# Patient Record
Sex: MALE | Race: WHITE | Employment: OTHER | URBAN - METROPOLITAN AREA
[De-identification: names, ages, dates, MRNs, and addresses within clinical notes are randomized per-mention and may not be internally consistent; named-entity substitution may affect disease eponyms.]

---

## 2017-05-19 ENCOUNTER — HOSPITAL ENCOUNTER (OUTPATIENT)
Dept: GENERAL RADIOLOGY | Age: 67
Discharge: HOME | End: 2017-05-19
Payer: COMMERCIAL

## 2017-05-19 DIAGNOSIS — M41.50 DEGENERATIVE SCOLIOSIS: ICD-10-CM

## 2017-05-19 PROCEDURE — 72040 X-RAY EXAM NECK SPINE 2-3 VW: CPT

## 2017-05-19 PROCEDURE — 72100 X-RAY EXAM L-S SPINE 2/3 VWS: CPT

## 2017-06-30 PROBLEM — R79.89 LOW TSH LEVEL: Status: ACTIVE | Noted: 2017-06-30

## 2017-06-30 PROBLEM — E87.1 HYPONATREMIA: Status: ACTIVE | Noted: 2017-06-30

## 2017-06-30 PROBLEM — B35.1 ONYCHOMYCOSIS OF TOENAIL: Chronic | Status: ACTIVE | Noted: 2017-06-30

## 2018-02-08 PROBLEM — H04.123 INSUFFICIENCY OF TEAR FILM OF BOTH EYES: Chronic | Status: ACTIVE | Noted: 2018-02-08

## 2018-02-14 PROBLEM — K40.90 RIGHT INGUINAL HERNIA: Status: ACTIVE | Noted: 2018-02-14

## 2018-05-31 RX ORDER — CEFAZOLIN SODIUM IN 0.9 % NACL 2 G/50 ML
2 INTRAVENOUS SOLUTION, PIGGYBACK (ML) INTRAVENOUS ONCE
Status: CANCELLED | OUTPATIENT
Start: 2018-06-29 | End: 2018-06-29

## 2018-06-28 ENCOUNTER — ANESTHESIA EVENT (OUTPATIENT)
Dept: SURGERY | Age: 68
End: 2018-06-28
Payer: COMMERCIAL

## 2018-06-28 RX ORDER — SODIUM CHLORIDE 0.9 % (FLUSH) 0.9 %
5-10 SYRINGE (ML) INJECTION EVERY 8 HOURS
Status: CANCELLED | OUTPATIENT
Start: 2018-06-28

## 2018-06-28 RX ORDER — SODIUM CHLORIDE 0.9 % (FLUSH) 0.9 %
5-10 SYRINGE (ML) INJECTION AS NEEDED
Status: CANCELLED | OUTPATIENT
Start: 2018-06-28

## 2018-06-29 ENCOUNTER — HOSPITAL ENCOUNTER (OUTPATIENT)
Age: 68
Setting detail: OUTPATIENT SURGERY
Discharge: HOME | End: 2018-06-29
Attending: SURGERY | Admitting: SURGERY
Payer: COMMERCIAL

## 2018-06-29 ENCOUNTER — ANESTHESIA (OUTPATIENT)
Dept: SURGERY | Age: 68
End: 2018-06-29
Payer: COMMERCIAL

## 2018-06-29 VITALS
HEIGHT: 70 IN | BODY MASS INDEX: 23.94 KG/M2 | TEMPERATURE: 97.6 F | WEIGHT: 167.25 LBS | SYSTOLIC BLOOD PRESSURE: 150 MMHG | DIASTOLIC BLOOD PRESSURE: 81 MMHG | HEART RATE: 75 BPM | RESPIRATION RATE: 16 BRPM | OXYGEN SATURATION: 91 %

## 2018-06-29 PROCEDURE — 77030002986 HC SUT PROL J&J -A: Performed by: SURGERY

## 2018-06-29 PROCEDURE — 77030012406 HC DRN WND PENRS BARD -A: Performed by: SURGERY

## 2018-06-29 PROCEDURE — 74011250636 HC RX REV CODE- 250/636

## 2018-06-29 PROCEDURE — 74011250636 HC RX REV CODE- 250/636: Performed by: SURGERY

## 2018-06-29 PROCEDURE — 77030011640 HC PAD GRND REM COVD -A: Performed by: SURGERY

## 2018-06-29 PROCEDURE — 77030003029 HC SUT VCRL J&J -B: Performed by: SURGERY

## 2018-06-29 PROCEDURE — 74011250636 HC RX REV CODE- 250/636: Performed by: ANESTHESIOLOGY

## 2018-06-29 PROCEDURE — 74011000250 HC RX REV CODE- 250

## 2018-06-29 PROCEDURE — 77030020782 HC GWN BAIR PAWS FLX 3M -B: Performed by: ANESTHESIOLOGY

## 2018-06-29 PROCEDURE — 77030002996 HC SUT SLK J&J -A: Performed by: SURGERY

## 2018-06-29 PROCEDURE — 77030018836 HC SOL IRR NACL ICUM -A: Performed by: SURGERY

## 2018-06-29 PROCEDURE — 76010000161 HC OR TIME 1 TO 1.5 HR INTENSV-TIER 1: Performed by: SURGERY

## 2018-06-29 PROCEDURE — 77030008703 HC TU ET UNCUF COVD -A: Performed by: ANESTHESIOLOGY

## 2018-06-29 PROCEDURE — 74011000250 HC RX REV CODE- 250: Performed by: SURGERY

## 2018-06-29 PROCEDURE — 77030021678 HC GLIDESCP STAT DISP VERT -B: Performed by: ANESTHESIOLOGY

## 2018-06-29 PROCEDURE — 77030032490 HC SLV COMPR SCD KNE COVD -B: Performed by: SURGERY

## 2018-06-29 PROCEDURE — 77030031139 HC SUT VCRL2 J&J -A: Performed by: SURGERY

## 2018-06-29 PROCEDURE — C1781 MESH (IMPLANTABLE): HCPCS | Performed by: SURGERY

## 2018-06-29 PROCEDURE — 77030008467 HC STPLR SKN COVD -B: Performed by: SURGERY

## 2018-06-29 PROCEDURE — 74011250637 HC RX REV CODE- 250/637: Performed by: ANESTHESIOLOGY

## 2018-06-29 PROCEDURE — 76060000034 HC ANESTHESIA 1.5 TO 2 HR: Performed by: SURGERY

## 2018-06-29 PROCEDURE — 76210000020 HC REC RM PH II FIRST 0.5 HR: Performed by: SURGERY

## 2018-06-29 PROCEDURE — 76210000000 HC OR PH I REC 2 TO 2.5 HR: Performed by: SURGERY

## 2018-06-29 PROCEDURE — 77030018673: Performed by: SURGERY

## 2018-06-29 DEVICE — BARD MESH, 3" X 6" (7.6 CM X 15 CM)
Type: IMPLANTABLE DEVICE | Site: GROIN | Status: FUNCTIONAL
Brand: BARD

## 2018-06-29 RX ORDER — PROPOFOL 10 MG/ML
INJECTION, EMULSION INTRAVENOUS AS NEEDED
Status: DISCONTINUED | OUTPATIENT
Start: 2018-06-29 | End: 2018-06-29 | Stop reason: HOSPADM

## 2018-06-29 RX ORDER — FAMOTIDINE 20 MG/1
20 TABLET, FILM COATED ORAL ONCE
Status: COMPLETED | OUTPATIENT
Start: 2018-06-29 | End: 2018-06-29

## 2018-06-29 RX ORDER — ONDANSETRON 2 MG/ML
INJECTION INTRAMUSCULAR; INTRAVENOUS AS NEEDED
Status: DISCONTINUED | OUTPATIENT
Start: 2018-06-29 | End: 2018-06-29 | Stop reason: HOSPADM

## 2018-06-29 RX ORDER — FENTANYL CITRATE 50 UG/ML
INJECTION, SOLUTION INTRAMUSCULAR; INTRAVENOUS AS NEEDED
Status: DISCONTINUED | OUTPATIENT
Start: 2018-06-29 | End: 2018-06-29 | Stop reason: HOSPADM

## 2018-06-29 RX ORDER — NEOSTIGMINE METHYLSULFATE 1 MG/ML
INJECTION INTRAVENOUS AS NEEDED
Status: DISCONTINUED | OUTPATIENT
Start: 2018-06-29 | End: 2018-06-29 | Stop reason: HOSPADM

## 2018-06-29 RX ORDER — SODIUM CHLORIDE 0.9 % (FLUSH) 0.9 %
5-10 SYRINGE (ML) INJECTION AS NEEDED
Status: DISCONTINUED | OUTPATIENT
Start: 2018-06-29 | End: 2018-06-29 | Stop reason: HOSPADM

## 2018-06-29 RX ORDER — CEFAZOLIN SODIUM IN 0.9 % NACL 2 G/50 ML
2 INTRAVENOUS SOLUTION, PIGGYBACK (ML) INTRAVENOUS ONCE
Status: DISCONTINUED | OUTPATIENT
Start: 2018-06-29 | End: 2018-06-29 | Stop reason: SDUPTHER

## 2018-06-29 RX ORDER — GLYCOPYRROLATE 0.2 MG/ML
INJECTION INTRAMUSCULAR; INTRAVENOUS AS NEEDED
Status: DISCONTINUED | OUTPATIENT
Start: 2018-06-29 | End: 2018-06-29 | Stop reason: HOSPADM

## 2018-06-29 RX ORDER — BUPIVACAINE HYDROCHLORIDE 2.5 MG/ML
INJECTION, SOLUTION EPIDURAL; INFILTRATION; INTRACAUDAL AS NEEDED
Status: DISCONTINUED | OUTPATIENT
Start: 2018-06-29 | End: 2018-06-29 | Stop reason: HOSPADM

## 2018-06-29 RX ORDER — LIDOCAINE HYDROCHLORIDE 20 MG/ML
INJECTION, SOLUTION EPIDURAL; INFILTRATION; INTRACAUDAL; PERINEURAL AS NEEDED
Status: DISCONTINUED | OUTPATIENT
Start: 2018-06-29 | End: 2018-06-29 | Stop reason: HOSPADM

## 2018-06-29 RX ORDER — ROCURONIUM BROMIDE 10 MG/ML
INJECTION, SOLUTION INTRAVENOUS AS NEEDED
Status: DISCONTINUED | OUTPATIENT
Start: 2018-06-29 | End: 2018-06-29 | Stop reason: HOSPADM

## 2018-06-29 RX ORDER — HYDROMORPHONE HYDROCHLORIDE 2 MG/ML
0.5 INJECTION, SOLUTION INTRAMUSCULAR; INTRAVENOUS; SUBCUTANEOUS
Status: DISCONTINUED | OUTPATIENT
Start: 2018-06-29 | End: 2018-06-29 | Stop reason: HOSPADM

## 2018-06-29 RX ORDER — LIDOCAINE HYDROCHLORIDE 10 MG/ML
0.1 INJECTION INFILTRATION; PERINEURAL AS NEEDED
Status: DISCONTINUED | OUTPATIENT
Start: 2018-06-29 | End: 2018-06-29 | Stop reason: HOSPADM

## 2018-06-29 RX ORDER — OXYCODONE HYDROCHLORIDE 5 MG/1
5 TABLET ORAL
Status: COMPLETED | OUTPATIENT
Start: 2018-06-29 | End: 2018-06-29

## 2018-06-29 RX ORDER — ACETAMINOPHEN 10 MG/ML
1000 INJECTION, SOLUTION INTRAVENOUS
Status: COMPLETED | OUTPATIENT
Start: 2018-06-29 | End: 2018-06-29

## 2018-06-29 RX ORDER — MIDAZOLAM HYDROCHLORIDE 1 MG/ML
2 INJECTION, SOLUTION INTRAMUSCULAR; INTRAVENOUS
Status: DISCONTINUED | OUTPATIENT
Start: 2018-06-29 | End: 2018-06-29 | Stop reason: HOSPADM

## 2018-06-29 RX ORDER — FENTANYL CITRATE 50 UG/ML
25 INJECTION, SOLUTION INTRAMUSCULAR; INTRAVENOUS ONCE
Status: DISCONTINUED | OUTPATIENT
Start: 2018-06-29 | End: 2018-06-29 | Stop reason: HOSPADM

## 2018-06-29 RX ORDER — OXYCODONE AND ACETAMINOPHEN 5; 325 MG/1; MG/1
1 TABLET ORAL AS NEEDED
Status: DISCONTINUED | OUTPATIENT
Start: 2018-06-29 | End: 2018-06-29 | Stop reason: HOSPADM

## 2018-06-29 RX ORDER — SODIUM CHLORIDE, SODIUM LACTATE, POTASSIUM CHLORIDE, CALCIUM CHLORIDE 600; 310; 30; 20 MG/100ML; MG/100ML; MG/100ML; MG/100ML
100 INJECTION, SOLUTION INTRAVENOUS CONTINUOUS
Status: DISCONTINUED | OUTPATIENT
Start: 2018-06-29 | End: 2018-06-29 | Stop reason: HOSPADM

## 2018-06-29 RX ORDER — DIPHENHYDRAMINE HYDROCHLORIDE 50 MG/ML
12.5 INJECTION, SOLUTION INTRAMUSCULAR; INTRAVENOUS ONCE
Status: DISCONTINUED | OUTPATIENT
Start: 2018-06-29 | End: 2018-06-29 | Stop reason: HOSPADM

## 2018-06-29 RX ORDER — MIDAZOLAM HYDROCHLORIDE 1 MG/ML
2 INJECTION, SOLUTION INTRAMUSCULAR; INTRAVENOUS ONCE
Status: COMPLETED | OUTPATIENT
Start: 2018-06-29 | End: 2018-06-29

## 2018-06-29 RX ORDER — SODIUM CHLORIDE 9 MG/ML
50 INJECTION, SOLUTION INTRAVENOUS CONTINUOUS
Status: DISCONTINUED | OUTPATIENT
Start: 2018-06-29 | End: 2018-06-29 | Stop reason: HOSPADM

## 2018-06-29 RX ORDER — CEFAZOLIN SODIUM/WATER 2 G/20 ML
2 SYRINGE (ML) INTRAVENOUS
Status: COMPLETED | OUTPATIENT
Start: 2018-06-29 | End: 2018-06-29

## 2018-06-29 RX ADMIN — PROPOFOL 200 MG: 10 INJECTION, EMULSION INTRAVENOUS at 07:15

## 2018-06-29 RX ADMIN — OXYCODONE HYDROCHLORIDE 5 MG: 5 TABLET ORAL at 10:26

## 2018-06-29 RX ADMIN — ONDANSETRON 4 MG: 2 INJECTION INTRAMUSCULAR; INTRAVENOUS at 07:25

## 2018-06-29 RX ADMIN — HYDROMORPHONE HYDROCHLORIDE 0.5 MG: 2 INJECTION, SOLUTION INTRAMUSCULAR; INTRAVENOUS; SUBCUTANEOUS at 10:26

## 2018-06-29 RX ADMIN — FAMOTIDINE 20 MG: 20 TABLET ORAL at 06:08

## 2018-06-29 RX ADMIN — SODIUM CHLORIDE, SODIUM LACTATE, POTASSIUM CHLORIDE, AND CALCIUM CHLORIDE 1000 ML: 600; 310; 30; 20 INJECTION, SOLUTION INTRAVENOUS at 06:07

## 2018-06-29 RX ADMIN — Medication 2 G: at 07:25

## 2018-06-29 RX ADMIN — PROMETHAZINE HYDROCHLORIDE 6.25 MG: 25 INJECTION INTRAMUSCULAR; INTRAVENOUS at 09:24

## 2018-06-29 RX ADMIN — FENTANYL CITRATE 25 MCG: 50 INJECTION, SOLUTION INTRAMUSCULAR; INTRAVENOUS at 07:30

## 2018-06-29 RX ADMIN — MIDAZOLAM HYDROCHLORIDE 2 MG: 1 INJECTION, SOLUTION INTRAMUSCULAR; INTRAVENOUS at 06:53

## 2018-06-29 RX ADMIN — ROCURONIUM BROMIDE 40 MG: 10 INJECTION, SOLUTION INTRAVENOUS at 07:15

## 2018-06-29 RX ADMIN — ROCURONIUM BROMIDE 10 MG: 10 INJECTION, SOLUTION INTRAVENOUS at 07:41

## 2018-06-29 RX ADMIN — SODIUM CHLORIDE, SODIUM LACTATE, POTASSIUM CHLORIDE, AND CALCIUM CHLORIDE: 600; 310; 30; 20 INJECTION, SOLUTION INTRAVENOUS at 07:06

## 2018-06-29 RX ADMIN — LIDOCAINE HYDROCHLORIDE 60 MG: 20 INJECTION, SOLUTION EPIDURAL; INFILTRATION; INTRACAUDAL; PERINEURAL at 07:15

## 2018-06-29 RX ADMIN — NEOSTIGMINE METHYLSULFATE 3 MG: 1 INJECTION INTRAVENOUS at 08:21

## 2018-06-29 RX ADMIN — GLYCOPYRROLATE 0.4 MG: 0.2 INJECTION INTRAMUSCULAR; INTRAVENOUS at 08:21

## 2018-06-29 RX ADMIN — HYDROMORPHONE HYDROCHLORIDE 0.5 MG: 2 INJECTION, SOLUTION INTRAMUSCULAR; INTRAVENOUS; SUBCUTANEOUS at 10:32

## 2018-06-29 RX ADMIN — ACETAMINOPHEN 1000 MG: 10 INJECTION, SOLUTION INTRAVENOUS at 09:34

## 2018-06-29 RX ADMIN — FENTANYL CITRATE 50 MCG: 50 INJECTION, SOLUTION INTRAMUSCULAR; INTRAVENOUS at 07:15

## 2018-06-29 NOTE — ANESTHESIA PREPROCEDURE EVALUATION
Anesthetic History   No history of anesthetic complications            Review of Systems / Medical History  Patient summary reviewed and pertinent labs reviewed    Pulmonary  Within defined limits                 Neuro/Psych   Within defined limits           Cardiovascular                  Exercise tolerance: >4 METS  Comments: MVP  RBBB   GI/Hepatic/Renal     GERD           Endo/Other        Arthritis     Other Findings   Comments: Scoliosis  Insomnia  DDD  Back pain           Physical Exam    Airway  Mallampati: II  TM Distance: 4 - 6 cm  Neck ROM: decreased range of motion   Mouth opening: Normal     Cardiovascular    Rhythm: regular  Rate: normal         Dental  No notable dental hx       Pulmonary  Breath sounds clear to auscultation               Abdominal  GI exam deferred       Other Findings            Anesthetic Plan    ASA: 2  Anesthesia type: general          Induction: Intravenous  Anesthetic plan and risks discussed with: Patient

## 2018-06-29 NOTE — H&P
H&P/Consult Note/Progress Note/Office Note:   Bienvenido Vega  MRN: [id number]  BKO:[birth year]  Age:67 y.o.    HPI: Bienvenido Vega is a 78 y.o. male who developed a left groin bulge and mild-moderate discomfort in the left groin   which is worsened with exertion. He has had no prior inguinal hernia repairs in the past.      He previously had low midline incision by Dr Mitchell/Dr Deonte Strickland for a back procedure with wound dehisc.       Past Medical History:   Diagnosis Date    Arthritis 07/06/2015    mostly lumbar area    Back pain     DDD (degenerative disc disease), lumbar     GERD (gastroesophageal reflux disease)     occassionally- managed with PRN OTC meds    History of basal cell cancer     removed from scalp    History of squamous cell carcinoma     removed from RLE    Insomnia 07/06/2015    managed with PRN meds    Left inguinal hernia     MVP (mitral valve prolapse)     Onychomycosis 7/6/2015    toenail     Oral lichen planus 8/8/6421    RBBB 7/6/2015    Right wrist pain 7/6/2015    Rising PSA level 7/6/2015    Scoliosis     mild    Spondylosis of lumbar region without myelopathy or radiculopathy     chronic LBP     Past Surgical History:   Procedure Laterality Date    HX COLONOSCOPY      HX ORTHOPAEDIC      RIGHT knee scoped, Dr. Jefferson Moser ARTHROSCOPY Right     NEUROLOGICAL PROCEDURE UNLISTED  2007    L5 - S1 fused, Dr. Deonte Strickland     Current Facility-Administered Medications   Medication Dose Route Frequency    ceFAZolin (ANCEF) 2 g/20 mL in sterile water IV syringe  2 g IntraVENous ON CALL TO OR    lidocaine (XYLOCAINE) 10 mg/mL (1 %) injection 0.1 mL  0.1 mL SubCUTAneous PRN    lactated Ringers infusion  100 mL/hr IntraVENous CONTINUOUS    lactated ringers bolus infusion 1,000 mL  1,000 mL IntraVENous ONCE    0.9% sodium chloride infusion  50 mL/hr IntraVENous CONTINUOUS    fentaNYL citrate (PF) injection 25 mcg  25 mcg IntraVENous ONCE    midazolam (VERSED) injection 2 mg  2 mg IntraVENous ONCE PRN    midazolam (VERSED) injection 2 mg  2 mg IntraVENous ONCE    ceFAZolin in 0.9% NS (ANCEF) IVPB soln 2 g  2 g IntraVENous ONCE     Review of patient's allergies indicates no known allergies. Social History     Social History    Marital status:      Spouse name: N/A    Number of children: N/A    Years of education: N/A     Social History Main Topics    Smoking status: Never Smoker    Smokeless tobacco: Never Used    Alcohol use 3.0 oz/week     5 Cans of beer per week    Drug use: No    Sexual activity: Yes     Partners: Female     Other Topics Concern    Not on file     Social History Narrative     History   Smoking Status    Never Smoker   Smokeless Tobacco    Never Used     Family History   Problem Relation Age of Onset    Cancer Mother      breast cancer    Cancer Father      colon cancer    Malignant Hyperthermia Neg Hx     Pseudocholinesterase Deficiency Neg Hx     Delayed Awakening Neg Hx     Post-op Nausea/Vomiting Neg Hx     Emergence Delirium Neg Hx     Post-op Cognitive Dysfunction Neg Hx     Other Neg Hx      ROS: The patient has no difficulty with chest pain or shortness of breath. No fever or chills. Comprehensive review of systems was otherwise unremarkable except as noted above. Physical Exam:   Visit Vitals    /83 (BP 1 Location: Right arm, BP Patient Position: At rest;Supine)    Pulse 89    Temp 97.9 °F (36.6 °C)    Resp 18    Ht 5' 10\" (1.778 m)    Wt 167 lb 4 oz (75.9 kg)    SpO2 99%    BMI 24 kg/m2     Constitutional: Alert, oriented, cooperative patient in no acute distress; appears stated age    Eyes:Sclera are clear. EOMs intact  ENMT: no external lesions gross hearing normal; no obvious neck masses, no ear or lip lesions, nares normal  CV: RRR. Normal perfusion  Resp: No JVD. Breathing is  non-labored; no audible wheezing.     GI: soft and non-distended; healed scars; small ventral hernia;   +LIH; no testicular masses    Musculoskeletal: unremarkable with normal function. No embolic signs or cyanosis. Neuro:  Oriented; moves all 4; no focal deficits  Psychiatric: normal affect and mood, no memory impairment    Recent vitals (if inpt):  @IPVITALS(24:)@    Labs:  Recent Labs      06/27/18   0927   WBC  6.4   HGB  13.8   PLT  286   NA  132*   K  4.8   CL  93*   CO2  26   BUN  18   CREA  1.02   GLU  85   TBILI  0.8   SGOT  29   ALT  17   AP  46       Lab Results   Component Value Date/Time    WBC 6.4 06/27/2018 09:27 AM    HGB 13.8 06/27/2018 09:27 AM    PLATELET 161 22/71/1297 09:27 AM    Sodium 132 (L) 06/27/2018 09:27 AM    Potassium 4.8 06/27/2018 09:27 AM    Chloride 93 (L) 06/27/2018 09:27 AM    CO2 26 06/27/2018 09:27 AM    BUN 18 06/27/2018 09:27 AM    Creatinine 1.02 06/27/2018 09:27 AM    Glucose 85 06/27/2018 09:27 AM    Bilirubin, total 0.8 06/27/2018 09:27 AM    Bilirubin, direct 0.1 07/21/2010 07:41 AM    AST (SGOT) 29 06/27/2018 09:27 AM    ALT (SGPT) 17 06/27/2018 09:27 AM    Alk. phosphatase 46 06/27/2018 09:27 AM       I reviewed recent labs and recent radiologic studies. I independently reviewed radiology images for studies I described above or studies I have ordered.    Admission date (for inpatients): 6/29/2018   [unfilled]  [unfilled]    ASSESSMENT/PLAN:  Problem List  Date Reviewed: 2/14/2018          Codes Class Noted    Left inguinal hernia ICD-10-CM: K40.90  ICD-9-CM: 550.90  2/14/2018        Insufficiency of tear film of both eyes (Chronic) ICD-10-CM: R98.688  ICD-9-CM: 375.15  2/8/2018        Low TSH level ICD-10-CM: R94.6  ICD-9-CM: 794.5  6/30/2017        Onychomycosis of toenail (Chronic) ICD-10-CM: B35.1  ICD-9-CM: 110.1  6/30/2017        Hyponatremia ICD-10-CM: E87.1  ICD-9-CM: 276.1  6/30/2017    Overview Signed 6/30/2017 11:16 PM by Maddie Emanuel MD     Recurrent, probably due to meloxicam.             Lumbar disc disease (Chronic) ICD-10-CM: M51.9  ICD-9-CM: 722.93 7/6/2015        IBS (irritable bowel syndrome) (Chronic) ICD-10-CM: K58.9  ICD-9-CM: 564.1  7/6/2015        Insomnia (Chronic) ICD-10-CM: G47.00  ICD-9-CM: 780.52  7/6/2015        Arthritis (Chronic) ICD-10-CM: M19.90  ICD-9-CM: 716.90  7/6/2015    Overview Signed 7/6/2015 10:29 PM by Ashlee Su MD     primarily the right wrist             Right wrist pain (Chronic) ICD-10-CM: M25.531  ICD-9-CM: 719.43  7/6/2015        Onychomycosis (Chronic) ICD-10-CM: B35.1  ICD-9-CM: 110.1  7/6/2015    Overview Signed 7/6/2015 10:29 PM by Ashlee Su MD     toenail             Rising PSA level (Chronic) ICD-10-CM: R97.20  ICD-9-CM: 790.93  5/6/5190        Oral lichen planus (Chronic) ICD-10-CM: L43.8  ICD-9-CM: 697.0  7/6/2015        BPH with obstruction/lower urinary tract symptoms (Chronic) ICD-10-CM: N40.1, N13.8  ICD-9-CM: 600.01, 599.69  7/6/2015    Overview Signed 7/6/2015 10:34 PM by Ashlee Su MD     minor             RBBB (Chronic) ICD-10-CM: I45.10  ICD-9-CM: 426.4  7/6/2015        Chronic low back pain (Chronic) ICD-10-CM: M54.5, G89.29  ICD-9-CM: 724.2, 338.29  7/5/2015        MVP (mitral valve prolapse) (Chronic) ICD-10-CM: I34.1  ICD-9-CM: 424.0  7/5/2015        Special screening for malignant neoplasm of prostate ICD-10-CM: Z12.5  ICD-9-CM: V76.44  7/2/2015            Active Problems:    * No active hospital problems. *       Informed consent obtained for [facility] with mesh.           Signed:  Corky Ireland MD,  FACS

## 2018-06-29 NOTE — OP NOTES
[facility] [address], [address] [facility]  [phone]    [address] REPORT    Name: Guicho Romo  Date of Surgery: 6/29/2018  Med Record Number: [id number]   Age: 78 y.o. Sex: male   Pre-operative Diagnosis: Hernia, inguinal, left [K40.90]  Post-operative Diagnosis: Hernia, inguinal, left [K40.90]  - Direct  Procedure: Direct Inguinal Hernia Repair with mesh  Surgeon: Eder Urena MD  Assistants: none  Anesthesia:  General  Complications: none  Specimens:  none  Estimated Blood Loss:  <30cc      Procedure Description:   The risks, benefits, potential complications, treatment options, and expected outcomes were discussed with the patient pre-operatively. The possibilities of reaction to medication, chronic pain, bleeding, infection, injury to internal organs, recurrence, testicular damage, blood clots, the need for further surgery, heart attack, stroke, and death were discussed with the patient. The patient voiced understanding and gave informed consent preoperatively. The surgical site was marked preoperatively. The patient was taken to the Operating Room, and the [facility] time-out protocol checklist was followed. After the induction of adequate anesthesia, the abdomen was prepped and draped in the usual sterile fashion. An Nyoka Parcel was used to prevent any contact with the skin. Preoperative antibiotics were given. An oblique inguinal incision was made and carried to the external oblique fascia and external ring. The external oblique was opened in the direction of its fibers down to the ring and the spermatic cord was encircled with a penrose drain. The direct hernia defect was identified and imbricated with suture to keep it out of the field. There was no indirect hernia component. The wound was irrigated with Bacitracin irrigation. A piece of prolene mesh was soaked in bacitracin, cut to size and anchored at the pubic tubercle with 2-0 prolene suture.   The mesh was secured medially to the conjoint tendon and laterally to the iliopubic tract and then the defect cut in the mesh to allow accommodation the cord was secured with 2-0 prolene suture superolaterally. The wound was once again irrigated with bacitracin and hemostasis confirmed. It was then anesthetized with marcaine and the external oblique was re approximated with 2-0 vicryl suture. Gena's fascia was closed with 3-O vicryl suture. The skin was closed with clips, and a sterile dressing of Telfa and tegaderm was placed. At the end of the operation, all sponge, instruments, and needle counts were correct.      Eder Urena MD, FACS

## 2018-06-29 NOTE — DISCHARGE INSTRUCTIONS
Leave dressings 6-7 days. Then remove, but keep incision covered daily until follow-up. Try to keep incision as dry as possible to lower risk of infection. No heavy lifting (>5lbs) for 6 weeks to reduce risk of developing a hernia in the incisions. No driving until you are off pain meds for 24hrs and have no pain with movements associated with driving. Pain prescription (Percocet) on chart for patient to use as needed for pain  Follow-up with Dr Shelby Duran in 1 week (Friday July 6 at 11:45am-appt already made) in the office at:  [address], [address]  [phone]-->option 1)    After general anesthesia or intravenous sedation, for 24 hours or while taking prescription Narcotics:  · Limit your activities  · Do not drive and operate hazardous machinery  · Do not make important personal or business decisions  · Do  not drink alcoholic beverages  · If you have not urinated within 8 hours after discharge, please contact your surgeon on call. *  Please give a list of your current medications to your Primary Care Provider. *  Please update this list whenever your medications are discontinued, doses are      changed, or new medications (including over-the-counter products) are added. *  Please carry medication information at all times in case of emergency situations. These are general instructions for a healthy lifestyle:  No smoking/ No tobacco products/ Avoid exposure to second hand smoke  Surgeon General's Warning:  Quitting smoking now greatly reduces serious risk to your health.   Obesity, smoking, and sedentary lifestyle greatly increases your risk for illness  A healthy diet, regular physical exercise & weight monitoring are important for maintaining a healthy lifestyle    You may be retaining fluid if you have a history of heart failure or if you experience any of the following symptoms:  Weight gain of 3 pounds or more overnight or 5 pounds in a week, increased swelling in our hands or feet or shortness of breath while lying flat in bed. Please call your doctor as soon as you notice any of these symptoms; do not wait until your next office visit. Recognize signs and symptoms of STROKE:  F-face looks uneven  A-arms unable to move or move unevenly  S-speech slurred or non-existent  T-time-call 911 as soon as signs and symptoms begin-DO NOT go       Back to bed or wait to see if you get better-TIME IS BRAIN.

## 2018-06-29 NOTE — IP AVS SNAPSHOT
Cynthia Shelby 
 
 
 [address] [facility] [address] 
[phone] Patient: Benjy Bolaños MRN: [id number] About your hospitalization You were admitted on:  June 29, 2018 You last received care in the:  [facility] PACU You were discharged on:  June 29, 2018 Why you were hospitalized Your primary diagnosis was:  Not on File Follow-up Information Follow up With Details Comments Contact Info Ro Johnston MD   [address] [facility] [address] 
[phone] Your Scheduled Appointments Thursday July 05, 2018  3:00 PM EDT Complete Physical with Ro Johnston MD  
Internal Medicine [facility] Diagnostics ([address]) [address] [facility] 
[address] [place] [id number]  
[phone] Friday July 06, 2018 11:45 AM EDT Global Post Op with Shea Arellano MD  
[facility] ([facility]) [facility] [address]  
[phone] Discharge Orders None A check louis indicates which time of day the medication should be taken. My Medications CONTINUE taking these medications Instructions Each Dose to Equal  
 Morning Noon Evening Bedtime BENTYL 10 mg capsule Generic drug:  dicyclomine Your last dose was: Your next dose is: Take 10 mg by mouth as needed. 10 mg  
    
   
   
   
  
 COLACE 100 mg capsule Generic drug:  docusate sodium Your last dose was: Your next dose is: Take 100 mg by mouth daily. 100 mg  
    
   
   
   
  
 eszopiclone 3 mg tablet Commonly known as:  Flower Normangee Your last dose was: Your next dose is: Take 1 Tab by mouth nightly as needed for Sleep. Max Daily Amount: 3 mg.  
 3 mg HYDROcodone-acetaminophen 5-325 mg per tablet Commonly known as:  [address] Your last dose was: Your next dose is: Take 1 Tab by mouth every six (6) hours as needed for Pain. Max Daily Amount: 4 Tabs. 1 Tab LINZESS 145 mcg Cap capsule Generic drug:  linaclotide Your last dose was: Your next dose is: Take 145 mcg by mouth as needed. 145 mcg  
    
   
   
   
  
 loratadine 10 mg tablet Commonly known as:  Chapo Briggs Your last dose was: Your next dose is: Take 10 mg by mouth daily. 10 mg  
    
   
   
   
  
 meloxicam 15 mg tablet Commonly known as:  MOBIC Your last dose was: Your next dose is: Take 1 Tab by mouth daily. 15 mg  
    
   
   
   
  
 omeprazole 20 mg capsule Commonly known as:  PRILOSEC Your last dose was: Your next dose is: Take 20 mg by mouth daily. 20 mg  
    
   
   
   
  
 RESTASIS 0.05 % ophthalmic emulsion Generic drug:  cycloSPORINE Your last dose was: Your next dose is:    
   
   
 INSTILL 1 DROP IN BOTH EYES TWICE DAILY  
     
   
   
   
  
 SINGULAIR 10 mg tablet Generic drug:  montelukast  
   
Your last dose was: Your next dose is: Take 10 mg by mouth as needed. 10 mg  
    
   
   
   
  
 traMADol 50 mg tablet Commonly known as:  ULTRAM  
   
Your last dose was: Your next dose is: Take 1 Tab by mouth every six (6) hours as needed for Pain. Max Daily Amount: 200 mg.  
 50 mg  
    
   
   
   
  
 tretinoin 0.05 % topical cream  
Commonly known as:  RETIN-A Your last dose was: Your next dose is:    
   
   
 APPLY TO AFFECTED AREA NIGHTLY ATBEDTIME  
     
   
   
   
  
 triamcinolone acetonide 0.1 % dental paste Commonly known as:  KENALOG Your last dose was:     
   
Your next dose is:    
   
   
 APPLY AS DIRECTED  
 Opioid Education Prescription Opioids: What You Need to Know: 
 
Prescription opioids can be used to help relieve moderate-to-severe pain and are often prescribed following a surgery or injury, or for certain health conditions. These medications can be an important part of treatment but also come with serious risks. Opioids are strong pain medicines. Examples include hydrocodone, oxycodone, fentanyl, and morphine. Heroin is an example of an illegal opioid. It is important to work with your health care provider to make sure you are getting the safest, most effective care. WHAT ARE THE RISKS AND SIDE EFFECTS OF OPIOID USE? Prescription opioids carry serious risks of addiction and overdose, especially with prolonged use. An opioid overdose, often marked by slow breathing, can cause sudden death. The use of prescription opioids can have a number of side effects as well, even when taken as directed. · Tolerance-meaning you might need to take more of a medication for the same pain relief · Physical dependence-meaning you have symptoms of withdrawal when the medication is stopped. Withdrawal symptoms can include nausea, sweating, chills, diarrhea, stomach cramps, and muscle aches. Withdrawal can last up to several weeks, depending on which drug you took and how long you took it. · Increased sensitivity to pain · Constipation · Nausea, vomiting, and dry mouth · Sleepiness and dizziness · Confusion · Depression · Low levels of testosterone that can result in lower sex drive, energy, and strength · Itching and sweating RISKS ARE GREATER WITH:      
· History of drug misuse, substance use disorder, or overdose · Mental health conditions (such as depression or anxiety) · Sleep apnea · Older age (72 years or older) · Pregnancy Avoid alcohol while taking prescription opioids.   Also, unless specifically advised by your health care provider, medications to avoid include: · Benzodiazepines (such as Xanax or Valium) · Muscle relaxants (such as Soma or Flexeril) · Hypnotics (such as Ambien or Lunesta) · Other prescription opioids KNOW YOUR OPTIONS Talk to your health care provider about ways to manage your pain that don't involve prescription opioids. Some of these options may actually work better and have fewer risks and side effects. Options may include: 
· Pain relievers such as acetaminophen, ibuprofen, and naproxen · Some medications that are also used for depression or seizures · Physical therapy and exercise · Counseling to help patients learn how to cope better with triggers of pain and stress. · Application of heat or cold compress · Massage therapy · Relaxation techniques Be Informed Make sure you know the name of your medication, how much and how often to take it, and its potential risks & side effects. IF YOU ARE PRESCRIBED OPIOIDS FOR PAIN: 
· Never take opioids in greater amounts or more often than prescribed. Remember the goal is not to be pain-free but to manage your pain at a tolerable level. · Follow up with your primary care provider to: · Work together to create a plan on how to manage your pain. · Talk about ways to help manage your pain that don't involve prescription opioids. · Talk about any and all concerns and side effects. · Help prevent misuse and abuse. · Never sell or share prescription opioids · Help prevent misuse and abuse. · Store prescription opioids in a secure place and out of reach of others (this may include visitors, children, friends, and family). · Safely dispose of unused/unwanted prescription opioids: Find your community drug take-back program or your pharmacy mail-back program, or flush them down the toilet, following guidance from the Food and Drug Administration (www.fda.gov/Drugs/ResourcesForYou). · Visit www.cdc.gov/drugoverdose to learn about the risks of opioid abuse and overdose. · If you believe you may be struggling with addiction, tell your health care provider and ask for guidance or call Garry Saleh at 4-960-156-MGBI. Discharge Instructions Leave dressings 6-7 days. Then remove, but keep incision covered daily until follow-up. Try to keep incision as dry as possible to lower risk of infection. No heavy lifting (>5lbs) for 6 weeks to reduce risk of developing a hernia in the incisions. No driving until you are off pain meds for 24hrs and have no pain with movements associated with driving. Pain prescription (Percocet) on chart for patient to use as needed for pain Follow-up with Dr Tal Gamino in 1 week (Friday July 6 at 11:45am-appt already made) in the office at: 
[place] [address], [address] 
([phone]-->option 1) After general anesthesia or intravenous sedation, for 24 hours or while taking prescription Narcotics: · Limit your activities · Do not drive and operate hazardous machinery · Do not make important personal or business decisions · Do  not drink alcoholic beverages · If you have not urinated within 8 hours after discharge, please contact your surgeon on call. *  Please give a list of your current medications to your Primary Care Provider. *  Please update this list whenever your medications are discontinued, doses are 
    changed, or new medications (including over-the-counter products) are added. *  Please carry medication information at all times in case of emergency situations. These are general instructions for a healthy lifestyle: No smoking/ No tobacco products/ Avoid exposure to second hand smoke Surgeon General's Warning:  Quitting smoking now greatly reduces serious risk to your health. Obesity, smoking, and sedentary lifestyle greatly increases your risk for illness A healthy diet, regular physical exercise & weight monitoring are important for maintaining a healthy lifestyle You may be retaining fluid if you have a history of heart failure or if you experience any of the following symptoms:  Weight gain of 3 pounds or more overnight or 5 pounds in a week, increased swelling in our hands or feet or shortness of breath while lying flat in bed. Please call your doctor as soon as you notice any of these symptoms; do not wait until your next office visit. Recognize signs and symptoms of STROKE: 
F-face looks uneven A-arms unable to move or move unevenly S-speech slurred or non-existent T-time-call 911 as soon as signs and symptoms begin-DO NOT go Back to bed or wait to see if you get better-TIME IS BRAIN. Introducing [facility]! [employer] introduces eKonnekt patient portal. Now you can access parts of your medical record, email your doctor's office, and request medication refills online. 1. In your internet browser, go to [email or web address] 2. Click on the First Time User? Click Here link in the Sign In box. You will see the New Member Sign Up page. 3. Enter your eKonnekt Access Code exactly as it appears below. You will not need to use this code after youve completed the sign-up process. If you do not sign up before the expiration date, you must request a new code. · eKonnekt Access Code: [email or web address] Expires: 8/15/2018 11:38 AM 
 
4. Enter the last four digits of your Social Security Number (xxxx) and Date of Birth (mm/dd/yyyy) as indicated and click Submit. You will be taken to the next sign-up page. 5. Create a eKonnekt ID. This will be your eKonnekt login ID and cannot be changed, so think of one that is secure and easy to remember. 6. Create a eKonnekt password. You can change your password at any time. 7. Enter your Password Reset Question and Answer.  This can be used at a later time if you forget your password. 8. Enter your e-mail address. You will receive e-mail notification when new information is available in [address] 9. Click Sign Up. You can now view and download portions of your medical record. 10. Click the Download Summary menu link to download a portable copy of your medical information. If you have questions, please visit the Frequently Asked Questions section of the Maventt website. Remember, Averail is NOT to be used for urgent needs. For medical emergencies, dial 911. Now available from your iPhone and Android! Introducing Segundo Perez As a [employer] patient, I wanted to make you aware of our electronic visit tool called Segundo Perez. [employer] 24/7 allows you to connect within minutes with a medical provider 24 hours a day, seven days a week via a mobile device or tablet or logging into a secure website from your computer. You can access Segundo Perez from anywhere in the [place]. A virtual visit might be right for you when you have a simple condition and feel like you just dont want to get out of bed, or cant get away from work for an appointment, when your regular [employer] provider is not available (evenings, weekends or holidays), or when youre out of town and need minor care. Electronic visits cost only $49 and if the [employer] 24/7 provider determines a prescription is needed to treat your condition, one can be electronically transmitted to a nearby pharmacy*. Please take a moment to enroll today if you have not already done so. The enrollment process is free and takes just a few minutes. To enroll, please download the [employer] 24/7 john to your tablet or phone, or visit [email or web address] to enroll on your computer.    
And, as an [address] [place] [address] patient with a [employer] account, the results of your visits will be scanned into your electronic medical record and your primary care provider will be able to view the scanned results. We urge you to continue to see your regular [employer] provider for your ongoing medical care. And while your primary care provider may not be the one available when you seek a Segundo Cohenfin virtual visit, the peace of mind you get from getting a real diagnosis real time can be priceless. For more information on Biscottivivianfin, view our Frequently Asked Questions (FAQs) at [email or web address]. Sincerely, 
 
Tucker Rodriguez MD 
Chief Medical Officer MundoMartha Cole Tenzin *:  certain medications cannot be prescribed via [employer] Providers Seen During Your Hospitalization Provider Specialty Primary office phone Eder Urena MD General Surgery [phone] Your Primary Care Physician (PCP) Primary Care Physician Office Phone Office Fax [address] [phone] You are allergic to the following No active allergies Recent Documentation Height Weight BMI Smoking Status 1.778 m 75.9 kg 24 kg/m2 Never Smoker Emergency Contacts Name Discharge Info Relation Home Work Mobile Rehana Gomez  Child [2] [phone] Caterina Romeo  Spouse [3] [phone] Patient Belongings The following personal items are in your possession at time of discharge: 
  Dental Appliances: None  Visual Aid: Glasses      Home Medications: None   Jewelry: None  Clothing: Pants, Shirt, Footwear, Undergarments, Belt, Socks    Other Valuables: None  Personal Items Sent to Safe: none Please provide this summary of care documentation to your next provider. Signatures-by signing, you are acknowledging that this After Visit Summary has been reviewed with you and you have received a copy. Patient Signature:  ____________________________________________________________ Date:  ____________________________________________________________  
  
Alba Landsman Provider Signature:  ____________________________________________________________ Date:  ____________________________________________________________

## 2018-06-29 NOTE — ANESTHESIA POSTPROCEDURE EVALUATION
Post-Anesthesia Evaluation and Assessment    Patient: Brock Yu MRN: [id number]  SSN: [id number]    YOB: 1950  Age: 78 y.o. Sex: male       Cardiovascular Function/Vital Signs  Visit Vitals    /89    Pulse 70    Temp 36.7 °C (98 °F)    Resp 16    Ht 5' 10\" (1.778 m)    Wt 75.9 kg (167 lb 4 oz)    SpO2 100%    BMI 24 kg/m2       Patient is status post general anesthesia for Procedure(s):  LEFT HERNIA INGUINAL REPAIR WITH  MESH. Nausea/Vomiting: None    Postoperative hydration reviewed and adequate. Pain:  Pain Scale 1: Numeric (0 - 10) (06/29/18 0841)  Pain Intensity 1: 2 (06/29/18 0841)   Managed    Neurological Status:   Neuro (WDL): Exceptions to WDL (06/29/18 0841)  Neuro  Neurologic State: Sleeping (06/29/18 0841)   At baseline    Mental Status and Level of Consciousness: Arousable    Pulmonary Status:   O2 Device: Room air (06/29/18 0915)   Adequate oxygenation and airway patent    Complications related to anesthesia: None    Post-anesthesia assessment completed.  No concerns    Signed By: Avelino Larson MD     June 29, 2018

## 2018-07-03 ENCOUNTER — PATIENT OUTREACH (OUTPATIENT)
Dept: OTHER | Age: 68
End: 2018-07-03

## 2018-07-03 NOTE — PROGRESS NOTES
Patient on [address] [facility] discharge report dated 6/30/18. Left message on voicemail. Will attempt to contact again. Need to complete post-discharge assessment.

## 2018-07-04 ENCOUNTER — PATIENT OUTREACH (OUTPATIENT)
Dept: OTHER | Age: 68
End: 2018-07-04

## 2018-07-04 NOTE — LETTER
Mr. Jose J Tamayo [address] [facility] [address] Dear Mr. Randall Lew, My name is Yulia Sharif RN, Employee Care Manager for [employer], and I have been trying to reach you. The Employee Care  is a free-of-charge, confidential service provided to our employees and their family members covered by the [employer]. Part of my job is to follow up with members who have recently been in the hospital or emergency room, to help them coordinate their care and answer questions they may have about their visit. I am able to provide assistance with medication questions, scheduling needed follow-up appointments, and arranging services like home health or home medical equipment. I can also provide education regarding your hospital or ER visit as well as your medical conditions. As healthcare providers, we know that patients do better when they have close follow up with a primary care provider (PCP), especially after a hospital or emergency department visit. If you do not have a PCP, I can help you find one that is convenient to you and covered by your insurance. I can also help you understand any after visit instructions, such as what symptoms to watch out for, or any new or changed medications. Employee Care Management now partners with [employer]. If you are a qualifying employee, you may receive an additional 10 wellness incentive points for every month of active participation with an Employee Care Manager. Remember that you can access your After Visit Summary by logging into your [email or web address] account. If you do not have a [email or web address] account, I can help you request access. Our program is designed to provide you with the opportunity to have a [employer] care manager partner with you for your healthcare needs. Please contact me at the below number if I can provide you with assistance for any of the above services.  
 
 
Sincerely, 
 
 
 Kalee Zelaya, RN, BSN  Employee Care Manager [address] [email or web address]

## 2018-07-04 NOTE — PROGRESS NOTES
Second attempt to reach patient for [facility] Program, and discharge assessment. Discreet VM left. Will send UTR letter.

## 2018-07-06 PROBLEM — J31.0 RHINITIS: Chronic | Status: ACTIVE | Noted: 2018-07-06

## 2018-07-06 PROBLEM — C44.90 NONMELANOMA SKIN CANCER: Chronic | Status: ACTIVE | Noted: 2018-07-06

## 2018-07-06 PROBLEM — N42.9 ABNORMAL PROSTATE ON PHYSICAL EXAMINATION: Status: ACTIVE | Noted: 2018-07-06

## 2018-07-12 ENCOUNTER — PATIENT OUTREACH (OUTPATIENT)
Dept: OTHER | Age: 68
End: 2018-07-12

## 2018-07-12 NOTE — PROGRESS NOTES
Third attempt to reach patient for [facility] Program, and discharge assessment. Discreet VM left. Sent UTR letter last outreach attampt.

## 2018-08-08 ENCOUNTER — PATIENT OUTREACH (OUTPATIENT)
Dept: OTHER | Age: 68
End: 2018-08-08

## 2018-08-08 NOTE — LETTER
8/8/2018 4:23 PM 
 
Mr. Destini Varnernvangen [address] [facility] [address] Dear Destini Efren My name is Zain Olivares,  Employee Care Manager for [employer], and I have been trying to reach you. The Employee Care Management [facility]) program is a free-of-charge, confidential service provided to our employees and their family members covered by the [facility]. I can help you with care transitions such as when you come home from the hospital, when help is needed to manage your disease, or when you need assistance coordinating services or appointments. [employer] now partners with [facility]. If you are a qualifying employee, you may receive an additional 10 wellness incentive points for every month of active participation with an Employee Care Manager. As healthcare providers, we know that patients do better when they have close follow up with a primary care provider (PCP). I can help you find one that is convenient to you and covered by your insurance. I can also help you understand any after visit instructions, such as what symptoms to watch out for, or any new or changed medications. We can work together using your preferred communication -- telephone, email, sifonrhart. If you do not have a [employer] account, I can help you request access. Our program is designed to provide you with the opportunity to have a [employer] care manager partner with you for your healthcare needs. Due to not being able to reach you, I am closing out the current program, but will remain available to you should you have any questions. Please contact me at the below number if I can provide you with assistance for any of the above services. Sincerely, Zain Olivares RN, BSN  Employee Care Manager [address] [place] [address], [phone] [facility] Alexander Quintero [phone]  F [phone]  [email or web address]

## 2018-08-08 NOTE — PROGRESS NOTES
NICKOLAS follow up. * Covering for CM Pina Peoples RN. Final call made today to attempt to contact patient. Discreet message left on voicemail with contact information. Resolving current episode for case management due to patient unable to reach. Patient has not been reached after repeated calls and letters. Letter sent to patient notifying completion of services due to unable to reach. This writer's contact information and information regarding program services included in materials sent.

## 2019-06-11 ENCOUNTER — TELEPHONE (OUTPATIENT)
Dept: PHARMACY | Age: 69
End: 2019-06-11

## 2019-06-11 NOTE — TELEPHONE ENCOUNTER
CLINICAL PHARMACY NOTE - Medication Conversion Initiative    Sam Pardo is a 80 y.o. male referred to clinical pharmacy specialist - identified with current prescription(s) for Flector. Starting July 2019, the prescribed medication(s) is moving from tier 2 to tier 3 per patient's pharmacy benefit. For ministry and patient cost savings, a conversion has been identified to generic diclofenac patch. Plan:  - Medications:   Consider changing from brand Flector to generic diclofenac patch   Copay cards available: Yes  - Follow up: Per provider discretion     First attempt made to reach patient by telephone to discuss formulary changes. Left voice message for patient to return clinician's phone call to [phone] and/or [phone] option 7. Will continue to attempt to contact patient by telephone as appropriate.     Priti Tate, PharmD, Daria Melchor, [facility]  Clinical Pharmacy Specialist  [phone], Option 7

## 2019-06-12 NOTE — TELEPHONE ENCOUNTER
CLINICAL PHARMACY NOTE - Medication Conversion Initiative     Second attempt made to reach patient by telephone to discuss formulary changes. Left detailed voice message for patient regarding formulary changes and alternatives. Also sent patient an email with the information and shared the team's contact information if there are any questions.  No further outreach at this time.      Ibrahima Leary, PharmD, Margy Liu, [address] [facility] Specialist  [phone], Option 7

## 2019-07-03 ENCOUNTER — HOSPITAL ENCOUNTER (OUTPATIENT)
Dept: GENERAL RADIOLOGY | Age: 69
Discharge: HOME | End: 2019-07-03

## 2019-07-03 DIAGNOSIS — Z00.00 ROUTINE GENERAL MEDICAL EXAMINATION AT A HEALTH CARE FACILITY: ICD-10-CM

## 2019-07-22 ENCOUNTER — HOSPITAL ENCOUNTER (OUTPATIENT)
Dept: ULTRASOUND IMAGING | Age: 69
Discharge: HOME | End: 2019-07-22
Attending: INTERNAL MEDICINE

## 2019-07-22 DIAGNOSIS — R06.02 SHORTNESS OF BREATH: ICD-10-CM

## 2019-07-22 DIAGNOSIS — R22.43 LOCALIZED SWELLING, MASS, OR LUMP OF BOTH LOWER EXTREMITIES: ICD-10-CM

## 2020-03-04 ENCOUNTER — HOSPITAL ENCOUNTER (OUTPATIENT)
Dept: MAMMOGRAPHY | Age: 70
Discharge: HOME | End: 2020-03-04
Attending: INTERNAL MEDICINE
Payer: COMMERCIAL

## 2020-03-04 DIAGNOSIS — M81.0 AGE-RELATED OSTEOPOROSIS WITHOUT CURRENT PATHOLOGICAL FRACTURE: ICD-10-CM

## 2020-03-04 DIAGNOSIS — E55.9 HYPOVITAMINOSIS D: ICD-10-CM

## 2020-03-04 PROCEDURE — 77080 DXA BONE DENSITY AXIAL: CPT

## 2020-03-06 ENCOUNTER — HOSPITAL ENCOUNTER (OUTPATIENT)
Dept: PHYSICAL THERAPY | Age: 70
Discharge: HOME | End: 2020-03-06
Payer: COMMERCIAL

## 2020-03-06 PROCEDURE — 97140 MANUAL THERAPY 1/> REGIONS: CPT

## 2020-03-06 PROCEDURE — 97110 THERAPEUTIC EXERCISES: CPT

## 2020-03-06 PROCEDURE — 97161 PT EVAL LOW COMPLEX 20 MIN: CPT

## 2020-03-06 NOTE — PROGRESS NOTES
Erna Stringer  : 1950  Payor: [facility] / Plan: 2900 [facility] 30 [facility] / Product Type: Commerical /  [address]  at [facility] [facility], [place] [address] [address] [facility]  [phone]   [phone]      OUTPATIENT PHYSICAL THERAPY: Daily Treatment Note 3/6/2020  Visit Count:  1    ICD-10: Treatment Diagnosis:   M54.5 Low Back Pain   R26.2 Difficulty in Walking, Not Elsewhere Classified    Precautions/Allergies:   Patient has no known allergies. TREATMENT PLAN:  Effective Dates: 3/6/2020 TO 2020 (60 days). Frequency/Duration: 3 times a week for 60 Days        PRE-TREATMENT SYMPTOMS/COMPLAINTS:  Central Low back pain    MEDICATIONS REVIEWED:  3/6/2020   TREATMENT:   (In addition to Assessment/Re-Assessment sessions the following treatments were rendered)    THERAPEUTIC EXERCISE: (8 minutes):  Exercises per grid below to improve mobility, strength and balance. Required minimal visual and verbal cues to promote proper body alignment and promote proper body posture. Progressed resistance, range and complexity of movement as indicated. Date:  3/6/2020 Date:   Date:     Activity/Exercise Parameters Parameters Parameters   Piriformis stretch HEP     Glute stretch HEP     LTR HEP     bridges      PPT with TA activation      Side plank, forward plank      Hip abduction      clamshell      birddog                          MANUAL THERAPY: (15 minutes): Joint mobilization, Soft tissue mobilization and Manipulation was utilized and necessary because of the patient's restricted joint motion, painful spasm, loss of articular motion and restricted motion of soft tissue.    +PA L1-L5 Grade III    (Used abbreviations: MET - muscle energy technique; PNF - proprioceptive neuromuscular facilitation; NMR - neuromuscular re-education; AP - anterior to posterior; PA - posterior to anterior)    MODALITIES: (0 minutes):      none TREATMENT/SESSION ASSESSMENT:  Zana Coffey verbalized understanding of role of PT and POC. Education: Provided pt with HEP. RECOMMENDATIONS/INTENT FOR NEXT TREATMENT SESSION: \"Next visit will focus on advancements to more challenging activities\".     PAIN: Initial: 6/10 Post Session: 6 /10     MedBridge Portal    Total Treatment Billable Duration: 44 minutes  PT Patient Time In/Time Out  Time In: 1518  Time Out: 1602  Gaudencio Dorman, PT, DPT    Future Appointments   Date Time Provider Elisabeth Chauhan   3/9/2020  8:00 AM Rei Senate A [facility]   3/11/2020  8:45 AM Rei Senate A [facility]PT [facility]   3/13/2020 10:15 AM Rei Senate A S[facility]ORPT SFD   3/16/2020  9:30 AM Rei Senate A SFDORPT SFD   3/17/2020 11:00 AM Rei Senate A S[facility]ORPT SFD   3/20/2020  1:45 PM Rei Senate A S[facility]ORPT SFD   3/23/2020  9:30 AM Rei Senate A S[facility]ORPT SFD   3/24/2020 11:00 AM Rei Senate A S[facility]PT SFD   3/27/2020 10:15 AM Rei Senate A [facility]ORPT SFD   3/30/2020 10:15 AM Rei Senate A [facility]PT S[facility]   4/1/2020 10:15 AM Rei Senate A [facility]PT S[facility]   4/3/2020 10:15 AM Rei Senate A SFDORPT SFD   4/30/2020 11:00 AM Lam Garcia MD [facility] BSNE   9/9/2020  3:00 PM Lam Garcia MD [facility]

## 2020-03-06 NOTE — THERAPY EVALUATION
Cleveland Myers : 1950 Payor: [address] / Plan: 2900 [facility] 30 [facility] / Product Type: Commlailal /  [address]  at [facility] [address], [address] [facility], [place] [address] [address] [facility] [phone]   [phone] OUTPATIENT PHYSICAL THERAPY:Initial Assessment and PM 3/6/2020 ICD-10: Treatment Diagnosis:  
M54.5 Low Back Pain R26.2 Difficulty in Walking, Not Elsewhere Classified Precautions/Allergies:  
Patient has no known allergies. Fall Risk Score: 2 (? 5 = High Risk) MD Orders: Evaluate and Treat MEDICAL/REFERRING DIAGNOSIS: 
Other forms of scoliosis, lumbar region [M41.86] DATE OF ONSET: 6-8 weeks ago REFERRING PHYSICIAN: Jorge Luis Fuller MD 
RETURN PHYSICIAN APPOINTMENT:   
 
ASSESSMENT:  Mr. Shaheen Denson has attended 1 physical therapy session including the initial evaluation. Pt presents with significant hypomobility of lumbar spinal segments with decreased ROM and core strength affecting pt ability to tolerate functional mobility, ADLs, and work tasks. Recommending skilled PT: manual therapeutic techniques (as appropriate), therapeutic exercises and activities, balance interventions, and a comprehensive home exercise program to address the current impairments, as listed above. Cleveland Myers will benefit from skilled PT (medically necessary) to address above deficits affecting participation in basic ADLs and overall functional tolerance. PROBLEM LIST (Impacting functional limitations): 1. Decreased Strength 2. Decreased ADL/Functional Activities 3. Decreased Transfer Abilities 4. Decreased Ambulation Ability/Technique 5. Decreased Balance 6. Increased Pain 7. Decreased Flexibility/Joint Mobility 8. Decreased [place] with Home Exercise Program INTERVENTIONS PLANNED: 
1. Balance Exercise 2. Cold 3. Cryotherapy 4. Electrical Stimulation 5. Family Education 6. Gait Training 7.  Heat 8. Home Exercise Program (HEP) 9. Manual Therapy 10. Neuromuscular Re-education/Strengthening 11. Range of Motion (ROM) 12. Therapeutic Activites 13. Therapeutic Exercise/Strengthening 14. Transcutaneous Electrical Nerve Stimulation (TENS) 15. Transfer Training TREATMENT PLAN: 
TREATMENT PLAN: 
Effective Dates: 3/6/2020 TO 5/5/2020 (60 days). Frequency/Duration: 3 times a week for 60 Days Short-Term Functional Goals: Time Frame: 4 weeks 1. Pt will be compliant with HEP. 2. Pt will decreased worst pain to 5/10 or less in order to improved standing and sitting tolerance for work and ADLs 3. Pt will decreased KAI to 13/50 or less in order to return to daily functional activities and work tasks 4. Pt will report sitting tolerance > 30 minutes with <5/10 pain for return to functional mobility and work tasks Discharge Goals: Time Frame: 8 weeks 1. Pt will be independent with HEP. 2. Pt will decreased worst pain to 3/10 or less in order to improve standing and sitting tolerance for work and ADLs 3. Pt will decreased KAI to 8/50 or less in order to return to daily functional activities and work tasks 4. Pt will report sitting tolerance > 60 minutes with < 3/10 pain for return to functional mobility and work tasks Rehabilitation Potential For Stated Goals: Good Regarding [facility] therapy, I certify that the treatment plan above will be carried out by a therapist or under their direction. Thank you for this referral, Joao Hicks, PT, DPT Referring Physician Signature: Padma Hull MD              Date The information in this section was collected on 3/6/20 (except where otherwise noted). HISTORY:  
History of Present Injury/Illness (Reason for Referral): Mechanical low back pain CC/Primary Concern: 
    Pt reports a history of chronic lower back pain that began in his 35s.  Pt reports having a lumbar fusion L5-S1 a few years ago but now with recent exaccerbation approximately 6-8 weeks ago. 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 Treatment Side: Bilateral central LBP Past Medical History/Comorbidities:  
Mr. Markie Hernández  has a past medical history of Arthritis (07/06/2015), Back pain, DDD (degenerative disc disease), lumbar, GERD (gastroesophageal reflux disease), History of basal cell cancer, History of squamous cell carcinoma, Insomnia (07/06/2015), Left inguinal hernia, MVP (mitral valve prolapse), Onychomycosis (5/7/9897), Oral lichen planus (7/1/4252), RBBB (7/6/2015), Right wrist pain (7/6/2015), Rising PSA level (7/6/2015), Scoliosis, and Spondylosis of lumbar region without myelopathy or radiculopathy. Mr. Markie Hernández  has a past surgical history that includes hx orthopaedic; neurological procedure unlisted (2007); hx colonoscopy; and hx shoulder arthroscopy (Right). Social History/Living Environment:  
 Lives with wife, enjoys being active (fly fishing, going to [address] [facility], 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 
Prior to 6-8 weeks ago pain was manageable Patient Goals: Return to work tasks with manageable pain, be able to sit for > 1 hour without significant back pain, be able to stand for >1 hour without significant back pain Current Medications: Current Outpatient Medications:  
  linaCLOtide (LINZESS) 145 mcg cap capsule, Take 1 Cap by mouth Daily (before breakfast). , Disp: 90 Cap, Rfl: 3 
  traMADol (ULTRAM) 50 mg tablet, Take 1 Tab by mouth every six (6) hours as needed for Pain for up to 30 days. Max Daily Amount: 200 mg., Disp: 120 Tab, Rfl: 5 
  Cetirizine (ZYRTEC) 10 mg cap, Take  by mouth., Disp: , Rfl:  
  terbinafine HCl (LAMISIL) 250 mg tablet, Take 250 mg by mouth daily. , Disp: , Rfl:  
  lidocaine (LIDODERM) 5 %, by TransDERmal route every twenty-four (24) hours. Apply patch to the affected area for 12 hours a day and remove for 12 hours a day., Disp: , Rfl:  
  meloxicam (MOBIC) 15 mg tablet, Take 1 Tab by mouth daily. , Disp: 90 Tab, Rfl: 3 
  eszopiclone (LUNESTA) 3 mg tablet, Take 1 Tab by mouth nightly as needed for Sleep. Max Daily Amount: 3 mg., Disp: 90 Tab, Rfl: 1 
  montelukast (SINGULAIR) 10 mg tablet, Take 1 Tab by mouth daily. , Disp: 90 Tab, Rfl: 4 
  tadalafil (CIALIS) 5 mg tablet, Take 1 Tab by mouth daily. , Disp: 90 Tab, Rfl: 3 
  predniSONE (DELTASONE) 5 mg tablet, 8 tablets daily for 5 days then taper as directed, Disp: 100 Tab, Rfl: 1 
  dicyclomine (BENTYL) 20 mg tablet, Take 1 Tab by mouth every six (6) hours. , Disp: 120 Tab, Rfl: 5 
  tamsulosin (FLOMAX) 0.4 mg capsule, Take 1 Cap by mouth daily. , Disp: 90 Cap, Rfl: 3 
  ipratropium (ATROVENT) 0.03 % nasal spray, 2 Sprays by Both Nostrils route every twelve (12) hours. , Disp: 30 mL, Rfl: 11 
  docusate sodium (COLACE) 100 mg capsule, Take 100 mg by mouth daily. , Disp: , Rfl:  
  RESTASIS 0.05 % ophthalmic emulsion, INSTILL 1 DROP IN BOTH EYES TWICE DAILY, Disp: , Rfl: 12 
  tretinoin (RETIN-A) 0.05 % topical cream, APPLY TO AFFECTED AREA NIGHTLY ATBEDTIME, Disp: , Rfl: 1 
  triamcinolone acetonide (KENALOG) 0.1 % dental paste, APPLY AS DIRECTED, Disp: , Rfl: 1 
  omeprazole (PRILOSEC) 20 mg capsule, Take 20 mg by mouth daily. , Disp: , Rfl:   
Date Last Reviewed:  3/6/2020 Ambulatory/Rehab Services H2 Model Falls Risk Assessment Risk Factors: 
     (1)  Gender [Male] Ability to Rise from Chair: 
     (1)  Pushes up, successful in one attempt Falls Prevention Plan: No modifications necessary Total: (5 or greater = High Risk): 2  
 ©2010 [facility] of [place] States Patent #[id number]. Federal Law prohibits the replication, distribution or use without written permission from [facility] of [place] Number of Personal Factors/Comorbidities that affect the Plan of Care: 1-2: MODERATE COMPLEXITY EXAMINATION:  
Inspection Pelvic alignment: NT Additional Comments:  
________________________________________________________________________________________________ Observation Posture: Decreased lumbar lordosis Gait: Decreased Gait Speed, Decreased Stride Length and Decreased Step Length Assistive Device: 
           
 
________________________________________________________________________________________________ Range of Motion Lumbar Joint:    
 Right (Degrees/Percent) Left (Degrees/Percent) Flexion  65 degrees pain Extension  10 degrees pain Side Bending  Knee joint [facility] Rotation  Knee joint [facility] Lower Joint: Passive Passive Active Active Right (Degrees) Left (Degrees) Right (Degrees) Left (Degrees) Hip Flexion   [facility] [place] Hip Extension   [facility] [place] Hip Abduction   [facility] [place] Hip Internal Rotation    NT NT Hip External Rotation   NT NT Additional Comments: + decreased tissue extensibility HS, piriformis, glutes, R hip flexor > L , quads 
________________________________________________________________________________________________ Strength Lower Extremity Joint:    
 RIGHT LEFT Hip Flexion 4/5 4/5 Hip Extension 4+/5 4+/5 Hip Internal Rotation NT/5 NT/5 Hip External Rotation NT/5 NT/% Hip Abduction 4+/5 4+/5 Knee flexion 5/5 5/5 Knee extension 5/5 5/5  
 
________________________________________________________________________________________________ Neruo-Vascular C/O Radicular Symptoms: No 
 
    Additional Comments:  
________________________________________________________________________________________________ 
 
       
________________________________________________________________________________________________ Palpation: lumbar paraspinals, increased tone noted Joint mobilization: L1-L5 hypomobility with PA Body Structures Involved: 1. Nerves 2. Bones 3. Joints 4. Muscles 5. Ligaments Body Functions Affected: 1. Sensory/Pain 2. Neuromusculoskeletal 
3. Movement Related Activities and Participation Affected: 1. General Tasks and Demands 2. Mobility 3. Self Care 4. Domestic Life 5. Interpersonal Interactions and Relationships 6. Community, Social and [place] Number of elements (examined above) that affect the Plan of Care: 4+: HIGH COMPLEXITY CLINICAL PRESENTATION:  
Presentation: Evolving clinical presentation with changing clinical characteristics: MODERATE COMPLEXITY CLINICAL DECISION MAKING:  
Outcome Measure: Tool Used: Modified Oswestry Low Back Pain Questionnaire Score:  Initial:17 /50  Most Recent: X/50 (Date: -- ) Interpretation of Score: Each section is scored on a 0-5 scale, 5 representing the greatest disability. The scores of each section are added together for a total score of 50. Medical Necessity:  
· Skilled intervention continues to be required due to decreased strength, balance, ROM, with increased pain affecting pt participation in daily functional mobility Reason for Services/Other Comments: 
· Patient continues to require skilled intervention due to decreased strength balance and ROM with increased pain affecting pt functional mobility and ADLS. Use of outcome tool(s) and clinical judgement create a POC that gives a: Clear prediction of patient's progress: LOW COMPLEXITY

## 2020-03-09 ENCOUNTER — HOSPITAL ENCOUNTER (OUTPATIENT)
Dept: PHYSICAL THERAPY | Age: 70
Discharge: HOME | End: 2020-03-09
Payer: COMMERCIAL

## 2020-03-09 PROCEDURE — 97140 MANUAL THERAPY 1/> REGIONS: CPT

## 2020-03-09 PROCEDURE — 97014 ELECTRIC STIMULATION THERAPY: CPT

## 2020-03-09 NOTE — PROGRESS NOTES
Jose J Aquino  : 1950  Payor: [facility] / Plan: 2900 [facility] 30 [facility] / Product Type: Commerical /  [address]  at [facility]  [address] [facility], [place] [address] [address] [facility]  [phone]   [phone]      OUTPATIENT PHYSICAL THERAPY: Daily Treatment Note 3/9/2020  Visit Count:  2    ICD-10: Treatment Diagnosis:   M54.5 Low Back Pain   R26.2 Difficulty in Walking, Not Elsewhere Classified    Precautions/Allergies:   Patient has no known allergies. TREATMENT PLAN:  Effective Dates: 3/6/2020 TO 2020 (60 days). Frequency/Duration: 3 times a week for 60 Days        PRE-TREATMENT SYMPTOMS/COMPLAINTS:  Pt reports pain is tolerable this morning. Has questions about stretches. MEDICATIONS REVIEWED:  3/9/2020   TREATMENT:   (In addition to Assessment/Re-Assessment sessions the following treatments were rendered)    THERAPEUTIC EXERCISE: ( minutes):  Exercises per grid below to improve mobility, strength and balance. Required minimal visual and verbal cues to promote proper body alignment and promote proper body posture. Progressed resistance, range and complexity of movement as indicated. Date:  3/6/2020 Date:   Date:     Activity/Exercise Parameters Parameters Parameters   Piriformis stretch HEP     Glute stretch HEP     LTR HEP     bridges      PPT with TA activation      Side plank, forward plank      Hip abduction      clamshell      birddog                          MANUAL THERAPY: (45 minutes): Joint mobilization, Soft tissue mobilization and Manipulation was utilized and necessary because of the patient's restricted joint motion, painful spasm, loss of articular motion and restricted motion of soft tissue.    +PA L1-L5 Grade III  + stretch B HS, glutes, piriformis, quads, hip flexors  + STM B lumbar paraspinals    (Used abbreviations: MET - muscle energy technique; PNF - proprioceptive neuromuscular facilitation; NMR - neuromuscular re-education; AP - anterior to posterior; PA - posterior to anterior)    MODALITIES: (15 minutes): *  Electrical Stimulation Therapy (level 15) was provided with intensity adjusted throughout treatment to patient tolerance. with MHP      TREATMENT/SESSION ASSESSMENT:  Viviana Andre verbalized understanding of role of PT and POC. Reviewed stretches and provided VC/TC for technique. Focused on manual techniques today. Education: Provided pt with HEP. RECOMMENDATIONS/INTENT FOR NEXT TREATMENT SESSION: \"Next visit will focus on advancements to more challenging activities\".     PAIN: Initial: 2 /10 Post Session: 2 /10     MedBridge Portal    Total Treatment Billable Duration: 60 minutes  PT Patient Time In/Time Out  Time In: 0800  Time Out: 0904  Sparkle Olivarez PT, DPT    Future Appointments   Date Time Provider Elisabeth Chauhan   3/11/2020  8:45 AM [place] Si [facility]ORPT [facility]   3/13/2020 10:15 AM Hollice Yuly A S[facility]PT [facility]   3/16/2020  9:30 AM Hollice Yuly A [facility]PT [facility]   3/17/2020 11:00 AM Hollice Yuly A S[facility]PT [facility]   3/20/2020  1:45 PM Hollice Yuly A SFDORPT [facility]   3/23/2020  9:30 AM Hollice Yuly A S[facility]ORPT [facility]   3/24/2020 11:00 AM Hollice Yuly A SFDORPT [facility]   3/27/2020 10:15 AM Hollice Yuly A SFDORPT [facility]   3/30/2020 10:15 AM Hollice Yuly A S[facility]ORPT [facility]   4/1/2020 10:15 AM Hollice Yuly A SFDORPT [facility]   4/3/2020 10:15 AM Hollice Yuly A S[facility]PT [facility]   4/30/2020 11:00 AM Vira Shook MD [facility] BS[facility]   9/9/2020  3:00 PM Vira Shook MD [facility] BS[facility]

## 2020-03-11 ENCOUNTER — HOSPITAL ENCOUNTER (OUTPATIENT)
Dept: PHYSICAL THERAPY | Age: 70
Discharge: HOME | End: 2020-03-11
Payer: COMMERCIAL

## 2020-03-11 PROCEDURE — 97140 MANUAL THERAPY 1/> REGIONS: CPT

## 2020-03-11 PROCEDURE — 97014 ELECTRIC STIMULATION THERAPY: CPT

## 2020-03-11 PROCEDURE — 97110 THERAPEUTIC EXERCISES: CPT

## 2020-03-11 NOTE — PROGRESS NOTES
Fortino Ron  : 1950  Payor: [facility] / Plan: 2900 [facility] 30 [facility] / Product Type: Commerical /  [address]  at [facility]  [address], [address] [facility], [place] [address] [address] [facility]  [phone]   [phone]      OUTPATIENT PHYSICAL THERAPY: Daily Treatment Note 3/11/2020  Visit Count:  3    ICD-10: Treatment Diagnosis:   M54.5 Low Back Pain   R26.2 Difficulty in Walking, Not Elsewhere Classified    Precautions/Allergies:   Patient has no known allergies. TREATMENT PLAN:  Effective Dates: 3/6/2020 TO 2020 (60 days). Frequency/Duration: 3 times a week for 60 Days        PRE-TREATMENT SYMPTOMS/COMPLAINTS:  Pt reports feeling better after last session and feels that heat helped. MEDICATIONS REVIEWED:  3/11/2020   TREATMENT:   (In addition to Assessment/Re-Assessment sessions the following treatments were rendered)    THERAPEUTIC EXERCISE: (10 minutes):  Exercises per grid below to improve mobility, strength and balance. Required minimal visual and verbal cues to promote proper body alignment and promote proper body posture. Progressed resistance, range and complexity of movement as indicated. Date:  3/6/2020 Date:  3/11/20 Date:     Activity/Exercise Parameters Parameters Parameters   Piriformis stretch HEP --    Glute stretch HEP --    LTR HEP 15 X    bridges  20 X     PPT with TA activation  10 X marches    Side plank  --    Hip abduction  15 X     clamshell  20 X     birddog  5X each 5 sec holds                        MANUAL THERAPY: (45 minutes): Joint mobilization, Soft tissue mobilization and Manipulation was utilized and necessary because of the patient's restricted joint motion, painful spasm, loss of articular motion and restricted motion of soft tissue.    +PA L1-L5 Grade III  + stretch B HS, glutes, piriformis, quads, hip flexors  + STM B lumbar paraspinals    (Used abbreviations: MET - muscle energy technique; PNF - proprioceptive neuromuscular facilitation; NMR - neuromuscular re-education; AP - anterior to posterior; PA - posterior to anterior)    MODALITIES: ( 15 minutes): *  Electrical Stimulation Therapy (level 15) was provided with intensity adjusted throughout treatment to patient tolerance. with P      TREATMENT/SESSION ASSESSMENT:  Shavon Heard verbalized understanding of role of PT and POC. Added core and hip strengthening exercises today. Education: Provided pt with HEP. RECOMMENDATIONS/INTENT FOR NEXT TREATMENT SESSION: \"Next visit will focus on advancements to more challenging activities\".     PAIN: Initial: 3 /10 Post Session: 2 /10     [facility] Portal    Total Treatment Billable Duration: 75 minutes  PT Patient Time In/Time Out  Time In: 0769  Time Out: [address], PT, DPT    Future Appointments   Date Time Provider Elisabeth Chauhan   3/13/2020 10:15 AM Jonita Creamer A SFD[facility]PT [facility]   3/16/2020  9:30 AM Jonita Creamer A [facility]PT [facility]   3/17/2020 11:00 AM Jonita Creamer A [facility]PT [facility]   3/20/2020  1:45 PM Jonita Creamer A S[facility]PT [facility]   3/23/2020  9:30 AM Jonita Creamer A S[facility]PT [facility]   3/24/2020 11:00 AM Jonita Creamer A SFDORPT [facility]   3/27/2020 10:15 AM Jonita Creamer A S[facility]ORPT [facility]   3/30/2020 10:15 AM Jonita Creamer A SFDORPT [facility]   4/1/2020 10:15 AM Jonita Creamer A SFDORPT S[facility]   4/3/2020 10:15 AM Jonita Creamer A SFDORPT [facility]   4/30/2020 11:00 AM Jd Yo MD [facility] BS[facility]   9/9/2020  3:00 PM Jd Yo MD [facility]

## 2020-03-13 ENCOUNTER — HOSPITAL ENCOUNTER (OUTPATIENT)
Dept: PHYSICAL THERAPY | Age: 70
Discharge: HOME | End: 2020-03-13
Payer: COMMERCIAL

## 2020-03-13 PROCEDURE — 97140 MANUAL THERAPY 1/> REGIONS: CPT

## 2020-03-13 PROCEDURE — 97014 ELECTRIC STIMULATION THERAPY: CPT

## 2020-03-13 PROCEDURE — 97110 THERAPEUTIC EXERCISES: CPT

## 2020-03-13 NOTE — PROGRESS NOTES
Toy English  : 1950  Payor: [facility] / Plan: [address] [facility] 30 [facility] / Product Type: Commerical /  [address]  at [address] [facility] 68, 101 [facility], [place], [facility] W [facility]  [phone]   [phone]      OUTPATIENT PHYSICAL THERAPY: Daily Treatment Note 3/13/2020  Visit Count:  4    ICD-10: Treatment Diagnosis:   M54.5 Low Back Pain   R26.2 Difficulty in Walking, Not Elsewhere Classified    Precautions/Allergies:   Patient has no known allergies. TREATMENT PLAN:  Effective Dates: 3/6/2020 TO 2020 (60 days). Frequency/Duration: 3 times a week for 60 Days        PRE-TREATMENT SYMPTOMS/COMPLAINTS:  Pt reports back is feeling okay this morning. Mornings are always better for him. MEDICATIONS REVIEWED:  3/13/2020   TREATMENT:   (In addition to Assessment/Re-Assessment sessions the following treatments were rendered)    THERAPEUTIC EXERCISE: (15 minutes):  Exercises per grid below to improve mobility, strength and balance. Required minimal visual and verbal cues to promote proper body alignment and promote proper body posture. Progressed resistance, range and complexity of movement as indicated. Date:  3/6/2020 Date:  3/11/20 Date:  3/13/20   Activity/Exercise Parameters Parameters Parameters   Piriformis stretch HEP -- --   Glute stretch HEP -- --   LTR HEP 15 X --   bridges  20 X  20 X   PPT with TA activation  10 X marches 10 X marches 2 set   Side plank  -- --   Hip abduction  15 X  15 X    clamshell  20 X  15 X    birddog  5X each 5 sec holds                        MANUAL THERAPY: (30 minutes): Joint mobilization, Soft tissue mobilization and Manipulation was utilized and necessary because of the patient's restricted joint motion, painful spasm, loss of articular motion and restricted motion of soft tissue.    +PA L1-L5 Grade III  + stretch B HS, glutes, piriformis, quads, hip flexors  + STM B lumbar paraspinals    (Used abbreviations: MET - muscle energy technique; PNF - proprioceptive neuromuscular facilitation; NMR - neuromuscular re-education; AP - anterior to posterior; PA - posterior to anterior)    MODALITIES: ( 15 minutes): *  Electrical Stimulation Therapy (level 17) was provided with intensity adjusted throughout treatment to patient tolerance. with P      TREATMENT/SESSION ASSESSMENT:  Jose Luis Bello verbalized understanding of role of PT and POC. Required cues for technique with s/l hip abduction to prevent compensations  Education: Provided pt with HEP. RECOMMENDATIONS/INTENT FOR NEXT TREATMENT SESSION: \"Next visit will focus on advancements to more challenging activities\".     PAIN: Initial: 3 /10 Post Session: 2 /10     [employer] Portal    Total Treatment Billable Duration: 60 minutes  PT Patient Time In/Time Out  Time In: 1015  Time Out: 200  Sahra Leo PT, DPT    Future Appointments   Date Time Provider Elisabeth Chauhan   3/16/2020  9:30 AM Harleen Giron [facility]   3/17/2020 11:00 AM Boneta Lua A [facility]ORPT [facility]   3/20/2020  1:45 PM Boneta Lua A [facility]PT [facility]   3/23/2020  9:30 AM Boneta Lua A [facility]PT [facility]   3/24/2020 11:00 AM Boneta Lua A [facility]PT [facility]   3/27/2020 10:15 AM Boneta Lua A [facility]   3/30/2020 10:15 AM Boneta Lua A [facility]PT [facility]   4/1/2020 10:15 AM Boneta Lua A [facility]PT [facility]   4/3/2020 10:15 AM Boneta Lua A [facility]PT [facility]   4/30/2020 11:00 AM Damien Blanton MD [facility]   9/9/2020  3:00 PM Damien Blanton MD [facility]

## 2020-03-16 ENCOUNTER — HOSPITAL ENCOUNTER (OUTPATIENT)
Dept: PHYSICAL THERAPY | Age: 70
Discharge: HOME | End: 2020-03-16
Payer: COMMERCIAL

## 2020-03-16 PROCEDURE — 97110 THERAPEUTIC EXERCISES: CPT

## 2020-03-16 PROCEDURE — 97140 MANUAL THERAPY 1/> REGIONS: CPT

## 2020-03-16 PROCEDURE — 97014 ELECTRIC STIMULATION THERAPY: CPT

## 2020-03-16 NOTE — PROGRESS NOTES
Fortino Ron  : 1950  Payor: [facility] / Plan: 2900 [facility] 30 [facility] / Product Type: [employer] /  [place]  at 614 [facility] 68, [address], [place], [facility] W [facility]  [phone]   [phone]      OUTPATIENT PHYSICAL THERAPY: Daily Treatment Note 3/16/2020  Visit Count:  5    ICD-10: Treatment Diagnosis:   M54.5 Low Back Pain   R26.2 Difficulty in Walking, Not Elsewhere Classified    Precautions/Allergies:   Patient has no known allergies. TREATMENT PLAN:  Effective Dates: 3/6/2020 TO 2020 (60 days). Frequency/Duration: 3 times a week for 60 Days        PRE-TREATMENT SYMPTOMS/COMPLAINTS:  Pt reports back is feeling fair this morning. Pt reports wife was able to help with driving. MEDICATIONS REVIEWED:  3/16/2020   TREATMENT:   (In addition to Assessment/Re-Assessment sessions the following treatments were rendered)    THERAPEUTIC EXERCISE: (10 minutes):  Exercises per grid below to improve mobility, strength and balance. Required minimal visual and verbal cues to promote proper body alignment and promote proper body posture. Progressed resistance, range and complexity of movement as indicated. Reviewed HEP and performed to provided cues for technique and prevent compensations.      Date:  3/6/2020 Date:  3/11/20 Date:  3/13/20 Date:   3/16/20       Activity/Exercise Parameters Parameters Parameters        Piriformis stretch HEP -- --        Glute stretch HEP -- --        LTR HEP 15 X --        bridges  20 X  20 X        PPT with TA activation  10 X marches 10 X marches 2 set reviewed       Side plank  -- -- reviewed       Hip abduction  15 X  15 X         clamshell  20 X  15 X         birddog  5X each 5 sec holds  reviewed       NEEL    Added + reviewed                               MANUAL THERAPY: (35 minutes): Joint mobilization, Soft tissue mobilization and Manipulation was utilized and necessary because of the patient's restricted joint motion, painful spasm, loss of articular motion and restricted motion of soft tissue. +PA L1-L5 Grade III  + stretch B HS, glutes, piriformis, quads, hip flexors  + STM B lumbar paraspinals    (Used abbreviations: MET - muscle energy technique; PNF - proprioceptive neuromuscular facilitation; NMR - neuromuscular re-education; AP - anterior to posterior; PA - posterior to anterior)    MODALITIES: (15  minutes): *  Electrical Stimulation Therapy (level 19) was provided with intensity adjusted throughout treatment to patient tolerance. with P      TREATMENT/SESSION ASSESSMENT:  Maurizio Lei verbalized understanding of role of PT and POC. Required cues for technique with s/l hip abduction to prevent compensations  Education: Provided pt with HEP. RECOMMENDATIONS/INTENT FOR NEXT TREATMENT SESSION: \"Next visit will focus on advancements to more challenging activities\".     PAIN: Initial: 3 /10 Post Session: 2 /10     [facility] Portal    Total Treatment Billable Duration: 60 minutes  PT Patient Time In/Time Out  Time In: 0930  Time Out: 56  Pito Chandra PT, DPT    Future Appointments   Date Time Provider Elisabeth Chauhan   3/17/2020 11:00 AM Wilber Lloyd [facility]PT [facility]   3/20/2020  1:45 PM Iva Loaizal A [facility]PT [facility]   3/23/2020  9:30 AM Iva Loaizal A [facility]PT [facility]   3/24/2020 11:00 AM Iva Loaizal A [facility]PT [facility]   3/27/2020 10:15 AM Iva Loaizal A [facility]   3/30/2020 10:15 AM Iva Loaizal A [facility]PT [facility]   4/1/2020 10:15 AM Iva Loaizal A [facility]   4/3/2020 10:15 AM Iva Loaizal A [facility]PT [facility]   4/30/2020 11:00 AM Caryle Coon, MD [facility]   9/9/2020  3:00 PM Caryle Coon, MD [facility]

## 2020-03-17 ENCOUNTER — HOSPITAL ENCOUNTER (OUTPATIENT)
Dept: PHYSICAL THERAPY | Age: 70
Discharge: HOME | End: 2020-03-17
Payer: COMMERCIAL

## 2020-03-17 PROCEDURE — 97014 ELECTRIC STIMULATION THERAPY: CPT

## 2020-03-17 PROCEDURE — 97110 THERAPEUTIC EXERCISES: CPT

## 2020-03-17 PROCEDURE — 97140 MANUAL THERAPY 1/> REGIONS: CPT

## 2020-03-17 NOTE — PROGRESS NOTES
Mamadou Fam  : 1950  Payor: [facility] / Plan: [address] [facility] 30 [facility] / Product Type: Commerical /  [address]  at [facility]  [address], [address] [facility], [place] [address] [address] [facility]  [phone]   [phone]      OUTPATIENT PHYSICAL THERAPY: Daily Treatment Note 3/17/2020  Visit Count:  6    ICD-10: Treatment Diagnosis:   M54.5 Low Back Pain   R26.2 Difficulty in Walking, Not Elsewhere Classified    Precautions/Allergies:   Patient has no known allergies. TREATMENT PLAN:  Effective Dates: 3/6/2020 TO 2020 (60 days). Frequency/Duration: 3 times a week for 60 Days        PRE-TREATMENT SYMPTOMS/COMPLAINTS:  Pt reports some increased pain yesterday after walking 1.5 miles with dog but seems to have subsided some today. MEDICATIONS REVIEWED:  3/17/2020   TREATMENT:   (In addition to Assessment/Re-Assessment sessions the following treatments were rendered)    THERAPEUTIC EXERCISE: (8 minutes):  Exercises per grid below to improve mobility, strength and balance. Required minimal visual and verbal cues to promote proper body alignment and promote proper body posture. Progressed resistance, range and complexity of movement as indicated. Reviewed HEP and performed to provided cues for technique and prevent compensations.      Date:  3/6/2020 Date:  3/11/20 Date:  3/13/20 Date:   3/16/20 Date:  3/17/20      Activity/Exercise Parameters Parameters Parameters        Piriformis stretch HEP -- --        Glute stretch HEP -- --        LTR HEP 15 X --        bridges  20 X  20 X        PPT with TA activation  10 X marches 10 X marches 2 set reviewed Reviewed   10 X 2 set      Side plank  -- -- reviewed       Hip abduction  15 X  15 X         clamshell  20 X  15 X         birddog  5X each 5 sec holds  reviewed Reviewed  5 X 5 sec holds, 2 set      NEEL    Added + reviewed                               MANUAL THERAPY: (45 minutes): Joint mobilization, Soft tissue mobilization and Manipulation was utilized and necessary because of the patient's restricted joint motion, painful spasm, loss of articular motion and restricted motion of soft tissue. +PA L1-L5 Grade III  + stretch B HS, glutes, piriformis, quads, hip flexors  + STM B lumbar paraspinals    (Used abbreviations: MET - muscle energy technique; PNF - proprioceptive neuromuscular facilitation; NMR - neuromuscular re-education; AP - anterior to posterior; PA - posterior to anterior)    MODALITIES: (15  minutes): *  Electrical Stimulation Therapy (level 19) was provided with intensity adjusted throughout treatment to patient tolerance. with P      TREATMENT/SESSION ASSESSMENT:  Tavo Riley verbalized understanding of role of PT and POC. Required cues for technique with s/l hip abduction to prevent compensations  Education: Provided pt with HEP. RECOMMENDATIONS/INTENT FOR NEXT TREATMENT SESSION: \"Next visit will focus on advancements to more challenging activities\".     PAIN: Initial: 2 /10 Post Session: 1 /10     [employer] Portal    Total Treatment Billable Duration: 68 minutes  PT Patient Time In/Time Out  Time In: 1100  Time Out: 1208  Roseline Hemp, PT, DPT    Future Appointments   Date Time Provider Elisabeth Chauhan   3/20/2020  1:45 PM [facility]   3/23/2020  9:30 AM Kaleen Peed A [facility]PT [facility]   3/24/2020 11:00 AM Kaleen Peed A [facility]PT [facility]   3/27/2020 10:15 AM Kaleen Peed A [facility]PT [facility]   3/30/2020 10:15 AM Kaleen Peed A [facility]PT [facility]   4/1/2020 10:15 AM Kaleen Peed A [facility]PT [facility]   4/3/2020 10:15 AM Kaleen Peed A [facility]PT [facility]   4/30/2020 11:00 AM David Hobbs MD [facility]   9/9/2020  3:00 PM David Hobbs MD [facility]

## 2020-03-19 PROBLEM — R29.3 POSTURAL IMBALANCE: Status: ACTIVE | Noted: 2020-03-19

## 2020-03-19 PROBLEM — R20.2 PARESTHESIA: Status: ACTIVE | Noted: 2020-03-19

## 2020-03-19 PROBLEM — G62.9 NEUROPATHY: Status: ACTIVE | Noted: 2020-03-19

## 2020-03-19 PROBLEM — R53.1 WEAKNESS: Status: ACTIVE | Noted: 2020-03-19

## 2020-04-27 ENCOUNTER — APPOINTMENT (OUTPATIENT)
Dept: PHYSICAL THERAPY | Age: 70
End: 2020-04-27

## 2020-05-11 ENCOUNTER — HOSPITAL ENCOUNTER (OUTPATIENT)
Dept: PHYSICAL THERAPY | Age: 70
Discharge: HOME | End: 2020-05-11
Payer: COMMERCIAL

## 2020-05-11 PROCEDURE — 97164 PT RE-EVAL EST PLAN CARE: CPT

## 2020-05-11 PROCEDURE — 97140 MANUAL THERAPY 1/> REGIONS: CPT

## 2020-05-11 PROCEDURE — 97014 ELECTRIC STIMULATION THERAPY: CPT

## 2020-05-11 NOTE — PROGRESS NOTES
Kassie Miranda  : 1950  Payor: [facility] / Plan: [address] [facility] 30 [facility] / Product Type: Commerical /  [address]  at [address] [facility] 68, 101 [facility], [place], [facility] W [facility]  [phone]   [phone]      OUTPATIENT PHYSICAL THERAPY: Daily Treatment Note 2020  Visit Count:  7    ICD-10: Treatment Diagnosis:   M54.5 Low Back Pain   R26.2 Difficulty in Walking, Not Elsewhere Classified    Precautions/Allergies:   Patient has no known allergies. TREATMENT PLAN:  Effective Dates: 2020 TO 7/10/2020 (60 days). Frequency/Duration: 3 times a week for 60 Days        PRE-TREATMENT SYMPTOMS/COMPLAINTS:  Pt reports some increased lower back pain after fly fishing this weekend but reports pain overall seems to be improved since first beginning PT. Pt hoping to attempt returning to work in another 4 weeks    MEDICATIONS REVIEWED:  2020   TREATMENT:   (In addition to Assessment/Re-Assessment sessions the following treatments were rendered)    THERAPEUTIC EXERCISE: (0 minutes):  Exercises per grid below to improve mobility, strength and balance. Required minimal visual and verbal cues to promote proper body alignment and promote proper body posture. Progressed resistance, range and complexity of movement as indicated. Reviewed HEP and performed to provided cues for technique and prevent compensations.      Date:  3/6/2020 Date:  3/11/20 Date:  3/13/20 Date:   3/16/20 Date:  3/17/20      Activity/Exercise Parameters Parameters Parameters        Piriformis stretch HEP -- --        Glute stretch HEP -- --        LTR HEP 15 X --        bridges  20 X  20 X        PPT with TA activation  10 X marches 10 X march 2 set reviewed Reviewed   10 X 2 set      Side plank  -- -- reviewed       Hip abduction  15 X  15 X         clamshell  20 X  15 X         birddog  5X each 5 sec holds  reviewed Reviewed  5 X 5 sec holds, 2 set NEEL    Added + reviewed                               MANUAL THERAPY: (30 minutes): Joint mobilization, Soft tissue mobilization and Manipulation was utilized and necessary because of the patient's restricted joint motion, painful spasm, loss of articular motion and restricted motion of soft tissue. +PA L1-L5 Grade III  + stretch B HS, glutes, piriformis, quads, hip flexors  + STM B lumbar paraspinals    (Used abbreviations: MET - muscle energy technique; PNF - proprioceptive neuromuscular facilitation; NMR - neuromuscular re-education; AP - anterior to posterior; PA - posterior to anterior)    MODALITIES: (15  minutes): *  Electrical Stimulation Therapy (level 12) was provided with intensity adjusted throughout treatment to patient tolerance. with [facility]      TREATMENT/SESSION ASSESSMENT:  Thea Kumar verbalized understanding of role of PT and POC. Tolerated manual techniques well with improvement in symptoms    Education: Provided pt with HEP. RECOMMENDATIONS/INTENT FOR NEXT TREATMENT SESSION: \"Next visit will focus on advancements to more challenging activities\".     PAIN: Initial: 5 /10 Post Session: 3 /10     [facility] Portal    Total Treatment Billable Duration: 55 minutes  PT Patient Time In/Time Out  Time In: 1005  Time Out: 80  Jimmy Ax, PT, DPT    Future Appointments   Date Time Provider Elisabeth Chauhan   7/24/2020  8:00 AM [facility] NURSE ONLY LAKISHA LUNA   7/31/2020  1:30 PM MD LAKISHA Mata JACQUELINE LUNA

## 2020-05-11 NOTE — THERAPY EVALUATION
Evgeny Amaya : 1950 Payor: [address] / Plan: 2900 [facility] 30 [facility] / Product Type: Commerical /  [address]  at [facility] [address], [address] [address] [address] [facility] [phone]   [phone] OUTPATIENT PHYSICAL THERAPY:Re-evaluation and AM 2020 ICD-10: Treatment Diagnosis:  
M54.5 Low Back Pain R26.2 Difficulty in Walking, Not Elsewhere Classified Precautions/Allergies:  
Patient has no known allergies. Fall Risk Score: 2 (? 5 = High Risk) MD Orders: Evaluate and Treat MEDICAL/REFERRING DIAGNOSIS: 
Other forms of scoliosis, lumbar region [M41.86] DATE OF ONSET: 6-8 weeks ago REFERRING PHYSICIAN: Gloria Hicks MD 
RETURN PHYSICIAN APPOINTMENT:   
 
ASSESSMENT:  Mr. Flower Irwin has attended 6 physical therapy session including the initial evaluation with last session being on 3/17/20 before closure of clinic due to COVID-19 crisis. Pt is making progress in terms of strength and pain overall but continues to present with decreased lumbar ROM and core strength affecting quality of life and daily functional activities such as sitting and standing tolerance. Pt continues to present with significant hypomobility of lumbar spinal segments with decreased ROM and core strength affecting pt ability to tolerate functional mobility, ADLs, and work tasks. Recommending continued skilled PT: manual therapeutic techniques (as appropriate), therapeutic exercises and activities, balance interventions, and a comprehensive home exercise program to address the current impairments, as listed above. Evgeny Amaya will benefit from skilled PT (medically necessary) to address above deficits affecting participation in basic ADLs and overall functional tolerance. PROBLEM LIST (Impacting functional limitations): 1. Decreased Strength 2. Decreased ADL/Functional Activities 3.  Decreased Transfer Abilities 4. Decreased Ambulation Ability/Technique 5. Decreased Balance 6. Increased Pain 7. Decreased Flexibility/Joint Mobility 8. Decreased [place] with Home Exercise Program INTERVENTIONS PLANNED: 
1. Balance Exercise 2. Cold 3. Cryotherapy 4. Electrical Stimulation 5. Family Education 6. Gait Training 7. Heat 8. Home Exercise Program (HEP) 9. Manual Therapy 10. Neuromuscular Re-education/Strengthening 11. Range of Motion (ROM) 12. Therapeutic Activites 13. Therapeutic Exercise/Strengthening 14. Transcutaneous Electrical Nerve Stimulation (TENS) 15. Transfer Training TREATMENT PLAN: 
TREATMENT PLAN: 
Effective Dates: 5/11/2020 TO 7/10/2020 (60 days). Frequency/Duration: 3 times a week for 60 Days Short-Term Functional Goals: Time Frame: 4 weeks 1. Pt will be compliant with HEP. Goal met 5/11/20 2. Pt will decreased worst pain to 5/10 or less in order to improved standing and sitting tolerance for work and ADLs, Goal met 5/11/20 with exception of heavier tasks for example fly fishing 3. Pt will decreased KAI to 13/50 or less in order to return to daily functional activities and work tasks 4. Pt will report sitting tolerance > 30 minutes with <5/10 pain for return to functional mobility and work tasks Discharge Goals: Time Frame: 8 weeks 1. Pt will be independent with HEP. 2. Pt will decreased worst pain to 3/10 or less in order to improve standing and sitting tolerance for work and ADLs 3. Pt will decreased KAI to 8/50 or less in order to return to daily functional activities and work tasks 4. Pt will report sitting tolerance > 60 minutes with < 3/10 pain for return to functional mobility and work tasks Rehabilitation Potential For Stated Goals: Good Regarding [facility] therapy, I certify that the treatment plan above will be carried out by a therapist or under their direction. Thank you for this referral, Kam Osei, PT, DPT Referring Physician Signature: Sandra Wallis MD              Date The information in this section was collected on 5/11/20 (except where otherwise noted). HISTORY:  
History of Present Injury/Illness (Reason for Referral): Mechanical low back pain CC/Primary Concern: 
    Pt reports a history of chronic lower back pain that began in his 35s. Pt reports having a lumbar fusion L5-S1 a few years ago but now with recent exaccerbation approximately 6-8 weeks ago. 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 Treatment Side: Bilateral central LBP Past Medical History/Comorbidities:  
Mr. Sheeba Veras  has a past medical history of Arthritis (07/06/2015), Back pain, DDD (degenerative disc disease), lumbar, GERD (gastroesophageal reflux disease), History of basal cell cancer, History of squamous cell carcinoma, Insomnia (07/06/2015), Left inguinal hernia, MVP (mitral valve prolapse), Onychomycosis (3/4/0235), Oral lichen planus (1/4/0055), RBBB (7/6/2015), Right wrist pain (7/6/2015), Rising PSA level (7/6/2015), Scoliosis, and Spondylosis of lumbar region without myelopathy or radiculopathy. Mr. Sheeba Veras  has a past surgical history that includes hx orthopaedic; neurological procedure unlisted (2007); hx colonoscopy; and hx shoulder arthroscopy (Right). Social History/Living Environment:  
 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 
Prior to 6-8 weeks ago pain was manageable Patient Goals: Return to work tasks with manageable pain, be able to sit for > 1 hour without significant back pain, be able to stand for >1 hour without significant back pain Current Medications:   
  
Current Outpatient Medications:  
  tamsulosin (FLOMAX) 0.4 mg capsule, Take 1 Cap by mouth daily. , Disp: 90 Cap, Rfl: 3 
  linaCLOtide (LINZESS) 145 mcg cap capsule, Take 1 Cap by mouth Daily (before breakfast). , Disp: 90 Cap, Rfl: 3 
  Cetirizine (ZYRTEC) 10 mg cap, Take  by mouth., Disp: , Rfl:  
  terbinafine HCl (LAMISIL) 250 mg tablet, Take 250 mg by mouth daily. , Disp: , Rfl:  
  lidocaine (LIDODERM) 5 %, by TransDERmal route every twenty-four (24) hours. Apply patch to the affected area for 12 hours a day and remove for 12 hours a day., Disp: , Rfl:  
  meloxicam (MOBIC) 15 mg tablet, Take 1 Tab by mouth daily. , Disp: 90 Tab, Rfl: 3 
  eszopiclone (LUNESTA) 3 mg tablet, Take 1 Tab by mouth nightly as needed for Sleep. Max Daily Amount: 3 mg., Disp: 90 Tab, Rfl: 1 
  montelukast (SINGULAIR) 10 mg tablet, Take 1 Tab by mouth daily. , Disp: 90 Tab, Rfl: 4 
  tadalafil (CIALIS) 5 mg tablet, Take 1 Tab by mouth daily. , Disp: 90 Tab, Rfl: 3 
  predniSONE (DELTASONE) 5 mg tablet, 8 tablets daily for 5 days then taper as directed, Disp: 100 Tab, Rfl: 1 
  dicyclomine (BENTYL) 20 mg tablet, Take 1 Tab by mouth every six (6) hours. , Disp: 120 Tab, Rfl: 5 
  ipratropium (ATROVENT) 0.03 % nasal spray, 2 Sprays by Both Nostrils route every twelve (12) hours. , Disp: 30 mL, Rfl: 11 
  docusate sodium (COLACE) 100 mg capsule, Take 100 mg by mouth daily. , Disp: , Rfl:  
  RESTASIS 0.05 % ophthalmic emulsion, INSTILL 1 DROP IN BOTH EYES TWICE DAILY, Disp: , Rfl: 12 
  tretinoin (RETIN-A) 0.05 % topical cream, APPLY TO AFFECTED AREA NIGHTLY ATBEDTIME, Disp: , Rfl: 1 
  triamcinolone acetonide (KENALOG) 0.1 % dental paste, APPLY AS DIRECTED, Disp: , Rfl: 1 
  omeprazole (PRILOSEC) 20 mg capsule, Take 20 mg by mouth daily. , Disp: , Rfl:   
Date Last Reviewed:  5/11/2020 Ambulatory/Rehab Services H2 Model Falls Risk Assessment Risk Factors: 
     (1)  Gender [Male] Ability to Rise from Chair: 
     (1)  Pushes up, successful in one attempt Falls Prevention Plan: No modifications necessary Total: (5 or greater = High Risk): 2  
 ©2010 [facility] [address] [place] [address] States Patent #[id number]. Federal Law prohibits the replication, distribution or use without written permission from [employer] [employer] [place] Number of Personal Factors/Comorbidities that affect the Plan of Care: 1-2: MODERATE COMPLEXITY EXAMINATION:  
Inspection Pelvic alignment: NT Additional Comments:  
________________________________________________________________________________________________ Observation Posture: Decreased lumbar lordosis Gait: Decreased Gait Speed, Decreased Stride Length and Decreased Step Length 
 
           
 
________________________________________________________________________________________________ Range of Motion Lumbar Joint:    
 Right (Degrees/Percent) Left (Degrees/Percent) Flexion  40 degrees pain Extension  15 degrees pain Side Bending  Knee joint [facility] Rotation  Knee joint [facility] Lower Joint: Passive Passive Active Active Right (Degrees) Left (Degrees) Right (Degrees) Left (Degrees) Hip Flexion   [facility] Hip Extension   [facility] Hip Abduction   [facility] Hip Internal Rotation    NT NT Hip External Rotation   NT NT     Additional Comments: + decreased tissue extensibility HS, piriformis, glutes, R hip flexor > L , quads 
________________________________________________________________________________________________ Strength Lower Extremity Joint:    
 RIGHT LEFT Hip Flexion 5/5  5/5 Hip Extension 5/5  5/5 Hip Internal Rotation NT/5 NT/5 Hip External Rotation NT/5 NT/% Hip Abduction 5/5 (5/5 pain) 5/5 Knee flexion 5/5 5/5 Knee extension 5/5 5/5  
 
________________________________________________________________________________________________ Neruo-Vascular C/O Radicular Symptoms: No 
 
    Additional Comments:  
________________________________________________________________________________________________ 
 
       
________________________________________________________________________________________________ Palpation: lumbar paraspinals, increased tone noted Joint mobilization: L1-L5 hypomobility with PA Body Structures Involved: 1. Nerves 2. Bones 3. Joints 4. Muscles 5. Ligaments Body Functions Affected: 1. Sensory/Pain 2. Neuromusculoskeletal 
3. Movement Related Activities and Participation Affected: 1. General Tasks and Demands 2. Mobility 3. Self Care 4. Domestic Life 5. Interpersonal Interactions and Relationships 6. Community, Social and [address] Number of elements (examined above) that affect the Plan of Care: 4+: HIGH COMPLEXITY CLINICAL PRESENTATION:  
Presentation: Evolving clinical presentation with changing clinical characteristics: MODERATE COMPLEXITY CLINICAL DECISION MAKING:  
Outcome Measure: Tool Used: Modified Oswestry Low Back Pain Questionnaire Score:  Initial:17 /50  Most Recent: 24/50 (Date: 5/11/20 ) Interpretation of Score: Each section is scored on a 0-5 scale, 5 representing the greatest disability. The scores of each section are added together for a total score of 50.    
 
 
Medical Necessity:  
· Skilled intervention continues to be required due to decreased strength, balance, ROM, with increased pain affecting pt participation in daily functional mobility Reason for Services/Other Comments: 
· Patient continues to require skilled intervention due to decreased strength balance and ROM with increased pain affecting pt functional mobility and ADLS. Use of outcome tool(s) and clinical judgement create a POC that gives a: Clear prediction of patient's progress: LOW COMPLEXITY

## 2020-05-13 ENCOUNTER — HOSPITAL ENCOUNTER (OUTPATIENT)
Dept: PHYSICAL THERAPY | Age: 70
Discharge: HOME | End: 2020-05-13
Payer: COMMERCIAL

## 2020-05-13 PROCEDURE — 97014 ELECTRIC STIMULATION THERAPY: CPT

## 2020-05-13 PROCEDURE — 97140 MANUAL THERAPY 1/> REGIONS: CPT

## 2020-05-13 NOTE — PROGRESS NOTES
Chapo Barba  : 1950  Payor: [facility] / Plan: 29[facility] 30 [facility] / Product Type: Commlailal /  [address]  at [address] [facility]  11 [facility], [address] [place] [address], [place], [facility] W [facility]  [phone]   [phone]      OUTPATIENT PHYSICAL THERAPY: Daily Treatment Note 2020  Visit Count:  8    ICD-10: Treatment Diagnosis:   M54.5 Low Back Pain   R26.2 Difficulty in Walking, Not Elsewhere Classified    Precautions/Allergies:   Patient has no known allergies. TREATMENT PLAN:  Effective Dates: 2020 TO 7/10/2020 (60 days). Frequency/Duration: 3 times a week for 60 Days        PRE-TREATMENT SYMPTOMS/COMPLAINTS:  Pt reports some increased soreness after last session but feeling better today. MEDICATIONS REVIEWED:  2020   TREATMENT:   (In addition to Assessment/Re-Assessment sessions the following treatments were rendered)    THERAPEUTIC EXERCISE: (0 minutes):  Exercises per grid below to improve mobility, strength and balance. Required minimal visual and verbal cues to promote proper body alignment and promote proper body posture. Progressed resistance, range and complexity of movement as indicated. Reviewed HEP and performed to provided cues for technique and prevent compensations.      Date:  3/6/2020 Date:  3/11/20 Date:  3/13/20 Date:   3/16/20 Date:  3/17/20      Activity/Exercise Parameters Parameters Parameters        Piriformis stretch HEP -- --        Glute stretch HEP -- --        LTR HEP 15 X --        bridges  20 X  20 X        PPT with TA activation  10 X  10 X march 2 set reviewed Reviewed   10 X 2 set      Side plank  -- -- reviewed       Hip abduction  15 X  15 X         clamshell  20 X  15 X         birddog  5X each 5 sec holds  reviewed Reviewed  5 X 5 sec holds, 2 set      NEEL    Added + reviewed                               MANUAL THERAPY: (40 minutes): Joint mobilization, Soft tissue mobilization and Manipulation was utilized and necessary because of the patient's restricted joint motion, painful spasm, loss of articular motion and restricted motion of soft tissue. +PA L1-L5 Grade III  + stretch B HS, glutes, piriformis, quads, hip flexors  + STM B lumbar paraspinals    (Used abbreviations: MET - muscle energy technique; PNF - proprioceptive neuromuscular facilitation; NMR - neuromuscular re-education; AP - anterior to posterior; PA - posterior to anterior)    MODALITIES: (15  minutes): *  Electrical Stimulation Therapy (level 20) was provided with intensity adjusted throughout treatment to patient tolerance. with MHP      TREATMENT/SESSION ASSESSMENT:  Rashawn Roland verbalized understanding of role of PT and POC. Tolerated manual techniques well with improvement in symptoms    Education: Provided pt with HEP. RECOMMENDATIONS/INTENT FOR NEXT TREATMENT SESSION: \"Next visit will focus on advancements to more challenging activities\".     PAIN: Initial: 3 /10 Post Session: 1 /10     [employer] Portal    Total Treatment Billable Duration: 55 minutes  PT Patient Time In/Time Out  Time In: 1005  Time Out: 80  Cece Herring PT, DPT    Future Appointments   Date Time Provider Elisabeth Chauhan   5/14/2020  9:00 AM Michelle BAKER [facility]   5/18/2020  9:30 AM Katelyn Levy, PT, DPT [facility]   5/20/2020 10:15 AM Katelyn Levy, PT, DPT [facility]   5/22/2020 10:15 AM Katelyn Levy, PT, DPT [facility]   5/26/2020 10:15 AM Katelyn Levy, PT, DPT [facility]   5/27/2020 10:15 AM Katelyn Levy, PT, DPT [facility]   5/28/2020 11:00 AM Katelyn Levy, PT, DPT [facility]   6/2/2020  9:30 AM Katelyn Levy, PT, DPT [facility]   6/3/2020  9:30 AM Katelyn Levy, PT, DPT [facility]   6/5/2020  9:30 AM Katelyn Levy, PT, DPT [facility]   7/24/2020  8:00 AM IMD NURSE ONLY LAKISHA IMD IMD   7/31/2020  1:30 PM MD LAKISHA Engle IMJACQUELINE IMJACQUELINE

## 2020-05-14 ENCOUNTER — HOSPITAL ENCOUNTER (OUTPATIENT)
Dept: PHYSICAL THERAPY | Age: 70
Discharge: HOME | End: 2020-05-14
Payer: COMMERCIAL

## 2020-05-14 PROCEDURE — 97140 MANUAL THERAPY 1/> REGIONS: CPT

## 2020-05-14 PROCEDURE — 97014 ELECTRIC STIMULATION THERAPY: CPT

## 2020-05-14 NOTE — PROGRESS NOTES
Thea Kumar  : 1950  Payor: [facility] / Plan: 2900 [facility] 30 [facility] / Product Type: Commerical /  [address]  at [facility]  [address], [address], [place], [facility] W [facility]  [phone]   [phone]      OUTPATIENT PHYSICAL THERAPY: Daily Treatment Note 2020  Visit Count:  9    ICD-10: Treatment Diagnosis:   M54.5 Low Back Pain   R26.2 Difficulty in Walking, Not Elsewhere Classified    Precautions/Allergies:   Patient has no known allergies. TREATMENT PLAN:  Effective Dates: 2020 TO 7/10/2020 (60 days). Frequency/Duration: 3 times a week for 60 Days        PRE-TREATMENT SYMPTOMS/COMPLAINTS:  Pt reports some increased soreness but not as bad as last session. Pt reports continued pattern of increased pain at late afternoon. MEDICATIONS REVIEWED:  2020   TREATMENT:   (In addition to Assessment/Re-Assessment sessions the following treatments were rendered)    THERAPEUTIC EXERCISE: (0 minutes):  Exercises per grid below to improve mobility, strength and balance. Required minimal visual and verbal cues to promote proper body alignment and promote proper body posture. Progressed resistance, range and complexity of movement as indicated. Reviewed HEP and performed to provided cues for technique and prevent compensations.      Date:  3/6/2020 Date:  3/11/20 Date:  3/13/20 Date:   3/16/20 Date:  3/17/20      Activity/Exercise Parameters Parameters Parameters        Piriformis stretch HEP -- --        Glute stretch HEP -- --        LTR HEP 15 X --        bridges  20 X  20 X        PPT with TA activation  10 X marches 10 X marches 2 set reviewed Reviewed   10 X 2 set      Side plank  -- -- reviewed       Hip abduction  15 X  15 X         clamshell  20 X  15 X         birddog  5X each 5 sec holds  reviewed Reviewed  5 X 5 sec holds, 2 set      NEEL    Added + reviewed                               MANUAL THERAPY: (45 minutes): Joint mobilization, Soft tissue mobilization and Manipulation was utilized and necessary because of the patient's restricted joint motion, painful spasm, loss of articular motion and restricted motion of soft tissue. +PA L1-L5 Grade III  + stretch B HS, glutes, piriformis, quads, hip flexors  + STM B lumbar paraspinals    (Used abbreviations: MET - muscle energy technique; PNF - proprioceptive neuromuscular facilitation; NMR - neuromuscular re-education; AP - anterior to posterior; PA - posterior to anterior)    MODALITIES: (15  minutes): *  Electrical Stimulation Therapy (level 22) was provided with intensity adjusted throughout treatment to patient tolerance. with P      TREATMENT/SESSION ASSESSMENT:  Pradeep Guallpa verbalized understanding of role of PT and POC. Tolerated manual techniques well with improvement in symptoms    Education: Provided pt with HEP. Discussed addition of lumbar and paraspinal stretching into program.    RECOMMENDATIONS/INTENT FOR NEXT TREATMENT SESSION: \"Next visit will focus on advancements to more challenging activities\".     PAIN: Initial: 3 /10 Post Session: 2 /10     [employer] Portal    Total Treatment Billable Duration: 60  minutes  PT Patient Time In/Time Out  Time In: 0900  Time Out: 1000  Aliza Duran PT, DPT    Future Appointments   Date Time Provider Elisabeth Chauhan   5/18/2020  9:30 AM Arturo [place], PT, DPT [facility]   5/20/2020 10:15 AM [facility] [place]   5/22/2020 10:15 AM Arturo [place], PT, DPT [facility]   5/26/2020 10:15 AM Katcindy [place], PT, DPT [facility]   5/27/2020 10:15 AM [facility]cindy [place], PT, [facility]   5/28/2020 11:00 AM Arturo [place], PT, DPT [facility]   6/2/2020  9:30 AM [facility] [place]   6/3/2020  9:30 AM Arturo [place], PT, DPT [facility]   6/5/2020  9:30 AM Arturo [place], PT, DPT [facility]   7/24/2020  8:00 AM IMD NURSE ONLY [facility] JEREMY LUNA   7/31/2020  1:30 PM Owen Chacon MD [facility] JEREMY LUNA

## 2020-05-18 ENCOUNTER — HOSPITAL ENCOUNTER (OUTPATIENT)
Dept: PHYSICAL THERAPY | Age: 70
Discharge: HOME | End: 2020-05-18
Payer: COMMERCIAL

## 2020-05-18 PROCEDURE — 97140 MANUAL THERAPY 1/> REGIONS: CPT

## 2020-05-18 PROCEDURE — 97014 ELECTRIC STIMULATION THERAPY: CPT

## 2020-05-18 PROCEDURE — 97110 THERAPEUTIC EXERCISES: CPT

## 2020-05-18 NOTE — PROGRESS NOTES
Vicente Garcia  : 1950  Payor: [facility] / Plan: [address] [facility] 30 [facility] / Product Type: Commerical /  [address] [place]  at [facility]  [address], [place] [address] [address] [facility]  [phone]   [phone]      OUTPATIENT PHYSICAL THERAPY: Daily Treatment Note 2020  Visit Count:  10    ICD-10: Treatment Diagnosis:   M54.5 Low Back Pain   R26.2 Difficulty in Walking, Not Elsewhere Classified    Precautions/Allergies:   Patient has no known allergies. TREATMENT PLAN:  Effective Dates: 2020 TO 7/10/2020 (60 days). Frequency/Duration: 3 times a week for 60 Days       PRE-TREATMENT SYMPTOMS/COMPLAINTS:  Pt reports bending over continues to be the biggest issue. Pt notes over the weekend is worse because he is doing     MEDICATIONS REVIEWED:  2020   TREATMENT:   (In addition to Assessment/Re-Assessment sessions the following treatments were rendered)    THERAPEUTIC EXERCISE: (10 minutes):  Exercises per grid below to improve mobility, strength and balance. Required minimal visual and verbal cues to promote proper body alignment and promote proper body posture. Progressed resistance, range and complexity of movement as indicated. Reviewed HEP and performed to provided cues for technique and prevent compensations. Date:  2020     Activity/Exercise      Nustep 10min  Level 3     Piriformis stretch      Glute stretch      LTR      bridges      PPT with TA activation      Side plank      Hip abduction      clamshell      birddog      NEEL                    MANUAL THERAPY: (30 minutes): Joint mobilization, Soft tissue mobilization and Manipulation was utilized and necessary because of the patient's restricted joint motion, painful spasm, loss of articular motion and restricted motion of soft tissue.    +PA L1-L5 Grade III  + stretch B HS, glutes, piriformis, quads, hip flexors  + STM B lumbar paraspinals and QL  + trigger point to R QL   + silicone cups for myofascial release and to increase blood flow to lumbar region    (Used abbreviations: MET - muscle energy technique; PNF - proprioceptive neuromuscular facilitation; NMR - neuromuscular re-education; AP - anterior to posterior; PA - posterior to anterior)    MODALITIES: (10  minutes): *  Electrical Stimulation Therapy (level 22) was provided with intensity adjusted throughout treatment to patient tolerance. with [facility]      TREATMENT/SESSION ASSESSMENT:  Marly Ill with decreased mobility in B SI joints. Increased tone throughout R QL with associated tenderness. Decreased lumbar mobility with B lateral flexion and associated pain with L>R. Pt responded well to manual therapy with improved mobility afterwards. Notes ESTIM is helping decrease his pain. Reports he is going back to work in 2 weeks when his leave is up. Education: Provided pt with HEP. Discussed addition of lumbar and paraspinal stretching into program.    RECOMMENDATIONS/INTENT FOR NEXT TREATMENT SESSION: \"Next visit will focus on advancements to more challenging activities\".     PAIN: Initial: 3 /10 Post Session: 2 /10     [employer] Portal    Total Treatment Billable Duration: 50  minutes  PT Patient Time In/Time Out  Time In: 0930  Time Out: 1015  Abhijit Castro, PT, DPT, PT, DPT    Future Appointments   Date Time Provider Elisabeth Chauhan   5/20/2020 10:15 AM Tomeka Mediate, PT, DPT [facility]   5/22/2020 10:15 AM Tomeka Mediate, PT, DPT [facility]   5/26/2020 10:15 AM [facility]iate, [facility]   5/27/2020 10:15 AM Tomeka Mediate, PT, DPT [facility]   5/28/2020 11:00 AM Tomeka Mediate, PT, DPT [facility]   6/2/2020  9:30 AM Tomeka Mediate, PT, DPT [facility]   6/3/2020  9:30 AM Tomeka Mediate, PT, DPT [facility]   6/5/2020  9:30 AM Tomeka Mediate, PT, DPT [facility]   7/24/2020  8:00 AM IMD NURSE ONLY [facility] IMD IMD   7/31/2020  1:30 PM Rhianna Austin Danisha Sanchez MD [facility] IMD IMD

## 2020-05-20 ENCOUNTER — HOSPITAL ENCOUNTER (OUTPATIENT)
Dept: PHYSICAL THERAPY | Age: 70
Discharge: HOME | End: 2020-05-20
Payer: COMMERCIAL

## 2020-05-20 PROCEDURE — 97110 THERAPEUTIC EXERCISES: CPT

## 2020-05-20 PROCEDURE — 97014 ELECTRIC STIMULATION THERAPY: CPT

## 2020-05-20 NOTE — PROGRESS NOTES
Ant Fry  : 1950  Payor: [facility] / Plan: [address] [facility] 30 [facility] / Product Type: [employer] /  [address]  at [address], [address] [place] [address], [address]  [phone]   [phone]      OUTPATIENT PHYSICAL THERAPY: Daily Treatment Note 2020  Visit Count:  11    ICD-10: Treatment Diagnosis:   M54.5 Low Back Pain   R26.2 Difficulty in Walking, Not Elsewhere Classified    Precautions/Allergies:   Patient has no known allergies. TREATMENT PLAN:  Effective Dates: 2020 TO 7/10/2020 (60 days). Frequency/Duration: 3 times a week for 60 Days       PRE-TREATMENT SYMPTOMS/COMPLAINTS:  Pt notes continued pain with sustained lumbar flexion. MEDICATIONS REVIEWED:  2020   TREATMENT:   (In addition to Assessment/Re-Assessment sessions the following treatments were rendered)    THERAPEUTIC EXERCISE: (40 minutes):  Exercises per grid below to improve mobility, strength and balance. Required minimal visual and verbal cues to promote proper body alignment and promote proper body posture. Progressed resistance, range and complexity of movement as indicated. Reviewed HEP and performed to provided cues for technique and prevent compensations.      Date:  2020 Date:  2020    Activity/Exercise      Treadmill  12min  Increasing incline to 10    Nustep 10min  Level 3     Piriformis stretch      Glute stretch      LTR      bridges      PPT with TA activation      Side plank      Hip abduction      clamshell      birddog  2x10 reps     NEEL      physioball glute bridge  1x10 reps    Physioball hamstring curl  1x10 reps    Curl up  1x10 reps    Paloff press  1x10 reps    Squat review  5min            MANUAL THERAPY: (0 minutes): Joint mobilization, Soft tissue mobilization and Manipulation was utilized and necessary because of the patient's restricted joint motion, painful spasm, loss of articular motion and restricted motion of soft tissue. +PA L1-L5 Grade III  + stretch B HS, glutes, piriformis, quads, hip flexors  + STM B lumbar paraspinals and QL  + trigger point to R QL   + silicone cups for myofascial release and to increase blood flow to lumbar region    (Used abbreviations: MET - muscle energy technique; PNF - proprioceptive neuromuscular facilitation; NMR - neuromuscular re-education; AP - anterior to posterior; PA - posterior to anterior)    MODALITIES: (10  minutes): *  Electrical Stimulation Therapy (level 22) was provided with intensity adjusted throughout treatment to patient tolerance. with P      TREATMENT/SESSION ASSESSMENT:  Nicole Melton with decreased mobility in B SI joints. Increased tone throughout R QL with associated tenderness. Decreased lumbar mobility with B lateral flexion and associated pain with L>R. Pt responded well to manual therapy with improved mobility afterwards. Notes ESTIM is helping decrease his pain. Reports he is going back to work in 2 weeks when his leave is up. Education: Provided pt with HEP. Discussed addition of lumbar and paraspinal stretching into program.    RECOMMENDATIONS/INTENT FOR NEXT TREATMENT SESSION: \"Next visit will focus on advancements to more challenging activities\".     PAIN: Initial: 3 /10 Post Session: 2 /10     [employer] Portal    Total Treatment Billable Duration: 50  minutes  PT Patient Time In/Time Out  Time In: 1015  Time Out: 1115  Aleks Shafer PT, DPT, PT, DPT    Future Appointments   Date Time Provider Elisabeth Chauhan   5/22/2020 10:15 AM Maxine Para, PT, DPT [facility]   5/26/2020 10:15 AM Maxine Para, PT, DPT [facility]   5/27/2020 10:15 AM Maxine Para, PT, DPT [facility]   5/28/2020 11:00 AM Maxine Para, PT, DPT [facility]   6/2/2020  9:30 AM Maxine Para, PT, DPT [facility]   6/3/2020  9:30 AM Maxine Para, PT, DPT [facility]   6/5/2020  9:30 AM Maxine Para, PT, DPLESLY [facility]   7/24/2020  8:00 AM IMD NURSE ONLY [facility] IMD IMD   7/31/2020  1:30 PM Danuta Ya MD [facility] IMD IMD

## 2020-05-22 ENCOUNTER — HOSPITAL ENCOUNTER (OUTPATIENT)
Dept: PHYSICAL THERAPY | Age: 70
Discharge: HOME | End: 2020-05-22
Payer: COMMERCIAL

## 2020-05-22 PROCEDURE — 97110 THERAPEUTIC EXERCISES: CPT

## 2020-05-22 NOTE — PROGRESS NOTES
Gerri Goldman  : 1950  Payor: [place] / Plan: [address] [facility] 30 [facility] / Product Type: Commerical /  [address]  at [facility]  [address] [facility], [place] [address] [address] [facility]  [phone]   [phone]      OUTPATIENT PHYSICAL THERAPY: Daily Treatment Note 2020  Visit Count:  12    ICD-10: Treatment Diagnosis:   M54.5 Low Back Pain   R26.2 Difficulty in Walking, Not Elsewhere Classified    Precautions/Allergies:   Patient has no known allergies. TREATMENT PLAN:  Effective Dates: 2020 TO 7/10/2020 (60 days). Frequency/Duration: 3 times a week for 60 Days       PRE-TREATMENT SYMPTOMS/COMPLAINTS:  Pt notes he has been doing exercises at home and they are doing well. MEDICATIONS REVIEWED:  2020   TREATMENT:   (In addition to Assessment/Re-Assessment sessions the following treatments were rendered)    THERAPEUTIC EXERCISE: (40 minutes):  Exercises per grid below to improve mobility, strength and balance. Required minimal visual and verbal cues to promote proper body alignment and promote proper body posture. Progressed resistance, range and complexity of movement as indicated. Reviewed HEP and performed to provided cues for technique and prevent compensations.      Date:  2020 Date:  2020 Date:  2020   Activity/Exercise      Treadmill  12min  Increasing incline to 10    Airdyne   10min   Nustep 10min  Level 3     Piriformis stretch      Glute stretch      LTR      bridges      PPT with TA activation      Side plank      Hip abduction      clamshell      birddog  2x10 reps     NEEL      physioball glute bridge  1x10 reps    Physioball hamstring curl  1x10 reps    Curl up  1x10 reps    Paloff press  1x10 reps    Squat review  5min    Walking and marching holding weight   1 lap  7# weight   Lateral high step overs holding weight   1 lap  7# weight   TRX squats      Single leg RDL   2x10 reps  7# weight   Paloff press   2x10 reps  Grey TB on doorway   HEP review with physioball   5min   Squat review   5min           MANUAL THERAPY: (0 minutes): Joint mobilization, Soft tissue mobilization and Manipulation was utilized and necessary because of the patient's restricted joint motion, painful spasm, loss of articular motion and restricted motion of soft tissue. +PA L1-L5 Grade III  + stretch B HS, glutes, piriformis, quads, hip flexors  + STM B lumbar paraspinals and QL  + trigger point to R QL   + silicone cups for myofascial release and to increase blood flow to lumbar region    (Used abbreviations: MET - muscle energy technique; PNF - proprioceptive neuromuscular facilitation; NMR - neuromuscular re-education; AP - anterior to posterior; PA - posterior to anterior)    MODALITIES: (0  minutes): *  Electrical Stimulation Therapy (level 22) was provided with intensity adjusted throughout treatment to patient tolerance. with P      TREATMENT/SESSION ASSESSMENT:  Portia Miller with decreased balance during walking marching. Cues for proper hip mechanics during squats. Continue dissociation exercises to promote proper lifting and squatting techniques since pt continues to have pain with flexion based movements. Education: Provided pt with HEP. Discussed addition of lumbar and paraspinal stretching into program.    RECOMMENDATIONS/INTENT FOR NEXT TREATMENT SESSION: \"Next visit will focus on advancements to more challenging activities\".     PAIN: Initial: 3 /10 Post Session: 2 /10     [facility] Portal    Total Treatment Billable Duration: 40  minutes  PT Patient Time In/Time Out  Time In: 1015  Time Out: [address], PT, DPT, PT, DPT    Future Appointments   Date Time Provider Elisabeth Chauhan   5/26/2020 10:15 AM Edson Bo PT, DPT [facility]   5/27/2020 10:15 AM Edson Bo PT, DPT [facility]   5/28/2020 11:00 AM Edson Bo PT, DPT [facility]   6/2/2020 9:30 AM Alcario Efland, PT, DPT [facility]   6/3/2020  9:30 AM Alcario Efland, PT, DPT [facility]   6/5/2020  9:30 AM Alcario Efland, PT, DPT [facility]   7/24/2020  8:00 AM IMD NURSE ONLY [facility] IMD   7/31/2020  1:30 PM Melany Hatchet, MD [facility] IMD

## 2020-05-26 ENCOUNTER — HOSPITAL ENCOUNTER (OUTPATIENT)
Dept: PHYSICAL THERAPY | Age: 70
Discharge: HOME | End: 2020-05-26
Payer: COMMERCIAL

## 2020-05-26 PROCEDURE — 97110 THERAPEUTIC EXERCISES: CPT

## 2020-05-26 PROCEDURE — 97014 ELECTRIC STIMULATION THERAPY: CPT

## 2020-05-26 NOTE — PROGRESS NOTES
Tho Sanders  : 1950  Payor: [facility] / Plan: 2900 [facility] 30 [facility] / Product Type: Commerical /  [address]  at [facility] [facility], [place] [address] [address] [facility]  [phone]   [phone]      OUTPATIENT PHYSICAL THERAPY: Daily Treatment Note 2020  Visit Count:  13    ICD-10: Treatment Diagnosis:   M54.5 Low Back Pain   R26.2 Difficulty in Walking, Not Elsewhere Classified    Precautions/Allergies:   Patient has no known allergies. TREATMENT PLAN:  Effective Dates: 2020 TO 7/10/2020 (60 days). Frequency/Duration: 3 times a week for 60 Days       PRE-TREATMENT SYMPTOMS/COMPLAINTS:  Pt notes he was slightly sore after last treatment session but did well the following day. States he has been doing a lot of bending and crouching over the weekend while at his [address] which caused and increase in symptoms while he was performing the activities. Reports the most pain with flexion and sustained trunk flexion. Pt states he will be going back to work in 2 weeks and working 3 half days a week. MEDICATIONS REVIEWED:  2020   TREATMENT:   (In addition to Assessment/Re-Assessment sessions the following treatments were rendered)    THERAPEUTIC EXERCISE: (40 minutes):  Exercises per grid below to improve mobility, strength and balance. Required minimal visual and verbal cues to promote proper body alignment and promote proper body posture. Progressed resistance, range and complexity of movement as indicated. Reviewed HEP and performed to provided cues for technique and prevent compensations.      Date:  2020 Date:  2020 Date:  2020 DatE:  2020   Activity/Exercise       Treadmill  12min  Increasing incline to 10     Airdyne   10min 10min   Nustep 10min  Level 3      Piriformis stretch       Glute stretch       LTR       bridges       PPT with TA activation       Side plank       Hip abduction       clamshell       birddog  2x10 reps      NEEL       physioball glute bridge  1x10 reps     Physioball hamstring curl  1x10 reps     Curl up  1x10 reps     Paloff press  1x10 reps     Squat review  5min     Walking and marching holding weight   1 lap  7# weight    Lateral high step overs holding weight   1 lap  7# weight    TRX squats       Single leg RDL   2x10 reps  7# weight    Paloff press   2x10 reps  Grey TB on doorway 1x10 reps  Blue TB on doorway   HEP review with physioball   5min    Squat review   5min    Monster walks    1 lap  Green TB around knees  Holding 7# DB   Physioball knee flexion    2x10 reps  Green physioball  Holding 7# DB   Physioball hip flexion    2x10 reps  Green physioball  Holding 7# DB   Silverman's carry    1 lap ea hand  10# DB   Hip flexor and quad jason stretch     3x30s B  With PT manual assistance                                               MANUAL THERAPY: (0 minutes): Joint mobilization, Soft tissue mobilization and Manipulation was utilized and necessary because of the patient's restricted joint motion, painful spasm, loss of articular motion and restricted motion of soft tissue. +PA L1-L5 Grade III  + stretch B HS, glutes, piriformis, quads, hip flexors  + STM B lumbar paraspinals and QL  + trigger point to R QL   + silicone cups for myofascial release and to increase blood flow to lumbar region    (Used abbreviations: MET - muscle energy technique; PNF - proprioceptive neuromuscular facilitation; NMR - neuromuscular re-education; AP - anterior to posterior; PA - posterior to anterior)    MODALITIES: (10  minutes): *  Electrical Stimulation Therapy (level 25) was provided with intensity adjusted throughout treatment to patient tolerance. unattended       *  Cold Pack Therapy in order to provide analgesia and reduce inflammation and edema. Applied concurrently at the end of treatment session.        TREATMENT/SESSION ASSESSMENT:  Evgeny Amaya with decreased balance during physioball exercises. Assistance provided to prevent pt from falling sideways. Cues to engage core. Pt struggles with lumbo pelvic dissociation and requires extensive cues to hip hinge before squatting. Emailed detailed HEP after treatment session. Review exercises following treatment session. Pt continues to complain of pain during flexion exercises. Review lifting and bending mechanics. Education: Provided pt with HEP. Discussed addition of lumbar and paraspinal stretching into program.    RECOMMENDATIONS/INTENT FOR NEXT TREATMENT SESSION: \"Next visit will focus on advancements to more challenging activities\".     PAIN: Initial: 3 /10 Post Session: 2 /10     [employer] Portal    Total Treatment Billable Duration: 50  minutes  PT Patient Time In/Time Out  Time In: 1015  Time Out: [address], PT, DPT, PT, DPT    Future Appointments   Date Time Provider Elisabeth Chauhan   5/26/2020 10:15 AM Vazquez Inks, PT, DPT [facility]   5/27/2020 10:15 AM Sydna Inks, PT, DPT [facility]   5/28/2020 11:00 AM Sydna Inks, PT, DPT [facility]   6/2/2020  9:30 AM Sydna Inks, PT, DPT [facility]   6/3/2020  9:30 AM Sydna Inks, PT, DPT [facility]   6/5/2020  9:30 AM Sydna Inks, PT, DPT [facility]   7/24/2020  8:00 AM IMD NURSE ONLY [facility]NA [facility]   7/31/2020  1:30 PM MD LAKISHA Pfeiffer

## 2020-05-27 ENCOUNTER — HOSPITAL ENCOUNTER (OUTPATIENT)
Dept: PHYSICAL THERAPY | Age: 70
Discharge: HOME | End: 2020-05-27
Payer: COMMERCIAL

## 2020-05-27 PROCEDURE — 97110 THERAPEUTIC EXERCISES: CPT

## 2020-05-27 PROCEDURE — 97140 MANUAL THERAPY 1/> REGIONS: CPT

## 2020-05-27 PROCEDURE — 97014 ELECTRIC STIMULATION THERAPY: CPT

## 2020-05-27 NOTE — PROGRESS NOTES
Kristine Morel  : 1950  Payor: [facility] / Plan: [address] [facility] 30 [facility] / Product Type: Commerical /  [address]  at [facility]  [address], [address] [facility], [place] [address] [address] [facility]  [phone]   [phone]      OUTPATIENT PHYSICAL THERAPY: Daily Treatment Note 2020  Visit Count:  14    ICD-10: Treatment Diagnosis:   M54.5 Low Back Pain   R26.2 Difficulty in Walking, Not Elsewhere Classified    Precautions/Allergies:   Patient has no known allergies. TREATMENT PLAN:  Effective Dates: 2020 TO 7/10/2020 (60 days). Frequency/Duration: 3 times a week for 60 Days       PRE-TREATMENT SYMPTOMS/COMPLAINTS:  Pt notes he was sore after yesterday's treatment session. He states he is not sure what he did that aggravated his symptoms. Notes he may be doing the hamstring curls with the physioball too aggressively. Rates pain 10 today and states it is located over R SI joint. MEDICATIONS REVIEWED:  2020   TREATMENT:   (In addition to Assessment/Re-Assessment sessions the following treatments were rendered)    THERAPEUTIC EXERCISE: (20 minutes):  Exercises per grid below to improve mobility, strength and balance. Required minimal visual and verbal cues to promote proper body alignment and promote proper body posture. Progressed resistance, range and complexity of movement as indicated. Reviewed HEP and performed to provided cues for technique and prevent compensations.      Date:  2020 Date:  2020 Date:  2020 DatE:  2020 DatE:  2020   Activity/Exercise        Treadmill  12min  Increasing incline to 10      Airdyne   10min 10min 10min   Nustep 10min  Level 3       Piriformis stretch        Glute stretch        LTR        bridges        PPT with TA activation        Side plank        Hip abduction        clamshell        birddog  2x10 reps       NEEL        physioball glute bridge  1x10 reps   1x10 reps   Physioball hamstring curl  1x10 reps   1x10 reps   Curl up  1x10 reps      Paloff press  1x10 reps      Squat review  5min      Walking and marching holding weight   1 lap  7# weight     Lateral high step overs holding weight   1 lap  7# weight     TRX squats        Single leg RDL   2x10 reps  7# weight     Paloff press   2x10 reps  Grey TB on doorway 1x10 reps  Blue TB on doorway    HEP review with physioball   5min     Squat review   5min     Monster walks    1 lap  Green TB around knees  Holding 7# DB    Physioball knee flexion    2x10 reps  Green physioball  Holding 7# DB    Physioball hip flexion    2x10 reps  Green physioball  Holding 7# DB    Silverman's carry    1 lap ea hand  10# DB    Hip flexor and quad jason stretch     3x30s B  With PT manual assistance    Squats with physioball on wall     1x10 reps  Hold 5s at bottom                                             MANUAL THERAPY: (23 minutes): Joint mobilization, Soft tissue mobilization and Manipulation was utilized and necessary because of the patient's restricted joint motion, painful spasm, loss of articular motion and restricted motion of soft tissue. +PA L1-L5 Grade III  + STM B lumbar paraspinals and QL  + trigger point to R QL  +B long axis distraction   + silicone cups for myofascial release and to increase blood flow to lumbar region-NOT TODAY    (Used abbreviations: MET - muscle energy technique; PNF - proprioceptive neuromuscular facilitation; NMR - neuromuscular re-education; AP - anterior to posterior; PA - posterior to anterior)    MODALITIES: (10  minutes): *  Electrical Stimulation Therapy (level 30) was provided with intensity adjusted throughout treatment to patient tolerance. unattended       *  Hot Pack Therapy in order to provide analgesia and relieve muscle spasm. Applied concurrently at the end of treatment session.        TREATMENT/SESSION ASSESSMENT:  Trang Mensah with improvements in core motor control during physioball bridges and hamstring curls. Educated pt to perform curls with decreased movement to prevent lower back pain. Responded well to manual therapy with decreased pain over R SI joint following long axis distraction. ecreased balance during physioball exercises. Pt with leg length discrepancy in R LE (shorter in supine). Pt struggles with lumbo pelvic dissociation and requires extensive cues to hip hinge before squatting. Review lifting and bending mechanics. Education: Provided pt with HEP. Discussed addition of lumbar and paraspinal stretching into program.    RECOMMENDATIONS/INTENT FOR NEXT TREATMENT SESSION: \"Next visit will focus on advancements to more challenging activities\".     PAIN: Initial: 3 /10 Post Session: 2 /10     [employer] Portal    Total Treatment Billable Duration: 53  minutes  PT Patient Time In/Time Out  Time In: 1015  Time Out: [address], PT, DPT, PT, DPT    Future Appointments   Date Time Provider Elisabeth Chauhan   5/28/2020 11:00 AM Marice Leyden, PT, DPT [facility]   6/2/2020  9:30 AM Marice Leyden, PT, DPT [facility]   6/3/2020  9:30 AM Marice Leyden, PT, DPT [facility]   6/5/2020  9:30 AM Marice Leyden, PT, DPT [facility]   7/24/2020  8:00 AM IMD NURSE ONLY [facility] LUNA [facility]   7/31/2020  1:30 PM MD LAKISHA Nunez IMJACQUELINE

## 2020-05-28 ENCOUNTER — HOSPITAL ENCOUNTER (OUTPATIENT)
Dept: PHYSICAL THERAPY | Age: 70
Discharge: HOME | End: 2020-05-28
Payer: COMMERCIAL

## 2020-05-28 PROCEDURE — 97110 THERAPEUTIC EXERCISES: CPT

## 2020-05-28 PROCEDURE — 97014 ELECTRIC STIMULATION THERAPY: CPT

## 2020-05-28 NOTE — PROGRESS NOTES
Kervin Madison  : 1950  Payor: [facility] / Plan: [address] [facility] 30 [facility] / Product Type: Commerical /  [address]  at [facility]  [address] [facility], [place] [address] [address] [facility]  [phone]   [phone]      OUTPATIENT PHYSICAL THERAPY: Daily Treatment Note 2020  Visit Count:  15    ICD-10: Treatment Diagnosis:   M54.5 Low Back Pain   R26.2 Difficulty in Walking, Not Elsewhere Classified    Precautions/Allergies:   Patient has no known allergies. TREATMENT PLAN:  Effective Dates: 2020 TO 7/10/2020 (60 days). Frequency/Duration: 3 times a week for 60 Days       PRE-TREATMENT SYMPTOMS/COMPLAINTS:  Pt notes continued soreness in lower back from increase in lifting and doing activities. He states he is having difficulty with long car rides. MEDICATIONS REVIEWED:  2020   TREATMENT:   (In addition to Assessment/Re-Assessment sessions the following treatments were rendered)    THERAPEUTIC EXERCISE: (38 minutes):  Exercises per grid below to improve mobility, strength and balance. Required minimal visual and verbal cues to promote proper body alignment and promote proper body posture. Progressed resistance, range and complexity of movement as indicated. Reviewed HEP and performed to provided cues for technique and prevent compensations.      Date:  2020 Date:  2020 Date:  2020 DatE:  2020 Date:  2020 Date:  2020   Activity/Exercise         Treadmill  12min  Increasing incline to 10       Airdyne   10min 10min 10min 10min   Nustep 10min  Level 3        Piriformis stretch         Glute stretch         LTR         bridges         PPT with TA activation         Side plank         Hip abduction         clamshell         birddog  2x10 reps        NEEL         physioball glute bridge  1x10 reps   1x10 reps    Physioball hamstring curl  1x10 reps   1x10 reps    Curl up  1x10 reps       Sawyer press  1x10 reps       Squat review  5min       Walking and marching holding weight   1 lap  7# weight      Lateral high step overs holding weight   1 lap  7# weight      TRX squats         Single leg RDL   2x10 reps  7# weight      Paloff press   2x10 reps  Grey TB on doorway 1x10 reps  Blue TB on doorway     HEP review with physioball   5min      Squat review   5min      Monster walks    1 lap  Green TB around knees  Holding 7# DB     Physioball knee flexion    2x10 reps  Green physioball  Holding 7# DB     Physioball hip flexion    2x10 reps  Green physioball  Holding 7# DB     Silverman's carry    1 lap ea hand  10# DB     Hip flexor and quad jason stretch     3x30s B  With PT manual assistance     Squats with physioball on wall     1x10 reps  Hold 5s at bottom    Walking tipping birds down hallway      1 lap   Bungee forward      1x10 reps   Bungee sideways      1x10 reps B   HEP review                    MANUAL THERAPY: (0 minutes): Joint mobilization, Soft tissue mobilization and Manipulation was utilized and necessary because of the patient's restricted joint motion, painful spasm, loss of articular motion and restricted motion of soft tissue. +PA L1-L5 Grade III  + STM B lumbar paraspinals and QL  + trigger point to R QL  +B long axis distraction   + silicone cups for myofascial release and to increase blood flow to lumbar region-NOT TODAY    (Used abbreviations: MET - muscle energy technique; PNF - proprioceptive neuromuscular facilitation; NMR - neuromuscular re-education; AP - anterior to posterior; PA - posterior to anterior)    MODALITIES: (10  minutes): *  Electrical Stimulation Therapy (level 30) was provided with intensity adjusted throughout treatment to patient tolerance. unattended       *  Hot Pack Therapy in order to provide analgesia and relieve muscle spasm. Applied concurrently at the end of treatment session.        TREATMENT/SESSION ASSESSMENT:  Gerri Goldman with decreased balance especially on L LE. Difficulty with bungees leading with Lt leg as well. Instructed pt to continue working on balance exercises at home. Education: Provided pt with HEP. Discussed addition of lumbar and paraspinal stretching into program.    RECOMMENDATIONS/INTENT FOR NEXT TREATMENT SESSION: \"Next visit will focus on advancements to more challenging activities\".     PAIN: Initial: 3 /10 Post Session: 2 /10     [employer] Portal    Total Treatment Billable Duration: 48  minutes  PT Patient Time In/Time Out  Time In: 1100  Time Out: [address], PT, DPT, PT, DPT    Future Appointments   Date Time Provider Elisabeth Chauhan   6/2/2020  9:30 AM Aaron Ocampo PT, DPT [facility]   6/3/2020  9:30 AM Aaron Ocampo PT, DPT [facility]   6/5/2020  9:30 AM Aaron Ocampo PT, DPT [facility]   7/24/2020  8:00 AM IMD NURSE ONLY LAKISHA IMJACQUELINE IMD   7/31/2020  1:30 PM MD LAKISHA Lopes IMJACQUELINE IMD

## 2020-05-29 ENCOUNTER — APPOINTMENT (OUTPATIENT)
Dept: PHYSICAL THERAPY | Age: 70
End: 2020-05-29
Payer: COMMERCIAL

## 2020-06-02 ENCOUNTER — HOSPITAL ENCOUNTER (OUTPATIENT)
Dept: PHYSICAL THERAPY | Age: 70
Discharge: HOME | End: 2020-06-02
Payer: COMMERCIAL

## 2020-06-02 PROCEDURE — 97014 ELECTRIC STIMULATION THERAPY: CPT

## 2020-06-02 PROCEDURE — 97110 THERAPEUTIC EXERCISES: CPT

## 2020-06-02 NOTE — PROGRESS NOTES
Kassie Miranda  : 1950  Payor: [facility] / Plan: [address] [facility] 30 [facility] / Product Type: Commerical /  [address]  at [facility]  [address], [address] [facility] [place], [address]  [phone]   [phone]      OUTPATIENT PHYSICAL THERAPY: Daily Treatment Note 2020  Visit Count:  16    ICD-10: Treatment Diagnosis:   M54.5 Low Back Pain   R26.2 Difficulty in Walking, Not Elsewhere Classified    Precautions/Allergies:   Patient has no known allergies. TREATMENT PLAN:  Effective Dates: 2020 TO 7/10/2020 (60 days). Frequency/Duration: 3 times a week for 60 Days       PRE-TREATMENT SYMPTOMS/COMPLAINTS:  [place] soreness in his ower back     MEDICATIONS REVIEWED:  2020   TREATMENT:   (In addition to Assessment/Re-Assessment sessions the following treatments were rendered)    THERAPEUTIC EXERCISE: (40 minutes):  Exercises per grid below to improve mobility, strength and balance. Required minimal visual and verbal cues to promote proper body alignment and promote proper body posture. Progressed resistance, range and complexity of movement as indicated. Reviewed HEP and performed to provided cues for technique and prevent compensations.      Date:  2020 Date:  2020 Date:  2020 DatE:  2020 Date:  2020 Date:  2020 Date:  2020   Activity/Exercise          Treadmill  12min  Increasing incline to 10        Airdyne   10min 10min 10min 10min 10min   Nustep 10min  Level 3         Piriformis stretch          Glute stretch          LTR          bridges          PPT with TA activation          Side plank          Hip abduction          clamshell          birddog  2x10 reps         NEEL          physioball glute bridge  1x10 reps   1x10 reps     Physioball hamstring curl  1x10 reps   1x10 reps     Curl up  1x10 reps        Paloff press  1x10 reps Squat review  5min        Walking and marching holding weight   1 lap  7# weight       Lateral high step overs holding weight   1 lap  7# weight       TRX squats          Single leg RDL   2x10 reps  7# weight       Paloff press   2x10 reps  Grey TB on doorway 1x10 reps  Blue TB on doorway      HEP review with physioball   5min       Squat review   5min       Monster walks    1 lap  Green TB around knees  Holding 7# DB      Physioball knee flexion    2x10 reps  Green physioball  Holding 7# DB      Physioball hip flexion    2x10 reps  Green physioball  Holding 7# DB      Silverman's carry    1 lap ea hand  10# DB      Hip flexor and quad jason stretch     3x30s B  With PT manual assistance      Squats with physioball on wall     1x10 reps  Hold 5s at bottom     Marching down hallway          Walking tipping birds down hallway      1 lap 1 lap   Bungee forward      1x10 reps    Bungee sideways      1x10 reps B    HEP review          Trunk lateral flexion in standing with hand weights       2x10 reps  10# hand weight   Marching       2x10 reps  Blue foam pad  Hold 2s   BOSU balance with vertical and horizontal UE movements and weighted ball       3x30s ea  Yellow weighted ball  Flat part of bosu                          MANUAL THERAPY: (0 minutes): Joint mobilization, Soft tissue mobilization and Manipulation was utilized and necessary because of the patient's restricted joint motion, painful spasm, loss of articular motion and restricted motion of soft tissue. +PA L1-L5 Grade III  + STM B lumbar paraspinals and QL  + trigger point to R QL  +B long axis distraction   + silicone cups for myofascial release and to increase blood flow to lumbar region-NOT TODAY    (Used abbreviations: MET - muscle energy technique; PNF - proprioceptive neuromuscular facilitation; NMR - neuromuscular re-education; AP - anterior to posterior; PA - posterior to anterior)    MODALITIES: (10  minutes):       *  Electrical Stimulation Therapy (level 30) was provided with intensity adjusted throughout treatment to patient tolerance. unattended       *  Hot Pack Therapy in order to provide analgesia and relieve muscle spasm. Applied concurrently at the end of treatment session. TREATMENT/SESSION ASSESSMENT:  Kayleen Hernandez with continued decreased balance. Decreased abdominal activation to correct during LOB moments. Worked on lateral flexion exercises to help with that. Pt with difficulty dissociating hips and lower back. Required constant tactile cues to accomplish. Slight pain in lower back during tipping birds which decreased once pt activated core. Education: Provided pt with HEP. Discussed addition of lumbar and paraspinal stretching into program.    RECOMMENDATIONS/INTENT FOR NEXT TREATMENT SESSION: \"Next visit will focus on advancements to more challenging activities\".     PAIN: Initial: 2/10 Post Session: 1/10     [employer] Portal    Total Treatment Billable Duration: 50  minutes  PT Patient Time In/Time Out  Time In: 0930  Time Out: 1030  Amirah Soto, PT, DPT, PT, DPT    Future Appointments   Date Time Provider Department Center   6/3/2020  9:30 AM Tory Showers, PT, DPT [facility]   6/5/2020  9:30 AM Tory Showers, PT, DPT [facility]   6/8/2020  9:30 AM Tory Showers, PT, DPT [facility]   6/10/2020  9:30 AM Tory Showers, PT, DPT [facility]   6/11/2020  3:15 PM Tory Showers, PT, DPT [facility]   6/22/2020  9:30 AM Tory Showers, PT, DPT [facility]   6/24/2020 11:00 AM [facility]   6/26/2020  9:30 AM Tory Showers, PT, DPT [facility]   7/24/2020  8:00 AM IMD NURSE ONLY LAKISHA IMD IMD   7/31/2020  1:30 PM MD LAKISHA Montalvo IMJACQUELINE IMD

## 2020-06-03 ENCOUNTER — HOSPITAL ENCOUNTER (OUTPATIENT)
Dept: PHYSICAL THERAPY | Age: 70
Discharge: HOME | End: 2020-06-03
Payer: COMMERCIAL

## 2020-06-03 PROCEDURE — 97110 THERAPEUTIC EXERCISES: CPT

## 2020-06-03 PROCEDURE — 97140 MANUAL THERAPY 1/> REGIONS: CPT

## 2020-06-03 PROCEDURE — 97014 ELECTRIC STIMULATION THERAPY: CPT

## 2020-06-03 NOTE — PROGRESS NOTES
Josephinereji Hew  : 1950  Payor: [facility] / Plan: [address] / Product Type: Commlailal /  Adriana [place]  at [address] [facility] [address], [address] [facility], [place] [address] [address] [facility]  [phone]   [phone]      OUTPATIENT PHYSICAL THERAPY: Daily Treatment Note 6/3/2020  Visit Count:  17    ICD-10: Treatment Diagnosis:   M54.5 Low Back Pain   R26.2 Difficulty in Walking, Not Elsewhere Classified    Precautions/Allergies:   Patient has no known allergies. TREATMENT PLAN:  Effective Dates: 2020 TO 7/10/2020 (60 days). Frequency/Duration: 3 times a week for 60 Days       PRE-TREATMENT SYMPTOMS/COMPLAINTS:  Pt notes soreness in lower back from yesterday's treatment session. MEDICATIONS REVIEWED:  6/3/2020   TREATMENT:   (In addition to Assessment/Re-Assessment sessions the following treatments were rendered)    THERAPEUTIC EXERCISE: (12 minutes):  Exercises per grid below to improve mobility, strength and balance. Required minimal visual and verbal cues to promote proper body alignment and promote proper body posture. Progressed resistance, range and complexity of movement as indicated. Reviewed HEP and performed to provided cues for technique and prevent compensations.      Date:  2020 Date:  2020 Date:  2020 DatE:  2020 Date:  2020 Date:  2020 Date:  2020 Date:  6/3/2020   Activity/Exercise           Treadmill  12min  Increasing incline to 10         Airdyne   10min 10min 10min 10min 10min 12min   Nustep 10min  Level 3          Piriformis stretch           Glute stretch           LTR           bridges           PPT with TA activation           Side plank           Hip abduction           clamshell           birddog  2x10 reps          NEEL           physioball glute bridge  1x10 reps   1x10 reps      Physioball hamstring curl  1x10 reps   1x10 reps      Curl up  1x10 reps         Paloff press  1x10 reps         Squat review  5min         Walking and marching holding weight   1 lap  7# weight        Lateral high step overs holding weight   1 lap  7# weight        TRX squats           Single leg RDL   2x10 reps  7# weight        Paloff press   2x10 reps  Grey TB on doorway 1x10 reps  Blue TB on doorway       HEP review with physioball   5min        Squat review   5min        Monster walks    1 lap  Green TB around knees  Holding 7# DB       Physioball knee flexion    2x10 reps  Green physioball  Holding 7# DB       Physioball hip flexion    2x10 reps  Green physioball  Holding 7# DB       Silverman's carry    1 lap ea hand  10# DB       Hip flexor and quad jason stretch     3x30s B  With PT manual assistance       Squats with physioball on wall     1x10 reps  Hold 5s at bottom      Marching down hallway           Walking tipping birds down hallway      1 lap 1 lap    Bungee forward      1x10 reps     Bungee sideways      1x10 reps B     HEP review           Trunk lateral flexion in standing with hand weights       2x10 reps  10# hand weight    Marching       2x10 reps  Blue foam pad  Hold 2s    BOSU balance with vertical and horizontal UE movements and weighted ball       3x30s ea  Yellow weighted ball  Flat part of bosu                             MANUAL THERAPY: (30 minutes): Joint mobilization, Soft tissue mobilization and Manipulation was utilized and necessary because of the patient's restricted joint motion, painful spasm, loss of articular motion and restricted motion of soft tissue.    +PA L1-L5 Grade III  + STM B lumbar paraspinals and QL  + trigger point to R QL  +B long axis distraction   + silicone cups for myofascial release and to increase blood flow to lumbar region-NOT TODAY 6/3/2020    (Used abbreviations: MET - muscle energy technique; PNF - proprioceptive neuromuscular facilitation; NMR - neuromuscular re-education; AP - anterior to posterior; PA - posterior to anterior)    MODALITIES: (10  minutes): *  Electrical Stimulation Therapy (level 30) was provided with intensity adjusted throughout treatment to patient tolerance. unattended       *  Hot Pack Therapy in order to provide analgesia and relieve muscle spasm. Applied concurrently at the end of treatment session. TREATMENT/SESSION ASSESSMENT:  Ant Barley with continued tone throughout B QL. He had tenderness throughout B glute med which subsided following thera-roll. Education: Provided pt with HEP. Discussed addition of lumbar and paraspinal stretching into program.    RECOMMENDATIONS/INTENT FOR NEXT TREATMENT SESSION: \"Next visit will focus on advancements to more challenging activities\".     PAIN: Initial: 2/10 Post Session: 1/10     [employer] Portal    Total Treatment Billable Duration: 52  minutes  PT Patient Time In/Time Out  Time In: 0930  Time Out: 23 Miguel White PT, DPT, PT, DPT    Future Appointments   Date Time Provider Department Center   6/5/2020  9:30 AM Obdulia Gaytan PT, DPT [facility]   6/8/2020  9:30 AM Obdulia Gaytan PT, DPT [facility]   6/10/2020  9:30 AM Obdulia Gaytan PT, DPT [facility]   6/11/2020  3:15 PM Obdulia Gaytan PT, DPT [facility]   6/22/2020  9:30 AM Obdulia Gaytan PT, DPT [facility]   6/24/2020 11:00 [facility]   6/26/2020  9:30 AM Obdulia Gaytan PT, DPT [facility]   7/24/2020  8:00 AM IMD NURSE ONLY LAKISHA IMJACQUELINE IMD   7/31/2020  1:30 PM MD LAKISHA Bowman IMJACQUELINE

## 2020-06-05 ENCOUNTER — HOSPITAL ENCOUNTER (OUTPATIENT)
Dept: PHYSICAL THERAPY | Age: 70
Discharge: HOME | End: 2020-06-05
Payer: COMMERCIAL

## 2020-06-05 PROCEDURE — 97014 ELECTRIC STIMULATION THERAPY: CPT

## 2020-06-05 PROCEDURE — 97140 MANUAL THERAPY 1/> REGIONS: CPT

## 2020-06-05 PROCEDURE — 97110 THERAPEUTIC EXERCISES: CPT

## 2020-06-05 NOTE — PROGRESS NOTES
Brock Espinal  : 1950  Payor: MEDICAL [facility] / Plan: [facility] MEDICAL MUTUAL [address] / Product Type: Commerical /  [address]  at [facility]  [address] [facility], [place] [address] [address] [facility]  [phone]   [phone]        OUTPATIENT PHYSICAL THERAPY:Progress Report 2020    ICD-10: Treatment Diagnosis:   M54.5 Low Back Pain   R26.2 Difficulty in Walking, Not Elsewhere Classified  Precautions/Allergies:   Patient has no known allergies. Fall Risk Score: 2 (? 5 = High Risk)  MD Orders: Evaluate and Treat MEDICAL/REFERRING DIAGNOSIS:  Other forms of scoliosis, lumbar region [M41.86]   DATE OF ONSET: 6-8 weeks ago  REFERRING PHYSICIAN: Yuliya Birmingham MD  RETURN PHYSICIAN APPOINTMENT:      ASSESSMENT:  Mr. Elisa Maravilla has attended 11 physical therapy session since his re-evaluation 3/17/20 after opening of clinic. Pt is making progress in terms of strength and pain overall but continues to present with decreased lumbar ROM and core strength affecting quality of life and daily functional activities such as sitting and standing tolerance. Pt continues to present with significant hypomobility of lumbar spinal segments and B hip ER with decreased ROM and core strength affecting pt ability to tolerate functional mobility, ADLs, and work tasks. His balance is slowly improving as well as his hip and lumbar dissociation improving lifting mechanics. Recommending continued skilled PT: manual therapeutic techniques (as appropriate), therapeutic exercises and activities, balance interventions, and a comprehensive home exercise program to address the current impairments, as listed above. Brock Espinal will benefit from skilled PT (medically necessary) to address above deficits affecting participation in basic ADLs and overall functional tolerance. PROBLEM LIST (Impacting functional limitations):  1. Decreased Strength  2.  Decreased ADL/Functional Activities  3. Decreased Transfer Abilities  4. Decreased Ambulation Ability/Technique  5. Decreased Balance  6. Increased Pain  7. Decreased Flexibility/Joint Mobility  8. Decreased [place] with Home Exercise Program INTERVENTIONS PLANNED:  1. Balance Exercise  2. Cold  3. Cryotherapy  4. Electrical Stimulation  5. Family Education  6. Gait Training  7. Heat  8. Home Exercise Program (HEP)  9. Manual Therapy  10. Neuromuscular Re-education/Strengthening  11. Range of Motion (ROM)  12. Therapeutic Activites  13. Therapeutic Exercise/Strengthening  14. Transcutaneous Electrical Nerve Stimulation (TENS)  15. Transfer Training   TREATMENT PLAN:  TREATMENT PLAN:  Effective Dates: 5/11/2020 TO 7/10/2020 (60 days). Frequency/Duration: 3 times a week for 60 Days  Short-Term Functional Goals: Time Frame: 4 weeks  1. Pt will be compliant with HEP. Goal met 5/11/20  2. Pt will decreased worst pain to 5/10 or less in order to improved standing and sitting tolerance for work and ADLs, Goal met 5/11/20 with exception of heavier tasks for example fly fishing  3. Pt will decreased KAI to 13/50 or less in order to return to daily functional activities and work tasks  4. Pt will report sitting tolerance > 30 minutes with <5/10 pain for return to functional mobility and work tasks  Discharge Goals: Time Frame: 8 weeks  1. Pt will be independent with HEP. 2. Pt will decreased worst pain to 3/10 or less in order to improve standing and sitting tolerance for work and ADLs  3. Pt will decreased KAI to 8/50 or less in order to return to daily functional activities and work tasks  4. Pt will report sitting tolerance > 60 minutes with < 3/10 pain for return to functional mobility and work tasks      Rehabilitation Potential For Stated Goals: Good  Regarding [facility] therapy, I certify that the treatment plan above will be carried out by a therapist or under their direction.   Thank you for this referral,  Lashanda Trivedi, PT, DPT, PT, DPT               The information in this section was collected on 5/11/20 (except where otherwise noted). HISTORY:   History of Present Injury/Illness (Reason for Referral):   Mechanical low back pain      CC/Primary Concern:      Pt reports a history of chronic lower back pain that began in his 35s. Pt reports having a lumbar fusion L5-S1 a few years ago but now with recent exaccerbation approximately 6-8 weeks ago. 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 Treatment Side: Bilateral central LBP      Past Medical History/Comorbidities:   Mr. Jose Aviles  has a past medical history of Arthritis (07/06/2015), Back pain, DDD (degenerative disc disease), lumbar, GERD (gastroesophageal reflux disease), History of basal cell cancer, History of squamous cell carcinoma, Insomnia (07/06/2015), Left inguinal hernia, MVP (mitral valve prolapse), Onychomycosis (7/7/8495), Oral lichen planus (9/3/6512), RBBB (7/6/2015), Right wrist pain (7/6/2015), Rising PSA level (7/6/2015), Scoliosis, and Spondylosis of lumbar region without myelopathy or radiculopathy. Mr. Jose Aviles  has a past surgical history that includes hx orthopaedic; neurological procedure unlisted (2007); hx colonoscopy; and hx shoulder arthroscopy (Right).   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 , Disp: 90 Cap, Rfl: 3    linaCLOtide (LINZESS) 145 mcg cap capsule, Take 1 Cap by mouth Daily (before breakfast). , Disp: 90 Cap, Rfl: 3    Cetirizine (ZYRTEC) 10 mg cap, Take  by mouth., Disp: , Rfl:     terbinafine HCl (LAMISIL) 250 mg tablet, Take 250 mg by mouth daily. , Disp: , Rfl:     lidocaine (LIDODERM) 5 %, by TransDERmal route every twenty-four (24) hours. Apply patch to the affected area for 12 hours a day and remove for 12 hours a day., Disp: , Rfl:     meloxicam (MOBIC) 15 mg tablet, Take 1 Tab by mouth daily. , Disp: 90 Tab, Rfl: 3    eszopiclone (LUNESTA) 3 mg tablet, Take 1 Tab by mouth nightly as needed for Sleep. Max Daily Amount: 3 mg., Disp: 90 Tab, Rfl: 1    montelukast (SINGULAIR) 10 mg tablet, Take 1 Tab by mouth daily. , Disp: 90 Tab, Rfl: 4    tadalafil (CIALIS) 5 mg tablet, Take 1 Tab by mouth daily. , Disp: 90 Tab, Rfl: 3    predniSONE (DELTASONE) 5 mg tablet, 8 tablets daily for 5 days then taper as directed, Disp: 100 Tab, Rfl: 1    dicyclomine (BENTYL) 20 mg tablet, Take 1 Tab by mouth every six (6) hours. , Disp: 120 Tab, Rfl: 5    ipratropium (ATROVENT) 0.03 % nasal spray, 2 Sprays by Both Nostrils route every twelve (12) hours. , Disp: 30 mL, Rfl: 11    docusate sodium (COLACE) 100 mg capsule, Take 100 mg by mouth daily. , Disp: , Rfl:     RESTASIS 0.05 % ophthalmic emulsion, INSTILL 1 DROP IN BOTH EYES TWICE DAILY, Disp: , Rfl: 12    tretinoin (RETIN-A) 0.05 % topical cream, APPLY TO AFFECTED AREA NIGHTLY ATBEDTIME, Disp: , Rfl: 1    triamcinolone acetonide (KENALOG) 0.1 % dental paste, APPLY AS DIRECTED, Disp: , Rfl: 1    omeprazole (PRILOSEC) 20 mg capsule, Take 20 mg by mouth daily. , Disp: , Rfl:    Date Last Reviewed:  6/5/2020       Ambulatory/Rehab Services H2 Model Falls Risk Assessment    Risk Factors:       (1)  Gender [Male] Ability to Rise from Chair:       (1)  Pushes up, successful in one attempt    Falls Prevention Plan:       No modifications necessary   Total: (5 or greater = High Risk): 2    ©2010 [facility] of [employer]. All Rights Reserved. [facility] States Patent #[id number].  Federal Law prohibits the replication, distribution or use without written permission from [facility] [employer]        Number of Personal Factors/Comorbidities that affect the Plan of Care: 1-2: MODERATE COMPLEXITY   EXAMINATION:   Inspection        Pelvic alignment: NT        Additional Comments:   ________________________________________________________________________________________________  Observation          Posture: Decreased lumbar lordosis        Gait: Decreased Gait Speed, Decreased Stride Length and Decreased Step Length                  ________________________________________________________________________________________________  Range of Motion        Lumbar  Joint:      Right (Degrees/Percent) Left (Degrees/Percent)   Flexion  40 degrees pain    Extension  15 degrees pain    Side Bending  Knee joint WFL   Rotation  Knee joint WFL         Lower  Joint: Passive Passive Active Active    Right (Degrees) Left (Degrees) Right (Degrees) Left (Degrees)   Hip Flexion   [facility] WFL    Hip Extension   [facility] WFL   Hip Abduction   [facility] WF   Hip Internal Rotation    NT NT   Hip External Rotation   NT NT             Additional Comments: + decreased tissue extensibility HS, piriformis, glutes, R hip flexor > L , quads  ________________________________________________________________________________________________  Strength          Lower Extremity  Joint:      RIGHT LEFT   Hip Flexion 5/5  5/5    Hip Extension 5/5  5/5    Hip Internal Rotation 4/5 4/5   Hip External Rotation 4/5 4/5   Hip Abduction 5/5 (5/5 pain) 5/5    Knee flexion 5/5 5/5   Knee extension 5/5 5/5     ________________________________________________________________________________________________  [facility]        C/O Radicular Symptoms: No        Additional Comments:   ________________________________________________________________________________________________            ________________________________________________________________________________________________  Palpation: lumbar paraspinals, increased tone noted  Joint mobilization: L1-L5 hypomobility with PA       Body Structures Involved:  1. Nerves  2. Bones  3. Joints  4. Muscles  5. Ligaments Body Functions Affected:  1. Sensory/Pain  2. Neuromusculoskeletal  3. Movement Related Activities and Participation Affected:  1. General Tasks and Demands  2. Mobility  3. Self Care  4. Domestic Life  5. Interpersonal Interactions and Relationships  6. Community, Social and [place]   Number of elements (examined above) that affect the Plan of Care: 4+: HIGH COMPLEXITY   CLINICAL PRESENTATION:   Presentation: Evolving clinical presentation with changing clinical characteristics: MODERATE COMPLEXITY   CLINICAL DECISION MAKING:   Outcome Measure: Tool Used: Modified Oswestry Low Back Pain Questionnaire  Score:  Initial:17/50 24/50 (Date: 5/11/20 ) Most Recent: 21/50 (Date: 5/11/20 )   Interpretation of Score: Each section is scored on a 0-5 scale, 5 representing the greatest disability. The scores of each section are added together for a total score of 50.         Medical Necessity:   · Skilled intervention continues to be required due to decreased strength, balance, ROM, with increased pain affecting pt participation in daily functional mobility  Reason for Services/Other Comments:  · Patient continues to require skilled intervention due to decreased strength balance and ROM with increased pain affecting pt functional mobility and ADLS.    Use of outcome tool(s) and clinical judgement create a POC that gives a: Clear prediction of patient's progress: LOW COMPLEXITY

## 2020-06-05 NOTE — PROGRESS NOTES
Brock Signs  : 1950  Payor: [facility] / Plan: [address] / Product Type: Commerical /  [address] [place]  at   [address], [address] [facility], [place] [address] [address] [facility]  [phone]   [phone]      OUTPATIENT PHYSICAL THERAPY: Daily Treatment Note 2020  Visit Count:  18    ICD-10: Treatment Diagnosis:   M54.5 Low Back Pain   R26.2 Difficulty in Walking, Not Elsewhere Classified    Precautions/Allergies:   Patient has no known allergies. TREATMENT PLAN:  Effective Dates: 2020 TO 7/10/2020 (60 days). Frequency/Duration: 3 times a week for 60 Days       PRE-TREATMENT SYMPTOMS/COMPLAINTS:  Pt notes soreness in lower back from yesterday's treatment session. MEDICATIONS REVIEWED:  2020   TREATMENT:   (In addition to Assessment/Re-Assessment sessions the following treatments were rendered)    THERAPEUTIC EXERCISE: (40 minutes):  Exercises per grid below to improve mobility, strength and balance. Required minimal visual and verbal cues to promote proper body alignment and promote proper body posture. Progressed resistance, range and complexity of movement as indicated. Reviewed HEP and performed to provided cues for technique and prevent compensations.      Date:  2020 Date:  2020 Date:  2020 Date:  6/3/2020 Date:  2020   Activity/Exercise        Treadmill        Airdyne 10min 10min 10min 12min 10min   Nustep        Piriformis stretch        Glute stretch        LTR        bridges        PPT with TA activation        Side plank        Hip abduction        clamshell        birddog        NEEL        physioball glute bridge 1x10 reps       Physioball hamstring curl 1x10 reps       Curl up        Paloff press        Squat review        Walking and marching holding weight        Lateral high step overs holding weight        TRX squats        Single leg RDL        Paloff press        HEP review with physioball        Squat review        Monster walks        Physioball knee flexion        Physioball hip flexion        Farmer's carry        Hip flexor and quad jason stretch         Squats with physioball on wall 1x10 reps  Hold 5s at bottom       Marching down hallway     2 laps  1 normal and 1 with ER of B hips   Walking tipping birds down hallway  1 lap 1 lap  2 lap  3# hand weights   Bungee forward  1x10 reps      Bungee sideways  1x10 reps B      HEP review        Trunk lateral flexion in standing with hand weights   2x10 reps  10# hand weight     Marching   2x10 reps  Blue foam pad  Hold 2s     BOSU balance with vertical and horizontal UE movements and weighted ball   3x30s ea  Yellow weighted ball  Flat part of bosu      Wobble board IV/EV     1x20 reps B  Hold 2s in middle   Wobble board DF/PF     1x20 reps B  Hold 2s in middle   Wobble board IV/EV balance with weighted ball     4x30s B  Yellow weighted ball   Gastroc stretch     3x30s                                                     MANUAL THERAPY: (0 minutes): Joint mobilization, Soft tissue mobilization and Manipulation was utilized and necessary because of the patient's restricted joint motion, painful spasm, loss of articular motion and restricted motion of soft tissue. +PA L1-L5 Grade III  + STM B lumbar paraspinals and QL  + trigger point to R QL  +B long axis distraction   + silicone cups for myofascial release and to increase blood flow to lumbar region-NOT TODAY 6/5/2020    (Used abbreviations: MET - muscle energy technique; PNF - proprioceptive neuromuscular facilitation; NMR - neuromuscular re-education; AP - anterior to posterior; PA - posterior to anterior)    MODALITIES: (10  minutes): *  Electrical Stimulation Therapy (level 30) was provided with intensity adjusted throughout treatment to patient tolerance. unattended       *  Hot Pack Therapy in order to provide analgesia and relieve muscle spasm.    Applied concurrently at the end of treatment session. TREATMENT/SESSION ASSESSMENT:  Maria L España with improved balance on L LE today. Difficulty balancing on wobble board. Education: Provided pt with HEP. Discussed addition of lumbar and paraspinal stretching into program.    RECOMMENDATIONS/INTENT FOR NEXT TREATMENT SESSION: \"Next visit will focus on advancements to more challenging activities\".     PAIN: Initial: 2/10 Post Session: 1/10     [facility] Portal    Total Treatment Billable Duration: 52  minutes  PT Patient Time In/Time Out  Time In: 0930  Time Out: 1030  [place] Dancer, PT, DPT, PT, DPT    Future Appointments   Date Time Provider Department Center   6/8/2020  9:30 AM Drew Pore, PT, DPT [facility]   6/10/2020  9:30 AM Drew Pore, PT, DPT [facility]   6/11/2020  3:15 PM Drew Lynn, PT, DPT [facility]   6/22/2020  9:30 AM Drew Pore, PT, DPT [facility]   6/24/2020 11:00 AM Drew Pore, PT, DPT [facility]   6/26/2020  9:30 AM Drew Pore, PT, DPT [facility]   7/24/2020  8:00 AM IMD NURSE ONLY LAKISHA IMJACQUELINE IMJACQUELINE   7/31/2020  1:30 PM MD LAKISHA Rosas IMJACQUELINE

## 2020-06-08 ENCOUNTER — HOSPITAL ENCOUNTER (OUTPATIENT)
Dept: PHYSICAL THERAPY | Age: 70
Discharge: HOME | End: 2020-06-08
Payer: COMMERCIAL

## 2020-06-08 PROCEDURE — 97140 MANUAL THERAPY 1/> REGIONS: CPT

## 2020-06-08 PROCEDURE — 97110 THERAPEUTIC EXERCISES: CPT

## 2020-06-08 NOTE — PROGRESS NOTES
Jordan Matta  : 1950  Payor: MEDICAL [facility] / [facility] [address] / Product Type: Commerical /  [address] [place]  at [facility]  [address], [address], [facility], [address] [facility]  [phone]   [phone]      OUTPATIENT PHYSICAL THERAPY: Daily Treatment Note 2020  Visit Count:  19    ICD-10: Treatment Diagnosis:   M54.5 Low Back Pain   R26.2 Difficulty in Walking, Not Elsewhere Classified    Precautions/Allergies:   Patient has no known allergies. TREATMENT PLAN:  Effective Dates: 2020 TO 7/10/2020 (60 days). Frequency/Duration: 3 times a week for 60 Days       PRE-TREATMENT SYMPTOMS/COMPLAINTS:  Pt notes soreness in lower back after hunting this past weekend. States he was sore that day and then it got better the day after. MEDICATIONS REVIEWED:  2020   TREATMENT:   (In addition to Assessment/Re-Assessment sessions the following treatments were rendered)    THERAPEUTIC EXERCISE: (40 minutes):  Exercises per grid below to improve mobility, strength and balance. Required minimal visual and verbal cues to promote proper body alignment and promote proper body posture. Progressed resistance, range and complexity of movement as indicated. Reviewed HEP and performed to provided cues for technique and prevent compensations.      Date:  2020 Date:  2020 Date:  2020 Date:  6/3/2020 Date:  2020 Date:  2020   Activity/Exercise         Treadmill         Airdyne 10min 10min 10min 12min 10min 10min   Nustep         Piriformis stretch         Glute stretch         LTR         bridges         PPT with TA activation         Side plank         Hip abduction         clamshell         birddog         NEEL         physioball glute bridge 1x10 reps        Physioball hamstring curl 1x10 reps        Curl up         Paloff press         Squat review         Walking and marching holding weight         Lateral high step overs holding weight         TRX squats      2x10 reps  On BOSU   Single leg RDL         Paloff press         HEP review with physioball         Squat review         Monster walks         Physioball knee flexion         Physioball hip flexion         Farmer's carry         Hip flexor and quad jason stretch          Squats with physioball on wall 1x10 reps  Hold 5s at bottom        Marching down hallway     2 laps  1 normal and 1 with ER of B hips    Walking tipping birds down hallway  1 lap 1 lap  2 lap  3# hand weights    Bungee forward  1x10 reps       Bungee sideways  1x10 reps B       HEP review         Trunk lateral flexion in standing with hand weights   2x10 reps  10# hand weight      Marching   2x10 reps  Blue foam pad  Hold 2s      BOSU balance with vertical and horizontal UE movements and weighted ball   3x30s ea  Yellow weighted ball  Flat part of bosu       Wobble board IV/EV     1x20 reps B  Hold 2s in middle    Wobble board DF/PF     1x20 reps B  Hold 2s in middle    Wobble board IV/EV balance with weighted ball     4x30s B  Yellow weighted ball    Gastroc stretch     3x30s    Deadlift with Matrix      2x10 reps  7.5#    Lateral squat with UE extension holding weighted ball      2 laps  Yellow weighted ball   Trampoline ball toss      2x10 reps B on blue airex  2x10 reps B SLS  Tossing red weighted ball   Deadlift HEP review with walking stick      Working on hip hinging                       MANUAL THERAPY: (0 minutes): Joint mobilization, Soft tissue mobilization and Manipulation was utilized and necessary because of the patient's restricted joint motion, painful spasm, loss of articular motion and restricted motion of soft tissue.    +PA L1-L5 Grade III  + STM B lumbar paraspinals and QL  + trigger point to R QL  +B long axis distraction   + silicone cups for myofascial release and to increase blood flow to lumbar region-NOT TODAY 6/8/2020    (Used abbreviations: MET - muscle energy technique; PNF - proprioceptive neuromuscular facilitation; NMR - neuromuscular re-education; AP - anterior to posterior; PA - posterior to anterior)    MODALITIES: (10  minutes): *  Electrical Stimulation Therapy (level 30) was provided with intensity adjusted throughout treatment to patient tolerance. unattended       *  Hot Pack Therapy in order to provide analgesia and relieve muscle spasm. Applied concurrently at the end of treatment session. TREATMENT/SESSION ASSESSMENT:  Hanson Sea with difficulty understanding deadlift mechanics. Continues to require extensive verbal cues to hinge from hips correctly. Pt advised to practice at home. Biggest thing is he does not incorporate his trunk. Education: Provided pt with HEP. Discussed addition of lumbar and paraspinal stretching into program.    RECOMMENDATIONS/INTENT FOR NEXT TREATMENT SESSION: \"Next visit will focus on advancements to more challenging activities\".     PAIN: Initial: 2/10 Post Session: 1/10     [facility] Portal    Total Treatment Billable Duration: 50  minutes  PT Patient Time In/Time Out  Time In: 0930  Time Out: 1015  Ange Masters, PT, DPT, PT, DPT    Future Appointments   Date Time Provider Department Center   6/10/2020  9:30 AM Cleven Bouquet, PT, DPT [facility]   6/11/2020  3:15 PM Cleven Bouquet, PT, DPT [facility]   6/22/2020  9:30 AM Cleven Bouquet, PT, DPT [facility]   6/24/2020 11:00 AM Cleven Bouquet, PT, DPT [facility]   6/26/2020  9:30 AM Cleven Bouquet, PT, DPT [facility]   7/24/2020  8:00 AM IMD NURSE ONLY LAKISHA LUNA   7/31/2020  1:30 PM MD LAKISHA Contreras

## 2020-06-10 ENCOUNTER — HOSPITAL ENCOUNTER (OUTPATIENT)
Dept: PHYSICAL THERAPY | Age: 70
Discharge: HOME | End: 2020-06-10
Payer: COMMERCIAL

## 2020-06-10 PROCEDURE — 97140 MANUAL THERAPY 1/> REGIONS: CPT

## 2020-06-10 PROCEDURE — 97110 THERAPEUTIC EXERCISES: CPT

## 2020-06-10 PROCEDURE — 97014 ELECTRIC STIMULATION THERAPY: CPT

## 2020-06-10 NOTE — PROGRESS NOTES
Kristine Morel  : 1950  Payor: [facility] / Plan: [address] [facility] 30 [facility] / Product Type: Commerical /  [address]  at [facility]  [address] [facility], [place] [address] [address] [facility]  [phone]   [phone]      OUTPATIENT PHYSICAL THERAPY: Daily Treatment Note 6/10/2020  Visit Count:  20    ICD-10: Treatment Diagnosis:   M54.5 Low Back Pain   R26.2 Difficulty in Walking, Not Elsewhere Classified    Precautions/Allergies:   Patient has no known allergies. TREATMENT PLAN:  Effective Dates: 2020 TO 7/10/2020 (60 days). Frequency/Duration: 3 times a week for 60 Days       PRE-TREATMENT SYMPTOMS/COMPLAINTS:  Pt notes soreness in lower back after overdoing it with exercises at home. MEDICATIONS REVIEWED:  6/10/2020   TREATMENT:   (In addition to Assessment/Re-Assessment sessions the following treatments were rendered)    THERAPEUTIC EXERCISE: (30 minutes):  Exercises per grid below to improve mobility, strength and balance. Required minimal visual and verbal cues to promote proper body alignment and promote proper body posture. Progressed resistance, range and complexity of movement as indicated. Reviewed HEP and performed to provided cues for technique and prevent compensations.      Date:  2020 Date:  2020 Date:  2020 Date:  6/3/2020 Date:  2020 Date:  2020 Date:  6/10/2020   Activity/Exercise          Treadmill          Airdyne 10min 10min 10min 12min 10min 10min 10min   Nustep          Piriformis stretch          Glute stretch          LTR          bridges          PPT with TA activation          Side plank          Hip abduction          clamshell          birddog          NEEL          physioball glute bridge 1x10 reps         Physioball hamstring curl 1x10 reps         Curl up          Paloff press          Squat review          Walking and marching holding weight          Lateral high step overs holding weight          TRX squats      2x10 reps  On BOSU    Single leg RDL          Paloff press          HEP review with physioball          Squat review          Monster walks          Physioball knee flexion          Physioball hip flexion          Silverman's carry          Hip flexor and quad jason stretch           Squats with physioball on wall 1x10 reps  Hold 5s at bottom         Marching down hallway     2 laps  1 normal and 1 with ER of B hips     Walking tipping birds down hallway  1 lap 1 lap  2 lap  3# hand weights     Bungee forward  1x10 reps        Bungee sideways  1x10 reps B        HEP review          Trunk lateral flexion in standing with hand weights   2x10 reps  10# hand weight       Marching   2x10 reps  Blue foam pad  Hold 2s       BOSU balance with vertical and horizontal UE movements and weighted ball   3x30s ea  Yellow weighted ball  Flat part of bosu        Wobble board IV/EV     1x20 reps B  Hold 2s in middle     Wobble board DF/PF     1x20 reps B  Hold 2s in middle     Wobble board IV/EV balance with weighted ball     4x30s B  Yellow weighted ball  2x20 reps ea  Yellow weighted ball up and down and sided to side   Gastroc stretch     3x30s     Deadlift with Matrix      2x10 reps  7.5#     Lateral squat with UE extension holding weighted ball      2 laps  Yellow weighted ball    Trampoline ball toss      2x10 reps B on blue airex  2x10 reps B SLS  Tossing red weighted ball 2x10 reps B on blue airex  2x10 reps B SLS  Tossing red weighted ball   Deadlift HEP review with walking stick      Working on hip hinging                          MANUAL THERAPY: (10 minutes): Joint mobilization, Soft tissue mobilization and Manipulation was utilized and necessary because of the patient's restricted joint motion, painful spasm, loss of articular motion and restricted motion of soft tissue.    +PA L1-L5 Grade III  + STM B lumbar paraspinals and QL  + trigger point to R QL  +B long axis distraction   + silicone cups for myofascial release and to increase blood flow to lumbar region-NOT TODAY 6/10/2020    (Used abbreviations: MET - muscle energy technique; PNF - proprioceptive neuromuscular facilitation; NMR - neuromuscular re-education; AP - anterior to posterior; PA - posterior to anterior)    MODALITIES: (10  minutes): *  Electrical Stimulation Therapy (level 30) was provided with intensity adjusted throughout treatment to patient tolerance. unattended       *  Hot Pack Therapy in order to provide analgesia and relieve muscle spasm. Applied concurrently at the end of treatment session. TREATMENT/SESSION ASSESSMENT:  Ant Fry with difficulty understanding deadlift mechanics. Continues to require extensive verbal cues to hinge from hips correctly. Pt advised to practice at home. Biggest thing is he does not incorporate his trunk. Work on incorporating UE resisted exercises with balance. Education: Provided pt with HEP. Discussed addition of lumbar and paraspinal stretching into program.    RECOMMENDATIONS/INTENT FOR NEXT TREATMENT SESSION: \"Next visit will focus on advancements to more challenging activities\".     PAIN: Initial: 2/10 Post Session: 1/10     [employer] Portal    Total Treatment Billable Duration: 50  minutes  PT Patient Time In/Time Out  Time In: 0930  Time Out: 1030  Lashanda Trivedi PT, DPT, PT, DPT    Future Appointments   Date Time Provider Elisabeth Chauhan   6/11/2020  3:15 PM Obdulia Gaytan PT, DPT [facility]   6/26/2020  9:30 AM Obdulia Gaytan PT, DPT [facility]   7/2/2020  9:30 AM Obdulia Gaytan PT, DPT [facility]   7/6/2020  9:30 AM Obdulia Gaytan PT, DPT [facility]   7/8/2020  9:30 AM Obdulia Gaytan PT, DPT [facility]   7/10/2020  9:30 AM Obdulia Gaytan PT, DPT [facility]   7/24/2020  8:00 AM IMD NURSE ONLY LAKISHA LUNA   7/31/2020  1:30 PM MD LAKISHA Nguyen IMJACQUELINE

## 2020-06-11 ENCOUNTER — HOSPITAL ENCOUNTER (OUTPATIENT)
Dept: PHYSICAL THERAPY | Age: 70
Discharge: HOME | End: 2020-06-11
Payer: COMMERCIAL

## 2020-06-11 PROCEDURE — 97014 ELECTRIC STIMULATION THERAPY: CPT

## 2020-06-11 PROCEDURE — 97110 THERAPEUTIC EXERCISES: CPT

## 2020-06-11 NOTE — PROGRESS NOTES
Tho Sanders  : 1950  Payor: [facility] / Plan: [address] [facility] 30 [facility] / Product Type: Commlailal /  [address]  at [facility]  [address], [address], [place] [address] [address] [facility]  [phone]   [phone]      OUTPATIENT PHYSICAL THERAPY: Daily Treatment Note 2020  Visit Count:  21    ICD-10: Treatment Diagnosis:   M54.5 Low Back Pain   R26.2 Difficulty in Walking, Not Elsewhere Classified    Precautions/Allergies:   Patient has no known allergies. TREATMENT PLAN:  Effective Dates: 2020 TO 7/10/2020 (60 days). Frequency/Duration: 3 times a week for 60 Days       PRE-TREATMENT SYMPTOMS/COMPLAINTS:  Pt notes normal stiffness and soreness in lower back. States he has been working on his exercises     MEDICATIONS REVIEWED:  2020   TREATMENT:   (In addition to Assessment/Re-Assessment sessions the following treatments were rendered)    THERAPEUTIC EXERCISE: (40 minutes):  Exercises per grid below to improve mobility, strength and balance. Required minimal visual and verbal cues to promote proper body alignment and promote proper body posture. Progressed resistance, range and complexity of movement as indicated. Reviewed HEP and performed to provided cues for technique and prevent compensations.      Date:  6/3/2020 Date:  2020 Date:  2020 Date:  6/10/2020 Date:  2020   Activity/Exercise        Treadmill        Airdyne 12min 10min 10min 10min 10min   Paloff press     2x10 reps B  7.5#   Paloff press with rotation     2x10 reps B  2.5#   Banded foot taps 3-way for hip ER strength     2x10 reps B  Blue TB   Squat review        Walking and marching holding weight        Lateral high step overs holding weight        TRX squats   2x10 reps  On BOSU     Single leg RDL        Paloff press        HEP review with physioball        Squat review        Monster walks        Physioball knee flexion        Physioball hip flexion        Farmer's carry        Hip flexor and quad jason stretch         Squats with physioball on wall        Marching down hallway  2 laps  1 normal and 1 with ER of B hips      Walking tipping birds down hallway  2 lap  3# hand weights   1x20 reps B  5# hand weights  No walking. Stationary. Bungee forward        Bungee sideways        HEP review        Trunk lateral flexion in standing with hand weights        Marching        BOSU balance with vertical and horizontal UE movements and weighted ball        Wobble board IV/EV  1x20 reps B  Hold 2s in middle      Wobble board DF/PF  1x20 reps B  Hold 2s in middle      Wobble board IV/EV balance with weighted ball  4x30s B  Yellow weighted ball  2x20 reps ea  Yellow weighted ball up and down and sided to side    Gastroc stretch  3x30s   3x30s   Deadlift with Matrix   2x10 reps  7.5#      Lateral squat with UE extension holding weighted ball   2 laps  Yellow weighted ball     Trampoline ball toss   2x10 reps B on blue airex  2x10 reps B SLS  Tossing red weighted ball 2x10 reps B on blue airex  2x10 reps B SLS  Tossing red weighted ball 2x10 reps B on blue airex  2x10 reps B SLS  Tossing red weighted ball   Deadlift HEP review with walking stick   Working on hip hinging                       MANUAL THERAPY: (0 minutes): Joint mobilization, Soft tissue mobilization and Manipulation was utilized and necessary because of the patient's restricted joint motion, painful spasm, loss of articular motion and restricted motion of soft tissue.    +PA L1-L5 Grade III  + STM B lumbar paraspinals and QL  + trigger point to R QL  +B long axis distraction   + silicone cups for myofascial release and to increase blood flow to lumbar region-NOT TODAY 6/11/2020    (Used abbreviations: MET - muscle energy technique; PNF - proprioceptive neuromuscular facilitation; NMR - neuromuscular re-education; AP - anterior to posterior; PA - posterior to anterior)    MODALITIES: (10 minutes): *  Electrical Stimulation Therapy (level 30) was provided with intensity adjusted throughout treatment to patient tolerance. unattended       *  Hot Pack Therapy in order to provide analgesia and relieve muscle spasm. Applied concurrently at the end of treatment session. TREATMENT/SESSION ASSESSMENT:  Maria L España with complains of shoulder discomfort during paloff press. Good mechanics with rotational paloff. No complaints of lower back pain during tipping bird. Education: Provided pt with HEP. Discussed addition of lumbar and paraspinal stretching into program.    RECOMMENDATIONS/INTENT FOR NEXT TREATMENT SESSION: \"Next visit will focus on advancements to more challenging activities\".     PAIN: Initial: 2/10 Post Session: 1/10     [employer] Portal    Total Treatment Billable Duration: 50  minutes  PT Patient Time In/Time Out  Time In: 9655  Time Out: 0327  [facility] Dancer, PT, DPT, PT, DPT    Future Appointments   Date Time Provider Elisabeth Chauhan   6/11/2020  3:15 PM Satira Pore, PT, DPT [facility]   6/26/2020  9:30 AM Satira Pore, PT, DPT [facility] S[facility]   7/2/2020  9:30 AM Satira Pore, PT, DPT [facility]   7/6/2020  9:30 AM Satira Pore, PT, DPT [facility]   7/8/2020  9:30 AM Satira Pore, PT, DPT [facility]   7/10/2020  9:30 AM Satira Pore, PT, DPT [facility]   7/24/2020  8:00 AM IMD NURSE ONLY LAKISHA LUNA   7/31/2020  1:30 PM MD LAKISHA Wade IMJACQUELINE

## 2020-06-22 ENCOUNTER — APPOINTMENT (OUTPATIENT)
Dept: PHYSICAL THERAPY | Age: 70
End: 2020-06-22
Payer: COMMERCIAL

## 2020-06-22 RX ORDER — SODIUM CHLORIDE 0.9 % (FLUSH) 0.9 %
5-40 SYRINGE (ML) INJECTION AS NEEDED
Status: CANCELLED | OUTPATIENT
Start: 2020-06-22

## 2020-06-22 RX ORDER — SODIUM CHLORIDE 0.9 % (FLUSH) 0.9 %
5-40 SYRINGE (ML) INJECTION EVERY 8 HOURS
Status: CANCELLED | OUTPATIENT
Start: 2020-06-22

## 2020-06-24 ENCOUNTER — APPOINTMENT (OUTPATIENT)
Dept: PHYSICAL THERAPY | Age: 70
End: 2020-06-24
Payer: COMMERCIAL

## 2020-06-26 ENCOUNTER — HOSPITAL ENCOUNTER (OUTPATIENT)
Dept: PHYSICAL THERAPY | Age: 70
Discharge: HOME | End: 2020-06-26
Payer: COMMERCIAL

## 2020-06-26 PROCEDURE — 97140 MANUAL THERAPY 1/> REGIONS: CPT

## 2020-06-26 PROCEDURE — 97110 THERAPEUTIC EXERCISES: CPT

## 2020-06-26 PROCEDURE — 97014 ELECTRIC STIMULATION THERAPY: CPT

## 2020-06-26 NOTE — PROGRESS NOTES
Jordan Matta  : 1950  Payor: [facility] / Plan: [address] [facility] [address] [address] / Product Type: Commerical /  [address] [place]  at [facility]  [address], [address], [facility], [address] [facility]  [phone]   [phone]      OUTPATIENT PHYSICAL THERAPY: Daily Treatment Note 2020  Visit Count:  22    ICD-10: Treatment Diagnosis:   M54.5 Low Back Pain   R26.2 Difficulty in Walking, Not Elsewhere Classified    Precautions/Allergies:   Patient has no known allergies. TREATMENT PLAN:  Effective Dates: 2020 TO 7/10/2020 (60 days). Frequency/Duration: 3 times a week for 60 Days       PRE-TREATMENT SYMPTOMS/COMPLAINTS:  Pt notes he went on vacation and had some pain in his lower back from driving many hours. Notes he has gotten better about knowing what activities aggravates his symptoms but sitting continues to be his biggest complaint. Notes he is trying to get a TENs unit however pain management has not returned his multiple emails. MEDICATIONS REVIEWED:  2020   TREATMENT:   (In addition to Assessment/Re-Assessment sessions the following treatments were rendered)    THERAPEUTIC EXERCISE: (12 minutes):  Exercises per grid below to improve mobility, strength and balance. Required minimal visual and verbal cues to promote proper body alignment and promote proper body posture. Progressed resistance, range and complexity of movement as indicated. Reviewed HEP and performed to provided cues for technique and prevent compensations.      Date:  6/3/2020 Date:  2020 Date:  2020 Date:  6/10/2020 Date:  2020 Date:  2020   Activity/Exercise         Treadmill         Airdyne 12min 10min 10min 10min 10min 12min   Paloff press     2x10 reps B  7.5#    Paloff press with rotation     2x10 reps B  2.5#    Banded foot taps 3-way for hip ER strength     2x10 reps B  Blue TB    Squat review         Walking and marching holding weight         Lateral high step overs holding weight         TRX squats   2x10 reps  On BOSU      Single leg RDL         Paloff press         HEP review with physioball         Squat review         Monster walks         Physioball knee flexion         Physioball hip flexion         Farmer's carry         Hip flexor and quad jason stretch          Squats with physioball on wall         Marching down hallway  2 laps  1 normal and 1 with ER of B hips       Walking tipping birds down hallway  2 lap  3# hand weights   1x20 reps B  5# hand weights  No walking. Stationary. Bungee forward         Bungee sideways         HEP review         Trunk lateral flexion in standing with hand weights         Marching         BOSU balance with vertical and horizontal UE movements and weighted ball         Wobble board IV/EV  1x20 reps B  Hold 2s in middle       Wobble board DF/PF  1x20 reps B  Hold 2s in middle       Wobble board IV/EV balance with weighted ball  4x30s B  Yellow weighted ball  2x20 reps ea  Yellow weighted ball up and down and sided to side     Gastroc stretch  3x30s   3x30s    Deadlift with Matrix   2x10 reps  7.5#       Lateral squat with UE extension holding weighted ball   2 laps  Yellow weighted ball      Trampoline ball toss   2x10 reps B on blue airex  2x10 reps B SLS  Tossing red weighted ball 2x10 reps B on blue airex  2x10 reps B SLS  Tossing red weighted ball 2x10 reps B on blue airex  2x10 reps B SLS  Tossing red weighted ball    Deadlift HEP review with walking stick   Working on hip hinging                          MANUAL THERAPY: (26 minutes): Joint mobilization, Soft tissue mobilization and Manipulation was utilized and necessary because of the patient's restricted joint motion, painful spasm, loss of articular motion and restricted motion of soft tissue.    +PA L1-L5, B SI joints, Sacrum Grade III  + STM B lumbar paraspinals and QL  + trigger point to R QL  +B long axis distraction   + silicone cups for myofascial release and to increase blood flow to lumbar region-NOT TODAY 6/26/2020    (Used abbreviations: MET - muscle energy technique; PNF - proprioceptive neuromuscular facilitation; NMR - neuromuscular re-education; AP - anterior to posterior; PA - posterior to anterior)    MODALITIES: (10  minutes): *  Electrical Stimulation Therapy (level 35) was provided with intensity adjusted throughout treatment to patient tolerance. unattended       *  Hot Pack Therapy in order to provide analgesia and relieve muscle spasm. Applied concurrently at the end of treatment session. TREATMENT/SESSION ASSESSMENT:  Juan Christensen with significant tightness to R QL region. More tight than usual. Responded well to manual therapy. Instructed pt to perform doorway stretch every day. Education: Provided pt with HEP. Discussed addition of lumbar and paraspinal stretching into program.    RECOMMENDATIONS/INTENT FOR NEXT TREATMENT SESSION: \"Next visit will focus on advancements to more challenging activities\".     PAIN: Initial: 2/10 Post Session: 1/10     [employer] Portal    Total Treatment Billable Duration: 48  minutes  PT Patient Time In/Time Out  Time In: 0930  Time Out: 1  Issac Dull, PT, DPT, PT, DPT    Future Appointments   Date Time Provider Elisabeth Chauhan   7/2/2020  9:30 AM Gracie Rowlandet, PT, DPT [facility]   7/6/2020  9:30 AM Gracie Rowlandet, PT, DPT [facility]   7/8/2020  9:30 AM Gracie Rowlandet, PT, DPT [facility]   7/10/2020  9:30 AM Gracie Rowlandet, PT, DPT [facility] D   7/24/2020  8:00 AM IMD NURSE ONLY LAKISHA LUNA   7/31/2020  1:30 PM MD LAKISHA Liriano

## 2020-06-29 ENCOUNTER — ANESTHESIA EVENT (OUTPATIENT)
Dept: SURGERY | Age: 70
End: 2020-06-29
Payer: COMMERCIAL

## 2020-06-29 NOTE — H&P
Date of Service: 2020-05-11  Work Status:  ????? Allergies:????? 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  He complains of problems with numbness and tingling in both hands is bothered him quite a bit over the last 5 months. The left hand is worse than the right. The hands waken him at night from sleep. He has found that using splints does help generally. He also gets numbness and tingling during the day with driving as well as gripping and other activities. He denies diabetes rheumatoid arthritis or gout. He has had nerve conduction studies done. He is not having radiating neck pain. PHSH & ROS: This form was been filled out reviewed and included in the chart. Side problems as discussed in the history of present illness the patient has fairly negative review of systems. He drinks only doesnt smoke. He has a history of osteoarthritis. He has had various surgeries in the past including L5-S1 fusion in 2007  A knee arthroscopy number of years ago and right shoulder surgery by Dr. Poppy Tuttle. Current medicines: listed. Drug allergies: None known. PE: The patient is an alert oriented during a healthy-appearing mature white male. He is 5 feet 10 inches tall is 172 pounds. Exam of the upper extremities shows no deformities or atrophy to be present. He has good movement of his elbows wrists and fingers. There is no triggering or locking of the digits. Circulation: Patient hands show good capillary refill bilaterally. There is no peripheral edema noted.   Neurologic exam sensation is intact to light touch and motor function in the radial ulnar median nerve distribution bilaterally. He has no thenar or intrinsic atrophy. Tinel sign is negative over the carpal tunnels. There is no sensitivity at either cubital tunnel. Electrodiagnostic studies: The patient has had MC vase and EMGs done from his uppers to reduce by Dr. Chaka Marsh at [address] neurology on 3/6/20. These showed changes consistent with possible peripheral neuropathy with a superimposed tunnel syndrome bilaterally. Patient was brought back and had Dr. Stokes Root studies done of the lower extremities which were normal.  This caused the assessment to be just of carpal tunnel syndromes rather than a peripheral neuropathy. There was not any evidence of ulnar nerve neuropathy. Diagnosis: Bilateral carpal tunnel syndromes left worse than right    Disposition: The problem was discussed with the patient. He has symptoms consistent with a carpal tunnel syndrome of each upper extremity. Ablated with the help from symptomatic standpoint with carpal tunnel releases. We discussed options including possible carpal tunnel injections as well as the surgical releases. I dont feel that it is urgent that he have the releases done in AP is able to tolerate his symptoms for now he can hold off on surgery until there is a better time to consider an operation. We discussed the postop recovery time. He is going to call back as needed if he decides to go ahead with scheduling of the carpal tunnel releases.           Elect

## 2020-06-30 ENCOUNTER — ANESTHESIA (OUTPATIENT)
Dept: SURGERY | Age: 70
End: 2020-06-30
Payer: COMMERCIAL

## 2020-06-30 ENCOUNTER — HOSPITAL ENCOUNTER (OUTPATIENT)
Age: 70
Setting detail: OUTPATIENT SURGERY
Discharge: HOME | End: 2020-06-30
Attending: ORTHOPAEDIC SURGERY | Admitting: ORTHOPAEDIC SURGERY
Payer: COMMERCIAL

## 2020-06-30 VITALS
HEART RATE: 65 BPM | OXYGEN SATURATION: 96 % | SYSTOLIC BLOOD PRESSURE: 157 MMHG | BODY MASS INDEX: 25.25 KG/M2 | RESPIRATION RATE: 16 BRPM | DIASTOLIC BLOOD PRESSURE: 90 MMHG | TEMPERATURE: 98 F | WEIGHT: 171 LBS

## 2020-06-30 DIAGNOSIS — G89.18 POST-OP PAIN: Primary | ICD-10-CM

## 2020-06-30 PROCEDURE — 74011000250 HC RX REV CODE- 250: Performed by: ORTHOPAEDIC SURGERY

## 2020-06-30 PROCEDURE — 74011250636 HC RX REV CODE- 250/636: Performed by: NURSE ANESTHETIST, CERTIFIED REGISTERED

## 2020-06-30 PROCEDURE — 77030010507 HC ADH SKN DERMBND J&J -B: Performed by: ORTHOPAEDIC SURGERY

## 2020-06-30 PROCEDURE — 77030006605 HC BLD CRPL IN BIOM -C: Performed by: ORTHOPAEDIC SURGERY

## 2020-06-30 PROCEDURE — 74011250637 HC RX REV CODE- 250/637: Performed by: ANESTHESIOLOGY

## 2020-06-30 PROCEDURE — 74011250636 HC RX REV CODE- 250/636: Performed by: ANESTHESIOLOGY

## 2020-06-30 PROCEDURE — 77030000032 HC CUF TRNQT ZIMM -B: Performed by: ORTHOPAEDIC SURGERY

## 2020-06-30 PROCEDURE — 76210000020 HC REC RM PH II FIRST 0.5 HR: Performed by: ORTHOPAEDIC SURGERY

## 2020-06-30 PROCEDURE — 77030031139 HC SUT VCRL2 J&J -A: Performed by: ORTHOPAEDIC SURGERY

## 2020-06-30 PROCEDURE — 76010000138 HC OR TIME 0.5 TO 1 HR: Performed by: ORTHOPAEDIC SURGERY

## 2020-06-30 PROCEDURE — 76060000032 HC ANESTHESIA 0.5 TO 1 HR: Performed by: ORTHOPAEDIC SURGERY

## 2020-06-30 PROCEDURE — 74011000250 HC RX REV CODE- 250: Performed by: NURSE ANESTHETIST, CERTIFIED REGISTERED

## 2020-06-30 PROCEDURE — 77030018836 HC SOL IRR NACL ICUM -A: Performed by: ORTHOPAEDIC SURGERY

## 2020-06-30 PROCEDURE — 76210000063 HC OR PH I REC FIRST 0.5 HR: Performed by: ORTHOPAEDIC SURGERY

## 2020-06-30 RX ORDER — SODIUM CHLORIDE, SODIUM LACTATE, POTASSIUM CHLORIDE, CALCIUM CHLORIDE 600; 310; 30; 20 MG/100ML; MG/100ML; MG/100ML; MG/100ML
75 INJECTION, SOLUTION INTRAVENOUS CONTINUOUS
Status: DISCONTINUED | OUTPATIENT
Start: 2020-06-30 | End: 2020-06-30 | Stop reason: HOSPADM

## 2020-06-30 RX ORDER — LIDOCAINE HYDROCHLORIDE 20 MG/ML
INJECTION, SOLUTION EPIDURAL; INFILTRATION; INTRACAUDAL; PERINEURAL AS NEEDED
Status: DISCONTINUED | OUTPATIENT
Start: 2020-06-30 | End: 2020-06-30 | Stop reason: HOSPADM

## 2020-06-30 RX ORDER — SODIUM CHLORIDE, SODIUM LACTATE, POTASSIUM CHLORIDE, CALCIUM CHLORIDE 600; 310; 30; 20 MG/100ML; MG/100ML; MG/100ML; MG/100ML
1000 INJECTION, SOLUTION INTRAVENOUS CONTINUOUS
Status: DISCONTINUED | OUTPATIENT
Start: 2020-06-30 | End: 2020-06-30 | Stop reason: HOSPADM

## 2020-06-30 RX ORDER — ACETAMINOPHEN 500 MG
1000 TABLET ORAL
COMMUNITY
End: 2020-08-25

## 2020-06-30 RX ORDER — HYDROCODONE BITARTRATE AND ACETAMINOPHEN 7.5; 325 MG/1; MG/1
1 TABLET ORAL
Qty: 20 TAB | Refills: 0 | Status: SHIPPED | OUTPATIENT
Start: 2020-06-30 | End: 2020-07-31 | Stop reason: SDUPTHER

## 2020-06-30 RX ORDER — SODIUM CHLORIDE 0.9 % (FLUSH) 0.9 %
5-40 SYRINGE (ML) INJECTION AS NEEDED
Status: DISCONTINUED | OUTPATIENT
Start: 2020-06-30 | End: 2020-06-30 | Stop reason: HOSPADM

## 2020-06-30 RX ORDER — ONDANSETRON 2 MG/ML
INJECTION INTRAMUSCULAR; INTRAVENOUS AS NEEDED
Status: DISCONTINUED | OUTPATIENT
Start: 2020-06-30 | End: 2020-06-30 | Stop reason: HOSPADM

## 2020-06-30 RX ORDER — LIDOCAINE HYDROCHLORIDE 5 MG/ML
INJECTION, SOLUTION INFILTRATION; INTRAVENOUS
Status: COMPLETED | OUTPATIENT
Start: 2020-06-30 | End: 2020-06-30

## 2020-06-30 RX ORDER — HYDROMORPHONE HYDROCHLORIDE 2 MG/ML
0.5 INJECTION, SOLUTION INTRAMUSCULAR; INTRAVENOUS; SUBCUTANEOUS
Status: DISCONTINUED | OUTPATIENT
Start: 2020-06-30 | End: 2020-06-30 | Stop reason: HOSPADM

## 2020-06-30 RX ORDER — LIDOCAINE HYDROCHLORIDE 10 MG/ML
0.1 INJECTION INFILTRATION; PERINEURAL AS NEEDED
Status: DISCONTINUED | OUTPATIENT
Start: 2020-06-30 | End: 2020-06-30 | Stop reason: HOSPADM

## 2020-06-30 RX ORDER — NALOXONE HYDROCHLORIDE 0.4 MG/ML
0.1 INJECTION, SOLUTION INTRAMUSCULAR; INTRAVENOUS; SUBCUTANEOUS AS NEEDED
Status: DISCONTINUED | OUTPATIENT
Start: 2020-06-30 | End: 2020-06-30 | Stop reason: HOSPADM

## 2020-06-30 RX ORDER — MIDAZOLAM HYDROCHLORIDE 1 MG/ML
2 INJECTION, SOLUTION INTRAMUSCULAR; INTRAVENOUS
Status: DISCONTINUED | OUTPATIENT
Start: 2020-06-30 | End: 2020-06-30 | Stop reason: HOSPADM

## 2020-06-30 RX ORDER — PROPOFOL 10 MG/ML
INJECTION, EMULSION INTRAVENOUS
Status: DISCONTINUED | OUTPATIENT
Start: 2020-06-30 | End: 2020-06-30 | Stop reason: HOSPADM

## 2020-06-30 RX ORDER — LIDOCAINE HYDROCHLORIDE AND EPINEPHRINE 10; 10 MG/ML; UG/ML
INJECTION, SOLUTION INFILTRATION; PERINEURAL AS NEEDED
Status: DISCONTINUED | OUTPATIENT
Start: 2020-06-30 | End: 2020-06-30 | Stop reason: HOSPADM

## 2020-06-30 RX ORDER — ONDANSETRON 2 MG/ML
4 INJECTION INTRAMUSCULAR; INTRAVENOUS ONCE
Status: DISCONTINUED | OUTPATIENT
Start: 2020-06-30 | End: 2020-06-30 | Stop reason: HOSPADM

## 2020-06-30 RX ORDER — DIPHENHYDRAMINE HYDROCHLORIDE 50 MG/ML
12.5 INJECTION, SOLUTION INTRAMUSCULAR; INTRAVENOUS ONCE
Status: DISCONTINUED | OUTPATIENT
Start: 2020-06-30 | End: 2020-06-30 | Stop reason: HOSPADM

## 2020-06-30 RX ORDER — FENTANYL CITRATE 50 UG/ML
100 INJECTION, SOLUTION INTRAMUSCULAR; INTRAVENOUS AS NEEDED
Status: DISCONTINUED | OUTPATIENT
Start: 2020-06-30 | End: 2020-06-30 | Stop reason: HOSPADM

## 2020-06-30 RX ORDER — MIDAZOLAM HYDROCHLORIDE 1 MG/ML
INJECTION, SOLUTION INTRAMUSCULAR; INTRAVENOUS AS NEEDED
Status: DISCONTINUED | OUTPATIENT
Start: 2020-06-30 | End: 2020-06-30 | Stop reason: HOSPADM

## 2020-06-30 RX ORDER — FENTANYL CITRATE 50 UG/ML
INJECTION, SOLUTION INTRAMUSCULAR; INTRAVENOUS AS NEEDED
Status: DISCONTINUED | OUTPATIENT
Start: 2020-06-30 | End: 2020-06-30 | Stop reason: HOSPADM

## 2020-06-30 RX ORDER — SODIUM CHLORIDE 0.9 % (FLUSH) 0.9 %
5-40 SYRINGE (ML) INJECTION EVERY 8 HOURS
Status: DISCONTINUED | OUTPATIENT
Start: 2020-06-30 | End: 2020-06-30 | Stop reason: HOSPADM

## 2020-06-30 RX ORDER — OXYCODONE HYDROCHLORIDE 5 MG/1
10 TABLET ORAL
Status: DISCONTINUED | OUTPATIENT
Start: 2020-06-30 | End: 2020-06-30 | Stop reason: HOSPADM

## 2020-06-30 RX ORDER — OXYCODONE HYDROCHLORIDE 5 MG/1
5 TABLET ORAL
Status: COMPLETED | OUTPATIENT
Start: 2020-06-30 | End: 2020-06-30

## 2020-06-30 RX ADMIN — LIDOCAINE HYDROCHLORIDE 40 MG: 20 INJECTION, SOLUTION EPIDURAL; INFILTRATION; INTRACAUDAL; PERINEURAL at 07:57

## 2020-06-30 RX ADMIN — LIDOCAINE HYDROCHLORIDE 40 ML: 5 INJECTION, SOLUTION INFILTRATION at 07:09

## 2020-06-30 RX ADMIN — FENTANYL CITRATE 25 MCG: 50 INJECTION INTRAMUSCULAR; INTRAVENOUS at 07:51

## 2020-06-30 RX ADMIN — OXYCODONE HYDROCHLORIDE 5 MG: 5 TABLET ORAL at 09:03

## 2020-06-30 RX ADMIN — PROPOFOL 140 MCG/KG/MIN: 10 INJECTION, EMULSION INTRAVENOUS at 07:56

## 2020-06-30 RX ADMIN — ONDANSETRON 4 MG: 2 INJECTION INTRAMUSCULAR; INTRAVENOUS at 08:13

## 2020-06-30 RX ADMIN — MIDAZOLAM 1 MG: 1 INJECTION INTRAMUSCULAR; INTRAVENOUS at 07:51

## 2020-06-30 RX ADMIN — SODIUM CHLORIDE, SODIUM LACTATE, POTASSIUM CHLORIDE, AND CALCIUM CHLORIDE: 600; 310; 30; 20 INJECTION, SOLUTION INTRAVENOUS at 07:51

## 2020-06-30 NOTE — INTERVAL H&P NOTE
Update History & Physical    The Patient's History and Physical of May 11, 2020,   was reviewed with the patient and I examined the patient. There was no change. The surgical site was confirmed by the patient and me. Pt is alert and oriented. Chest: Clear w/o SOB. C/V:  RR&R    Plan:  The risk, benefits, expected outcome, and alternative to the recommended procedure have been discussed with the patient. Patient understands and wants to proceed with a left CTR.     Electronically signed by Gaurang Ng MD on 6/30/2020 at 7:48 AM

## 2020-06-30 NOTE — OP NOTES
Carpal Tunnel Release    Aleida Jones     6/30/2020    Indications: This is a 77 y.o. male who presents with left carpal tunnel syndrome. The patient was admitted for surgery as conservative measures have failed. Pre-operative Diagnosis:  LEFT CARPAL TUNNEL SYNDROME    Post-operative Diagnosis:  LEFT CARPAL TUNNEL SYNDROME    Procedure:   LEFT CARPAL TUNNEL RELEASE    Surgeon: Mikie Beauchamp MD    Anesthesia:   [facility]    Procedure/Operative Note:  Appropriate informed consent was obtained previously in the office. A [place] block was given in the  left upper extremity. THe extremity was prepped and draped as a sterile field. A timeout was completed identifying the patient, procedure site and surgeon. Once confirmed by the operative team we proceeded. A slightly curved longitudinal incision was then made in the  left proximal palm. Dissection was carried down to the subcutaneous tissue. A small self retaining retractor was inserted and the palmar fascia opened with the tip of the scalpel. There was some pain noted when I reached the level of the deep ligament so 2-3 cc of 1% lidocaine with epi was injected around the incision. The transverse ligament was incised over 5-6 mm at the distal margin and the wrist then moderately extended over a folded towel.,  The [employer] Navigation system was used to complete the release. First the single  was passed deep to the deep ligament into the carpal canal.  This was followed by the stripper instrument and then the double . Finally the ligamentatome was used to release the ligament from distal to proximal with a single smooth pass. The completeness of the release was checked by palpating with the single  as well as visually. The median nerve was intact and no masses or other abnormalities noted. Small fascial bands distally were released with the tenotomy scissors.   The margins of the canal were injected with 1% Lidocaine with epinephrine. The incision was closed with 4-0 Vicryl Rapide and surgical adhesive. Sterile dressings were applied. The patient tolerated the procedure well and was sent to the  in good condition.      Tourniquet Time:   Total Tourniquet Time Documented:  Forearm (Left) - 19 minutes  Total: Forearm (Left) - 19 minutes          Signed By: Nancy Cervantes MD

## 2020-06-30 NOTE — DISCHARGE INSTRUCTIONS
POST OP INSTRUCTIONS      1. Patient has appointment for 7/10/20 at 8:45 AM at the [facility]. 2.  For problems call Dr Brittany De Leon, [address] [facility],  [phone]          Appointments only,  [phone]    3. Ice and elevate the surgical site. 4.  May remove dressings and wash in running water or shower. Then cover the incision with Band-aids. Do not submerge in bath or dish water. DIET  · Clear liquids until no nausea or vomiting; then light diet for the first day. · Advance to regular diet on second day, unless your doctor orders otherwise. · If nausea and vomiting continues, call your doctor. PAIN  · Take pain medication as directed by your doctor. · Call your doctor if pain is NOT relieved by medication. · DO NOT take aspirin of blood thinners unless directed by your doctor. CALL YOUR DOCTOR IF   · Excessive bleeding that does not stop after holding pressure over the area  · Temperature of 101 degrees F or above  · Excessive redness, swelling or bruising, and/ or green or yellow, smelly discharge from incision    AFTER ANESTHESIA   · For the first 24 hours: DO NOT Drive, Drink alcoholic beverages, or Make important decisions. · Be aware of dizziness following anesthesia and while taking pain medication. After general anesthesia or intravenous sedation, for 24 hours or while taking prescription Narcotics:  · Limit your activities  · A responsible adult needs to be with you for the next 24 hours  · Do not drive and operate hazardous machinery  · Do not make important personal or business decisions  · Do not drink alcoholic beverages  · If you have not urinated within 8 hours after discharge, please contact your surgeon on call. · If you have sleep apnea and have a CPAP machine, please use it for all naps and sleeping. · Please use caution when taking narcotics and any of your home medications that may cause drowsiness.     *  Please give a list of your current medications to your Primary Care Provider. *  Please update this list whenever your medications are discontinued, doses are      changed, or new medications (including over-the-counter products) are added. *  Please carry medication information at all times in case of emergency situations. These are general instructions for a healthy lifestyle:  No smoking/ No tobacco products/ Avoid exposure to second hand smoke  Surgeon General's Warning:  Quitting smoking now greatly reduces serious risk to your health. Obesity, smoking, and sedentary lifestyle greatly increases your risk for illness  A healthy diet, regular physical exercise & weight monitoring are important for maintaining a healthy lifestyle    You may be retaining fluid if you have a history of heart failure or if you experience any of the following symptoms:  Weight gain of 3 pounds or more overnight or 5 pounds in a week, increased swelling in our hands or feet or shortness of breath while lying flat in bed. Please call your doctor as soon as you notice any of these symptoms; do not wait until your next office visit. Advance Care Planning  People with COVID-19 may have no symptoms, mild symptoms, such as fever, cough, and shortness of breath or they may have more severe illness, developing severe and fatal pneumonia. As a result, Advance Care Planning with attention to naming a health care decision maker (someone you trust to make healthcare decisions for you if you could not speak for yourself) and sharing other health care preferences is important BEFORE a possible health crisis. Please contact your Primary Care Provider to discuss Advance Care Planning.      Preventing the Spread of Coronavirus Disease 2019 in Homes and Residential Communities  For the most recent information go to [employer]    Prevention steps for People with confirmed or suspected COVID-19 (including persons under investigation) who do not need to be hospitalized  and   People with confirmed COVID-19 who were hospitalized and determined to be medically stable to go home    Your healthcare provider and public health staff will evaluate whether you can be cared for at home. If it is determined that you do not need to be hospitalized and can be isolated at home, you will be monitored by staff from your local or state health department. You should follow the prevention steps below until a healthcare provider or local or state health department says you can return to your normal activities. Stay home except to get medical care  People who are mildly ill with COVID-19 are able to isolate at home during their illness. You should restrict activities outside your home, except for getting medical care. Do not go to work, school, or public areas. Avoid using public transportation, ride-sharing, or taxis. Separate yourself from other people and animals in your home  People: As much as possible, you should stay in a specific room and away from other people in your home. Also, you should use a separate bathroom, if available. Animals: You should restrict contact with pets and other animals while you are sick with COVID-19, just like you would around other people. Although there have not been reports of pets or other animals becoming sick with COVID-19, it is still recommended that people sick with COVID-19 limit contact with animals until more information is known about the virus. When possible, have another member of your household care for your animals while you are sick. If you are sick with COVID-19, avoid contact with your pet, including petting, snuggling, being kissed or licked, and sharing food. If you must care for your pet or be around animals while you are sick, wash your hands before and after you interact with pets and wear a facemask.   Call ahead before visiting your doctor  If you have a medical appointment, call the healthcare provider and tell them that you have or may have COVID-19. This will help the healthcare providers office take steps to keep other people from getting infected or exposed. Wear a facemask  You should wear a facemask when you are around other people (e.g., sharing a room or vehicle) or pets and before you enter a healthcare providers office. If you are not able to wear a facemask (for example, because it causes trouble breathing), then people who live with you should not stay in the same room with you, or they should wear a facemask if they enter your room. Cover your coughs and sneezes  Cover your mouth and nose with a tissue when you cough or sneeze. Throw used tissues in a lined trash can. Immediately wash your hands with soap and water for at least 20 seconds or, if soap and water are not available, clean your hands with an alcohol-based hand  that contains at least 60% alcohol. Clean your hands often  Wash your hands often with soap and water for at least 20 seconds, especially after blowing your nose, coughing, or sneezing; going to the bathroom; and before eating or preparing food. If soap and water are not readily available, use an alcohol-based hand  with at least 60% alcohol, covering all surfaces of your hands and rubbing them together until they feel dry. Soap and water are the best option if hands are visibly dirty. Avoid touching your eyes, nose, and mouth with unwashed hands. Avoid sharing personal household items  You should not share dishes, drinking glasses, cups, eating utensils, towels, or bedding with other people or pets in your home. After using these items, they should be washed thoroughly with soap and water. Clean all high-touch surfaces everyday  High touch surfaces include counters, tabletops, doorknobs, bathroom fixtures, toilets, phones, keyboards, tablets, and bedside tables.  Also, clean any surfaces that may have blood, stool, or body fluids on them. Use a household cleaning spray or wipe, according to the label instructions. Labels contain instructions for safe and effective use of the cleaning product including precautions you should take when applying the product, such as wearing gloves and making sure you have good ventilation during use of the product. Monitor your symptoms  Seek prompt medical attention if your illness is worsening (e.g., difficulty breathing). Before seeking care, call your healthcare provider and tell them that you have, or are being evaluated for, COVID-19. Put on a facemask before you enter the facility. These steps will help the healthcare providers office to keep other people in the office or waiting room from getting infected or exposed. Ask your healthcare provider to call the local or state health department. Persons who are placed under active monitoring or facilitated self-monitoring should follow instructions provided by their local health department or occupational health professionals, as appropriate. When working with your local health department check their available hours. If you have a medical emergency and need to call 911, notify the dispatch personnel that you have, or are being evaluated for COVID-19. If possible, put on a facemask before emergency medical services arrive. Discontinuing home isolation  Patients with confirmed COVID-19 should remain under home isolation precautions until the risk of secondary transmission to others is thought to be low. The decision to discontinue home isolation precautions should be made on a case-by-case basis, in consultation with healthcare providers and state and local health departments.

## 2020-06-30 NOTE — ANESTHESIA PROCEDURE NOTES
Peripheral Block    Start time: 6/30/2020 7:57 AM  End time: 6/30/2020 8:18 AM  Performed by: Janice Dubose CRNA  Authorized by: Janice Dubose CRNA       Pre-procedure:    Indications: at surgeon's request and primary anesthetic    Timeout Time: 07:57          Block Type:   Block Type:  [place] block  Laterality:  Left  Patient Position:  Flat  Block Limb IV Checked: Yes    Esmarch exsanguination: Yes    Tourniquet Type:  Single  Tourniquet Location:  Left arm  Tourniquet Inflated:  6/30/2020 8:00 AM  Inflation (mmHg):  250  Limb w/o Radial Pulse: Yes    Infused Agent:  Lidocaine 0.5%  Volume Infused (mL):  40  Tourniquet Deflated:  6/30/2020 8:19 AM    Assessment:    Injection Assessment:   Patient tolerance:  Patient tolerated the procedure well with no immediate complications  Tourniquet cycled down 15 sec, and up for 45 sec times three

## 2020-06-30 NOTE — ANESTHESIA PREPROCEDURE EVALUATION
Anesthetic History   No history of anesthetic complications            Review of Systems / Medical History  Patient summary reviewed and pertinent labs reviewed    Pulmonary  Within defined limits                 Neuro/Psych   Within defined limits           Cardiovascular                  Exercise tolerance: >4 METS  Comments: MVP  RBBB   GI/Hepatic/Renal     GERD           Endo/Other        Arthritis     Other Findings   Comments: Scoliosis  Insomnia  DDD  Back pain           Physical Exam    Airway  Mallampati: II  TM Distance: 4 - 6 cm  Neck ROM: decreased range of motion   Mouth opening: Normal     Cardiovascular    Rhythm: regular  Rate: normal         Dental  No notable dental hx       Pulmonary  Breath sounds clear to auscultation               Abdominal  GI exam deferred       Other Findings            Anesthetic Plan    ASA: 2  Anesthesia type: regional - Abby block          Induction: Intravenous  Anesthetic plan and risks discussed with: Patient

## 2020-06-30 NOTE — ANESTHESIA POSTPROCEDURE EVALUATION
Procedure(s):  LEFT WRIST CARPAL TUNNEL RELEASE.    regional    Anesthesia Post Evaluation      Multimodal analgesia: multimodal analgesia used between 6 hours prior to anesthesia start to PACU discharge  Patient location during evaluation: bedside  Patient participation: complete - patient participated  Level of consciousness: responsive to verbal stimuli  Pain management: adequate  Airway patency: patent  Anesthetic complications: no  Cardiovascular status: hemodynamically stable  Respiratory status: spontaneous ventilation  Hydration status: stable        INITIAL Post-op Vital signs:   Vitals Value Taken Time   /87 6/30/2020  8:46 AM   Temp 36.5 °C (97.7 °F) 6/30/2020  8:28 AM   Pulse 67 6/30/2020  8:49 AM   Resp 14 6/30/2020  8:46 AM   SpO2 97 % 6/30/2020  8:49 AM   Vitals shown include unvalidated device data.

## 2020-07-01 ENCOUNTER — PATIENT OUTREACH (OUTPATIENT)
Dept: OTHER | Age: 70
End: 2020-07-01

## 2020-07-01 NOTE — PROGRESS NOTES
Patient on report as eligible for Case Management. Left discreet message on voicemail with this CM contact information. Will attempt to contact again to offer [phone] Management services.

## 2020-07-02 ENCOUNTER — APPOINTMENT (OUTPATIENT)
Dept: PHYSICAL THERAPY | Age: 70
End: 2020-07-02
Payer: COMMERCIAL

## 2020-07-02 ENCOUNTER — PATIENT OUTREACH (OUTPATIENT)
Dept: OTHER | Age: 70
End: 2020-07-02

## 2020-07-02 NOTE — LETTER
Dr. Darcie Tamayo [address] [facility] [address] Dear  [facility], My name is Jabari Cosme LPN, Employee Care Manager for [facility] and I have been trying to reach you, for follow up after your recent surgery. The Employee Care Management [facility]) program is a free-of-charge confidential service provided to our employees and their family members covered by the [facility]. The program will provide an employee and his/her family with the [facility]' expertise to assist in navigating the health care delivery system, provider services, and their overall care needsso as to assure and improve health care interactions and enhance the quality of life. This program is designed to provide you with the opportunity to have a [facility] care manager partner with you for the following services: 
 
 1) when you come home from the hospital or emergency room 2) when help is needed to manage your disease 3) when you need assistance coordinating services or appointments [facility] is dedicated to empowering the good health of its community and improving the quality of care and care experiences for employees and their families. We are committed to safeguarding patient confidentiality and privacy, assuring that every employee has the respect he or she deserves in managing their health. The information shared with your care manager will not be shared with anyone else aside from those you identify as part of your care team, and will only be used to assist you with any identified care needs. Please contact me if you would like this service provided to you. Sincerely, Jabari Cosme LPN  [place] [facility] Care Coordinator [address] [place] [address] Feng Jimenes [place] [address] [facility] Office Cell [phone] Fax [email or web address] [email or web address]

## 2020-07-02 NOTE — PROGRESS NOTES
Patient identified as eligible for [address] [place] [address] services. Second telephone outreach attempted. Left discreet voicemail with this CM confidential contact information. Will send UTR letter.

## 2020-07-06 ENCOUNTER — APPOINTMENT (OUTPATIENT)
Dept: PHYSICAL THERAPY | Age: 70
End: 2020-07-06
Payer: COMMERCIAL

## 2020-07-08 ENCOUNTER — HOSPITAL ENCOUNTER (OUTPATIENT)
Dept: PHYSICAL THERAPY | Age: 70
Discharge: HOME | End: 2020-07-08
Payer: COMMERCIAL

## 2020-07-08 PROCEDURE — 97110 THERAPEUTIC EXERCISES: CPT

## 2020-07-08 PROCEDURE — 97140 MANUAL THERAPY 1/> REGIONS: CPT

## 2020-07-08 PROCEDURE — 97014 ELECTRIC STIMULATION THERAPY: CPT

## 2020-07-08 NOTE — PROGRESS NOTES
Kevin Patel  : 1950  Payor: [facility] / Plan: [address] [facility] [address] [facility] / Product Type: Commerical /  [address] [place]  at [facility]  [address], [address], [place] [address] [address] [facility]  [phone]   [phone]      OUTPATIENT PHYSICAL THERAPY: Daily Treatment Note 2020  Visit Count:  23    ICD-10: Treatment Diagnosis:   M54.5 Low Back Pain   R26.2 Difficulty in Walking, Not Elsewhere Classified    Precautions/Allergies:   Patient has no known allergies. TREATMENT PLAN:  Effective Dates: 2020 TO 7/10/2020 (60 days). Frequency/Duration: 3 times a week for 60 Days       PRE-TREATMENT SYMPTOMS/COMPLAINTS:   \"It feels pretty good, I had carpal tunnel surgery a week ago this past Monday so I can't do a lot with using it yet\". MEDICATIONS REVIEWED:  2020   TREATMENT:   (In addition to Assessment/Re-Assessment sessions the following treatments were rendered)    THERAPEUTIC EXERCISE: (30 minutes):  Exercises per grid below to improve mobility, strength and balance. Required minimal visual and verbal cues to promote proper body alignment and promote proper body posture. Progressed resistance, range and complexity of movement as indicated. Reviewed HEP and performed to provided cues for technique and prevent compensations.      Date:  20 Date:   Date:   Date:   Date:   Date:     Activity/Exercise         Treadmill         Airdyne 12min        Paloff press 2 x 10 reps B 7.5#        Paloff press with rotation 2 x 10 reps B 2.5        Banded foot taps 3-way for hip ER strength         Side ways walking with Red T-band X 2 laps each way        Walking and marching holding weight         Lateral high step overs holding weight         TRX squats         Single leg RDL         Paloff press #55 x 20 reps        HEP review with physioball         Hip capsule stretch 3 x 20 sec holds bilaterally        Monster walks Piriformis stretch 3 x 20 sec holds        Bridging alternating X 15 reps        Lower Trunk Rotation in supine X 20 reps each way        Active Hamstring stretch X 20 pumps        Hip flexor and quad jason stretch          Squats with physioball on wall         Marching down hallway         Walking tipping birds down hallway         Bungee forward         Bungee sideways         HEP review         Trunk lateral flexion in standing with hand weights         Marching         BOSU balance with vertical and horizontal UE movements and weighted ball         Wobble board IV/EV         Wobble board DF/PF         Wobble board IV/EV balance with weighted ball         Gastroc stretch         Deadlift with Matrix         Lateral squat with UE extension holding weighted ball         Trampoline ball toss         Deadlift HEP review with walking stick                  Lower Trunk Rotation X 20 reps each way          MANUAL THERAPY: (15 minutes): Joint mobilization, Soft tissue mobilization and Manipulation was utilized and necessary because of the patient's restricted joint motion, painful spasm, loss of articular motion and restricted motion of soft tissue. +PA L1-L5, B SI joints, Sacrum Grade III  + STM B lumbar paraspinals and QL  + trigger point to R QL  +B long axis distraction   + silicone cups for myofascial release and to increase blood flow to lumbar region-NOT TODAY 7/8/2020    (Used abbreviations: MET - muscle energy technique; PNF - proprioceptive neuromuscular facilitation; NMR - neuromuscular re-education; AP - anterior to posterior; PA - posterior to anterior)    MODALITIES: (12  minutes): *  Electrical Stimulation Therapy (level 35) was provided with intensity adjusted throughout treatment to patient tolerance. unattended       *  Hot Pack Therapy in order to provide analgesia and relieve muscle spasm. Applied concurrently at the end of treatment session.        TREATMENT/SESSION ASSESSMENT:  Dave Lynn Efraín Becerra  Patient did well today with therapy, low pain this morning. Tightness across QL and paraspinals today. Added a few more exercises/stretches on for HEP. Education: Provided pt with HEP. Discussed addition of lumbar and paraspinal stretching into program.    RECOMMENDATIONS/INTENT FOR NEXT TREATMENT SESSION: \"Next visit will focus on advancements to more challenging activities\".     PAIN: Initial: 2/10 Post Session: 1/10     [facility] Portal    Total Treatment Billable Duration: 60  minutes  PT Patient Time In/Time Out  Time In: 0930  Time Out: 56  Ariellae Farzana,     Future Appointments   Date Time Provider Elisabeth Chauhan   7/10/2020  3:15 PM Shweta Tamez PT, DPT [place] [facility]   7/14/2020  9:30 AM Shweta Tamez PT, DPT [place] [facility]   7/16/2020  9:30 AM Shweta Tamez PT, DPT [place] [facility]   7/21/2020  9:30 AM Shweta Tamez PT, DPT [place] [facility]   7/23/2020  9:30 AM Shweta Tamez PT, DPT [facility]   7/24/2020  8:00 AM IMD NURSE ONLY LAKISHA IMD IMD   7/31/2020  1:30 PM MD LAKISHA Garner IMJACQUELINE IMD

## 2020-07-10 ENCOUNTER — APPOINTMENT (OUTPATIENT)
Dept: PHYSICAL THERAPY | Age: 70
End: 2020-07-10
Payer: COMMERCIAL

## 2020-07-14 ENCOUNTER — HOSPITAL ENCOUNTER (OUTPATIENT)
Dept: PHYSICAL THERAPY | Age: 70
Discharge: HOME | End: 2020-07-14
Payer: COMMERCIAL

## 2020-07-14 PROCEDURE — 97014 ELECTRIC STIMULATION THERAPY: CPT

## 2020-07-14 PROCEDURE — 97110 THERAPEUTIC EXERCISES: CPT

## 2020-07-14 PROCEDURE — 97140 MANUAL THERAPY 1/> REGIONS: CPT

## 2020-07-14 NOTE — PROGRESS NOTES
Melany Grijalva  : 1950  Payor: [address] / Plan: 2900 [facility] 30 [facility] / Product Type: Commlailal /  [address]  at [facility]  [address] [facility], [place] [address] [address] [facility]  [phone]   [phone]      OUTPATIENT PHYSICAL THERAPY: Daily Treatment Note 2020  Visit Count:  Visit count could not be calculated. Make sure you are using a visit which is associated with an episode. ICD-10: Treatment Diagnosis:   M54.5 Low Back Pain   R26.2 Difficulty in Walking, Not Elsewhere Classified    Precautions/Allergies:   Patient has no known allergies. TREATMENT PLAN:  Effective Dates: 2020 TO 7/10/2020 (60 days). Frequency/Duration: 3 times a week for 60 Days       PRE-TREATMENT SYMPTOMS/COMPLAINTS:   \"I did some fixing things at the house and paid for it the next day, but doing good overall\"    MEDICATIONS REVIEWED:  2020   TREATMENT:   (In addition to Assessment/Re-Assessment sessions the following treatments were rendered)    THERAPEUTIC EXERCISE: (30 minutes):  Exercises per grid below to improve mobility, strength and balance. Required minimal visual and verbal cues to promote proper body alignment and promote proper body posture. Progressed resistance, range and complexity of movement as indicated. Reviewed HEP and performed to provided cues for technique and prevent compensations.      Date:  20 Date:   Date:   Date:   Date:   Date:     Activity/Exercise         Treadmill         Airdyne 12min        Paloff press 2 x 10 reps B 7.5#        Paloff press with rotation 2 x 10 reps B 2.5        Banded foot taps 3-way for hip ER strength         Side ways walking with Red T-band X 2 laps each way        Walking and marching holding weight         Lateral high step overs holding weight         TRX squats         Single leg RDL         Paloff press #55 x 20 reps        HEP review with physioball         Hip capsule stretch 3 x 20 sec holds bilaterally        Monster walks Red x 4 laps        Piriformis stretch 3 x 20 sec holds        Bridging alternating X 15 reps        Lower Trunk Rotation in supine X 20 reps each way        Active Hamstring stretch X 20 pumps        Hip flexor and quad jason stretch          Squats with physioball on wall         Marching down hallway         Walking tipping birds down hallway         Bungee forward         Bungee sideways         HEP review         Trunk lateral flexion in standing with hand weights         Marching         BOSU balance with vertical and horizontal UE movements and weighted ball         Wobble board IV/EV         Wobble board DF/PF         Wobble board IV/EV balance with weighted ball         Gastroc stretch 3x30 holds        Deadlift with Matrix         Lateral squat with UE extension holding weighted ball         Trampoline ball toss         Deadlift HEP review with walking stick         Blue foam rotation with weighted ball X 15 reps each way maintaining balance                  Lower Trunk Rotation X 20 reps each way          MANUAL THERAPY: (15 minutes): Joint mobilization, Soft tissue mobilization and Manipulation was utilized and necessary because of the patient's restricted joint motion, painful spasm, loss of articular motion and restricted motion of soft tissue. +PA L1-L5, B SI joints, Sacrum Grade III  + STM B lumbar paraspinals and QL  + trigger point to R QL  +B long axis distraction   + silicone cups for myofascial release and to increase blood flow to lumbar region-NOT TODAY 7/14/2020    (Used abbreviations: MET - muscle energy technique; PNF - proprioceptive neuromuscular facilitation; NMR - neuromuscular re-education; AP - anterior to posterior; PA - posterior to anterior)    MODALITIES: (10  minutes): *  Electrical Stimulation Therapy (level 35) was provided with intensity adjusted throughout treatment to patient tolerance.  unattended *  Hot Pack Therapy in order to provide analgesia and relieve muscle spasm. Applied concurrently at the end of treatment session. TREATMENT/SESSION ASSESSMENT:  Nadia Alan  Patient did well today with therapy, low pain this morning. Tightness across QL and paraspinals today. Relief after therapy session today. Education: Provided pt with HEP. Discussed addition of lumbar and paraspinal stretching into program.    RECOMMENDATIONS/INTENT FOR NEXT TREATMENT SESSION: \"Next visit will focus on advancements to more challenging activities\".     PAIN: Initial: 2/10 Post Session: 1/10     [employer] Portal    Total Treatment Billable Duration: 55  minutes  PT Patient Time In/Time Out  Time In: 0930  Time Out: 1  Drew Horse,     Future Appointments   Date Time Provider Elisabeth Chauhan   7/16/2020  9:30 AM SFD PHYSICIAL THERAPIST SFRPT D   7/21/2020  9:30 AM Xiao Russo, PT, DPT SFDORPT SFD   7/23/2020  9:30 AM Xiao Russo PT, DPT SFDORPT SFD   7/24/2020  8:00 AM IMD NURSE ONLY LAKISHA IMJACQUELINE IMD   7/31/2020  1:30 PM MD LAKISHA Harper

## 2020-07-16 ENCOUNTER — HOSPITAL ENCOUNTER (OUTPATIENT)
Dept: PHYSICAL THERAPY | Age: 70
Discharge: HOME | End: 2020-07-16
Payer: COMMERCIAL

## 2020-07-16 PROCEDURE — 97140 MANUAL THERAPY 1/> REGIONS: CPT

## 2020-07-16 PROCEDURE — 97110 THERAPEUTIC EXERCISES: CPT

## 2020-07-16 PROCEDURE — 97014 ELECTRIC STIMULATION THERAPY: CPT

## 2020-07-16 NOTE — PROGRESS NOTES
Shanae Painting  : 1950  Payor: [address] / Plan: 2900 [facility] 30 [facility] / Product Type: Mary /  [place]  at [address] [facility] [address], [address] [facility], [place], [facility] [address] [facility]  [phone]   [phone]      OUTPATIENT PHYSICAL THERAPY: Daily Treatment Note 2020  Visit Count:  Visit count could not be calculated. Make sure you are using a visit which is associated with an episode. ICD-10: Treatment Diagnosis:   M54.5 Low Back Pain   R26.2 Difficulty in Walking, Not Elsewhere Classified    Precautions/Allergies:   Patient has no known allergies. TREATMENT PLAN:  Effective Dates: 2020 TO 7/10/2020 (60 days). Frequency/Duration: 3 times a week for 60 Days       PRE-TREATMENT SYMPTOMS/COMPLAINTS:   \"I am doing ok\"    MEDICATIONS REVIEWED:  2020   TREATMENT:   (In addition to Assessment/Re-Assessment sessions the following treatments were rendered)    THERAPEUTIC EXERCISE: (30 minutes):  Exercises per grid below to improve mobility, strength and balance. Required minimal visual and verbal cues to promote proper body alignment and promote proper body posture. Progressed resistance, range and complexity of movement as indicated. Reviewed HEP and performed to provided cues for technique and prevent compensations.      Date:  20 Date:   Date:   Date:   Date:   Date:     Activity/Exercise         Treadmill         Airdyne 12min        Paloff press 2 x 10 reps B 7.5#        Paloff press with rotation 2 x 10 reps B 2.5        Banded foot taps 3-way for hip ER strength         Side ways walking with Red T-band X 2 laps each way        Walking and marching holding weight         Lateral high step overs holding weight         TRX squats         Single leg RDL         Paloff press #55 x 20 reps        HEP review with physioball         Hip capsule stretch 3 x 20 sec holds bilaterally        Monster walks Red x 4 laps        Piriformis stretch 3 x 20 sec holds        Bridging alternating X 15 reps        Lower Trunk Rotation in supine X 20 reps each way        Active Hamstring stretch X 20 pumps        Hip flexor and quad jason stretch  manually        Squats with physioball on wall X 15        Marching down hallway         Walking tipping birds down hallway         Bungee forward         Bungee sideways         HEP review         Trunk lateral flexion in standing with hand weights         Marching         BOSU balance with vertical and horizontal UE movements and weighted ball         Wobble board IV/EV         Wobble board DF/PF         Wobble board IV/EV balance with weighted ball         Gastroc stretch 3x30 holds        Deadlift with Matrix         Lateral squat with UE extension holding weighted ball         Trampoline ball toss         Deadlift HEP review with walking stick         Blue foam rotation with weighted ball X 15 reps each way maintaining balance                  Lower Trunk Rotation X 20 reps each way          MANUAL THERAPY: (15 minutes): Joint mobilization, Soft tissue mobilization and Manipulation was utilized and necessary because of the patient's restricted joint motion, painful spasm, loss of articular motion and restricted motion of soft tissue. +PA L1-L5, B SI joints, Sacrum Grade III  + STM B lumbar paraspinals and QL  + trigger point to R QL  +B long axis distraction   + silicone cups for myofascial release and to increase blood flow to lumbar region-NOT TODAY 7/16/2020    (Used abbreviations: MET - muscle energy technique; PNF - proprioceptive neuromuscular facilitation; NMR - neuromuscular re-education; AP - anterior to posterior; PA - posterior to anterior)    MODALITIES: (10  minutes): *  Electrical Stimulation Therapy (level 35) was provided with intensity adjusted throughout treatment to patient tolerance.  unattended       *  Hot Pack Therapy in order to provide analgesia and relieve muscle spasm. Applied concurrently at the end of treatment session. TREATMENT/SESSION ASSESSMENT:  Maty Reddy  Patient did well today with therapy, low pain this morning. Tightness across QL and paraspinals today. Relief after therapy session today. No new issues. Education: Provided pt with HEP. Discussed addition of lumbar and paraspinal stretching into program.    RECOMMENDATIONS/INTENT FOR NEXT TREATMENT SESSION: \"Next visit will focus on advancements to more challenging activities\".     PAIN: Initial: 2/10 Post Session: 1/10     [employer] Portal    Total Treatment Billable Duration: 60minutes  PT Patient Time In/Time Out  Time In: 0930  Time Out: 56  Roberto Frias,     Future Appointments   Date Time Provider Elisabeth Chauhan   7/20/2020  9:30 AM Cyn [facility]   7/22/2020  9:30 AM SFD PHYSICIAL THERAPIST [facility]PT [facility]   7/24/2020  8:00 AM IMD NURSE ONLY LAKISHA IMJACQUELINE IMD   7/31/2020  1:30 PM MD LAKISHA Diaz JACQUELINE IMD

## 2020-07-20 ENCOUNTER — HOSPITAL ENCOUNTER (OUTPATIENT)
Dept: PHYSICAL THERAPY | Age: 70
Discharge: HOME | End: 2020-07-20
Payer: COMMERCIAL

## 2020-07-20 PROCEDURE — 97110 THERAPEUTIC EXERCISES: CPT

## 2020-07-20 NOTE — PROGRESS NOTES
Maty Reddy  : 1950  Payor: [facility] / Plan: 2900 [facility] [address] [facility] [address] / Product Type: Commerical /  [address] [place]  at 614 [facility] 68, [address], [facility], [address] [facility]  [phone]   [phone]      OUTPATIENT PHYSICAL THERAPY: Daily Treatment Note 2020  Visit Count:  24    ICD-10: Treatment Diagnosis:   M54.5 Low Back Pain   R26.2 Difficulty in Walking, Not Elsewhere Classified    Precautions/Allergies:   Patient has no known allergies. TREATMENT PLAN:  Effective Dates: 2020 TO 7/10/2020 (60 days). Frequency/Duration: 3 times a week for 60 Days       PRE-TREATMENT SYMPTOMS/COMPLAINTS:   Patient states he went to [address] [place] [address] with his family yesterday and was physically doing more than normal and was very stiff and sore this morning. Patient states that he thinks his stiffness and tightness is one of his main problems and he has been trying to work on stretching exercises but knows he needs to do more. MEDICATIONS REVIEWED:  2020   TREATMENT:   (In addition to Assessment/Re-Assessment sessions the following treatments were rendered)    THERAPEUTIC EXERCISE: (40 minutes):  Exercises per grid below to improve mobility, strength and balance. Required minimal visual and verbal cues to promote proper body alignment and promote proper body posture. Progressed resistance, range and complexity of movement as indicated. Reviewed HEP and performed to provided cues for technique and prevent compensations.      Date:  20 Date:  20 Date:   Date:   Date:   Date:     Activity/Exercise         Treadmill         Airdyne 12min Airdyne 5 minutes  Physiostep  X 8 minutes with moist heat to low back       Paloff press 2 x 10 reps B 7.5#        Paloff press with rotation 2 x 10 reps B 2.5        Banded foot taps 3-way for hip ER strength         Side ways walking with Red T-band X 2 laps each way Walking and marching holding weight         Lateral high step overs holding weight         TRX squats         Single leg RDL         Paloff press #55 x 20 reps        HEP review with physioball         Hip capsule stretch 3 x 20 sec holds bilaterally 3 x 20 second hold with legs crossed and pushing knee out with hand with cues       Monster walks Red x 4 laps        Piriformis stretch 3 x 20 sec holds 3 x 30 second hold with cues for correct technique        Bridging alternating X 15 reps        Lower Trunk Rotation in supine X 20 reps each way X 20 reps each way in comfortable range       Active Hamstring stretch X 20 pumps Instructed in sitting on EOB with 4\" step and heel on step   3 x 30 second hold B       Hip flexor and quad quinn stretch  manually Manually x 2 in side lying and then instructed in supine Quinn stretch with cues for no back pain with exercise  2 x 30 second hold       Squats with physioball on wall X 15        Marching down hallway         Walking tipping birds down hallway         Bungee forward         Bungee sideways         HEP review         Trunk lateral flexion in standing with hand weights         Marching         BOSU balance with vertical and horizontal UE movements and weighted ball         Wobble board IV/EV         Wobble board DF/PF         Wobble board IV/EV balance with weighted ball         Gastroc stretch 3x30 holds Standing at wall 3 x 30 second hold B       Deadlift with Matrix         Lateral squat with UE extension holding weighted ball         Trampoline ball toss         Deadlift HEP review with walking stick         Blue foam rotation with weighted ball X 15 reps each way maintaining balance                  Lower Trunk Rotation X 20 reps each way          MANUAL THERAPY: (0 minutes): Joint mobilization, Soft tissue mobilization and Manipulation was utilized and necessary because of the patient's restricted joint motion, painful spasm, loss of articular motion and restricted motion of soft tissue. +PA L1-L5, B SI joints, Sacrum Grade III  + STM B lumbar paraspinals and QL  + trigger point to R QL  +B long axis distraction   + silicone cups for myofascial release and to increase blood flow to lumbar region-NOT TODAY 7/20/2020    (Used abbreviations: MET - muscle energy technique; PNF - proprioceptive neuromuscular facilitation; NMR - neuromuscular re-education; AP - anterior to posterior; PA - posterior to anterior)    MODALITIES: (8  minutes):      *        *  Hot Pack Therapy in order to provide analgesia and relieve muscle spasm. Concurrently during Physiostep as noted above        TREATMENT/SESSION ASSESSMENT:  Zulay Ludwig  Patient with increased stiffness and tightness today and focused on stretching exercises and reviewing current exercises with cues for correct technique and added new stretching exercises for home. Patient states he received a TENS unit in the mail and suggested bring it to next session to review use and application. Patient agreeable. Education: Provided pt with HEP. Discussed addition of lumbar and paraspinal stretching into program. Discussed importance of compliance with stretching exercise program at home at least 2 times a day. RECOMMENDATIONS/INTENT FOR NEXT TREATMENT SESSION: \"Next visit will focus on advancements to more challenging activities\". Review stretching exercises.      PAIN: Initial: No number given  Stiff and tight Post Session: No number given     [employer] Portal    Total Treatment Billable Duration:  45 minutes  PT Patient Time In/Time Out  Time In: 0930  Time Out: [address] [facility]    Future Appointments   Date Time Provider Elisabeth Chauhan   7/22/2020  9:30 AM [facility] PHYSICIAL THERAPIST SFDOLUCIA JACQUELINE   7/24/2020  8:00 AM IMD NURSE ONLY LAKISHA LUNA JACQUELINE   7/31/2020  1:30 PM MD LAKISHA Sadler JACQUELINE IMJACQUELINE

## 2020-07-21 ENCOUNTER — APPOINTMENT (OUTPATIENT)
Dept: PHYSICAL THERAPY | Age: 70
End: 2020-07-21
Payer: COMMERCIAL

## 2020-07-22 ENCOUNTER — HOSPITAL ENCOUNTER (OUTPATIENT)
Dept: PHYSICAL THERAPY | Age: 70
Discharge: HOME | End: 2020-07-22
Payer: COMMERCIAL

## 2020-07-22 PROCEDURE — 97110 THERAPEUTIC EXERCISES: CPT

## 2020-07-22 NOTE — PROGRESS NOTES
Keith Quevedo  : 1950  Payor: [facility] / Plan: 2900 [facility] 30 [facility] / Product Type: Commerical /  [address] [place]  at [facility]  [address], [address], [place] [address] [address] [facility]  [phone]   [phone]      OUTPATIENT PHYSICAL THERAPY: Daily Treatment Note 2020  Visit Count:  25    ICD-10: Treatment Diagnosis:   M54.5 Low Back Pain   R26.2 Difficulty in Walking, Not Elsewhere Classified    Precautions/Allergies:   Patient has no known allergies. TREATMENT PLAN:  Effective Dates: 2020 TO 7/10/2020 (60 days). Frequency/Duration: 3 times a week for 60 Days       PRE-TREATMENT SYMPTOMS/COMPLAINTS:  No new complaints, just \"stiffness and soreness\". 'I got the TENS unit\". MEDICATIONS REVIEWED:  2020   TREATMENT:   (In addition to Assessment/Re-Assessment sessions the following treatments were rendered)    THERAPEUTIC EXERCISE: (40 minutes):  Exercises per grid below to improve mobility, strength and balance. Required minimal visual and verbal cues to promote proper body alignment and promote proper body posture. Progressed resistance, range and complexity of movement as indicated. Reviewed HEP and performed to provided cues for technique and prevent compensations.      Date:  20 Date:  20 Date:   Date:   Date:   Date:     Activity/Exercise         Treadmill         Airdyne 12min Airdyne 5 minutes  Physiostep  X 8 minutes with moist heat to low back       Paloff press 2 x 10 reps B 7.5#        Paloff press with rotation 2 x 10 reps B 2.5        Banded foot taps 3-way for hip ER strength         Side ways walking with Red T-band X 2 laps each way x       Walking and marching holding weight         Lateral high step overs holding weight         TRX squats         Single leg RDL         Paloff press #55 x 20 reps x       HEP review with physioball         Hip capsule stretch 3 x 20 sec holds bilaterally 3 x 20 second hold with legs crossed and pushing knee out with hand with cues       Monster walks Red x 4 laps        Piriformis stretch 3 x 20 sec holds 3 x 30 second hold with cues for correct technique        Bridging alternating X 15 reps        Lower Trunk Rotation in supine X 20 reps each way X 20 reps each way in comfortable range       Active Hamstring stretch X 20 pumps Instructed in sitting on EOB with 4\" step and heel on step   3 x 30 second hold B       Hip flexor and quad quinn stretch  manually Manually x 2 in side lying and then instructed in supine Quinn stretch with cues for no back pain with exercise  2 x 30 second hold       Squats with physioball on wall X 15        Marching down hallway         Walking tipping birds down hallway         Bungee forward         Bungee sideways         HEP review         Trunk lateral flexion in standing with hand weights         Marching         BOSU balance with vertical and horizontal UE movements and weighted ball  X 10 reps each way       Wobble board IV/EV  x       Wobble board DF/PF  x       Wobble board IV/EV balance with weighted ball  x       Gastroc stretch 3x30 holds Standing at wall 3 x 30 second hold B       Deadlift with Matrix  x       Lateral squat with UE extension holding weighted ball  x       Trampoline ball toss         Deadlift HEP review with walking stick  x       Blue foam rotation with weighted ball X 15 reps each way maintaining balance           X 15 reps each way       Lower Trunk Rotation X 20 reps each way          MANUAL THERAPY: (0 minutes):    NOT TODAY Joint mobilization, Soft tissue mobilization and Manipulation was utilized and necessary because of the patient's restricted joint motion, painful spasm, loss of articular motion and restricted motion of soft tissue.    +PA L1-L5, B SI joints, Sacrum Grade III  + STM B lumbar paraspinals and QL  + trigger point to R QL  +B long axis distraction   + silicone cups for myofascial release and to increase blood flow to lumbar region-NOT TODAY 7/22/2020    (Used abbreviations: MET - muscle energy technique; PNF - proprioceptive neuromuscular facilitation; NMR - neuromuscular re-education; AP - anterior to posterior; PA - posterior to anterior)    MODALITIES: (8  minutes):      *        *  Hot Pack Therapy in order to provide analgesia and relieve muscle spasm. Concurrently during Physiostep as noted above        TREATMENT/SESSION ASSESSMENT:  Hillman Pond  Patient with increased stiffness and tightness today and focused on stretching exercises as this is what he needs the most.  He feels relieve after stretches. Discussed TENS unit that he has for home use. Education: Provided pt with HEP. Discussed addition of lumbar and paraspinal stretching into program. Discussed importance of compliance with stretching exercise program at home at least 2 times a day. RECOMMENDATIONS/INTENT FOR NEXT TREATMENT SESSION: \"Next visit will focus on advancements to more challenging activities\". Review stretching exercises.      PAIN: Initial: No number given  Stiff and tight Post Session: No number given     [employer] Portal    Total Treatment Billable Duration:  45 minutes  PT Patient Time In/Time Out  Time In: 0930  Time Out: [address], PTA    Future Appointments   Date Time Provider Elisabeth Chauhan   7/22/2020  9:30 AM THEODORA PHYSICIAL THERAPIST DESIREE YIP   7/23/2020  8:00 AM IMJACQUELINE NURSE ONLY LAKISHA LUNA   7/31/2020  1:30 PM MD LAKISHA Cárdenas

## 2020-07-23 ENCOUNTER — HOSPITAL ENCOUNTER (OUTPATIENT)
Dept: GENERAL RADIOLOGY | Age: 70
Discharge: HOME | End: 2020-07-23

## 2020-07-23 ENCOUNTER — APPOINTMENT (OUTPATIENT)
Dept: PHYSICAL THERAPY | Age: 70
End: 2020-07-23
Payer: COMMERCIAL

## 2020-07-23 DIAGNOSIS — Z00.00 ROUTINE GENERAL MEDICAL EXAMINATION AT A HEALTH CARE FACILITY: ICD-10-CM

## 2020-07-27 ENCOUNTER — HOSPITAL ENCOUNTER (OUTPATIENT)
Dept: PHYSICAL THERAPY | Age: 70
Discharge: HOME | End: 2020-07-27
Payer: COMMERCIAL

## 2020-07-27 PROCEDURE — 97110 THERAPEUTIC EXERCISES: CPT

## 2020-07-27 NOTE — PROGRESS NOTES
Tiffanie Gutierrez  : 1950  Payor: [employer] [facility] / Plan: [facility] [employer] [address] / Product Type: Commerical /  [address]  at [facility]  [address], [address], [place] [address] [address] [facility]  [phone]   [phone]        OUTPATIENT PHYSICAL [address]    ICD-10: Treatment Diagnosis:   M54.5 Low Back Pain   R26.2 Difficulty in Walking, Not Elsewhere Classified  Precautions/Allergies:   Patient has no known allergies. Fall Risk Score: 2 (? 5 = High Risk)  MD Orders: Evaluate and Treat MEDICAL/REFERRING DIAGNOSIS:  Other forms of scoliosis, lumbar region [M41.86]   DATE OF ONSET: 6-8 weeks ago  REFERRING PHYSICIAN: Marianne Chrsitensen MD  RETURN PHYSICIAN APPOINTMENT:      ASSESSMENT:  Mr. Laura Mcleod has attended 21 physical therapy sessions for low back pain. Pt is continuing to improve in his ability to perform more challenging LE and core stability exercises, indicating increased strength overall, but continues to have limited sitting tolerance. Pt has significant hypomobility of lumbar spinal segments with decreased ROM and core strength affecting pt ability to tolerate functional mobility, ADLs, and work tasks. Recommending continued skilled PT: manual therapeutic techniques (as appropriate), therapeutic exercises and activities, balance interventions, and a comprehensive home exercise program to address the current impairments, as listed above. Tiffanie Gutierrez will benefit from skilled PT (medically necessary) to address above deficits affecting participation in basic ADLs and overall functional tolerance. PROBLEM LIST (Impacting functional limitations):  1. Decreased Strength  2. Decreased ADL/Functional Activities  3. Decreased Transfer Abilities  4. Decreased Ambulation Ability/Technique  5. Decreased Balance  6. Increased Pain  7. Decreased Flexibility/Joint Mobility  8.  Decreased [place] with Home Exercise Program INTERVENTIONS PLANNED:  1. Balance Exercise  2. Cold  3. Cryotherapy  4. Electrical Stimulation  5. Family Education  6. Gait Training  7. Heat  8. Home Exercise Program (HEP)  9. Manual Therapy  10. Neuromuscular Re-education/Strengthening  11. Range of Motion (ROM)  12. Therapeutic Activites  13. Therapeutic Exercise/Strengthening  14. Transcutaneous Electrical Nerve Stimulation (TENS)  15. Transfer Training   TREATMENT PLAN:  TREATMENT PLAN:  Effective Dates: Twice per week from 7/10/2020 until 9/25/2020 (8 weeks). Short-Term Functional Goals: Time Frame: 4 weeks  1. Pt will be compliant with HEP. Goal met 5/11/20  2. Pt will decreased worst pain to 5/10 or less in order to improved standing and sitting tolerance for work and ADLs, Goal met 5/11/20 with exception of heavier tasks for example fly fishing  3. Pt will decreased KAI to 13/50 or less in order to return to daily functional activities and work tasks. (PROGRESSING, 7/27/2020)   4. Pt will report sitting tolerance > 30 minutes with <5/10 pain for return to functional mobility and work tasks (PROGRESSING, 7/27/2020)   Discharge Goals: Time Frame: 8 weeks  1. Pt will be independent with HEP. (PROGRESSING, 7/27/2020)   2. Pt will decreased worst pain to 3/10 or less in order to improve standing and sitting tolerance for work and ADLs (PROGRESSING, 7/27/2020)   3. Pt will decreased KAI to 8/50 or less in order to return to daily functional activities and work tasks ([facility], 7/27/2020)   4. Pt will report sitting tolerance > 60 minutes with < 3/10 pain for return to functional mobility and work tasks (PROGRESSING, 7/27/2020)       Rehabilitation Potential For Stated Goals: Good  Regarding Rosa Abler therapy, I certify that the treatment plan above will be carried out by a therapist or under their direction.   Thank you for this referral,  Myrna Conte, DPT    Referring Physician Signature: Jerald Small MD NICOLÁS          Date                                   The information in this section was collected on 5/11/20 (except where otherwise noted). HISTORY:   History of Present Injury/Illness (Reason for Referral):   Mechanical low back pain      CC/Primary Concern:      Pt reports a history of chronic lower back pain that began in his 35s. Pt reports having a lumbar fusion L5-S1 a few years ago but now with recent exaccerbation approximately 6-8 weeks ago. 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 Treatment Side: Bilateral central LBP      Past Medical History/Comorbidities:   Mr. Pool Mccall  has a past medical history of Arthritis (07/06/2015), Back pain, DDD (degenerative disc disease), lumbar, GERD (gastroesophageal reflux disease), History of basal cell cancer, History of squamous cell carcinoma, Insomnia (07/06/2015), Left inguinal hernia, MVP (mitral valve prolapse), Onychomycosis (1/3/7570), Oral lichen planus (3/9/2565), RBBB (7/6/2015), Right wrist pain (7/6/2015), Rising PSA level (7/6/2015), Scoliosis, and Spondylosis of lumbar region without myelopathy or radiculopathy. Mr. Pool Mccall  has a past surgical history that includes hx orthopaedic; neurological procedure unlisted (2007); hx colonoscopy; and hx shoulder arthroscopy (Right).   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 , Disp: 90 Tab, Rfl: 3    acetaminophen (Tylenol Extra Strength) 500 mg tablet, Take 1,000 mg by mouth every six (6) hours as needed for Pain., Disp: , Rfl:     traMADoL (ULTRAM) 50 mg tablet, Take 50 mg by mouth every six (6) hours as needed for Pain., Disp: , Rfl:     eszopiclone (Lunesta) 3 mg tablet, Take 1 Tab by mouth nightly as needed for Sleep. Max Daily Amount: 3 mg., Disp: 90 Tab, Rfl: 1    tamsulosin (FLOMAX) 0.4 mg capsule, Take 1 Cap by mouth daily. , Disp: 90 Cap, Rfl: 3    linaCLOtide (LINZESS) 145 mcg cap capsule, Take 1 Cap by mouth Daily (before breakfast). (Patient taking differently: Take 145 mcg by mouth daily as needed.), Disp: 90 Cap, Rfl: 3    Cetirizine (ZYRTEC) 10 mg cap, Take  by mouth daily. , Disp: , Rfl:     lidocaine (LIDODERM) 5 %, by TransDERmal route every twenty-four (24) hours. Apply patch to the affected area for 12 hours a day and remove for 12 hours a day., Disp: , Rfl:     tadalafil (CIALIS) 5 mg tablet, Take 1 Tab by mouth daily. (Patient taking differently: Take 5 mg by mouth daily as needed.), Disp: 90 Tab, Rfl: 3    predniSONE (DELTASONE) 5 mg tablet, 8 tablets daily for 5 days then taper as directed, Disp: 100 Tab, Rfl: 1    dicyclomine (BENTYL) 20 mg tablet, Take 1 Tab by mouth every six (6) hours.  (Patient taking differently: Take 20 mg by mouth every six (6) hours as needed.), Disp: 120 Tab, Rfl: 5    ipratropium (ATROVENT) 0.03 % nasal spray, 2 Sprays by Both Nostrils route every twelve (12) hours. (Patient taking differently: 2 Sprays by Both Nostrils route every twelve (12) hours as needed.), Disp: 30 mL, Rfl: 11    RESTASIS 0.05 % ophthalmic emulsion, INSTILL 1 DROP IN BOTH EYES TWICE DAILY, Disp: , Rfl: 12    omeprazole (PRILOSEC) 20 mg capsule, Take 20 mg by mouth daily. , Disp: , Rfl:    Date Last Reviewed:  7/27/2020       Ambulatory/Rehab Services H2 Model Falls Risk Assessment    Risk Factors:       (1)  Gender [Male] Ability to Rise from Chair:       (1)  Pushes up, successful in one attempt    Falls Prevention Plan:       No modifications necessary   Total: (5 or greater = High Risk): 2    ©2010 [facility] [employer]. All Rights Reserved. [facility] States Patent #[id number].  Federal Law prohibits the replication, distribution or use without written permission from [facility] [employer]        Number of Personal Factors/Comorbidities that affect the Plan of Care: 1-2: MODERATE COMPLEXITY   EXAMINATION:   Inspection        Pelvic alignment: good pelvic alignment (symmetrical), but R LE noted to be shorter on 7/27/2020        Additional Comments:   ________________________________________________________________________________________________  Observation          Posture: Decreased lumbar lordosis        Gait: Decreased Gait Speed, Decreased Stride Length and Decreased Step Length                  ________________________________________________________________________________________________  Range of Motion        Lumbar  Joint:      Right (Degrees/Percent) Left (Degrees/Percent)   Flexion  40 degrees pain    Extension  15 degrees pain    Side Bending  Knee joint WFL   Rotation  Knee joint WFL         Lower  Joint: Passive Passive Active Active    Right (Degrees) Left (Degrees) Right (Degrees) Left (Degrees)   Hip Flexion   [facility]    Hip Extension   [facility]   Hip Abduction   [facility]   Hip Internal Rotation    NT NT   Hip External Rotation   NT NT             Additional Comments: + decreased tissue extensibility HS, piriformis, glutes, R hip flexor > L , quads  ________________________________________________________________________________________________  Strength          Lower Extremity  Joint:   7/27/2020 7/27/2020    RIGHT LEFT RIGHT LEFT   Hip Flexion 5/5  5/5  4+/5 4/5   Hip Extension 5/5  5/5  3+/5 4-/5   Hip Internal Rotation 4/5 4/5 4+/5 4+/5   Hip External Rotation 4/5 4/5 4/5 4+/5   Hip Abduction 5/5 (5/5 pain) 5/5  4/5 4/5   Knee flexion 5/5 5/5 4+/5 4+/5   Knee extension 5/5 5/5 5/5 5/5     ________________________________________________________________________________________________  [facility]        C/O Radicular Symptoms: No        Additional Comments:   ________________________________________________________________________________________________            ________________________________________________________________________________________________  Palpation: lumbar paraspinals, increased tone noted  Joint mobilization: L1-L5 hypomobility with PA       Body Structures Involved:  9. Nerves  10. Bones  11. Joints  12. Muscles  13. Ligaments Body Functions Affected:  16. Sensory/Pain  17. Neuromusculoskeletal  18. Movement Related Activities and Participation Affected:  5. General Tasks and Demands  6. Mobility  7. Self Care  8. Domestic Life  9. Interpersonal Interactions and Relationships  10. Community, Social and [place]   Number of elements (examined above) that affect the Plan of Care: 4+: HIGH COMPLEXITY   CLINICAL PRESENTATION:   Presentation: Evolving clinical presentation with changing clinical characteristics: MODERATE COMPLEXITY   CLINICAL DECISION MAKING:   Outcome Measure:    Tool Used: Modified Oswestry Low Back Pain Questionnaire  Score:  Initial:17/50  Most Recent: 21/50 (Date: 5/11/20 ) Most Recent: 21/50 (Date: 7/27/2020)   Interpretation of Score: Each section is scored on a 0-5 scale, 5 representing the greatest disability. The scores of each section are added together for a total score of 50. Medical Necessity:   · Skilled intervention continues to be required due to decreased strength, balance, ROM, with increased pain affecting pt participation in daily functional mobility  Reason for Services/Other Comments:  · Patient continues to require skilled intervention due to decreased strength balance and ROM with increased pain affecting pt functional mobility and ADLS.    Use of outcome tool(s) and clinical judgement create a POC that gives a: Clear prediction of patient's progress: LOW COMPLEXITY        /

## 2020-07-27 NOTE — PROGRESS NOTES
Yudy Carvalho  : 1950  Payor: [facility] / Plan: [address] [facility] [address] [facility] / Product Type: Commerical /  [address]  at [facility]  [address], [address] [facility], [place] [address] [address] [facility]  [phone]   [phone]      OUTPATIENT PHYSICAL THERAPY: Daily Treatment Note 2020  Visit Count:  26    ICD-10: Treatment Diagnosis:   M54.5 Low Back Pain   R26.2 Difficulty in Walking, Not Elsewhere Classified    Precautions/Allergies:   Patient has no known allergies. TREATMENT PLAN:  Effective Dates:  Twice per week from 7/10/2020 until 2020 (8 weeks). 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 Required minimal visual and verbal cues to promote proper body alignment and promote proper body posture. Progressed resistance, range and complexity of movement as indicated. Reviewed HEP and performed to provided cues for technique and prevent compensations.      Date:  20 Date:  20 Date:  2020   Date:   Date:     Activity/Exercise        Treadmill        Airdyne 12min Airdyne 5 minutes  Physiostep  X 8 minutes with moist heat to low back 10 minutes to increase blood flow and mobility     Paloff press 2 x 10 reps B 7.5#       Paloff press with rotation 2 x 10 reps B 2.5       Banded foot taps 3-way for hip ER strength        Side ways walking with Red T-band X 2 laps each way x      Walking and marching holding weight        Lateral high step overs holding weight        TRX squats        Single leg RDL        Paloff press #55 x 20 reps x      HEP review with physioball        Hip capsule stretch 3 x 20 sec holds bilaterally 3 x 20 second hold with legs crossed and pushing knee out with hand with cues      Monster walks Red x 4 laps       Piriformis stretch 3 x 20 sec holds 3 x 30 second hold with cues for correct technique       Bridging alternating X 15 reps       Lower Trunk Rotation in supine X 20 reps each way X 20 reps each way in comfortable range 20 reps/1 set each direction with cues for 2-3 second hold     Active Hamstring stretch X 20 pumps Instructed in sitting on EOB with 4\" step and heel on step   3 x 30 second hold B      Hip flexor and quad quinn stretch  manually Manually x 2 in side lying and then instructed in supine Quinn stretch with cues for no back pain with exercise  2 x 30 second hold      Squats with physioball on wall X 15       Marching down hallway        Walking tipping birds down hallway        Bungee forward        Bungee sideways        HEP review        Trunk lateral flexion in standing with hand weights        Marching        BOSU balance with vertical and horizontal UE movements and weighted ball  X 10 reps each way      Wobble board IV/EV  x      Wobble board DF/PF  x      Wobble board IV/EV balance with weighted ball  x      Gastroc stretch 3x30 holds Standing at wall 3 x 30 second hold B      Deadlift with Matrix  x      Lateral squat with UE extension holding weighted ball  x      Trampoline ball toss        Deadlift HEP review with walking stick  x      Blue foam rotation with weighted ball X 15 reps each way maintaining balance X 15 reps each way      Varying resistance at each LE in multiple different planes of motion   Per strength assessment in chart     Lumbar forward flexion stretches with exercise ball   30 second hold x 5 reps forward, then diagonally x 3 reps each direction     Prone press ups   10 reps/1 set with 15-30 second hold       MANUAL THERAPY: (0 minutes):    NOT TODAY Joint mobilization, Soft tissue mobilization and Manipulation was utilized and necessary because of the patient's restricted joint motion, painful spasm, loss of articular motion and restricted motion of soft tissue. +PA L1-L5, B SI joints, Sacrum Grade III  + STM B lumbar paraspinals and QL  + trigger point to R QL  +B long axis distraction   + silicone cups for myofascial release and to increase blood flow to lumbar region-NOT TODAY 7/27/2020    (Used abbreviations: MET - muscle energy technique; PNF - proprioceptive neuromuscular facilitation; NMR - neuromuscular re-education; AP - anterior to posterior; PA - posterior to anterior)    MODALITIES:        TREATMENT/SESSION ASSESSMENT:  Melany Grijalva  Reassessment today - see assessment in chart for details. Gave pt 6 mm heel lift for R shoe at end of session to see if this reduces strain at his lumbar region and decreases his overall pain. Instructed him to take heel lift out of R shoe if it appears to cause increased pain. RECOMMENDATIONS/INTENT FOR NEXT TREATMENT SESSION: \"Next visit will focus on advancements to more challenging activities\". Review stretching exercises.      PAIN: Initial: No number given  Stiff and tight Post Session: No number given     [employer] Portal    Total Treatment Billable Duration:  40 minutes  PT Patient Time In/Time Out  Time In: 1105  Time Out: [address] [facility], [facility]    Future Appointments   Date Time Provider Elisabeth Chauhan   7/30/2020  9:30 AM [facility] PHYSICIAL THERAPIST [place] [facility]   7/31/2020  1:30 PM Catrachita Kellogg MD [facility]   8/4/2020  9:30 AM Lyndsay Shields PTA [facility]   8/6/2020  9:30 AM Lyndsay Shields PTA [facility]   8/11/2020  9:30 AM Lyndsay Shields PTA [facility]   8/13/2020  9:30 AM Lyndsay Shields PTA [facility]

## 2020-07-30 ENCOUNTER — HOSPITAL ENCOUNTER (OUTPATIENT)
Dept: PHYSICAL THERAPY | Age: 70
Discharge: HOME | End: 2020-07-30
Payer: COMMERCIAL

## 2020-07-30 PROCEDURE — 97110 THERAPEUTIC EXERCISES: CPT

## 2020-07-30 NOTE — PROGRESS NOTES
Isiah Rocha  : 1950  Payor: [facility] / Plan: [address] [facility] [address] [facility] [address] / Product Type: Commerical /  [address] [place]  at [facility]  Romina Andrews [facility], [address], [place] [address] [address] [facility]  [phone]   [phone]      OUTPATIENT PHYSICAL THERAPY: Daily Treatment Note 2020  Visit Count:  27    ICD-10: Treatment Diagnosis:   M54.5 Low Back Pain   R26.2 Difficulty in Walking, Not Elsewhere Classified    Precautions/Allergies:   Patient has no known allergies. TREATMENT PLAN:  Effective Dates:  Twice per week from 7/10/2020 until 2020 (8 weeks). Frequency/Duration: 3 times a week for 60 Days       PRE-TREATMENT SYMPTOMS/COMPLAINTS:  Pt states his progress has been up and down depending on what he does in any given day; states his he feels better after doing the stretches if he does them daily. MEDICATIONS REVIEWED:  2020   TREATMENT:   (In addition to Assessment/Re-Assessment sessions the following treatments were rendered)    THERAPEUTIC EXERCISE: (45 minutes):  Exercises per grid below (and assessment in chart on 2020) to improve mobility, strength and balance. Required minimal visual and verbal cues to promote proper body alignment and promote proper body posture. Progressed resistance, range and complexity of movement as indicated. Reviewed HEP and performed to provided cues for technique and prevent compensations.      Date:  20 Date:  20 Date:  2020   Date:   Date:     Activity/ExerciseAirdyne        Treadmill         12min Airdyne 5 minutes  Physiostep  X 8 minutes with moist heat to low back 10 minutes to increase blood flow and mobility     Paloff press 2 x 10 reps B 7.5#       Paloff press with rotation 2 x 10 reps B 2.5       Banded foot taps 3-way for hip ER strength        Side ways walking with Red T-band X 2 laps each way x      Walking and marching holding weight        Lateral high step overs holding weight        TRX squats        Single leg RDL        Paloff press #55 x 20 reps x      HEP review with physioball        Hip capsule stretch 3 x 20 sec holds bilaterally 3 x 20 second hold with legs crossed and pushing knee out with hand with cues 3 x 20 sec holds bilaterally     Monster walks Red x 4 laps       Piriformis stretch 3 x 20 sec holds 3 x 30 second hold with cues for correct technique  3 x 30 sec holds      Bridging alternating X 15 reps  X 15 reps     Lower Trunk Rotation in supine X 20 reps each way X 20 reps each way in comfortable range 20 reps/1 set each direction with cues for 2-3 second hold     Active Hamstring stretch X 20 pumps Instructed in sitting on EOB with 4\" step and heel on step   3 x 30 second hold B X 30 pumps bilaterally     Hip flexor and quad quinn stretch  manually Manually x 2 in side lying and then instructed in supine Quinn stretch with cues for no back pain with exercise  2 x 30 second hold      Squats with physioball on wall X 15       Quinn stretch off Table  5 x 10 sec holds      BOSU balance with vertical and horizontal UE movements and weighted ball  X 10 reps each way      Wobble board IV/EV  x      Wobble board DF/PF  x      [employer] IV/EV balance with weighted ball  x      Gastroc stretch 3x30 holds Standing at wall 3 x 30 second hold B      Deadlift with Matrix  x      Lateral squat with UE extension holding weighted ball  x      Trampoline ball toss        Deadlift HEP review with walking stick  x      Blue foam rotation with weighted ball X 15 reps each way maintaining balance           X 15 reps each way      Varying resistance at each LE in multiple different planes of motion   Per strength assessment in chart     Lumbar forward flexion stretches with exercise ball   30 second hold x 5 reps forward, then diagonally x 3 reps each direction     Prone press ups   10 reps/1 set with 15-30 second hold       MANUAL THERAPY: (0 minutes): NOT TODAY Joint mobilization, Soft tissue mobilization and Manipulation was utilized and necessary because of the patient's restricted joint motion, painful spasm, loss of articular motion and restricted motion of soft tissue. +PA L1-L5, B SI joints, Sacrum Grade III  + STM B lumbar paraspinals and QL  + trigger point to R QL  +B long axis distraction   + silicone cups for myofascial release and to increase blood flow to lumbar region-NOT TODAY 7/30/2020    (Used abbreviations: MET - muscle energy technique; PNF - proprioceptive neuromuscular facilitation; NMR - neuromuscular re-education; AP - anterior to posterior; PA - posterior to anterior)    MODALITIES:        TREATMENT/SESSION ASSESSMENT:  Maty Reddy  Patient does get relieve after therapy and he continues to do his stretches at home that help as well. RECOMMENDATIONS/INTENT FOR NEXT TREATMENT SESSION: \"Next visit will focus on advancements to more challenging activities\". Review stretching exercises.      PAIN: Initial: No number given  Stiff and tight Post Session: No number given     MedBridge Portal    Total Treatment Billable Duration:  45 minutes  PT Patient Time In/Time Out  Time In: 0930  Time Out: Lelo Cleaning PTA    Future Appointments   Date Time Provider Elisabeth Chauhan   7/31/2020  1:30 PM MD LAKISHA Diaz IMJACQUELINE IMJACQUELINE   8/4/2020  9:30 AM [facility]   8/6/2020  9:30 AM RADHA Ortega JACQUELINE   8/11/2020  9:30 AM RADHA Ortega JACQUELINE   8/13/2020  9:30 AM [facility] Mack [facility]

## 2020-07-31 ENCOUNTER — PATIENT OUTREACH (OUTPATIENT)
Dept: OTHER | Age: 70
End: 2020-07-31

## 2020-08-04 ENCOUNTER — HOSPITAL ENCOUNTER (OUTPATIENT)
Dept: PHYSICAL THERAPY | Age: 70
Discharge: HOME | End: 2020-08-04
Payer: COMMERCIAL

## 2020-08-04 PROCEDURE — 97110 THERAPEUTIC EXERCISES: CPT

## 2020-08-04 NOTE — PROGRESS NOTES
Fiorella Yousif : 1950 Payor: [id number] / Plan: [address] [facility] 30 [facility] / Product Type: [employer] /  [place]  at [address] [facility], [place], [facility] [address] [facility] [phone]   [phone] OUTPATIENT PHYSICAL THERAPY: Daily Treatment Note 2020 Visit Count:  28 ICD-10: Treatment Diagnosis:  
M54.5 Low Back Pain R26.2 Difficulty in Walking, Not Elsewhere Classified Precautions/Allergies:  
Patient has no known allergies. TREATMENT PLAN: 
Effective Dates:  Twice per week from 7/10/2020 until 2020 (8 weeks). Frequency/Duration: 3 times a week for 60 Days PRE-TREATMENT SYMPTOMS/COMPLAINTS:   Worked on some of my stretches this morning. Tightness continues but patient reports he always feels better after stretching. MEDICATIONS REVIEWED:  2020 TREATMENT:  
(In addition to Assessment/Re-Assessment sessions the following treatments were rendered) THERAPEUTIC EXERCISE: (45 minutes):  Exercises per grid below (and assessment in chart on 2020) to improve mobility, strength and balance. Required minimal visual and verbal cues to promote proper body alignment and promote proper body posture. Progressed resistance, range and complexity of movement as indicated. Reviewed HEP and performed to provided cues for technique and prevent compensations. Date: 
20 Date: 
20 Date: 
2020 Date: 
20 Date: Activity/ExerciseAirdyne Treadmill 12min Airdyne 5 minutes Physiostep X 8 minutes with moist heat to low back 10 minutes to increase blood flow and mobility SciFit Arms and legs Level 2.0 
12 minutes Paloff press 2 x 10 reps B 7.5# Paloff press with rotation 2 x 10 reps B 2.5 Banded foot taps 3-way for hip ER strength Side ways walking with Red T-band X 2 laps each way x Walking and marching holding weight Lateral high step overs holding weight TRX squats Single leg RDL Paloff press #55 x 20 reps x HEP review with physioball Hip capsule stretch 3 x 20 sec holds bilaterally 3 x 20 second hold with legs crossed and pushing knee out with hand with cues 3 x 20 sec holds bilaterally 3 x 30 second hold B Monster walks Red x 4 laps Piriformis stretch 3 x 20 sec holds 3 x 30 second hold with cues for correct technique  3 x 30 sec holds  3 x 30 second hold B Bridging alternating X 15 reps  X 15 reps X 15 reps Lower Trunk Rotation in supine X 20 reps each way X 20 reps each way in comfortable range 20 reps/1 set each direction with cues for 2-3 second hold X 20 reps each direction Active Hamstring stretch X 20 pumps Instructed in sitting on EOB with 4\" step and heel on step 3 x 30 second hold B X 30 pumps bilaterally 2 x 30 second hold B Hip flexor and quad quinn stretch  manually Manually x 2 in side lying and then instructed in supine Quinn stretch with cues for no back pain with exercise 2 x 30 second hold  3 x 1 minute on edge of int with assist 
Then instructed in standing hip flexor stretch for home 3 x 30 second hold Squats with physioball on wall X 15 Quinn stretch off Table  5 x 10 sec holds  As above BOSU balance with vertical and horizontal UE movements and weighted ball  X 10 reps each way Wobble board IV/EV  x Wobble board DF/PF  x Wobble board IV/EV balance with weighted ball  x Gastroc stretch 3x30 holds Standing at wall 3 x 30 second hold B  3 x 30 second hold B Deadlift with Matrix  x Lateral squat with UE extension holding weighted ball  x Trampoline ball toss Deadlift HEP review with walking stick  x Blue foam rotation with weighted ball X 15 reps each way maintaining balance           X 15 reps each way Varying resistance at each LE in multiple different planes of motion   Per strength assessment in chart Lumbar forward flexion stretches with exercise ball   30 second hold x 5 reps forward, then diagonally x 3 reps each direction Prone press ups   10 reps/1 set with 15-30 second hold [place] walk    X 2 flights up and down MANUAL THERAPY: (0 minutes):    NOT TODAY Joint mobilization, Soft tissue mobilization and Manipulation was utilized and necessary because of the patient's restricted joint motion, painful spasm, loss of articular motion and restricted motion of soft tissue. +PA L1-L5, B SI joints, Sacrum Grade III 
+ STM B lumbar paraspinals and QL 
+ trigger point to R QL 
+B long axis distraction + silicone cups for myofascial release and to increase blood flow to lumbar region-NOT TODAY 8/4/2020 
 
(Used abbreviations: MET - muscle energy technique; PNF - proprioceptive neuromuscular facilitation; NMR - neuromuscular re-education; AP - anterior to posterior; PA - posterior to anterior) MODALITIES:  Moist heat to low back during stretching exercises to help increase blood flow to help improve stretch. TREATMENT/SESSION ASSESSMENT:  Chidi Pate  Patient continues to get relief following stretches and heat. Patient compliant. Continue with focus on stretching and use and instruct in use of theraroller right quad and TFL next visit. RECOMMENDATIONS/INTENT FOR NEXT TREATMENT SESSION: \"Next visit will focus on advancements to more challenging activities\". Review stretching exercises. PAIN: Initial: No number given  Stiff and tight Post Session: No number given MedBridge Portal 
 
Total Treatment Billable Duration:  45 minutes Leydi Obrien PTA Future Appointments Date Time Provider Elisabeth Chauhan 8/6/2020  9:30 AM RADHA Khan  
8/11/2020  9:30 AM VLADIMIR Mendes  
8/13/2020  9:30 AM Aisha Mari PTA [facility]

## 2020-08-06 ENCOUNTER — HOSPITAL ENCOUNTER (OUTPATIENT)
Dept: PHYSICAL THERAPY | Age: 70
Discharge: HOME | End: 2020-08-06
Payer: COMMERCIAL

## 2020-08-06 PROCEDURE — 97110 THERAPEUTIC EXERCISES: CPT

## 2020-08-06 NOTE — PROGRESS NOTES
Luis Carlos Melgar  : 1950  Payor: [facility] / Plan: [address] [facility] 30 [facility] / Product Type: Commerical /  [address]  at [facility]  [address], [address], [facility], [address] [facility]  [phone]   [phone]      OUTPATIENT PHYSICAL THERAPY: Daily Treatment Note 2020  Visit Count:  29    ICD-10: Treatment Diagnosis:   M54.5 Low Back Pain   R26.2 Difficulty in Walking, Not Elsewhere Classified    Precautions/Allergies:   Patient has no known allergies. TREATMENT PLAN:  Effective Dates:  Twice per week from 7/10/2020 until 2020 (8 weeks). Frequency/Duration: 3 times a week for 60 Days       PRE-TREATMENT SYMPTOMS/COMPLAINTS:  Pt states he is stretching some at home but not as consistent. Patient brought in his TENS unit for therapist to help him decide on parameters. MEDICATIONS REVIEWED:  2020   TREATMENT:   (In addition to Assessment/Re-Assessment sessions the following treatments were rendered)    THERAPEUTIC EXERCISE: (40 minutes):  Exercises per grid below  to improve mobility, strength and balance. Required minimal visual and verbal cues to promote proper body alignment and promote proper body posture. Progressed resistance, range and complexity of movement as indicated. Reviewed HEP and performed to provided cues for technique and prevent compensations.      Date:  20 Date:  20 Date:  2020   Date:  20   Date:     Activity/ExerciseAirdyne        Treadmill         12min Airdyne 5 minutes  Physiostep  X 8 minutes with moist heat to low back 10 minutes level 3 10 minutes level 3    Prone quad stretch    3 x 30 second hold B            Paloff press 2 x 10 reps B 7.5#       Paloff press with rotation 2 x 10 reps B 2.5       Banded foot taps 3-way for hip ER strength        Side ways walking with Red T-band X 2 laps each way x      Walking and marching holding weight        Lateral high step overs holding weight        TRX squats        Single leg RDL        Paloff press #55 x 20 reps x      HEP review with physioball        Hip capsule stretch 3 x 20 sec holds bilaterally 3 x 20 second hold with legs crossed and pushing knee out with hand with cues 3 x 20 sec holds bilaterally 3 x 30 second hold B    Monster walks Red x 4 laps       Piriformis stretch 3 x 20 sec holds 3 x 30 second hold with cues for correct technique  3 x 30 sec holds  3 x 30 second hold    Bridging alternating X 15 reps  X 15 reps 2 x 10 with 3 second hold    Lower Trunk Rotation in supine X 20 reps each way X 20 reps each way in comfortable range 20 reps/1 set each direction with cues for 2-3 second hold 10 x 10 second hold each direction     Active Hamstring stretch X 20 pumps Instructed in sitting on EOB with 4\" step and heel on step   3 x 30 second hold B X 30 pumps bilaterally X 30 pumps    Hip flexor and quad quinn stretch  manually Manually x 2 in side lying and then instructed in supine Quinn stretch with cues for no back pain with exercise  2 x 30 second hold      Squats with physioball on wall X 15       Quinn stretch off Table  5 x 10 sec holds      BOSU balance with vertical and horizontal UE movements and weighted ball  X 10 reps each way      Wobble board IV/EV  x      Wobble board DF/PF  x      [employer] IV/EV balance with weighted ball  x      Gastroc stretch 3x30 holds Standing at wall 3 x 30 second hold B      Deadlift with Matrix  x      Lateral squat with UE extension holding weighted ball  x      Trampoline ball toss        Deadlift HEP review with walking stick  x      Blue foam rotation with weighted ball X 15 reps each way maintaining balance           X 15 reps each way      Varying resistance at each LE in multiple different planes of motion   Per strength assessment in chart     Lumbar forward flexion stretches with exercise ball   30 second hold x 5 reps forward, then diagonally x 3 reps each direction     Prone press ups   10 reps/1 set with 15-30 second hold Prone on elbows 10 x 10 to 20 second hold      MANUAL THERAPY: (0 minutes):    NOT TODAY Joint mobilization, Soft tissue mobilization and Manipulation was utilized and necessary because of the patient's restricted joint motion, painful spasm, loss of articular motion and restricted motion of soft tissue. +PA L1-L5, B SI joints, Sacrum Grade III  + STM B lumbar paraspinals and QL  + trigger point to R QL  +B long axis distraction   + silicone cups for myofascial release and to increase blood flow to lumbar region-NOT TODAY 8/6/2020    (Used abbreviations: MET - muscle energy technique; PNF - proprioceptive neuromuscular facilitation; NMR - neuromuscular re-education; AP - anterior to posterior; PA - posterior to anterior)    EDUCATION: Patient brought a TENS unit to therapy that he got through his MD. Reviewed use of modulating program as it is already programmed. Patient understood and electrodes applied to patients low back prior to patient leaving. MODALITIES:        TREATMENT/SESSION ASSESSMENT:  Sally Morris  Patient does get relieve after therapy and he continues to do his stretches at home that help as well. RECOMMENDATIONS/INTENT FOR NEXT TREATMENT SESSION: \"Next visit will focus on advancements to more challenging activities\". Review stretching exercises.      PAIN: Initial: No number given  Stiff and tight Post Session: No number given     MedBridge Portal    Total Treatment Billable Duration:  40 minutes  PT Patient Time In/Time Out  Time In: 0930  Time Out: [address] Liane Wilder [facility]    Future Appointments   Date Time Provider Elisabeth Chauhan   8/27/2020  9:30 AM Brittny BAKER [facility] S[facility]

## 2020-08-11 ENCOUNTER — HOSPITAL ENCOUNTER (OUTPATIENT)
Dept: PHYSICAL THERAPY | Age: 70
Discharge: HOME | End: 2020-08-11
Payer: COMMERCIAL

## 2020-08-11 PROCEDURE — 97530 THERAPEUTIC ACTIVITIES: CPT

## 2020-08-11 NOTE — PROGRESS NOTES
Zulay Vani  : 1950  Payor: [facility] / Plan: [address] [facility] 30 [facility] / Product Type: Commerical /  [address]  at [facility]  [address], [address] [facility], [place] [address] [address] [facility]  [phone]   [phone]      OUTPATIENT PHYSICAL THERAPY: Daily Treatment Note 2020  Visit Count:  30    ICD-10: Treatment Diagnosis:   M54.5 Low Back Pain   R26.2 Difficulty in Walking, Not Elsewhere Classified    Precautions/Allergies:   Patient has no known allergies. TREATMENT PLAN:  Effective Dates:  Twice per week from 7/10/2020 until 2020 (8 weeks). Frequency/Duration: 3 times a week for 60 Days       PRE-TREATMENT SYMPTOMS/COMPLAINTS:  Pt states his progress has been up and down depending on what he does in any given day; states his he feels better after doing the stretches if he does them daily. MEDICATIONS REVIEWED:  2020   TREATMENT:   (In addition to Assessment/Re-Assessment sessions the following treatments were rendered)    THERAPEUTIC EXERCISE: (45 minutes):  Exercises per grid below (and assessment in chart on 2020) to improve mobility, strength and balance. Required minimal visual and verbal cues to promote proper body alignment and promote proper body posture. Progressed resistance, range and complexity of movement as indicated. Reviewed HEP and performed to provided cues for technique and prevent compensations.      Date:  20 Date:  20 Date:  2020   Date:  20 Date:     Activity/ExerciseAirdyne        Treadmill         12min Airdyne 5 minutes  Physiostep  X 8 minutes with moist heat to low back 10 minutes to increase blood flow and mobility Airdyne 5 minutes    Paloff press 2 x 10 reps B 7.5#       Paloff press with rotation 2 x 10 reps B 2.5       Banded foot taps 3-way for hip ER strength        Side ways walking with Red T-band X 2 laps each way x  Around counter top, 2lb Walking and marching holding weight    Walking fwd/bwd, 2lb    Lateral high step overs holding weight        TRX squats        Single leg RDL        Paloff press #55 x 20 reps x      HEP review with physioball        Hip capsule stretch 3 x 20 sec holds bilaterally 3 x 20 second hold with legs crossed and pushing knee out with hand with cues 3 x 20 sec holds bilaterally [id number]    Monster walks Red x 4 laps       Piriformis stretch 3 x 20 sec holds 3 x 30 second hold with cues for correct technique  3 x 30 sec holds  [id number] supine    Bridging alternating X 15 reps  X 15 reps     Lower Trunk Rotation in supine X 20 reps each way X 20 reps each way in comfortable range 20 reps/1 set each direction with cues for 2-3 second hold     Active Hamstring stretch X 20 pumps Instructed in sitting on EOB with 4\" step and heel on step   3 x 30 second hold B X 30 pumps bilaterally     Hip flexor and quad quinn stretch  manually Manually x 2 in side lying and then instructed in supine Quinn stretch with cues for no back pain with exercise  2 x 30 second hold      Squats with physioball on wall X 15       Quinn stretch off Table  5 x 10 sec holds      BOSU balance with vertical and horizontal UE movements and weighted ball  X 10 reps each way      Wobble board IV/EV  x      Wobble board DF/PF  x      [employer] IV/EV balance with weighted ball  x      Gastroc stretch 3x30 holds Standing at wall 3 x 30 second hold B      Deadlift with Matrix  x      Lateral squat with UE extension holding weighted ball  x      Trampoline ball toss        Deadlift HEP review with walking stick  x      Blue foam rotation with weighted ball X 15 reps each way maintaining balance           X 15 reps each way      Varying resistance at each LE in multiple different planes of motion   Per strength assessment in chart     Lumbar forward flexion stretches with exercise ball   30 second hold x 5 reps forward, then diagonally x 3 reps each direction Prone press ups   10 reps/1 set with 15-30 second hold     Pelvic tilt    Supine, standing, walking      MANUAL THERAPY: (0 minutes):    NOT TODAY Joint mobilization, Soft tissue mobilization and Manipulation was utilized and necessary because of the patient's restricted joint motion, painful spasm, loss of articular motion and restricted motion of soft tissue. +PA L1-L5, B SI joints, Sacrum Grade III  + STM B lumbar paraspinals and QL  + trigger point to R QL  +B long axis distraction   + silicone cups for myofascial release and to increase blood flow to lumbar region-NOT TODAY 8/11/2020    (Used abbreviations: MET - muscle energy technique; PNF - proprioceptive neuromuscular facilitation; NMR - neuromuscular re-education; AP - anterior to posterior; PA - posterior to anterior)    MODALITIES:      Therapeutic Activity (    38min): Therapeutic activities per grid below to improve mobility, strength, balance and coordination. Required maximal verbal cues to promote static and dynamic balance in standing, promote coordination of bilateral, lower extremity(s) and promote motor control of bilateral, lower extremity(s). RECOMMENDATIONS/INTENT FOR NEXT TREATMENT SESSION: \"Next visit will focus on advancements to more challenging activities\". Review stretching exercises.      PAIN: Initial: No number given  Stiff and tight Post Session: No number given     MedBridge Portal    Total Treatment Billable Duration:  40 minutes     Tree Justice DPT    Future Appointments   Date Time Provider Elisabeth Chauhan   8/13/2020  7:00 PM Lazara [facility]   8/14/2020  8:50 AM [address] THRU LAKISHA LUNA   8/18/2020  2:30 PM THEODORA PHONE APPOINTMENT KYREE YIP OR PRE A

## 2020-08-13 ENCOUNTER — HOSPITAL ENCOUNTER (OUTPATIENT)
Dept: PHYSICAL THERAPY | Age: 70
Discharge: HOME | End: 2020-08-13
Payer: COMMERCIAL

## 2020-08-13 NOTE — PROGRESS NOTES
Patient cancelled appointment for today. No reason given. Will continue with treatment next visit.   Gretchen Blizzard, PTA

## 2020-08-18 ENCOUNTER — HOSPITAL ENCOUNTER (OUTPATIENT)
Dept: PHYSICAL THERAPY | Age: 70
Discharge: HOME | End: 2020-08-18
Payer: COMMERCIAL

## 2020-08-18 PROCEDURE — 97014 ELECTRIC STIMULATION THERAPY: CPT

## 2020-08-18 PROCEDURE — 97140 MANUAL THERAPY 1/> REGIONS: CPT

## 2020-08-18 RX ORDER — SODIUM CHLORIDE 0.9 % (FLUSH) 0.9 %
5-40 SYRINGE (ML) INJECTION EVERY 8 HOURS
Status: CANCELLED | OUTPATIENT
Start: 2020-08-18

## 2020-08-18 RX ORDER — SODIUM CHLORIDE 0.9 % (FLUSH) 0.9 %
5-40 SYRINGE (ML) INJECTION AS NEEDED
Status: CANCELLED | OUTPATIENT
Start: 2020-08-18

## 2020-08-18 NOTE — PROGRESS NOTES
Luis Carlos Melgar  : 1950  Payor: [facility] / Plan: [address] [facility] 30 [facility] / Product Type: Commerical /  [address] [place]  at [facility]  [address] [facility], [place] [address] [address] [facility]  [phone]   [phone]      OUTPATIENT PHYSICAL THERAPY: Daily Treatment Note 2020  Visit Count:  31    ICD-10: Treatment Diagnosis:   M54.5 Low Back Pain   R26.2 Difficulty in Walking, Not Elsewhere Classified    Precautions/Allergies:   Patient has no known allergies. TREATMENT PLAN:  Effective Dates:  Twice per week from 7/10/2020 until 2020 (8 weeks). Frequency/Duration: 3 times a week for 60 Days       PRE-TREATMENT SYMPTOMS/COMPLAINTS:  Pt possibly returning to work. Pt still having difficulty with prolonged sitting but feels overall things are improving. MEDICATIONS REVIEWED:  2020   TREATMENT:   (In addition to Assessment/Re-Assessment sessions the following treatments were rendered)    THERAPEUTIC EXERCISE: (0 minutes):  Exercises per grid below (and assessment in chart on 2020) to improve mobility, strength and balance. Required minimal visual and verbal cues to promote proper body alignment and promote proper body posture. Progressed resistance, range and complexity of movement as indicated. Reviewed HEP and performed to provided cues for technique and prevent compensations.      Date:  20 Date:  20 Date:  2020   Date:  20 Date:     Activity/ExerciseAirdyne        Treadmill         12min Airdyne 5 minutes  Physiostep  X 8 minutes with moist heat to low back 10 minutes to increase blood flow and mobility Airdyne 5 minutes    Paloff press 2 x 10 reps B 7.5#       Paloff press with rotation 2 x 10 reps B 2.5       Banded foot taps 3-way for hip ER strength        Side ways walking with Red T-band X 2 laps each way x  Around counter top, 2lb     Walking and marching holding weight    Walking fwd/bwd, 2lb    Lateral high step overs holding weight        TRX squats        Single leg RDL        Paloff press #55 x 20 reps x      HEP review with physioball        Hip capsule stretch 3 x 20 sec holds bilaterally 3 x 20 second hold with legs crossed and pushing knee out with hand with cues 3 x 20 sec holds bilaterally [id number]    Monster walks Red x 4 laps       Piriformis stretch 3 x 20 sec holds 3 x 30 second hold with cues for correct technique  3 x 30 sec holds  [id number] supine    Bridging alternating X 15 reps  X 15 reps     Lower Trunk Rotation in supine X 20 reps each way X 20 reps each way in comfortable range 20 reps/1 set each direction with cues for 2-3 second hold     Active Hamstring stretch X 20 pumps Instructed in sitting on EOB with 4\" step and heel on step   3 x 30 second hold B X 30 pumps bilaterally     Hip flexor and quad quinn stretch  manually Manually x 2 in side lying and then instructed in supine Quinn stretch with cues for no back pain with exercise  2 x 30 second hold      Squats with physioball on wall X 15       Quinn stretch off Table  5 x 10 sec holds      BOSU balance with vertical and horizontal UE movements and weighted ball  X 10 reps each way      Wobble board IV/EV  x      Wobble board DF/PF  x      [employer] IV/EV balance with weighted ball  x      Gastroc stretch 3x30 holds Standing at wall 3 x 30 second hold B      Deadlift with Matrix  x      Lateral squat with UE extension holding weighted ball  x      Trampoline ball toss        Deadlift HEP review with walking stick  x      Blue foam rotation with weighted ball X 15 reps each way maintaining balance           X 15 reps each way      Varying resistance at each LE in multiple different planes of motion   Per strength assessment in chart     Lumbar forward flexion stretches with exercise ball   30 second hold x 5 reps forward, then diagonally x 3 reps each direction     Prone press ups   10 reps/1 set with 15-30 second hold     Pelvic tilt    Supine, standing, walking      MANUAL THERAPY: (40 minutes):   Joint mobilization, Soft tissue mobilization and Manipulation was utilized and necessary because of the patient's restricted joint motion, painful spasm, loss of articular motion and restricted motion of soft tissue. +PA L1-L5, B SI joints, Sacrum Grade III  + STM B lumbar paraspinals and QL      (Used abbreviations: MET - muscle energy technique; PNF - proprioceptive neuromuscular facilitation; NMR - neuromuscular re-education; AP - anterior to posterior; PA - posterior to anterior)    MODALITIES: (15 minutes)  Estim level 26 with MHP for pain relief and promote mm relaxation      Assessment: Pt tolerated session well today. Progressed HEP NEEL to prone on hands. Educated pt on glute/piriformis stretch as well as modification for hip flexor stretch. RECOMMENDATIONS/INTENT FOR NEXT TREATMENT SESSION: \"Next visit will focus on advancements to more challenging activities\". Review stretching exercises.      PAIN: Initial: 5/10 Post Session: 4/10     [employer] Portal    Total Treatment Billable Duration:  60 minutes  PT Patient Time In/Time Out  Time In: 0935  Time Out: [address] Pavan Simms, PT, DPT    Future Appointments   Date Time Provider Elisabeth Chauhan   8/18/2020  2:30 PM [facility] PHONE APPOINTMENT KYREE YIP OR PRE A   8/20/2020  9:30 AM Davis RAMÍREZ [facility]        Visit Approval Visit # Therapist initials Date A NS Cx Comments    31 SHAISTA 6/24/15 [x]  [] [] Recert/progress note on 7/27 - visit 4 today       [] [] []        [] [] []        [] [] []        [] [] []        [] [] []        [] [] []        [] [] []        [] [] []        [] [] []        [] [] []        [] [] []        [] [] []        [] [] []        [] [] []        [] [] []        [] [] []        [] [] []

## 2020-08-20 ENCOUNTER — HOSPITAL ENCOUNTER (OUTPATIENT)
Dept: PHYSICAL THERAPY | Age: 70
Discharge: HOME | End: 2020-08-20
Payer: COMMERCIAL

## 2020-08-20 PROCEDURE — 97140 MANUAL THERAPY 1/> REGIONS: CPT

## 2020-08-20 PROCEDURE — 97014 ELECTRIC STIMULATION THERAPY: CPT

## 2020-08-20 NOTE — PROGRESS NOTES
Alli Pérez  : 1950  Payor: [facility] / Plan: [address] [facility] 30 [facility] / Product Type: Commlailal /  [address]  at [facility]  [address] [facility], [place] [address] [address] [facility]  [phone]   [phone]      OUTPATIENT PHYSICAL THERAPY: Daily Treatment Note 2020  Visit Count:  32    ICD-10: Treatment Diagnosis:   M54.5 Low Back Pain   R26.2 Difficulty in Walking, Not Elsewhere Classified    Precautions/Allergies:   Patient has no known allergies. TREATMENT PLAN:  Effective Dates:  Twice per week from 7/10/2020 until 2020 (8 weeks). Frequency/Duration: 2-3 times a week for 60 Days       PRE-TREATMENT SYMPTOMS/COMPLAINTS:  Pt reports his back is doing \"okay\" today. MEDICATIONS REVIEWED:  2020   TREATMENT:   (In addition to Assessment/Re-Assessment sessions the following treatments were rendered)    THERAPEUTIC EXERCISE: (0 minutes):  Exercises per grid below (and assessment in chart on 2020) to improve mobility, strength and balance. Required minimal visual and verbal cues to promote proper body alignment and promote proper body posture. Progressed resistance, range and complexity of movement as indicated. Reviewed HEP and performed to provided cues for technique and prevent compensations.      Date:  20 Date:  20 Date:  2020   Date:  20 Date:     Activity/ExerciseAirdyne        Treadmill         12min Airdyne 5 minutes  Physiostep  X 8 minutes with moist heat to low back 10 minutes to increase blood flow and mobility Airdyne 5 minutes    Paloff press 2 x 10 reps B 7.5#       Paloff press with rotation 2 x 10 reps B 2.5       Banded foot taps 3-way for hip ER strength        Side ways walking with Red T-band X 2 laps each way x  Around counter top, 2lb     Walking and marching holding weight    Walking fwd/bwd, 2lb    Lateral high step overs holding weight        TRX squats Single leg RDL        Paloff press #55 x 20 reps x      HEP review with physioball        Hip capsule stretch 3 x 20 sec holds bilaterally 3 x 20 second hold with legs crossed and pushing knee out with hand with cues 3 x 20 sec holds bilaterally [id number]    Monster walks Red x 4 laps       Piriformis stretch 3 x 20 sec holds 3 x 30 second hold with cues for correct technique  3 x 30 sec holds  [id number] supine    Bridging alternating X 15 reps  X 15 reps     Lower Trunk Rotation in supine X 20 reps each way X 20 reps each way in comfortable range 20 reps/1 set each direction with cues for 2-3 second hold     Active Hamstring stretch X 20 pumps Instructed in sitting on EOB with 4\" step and heel on step   3 x 30 second hold B X 30 pumps bilaterally     Hip flexor and quad quinn stretch  manually Manually x 2 in side lying and then instructed in supine Quinn stretch with cues for no back pain with exercise  2 x 30 second hold      Squats with physioball on wall X 15       Quinn stretch off Table  5 x 10 sec holds      BOSU balance with vertical and horizontal UE movements and weighted ball  X 10 reps each way      Wobble board IV/EV  x      Wobble board DF/PF  x      [employer] IV/EV balance with weighted ball  x      Gastroc stretch 3x30 holds Standing at wall 3 x 30 second hold B      Deadlift with Matrix  x      Lateral squat with UE extension holding weighted ball  x      Trampoline ball toss        Deadlift HEP review with walking stick  x      Blue foam rotation with weighted ball X 15 reps each way maintaining balance           X 15 reps each way      Varying resistance at each LE in multiple different planes of motion   Per strength assessment in chart     Lumbar forward flexion stretches with exercise ball   30 second hold x 5 reps forward, then diagonally x 3 reps each direction     Prone press ups   10 reps/1 set with 15-30 second hold     Pelvic tilt    Supine, standing, walking      MANUAL THERAPY: (45 minutes):   Joint mobilization, Soft tissue mobilization and Manipulation was utilized and necessary because of the patient's restricted joint motion, painful spasm, loss of articular motion and restricted motion of soft tissue. +PA L1-L5, B SI joints, Sacrum Grade III  + STM B lumbar paraspinals and QL  +Manual stretching B HS, glutes, piriformis, hip flexor, quads      (Used abbreviations: MET - muscle energy technique; PNF - proprioceptive neuromuscular facilitation; NMR - neuromuscular re-education; AP - anterior to posterior; PA - posterior to anterior)    MODALITIES: (15 minutes)  Estim level 25 with MHP for pain relief and promote mm relaxation      Assessment: Pt continues with hypomobility noted throughout l/s segments with reproduction of pain with CPA L2-L4. Reviewed prone on hands for progression on HEP for improved lumbar extension ROM. RECOMMENDATIONS/INTENT FOR NEXT TREATMENT SESSION: \"Next visit will focus on advancements to more challenging activities\". Review stretching exercises.      PAIN: Initial: 3/10 Post Session:2 /10     [employer] Portal    Total Treatment Billable Duration:  60 minutes  PT Patient Time In/Time Out  Time In: 0930  Time Out: 56  Amanda De Leon, PT, DPT    Future Appointments   Date Time Provider Elisabeth Chauhan   8/27/2020  9:30 AM [facility]        Visit Approval Visit # Therapist initials Date A NS Cx Comments    31 SHAISTA 1/86/99 [x]  [] [] Recert/progress note on 7/27 - visit 4 today    32 SHAISTA 8/20/20 [x] [] []        [] [] []        [] [] []        [] [] []        [] [] []        [] [] []        [] [] []        [] [] []        [] [] []        [] [] []        [] [] []        [] [] []        [] [] []        [] [] []        [] [] []        [] [] []        [] [] []

## 2020-08-24 ENCOUNTER — ANESTHESIA EVENT (OUTPATIENT)
Dept: SURGERY | Age: 70
End: 2020-08-24
Payer: COMMERCIAL

## 2020-08-24 NOTE — PERIOP NOTES
Patient verified name and     Order for consent WAS found in EHR and matches case posting; patient verified. Type 1A surgery, PHONE assessment complete. Labs per surgeon: NONE  Labs per anesthesia protocol: NONE  EK IN CC--    Patient aware that a negative Covid swab result is required to proceed with surgery;  appointments are made by the surgeon office and test should be collected 7 days prior to surgery. The testing center is located at the [address] [place] [address] -- LISTED AS [facility] approved surgical skin cleanser and instructions given per hospital policy. Patient provided with and instructed on educational handouts including Guide to Surgery, Pain Management, Hand Hygiene, Blood Transfusion Education, and [facility] Brochure. Patient answered medical/surgical history questions at their best of ability. All prior to admission medications documented in [facility]. Original medication prescription bottle WAS NOT visualized during patient appointment. Patient instructed to hold all vitamins 7 days prior to surgery and NSAIDS 5 days prior to surgery, patient verbalized understanding. Patient teach back successful and patient demonstrates knowledge of instructions.

## 2020-08-24 NOTE — H&P
Date of Service: 2020-08-07  Work Status:  ????? Allergies:????? Medications:Atrovent; Flonase;Linzess (145 MCG); Lunesta (3 MG);Meloxicam (15 MG); Omeprazole;TraMADol HCl (50 MG);Voltaren (1 %, Apply 2 gram(s) transdermal 4 times a day as needed [PRN])    CC: Pain and numbness of the hands    HPI: Patient is a 36-year-old man that is 5-1/2 weeks post left CTR done on 6/30/20. He still complains of various pains around the hand dominant around the thenar muscles and also has some tenderness at the A1 pulley at the base of his thumb but has had no triggering there. He also has a little bit of tingling feeling in the very tip of his thumb that comes and goes somewhat. He is still having a lot of problems with his right hand which is wakening more at night and causing him considerable irritation. He would like to go ahead and schedule surgery on it in the near future. PE: The patient's surgical scar looks good and is maturing well. He has good movement of the wrist as well as the thumb and fingers with no triggering or catching. Disposition the progress was discussed with the patient is to continue with use of the hand as tolerated on the left and the go ahead and be scheduled for the right carpal tunnel release to be done in the next 2 or 3 weeks as an outpatient.

## 2020-08-25 ENCOUNTER — HOSPITAL ENCOUNTER (OUTPATIENT)
Age: 70
Setting detail: OUTPATIENT SURGERY
Discharge: HOME | End: 2020-08-25
Attending: ORTHOPAEDIC SURGERY | Admitting: ORTHOPAEDIC SURGERY
Payer: COMMERCIAL

## 2020-08-25 ENCOUNTER — ANESTHESIA (OUTPATIENT)
Dept: SURGERY | Age: 70
End: 2020-08-25
Payer: COMMERCIAL

## 2020-08-25 VITALS
HEART RATE: 68 BPM | OXYGEN SATURATION: 99 % | WEIGHT: 172 LBS | SYSTOLIC BLOOD PRESSURE: 158 MMHG | TEMPERATURE: 97.5 F | DIASTOLIC BLOOD PRESSURE: 78 MMHG | BODY MASS INDEX: 24.68 KG/M2 | RESPIRATION RATE: 16 BRPM

## 2020-08-25 DIAGNOSIS — G89.18 POST-OP PAIN: Primary | ICD-10-CM

## 2020-08-25 PROCEDURE — 77030010507 HC ADH SKN DERMBND J&J -B: Performed by: ORTHOPAEDIC SURGERY

## 2020-08-25 PROCEDURE — 76060000032 HC ANESTHESIA 0.5 TO 1 HR: Performed by: ORTHOPAEDIC SURGERY

## 2020-08-25 PROCEDURE — 74011250636 HC RX REV CODE- 250/636: Performed by: ANESTHESIOLOGY

## 2020-08-25 PROCEDURE — 76210000020 HC REC RM PH II FIRST 0.5 HR: Performed by: ORTHOPAEDIC SURGERY

## 2020-08-25 PROCEDURE — 77030031139 HC SUT VCRL2 J&J -A: Performed by: ORTHOPAEDIC SURGERY

## 2020-08-25 PROCEDURE — 76210000063 HC OR PH I REC FIRST 0.5 HR: Performed by: ORTHOPAEDIC SURGERY

## 2020-08-25 PROCEDURE — 77030006605 HC BLD CRPL IN BIOM -C: Performed by: ORTHOPAEDIC SURGERY

## 2020-08-25 PROCEDURE — 76010000138 HC OR TIME 0.5 TO 1 HR: Performed by: ORTHOPAEDIC SURGERY

## 2020-08-25 PROCEDURE — 77030000032 HC CUF TRNQT ZIMM -B: Performed by: ORTHOPAEDIC SURGERY

## 2020-08-25 PROCEDURE — 74011250636 HC RX REV CODE- 250/636: Performed by: NURSE ANESTHETIST, CERTIFIED REGISTERED

## 2020-08-25 PROCEDURE — 74011250637 HC RX REV CODE- 250/637: Performed by: ANESTHESIOLOGY

## 2020-08-25 PROCEDURE — 74011000250 HC RX REV CODE- 250: Performed by: ORTHOPAEDIC SURGERY

## 2020-08-25 RX ORDER — PROPOFOL 10 MG/ML
INJECTION, EMULSION INTRAVENOUS AS NEEDED
Status: DISCONTINUED | OUTPATIENT
Start: 2020-08-25 | End: 2020-08-25 | Stop reason: HOSPADM

## 2020-08-25 RX ORDER — SODIUM CHLORIDE 0.9 % (FLUSH) 0.9 %
5-40 SYRINGE (ML) INJECTION AS NEEDED
Status: DISCONTINUED | OUTPATIENT
Start: 2020-08-25 | End: 2020-08-25 | Stop reason: HOSPADM

## 2020-08-25 RX ORDER — FENTANYL CITRATE 50 UG/ML
INJECTION, SOLUTION INTRAMUSCULAR; INTRAVENOUS AS NEEDED
Status: DISCONTINUED | OUTPATIENT
Start: 2020-08-25 | End: 2020-08-25 | Stop reason: HOSPADM

## 2020-08-25 RX ORDER — ONDANSETRON 2 MG/ML
INJECTION INTRAMUSCULAR; INTRAVENOUS AS NEEDED
Status: DISCONTINUED | OUTPATIENT
Start: 2020-08-25 | End: 2020-08-25 | Stop reason: HOSPADM

## 2020-08-25 RX ORDER — OXYCODONE HYDROCHLORIDE 5 MG/1
5 TABLET ORAL
Status: DISCONTINUED | OUTPATIENT
Start: 2020-08-25 | End: 2020-08-25 | Stop reason: HOSPADM

## 2020-08-25 RX ORDER — ALBUTEROL SULFATE 0.83 MG/ML
2.5 SOLUTION RESPIRATORY (INHALATION) AS NEEDED
Status: DISCONTINUED | OUTPATIENT
Start: 2020-08-25 | End: 2020-08-25 | Stop reason: HOSPADM

## 2020-08-25 RX ORDER — SODIUM CHLORIDE 0.9 % (FLUSH) 0.9 %
5-40 SYRINGE (ML) INJECTION EVERY 8 HOURS
Status: DISCONTINUED | OUTPATIENT
Start: 2020-08-25 | End: 2020-08-25 | Stop reason: HOSPADM

## 2020-08-25 RX ORDER — LIDOCAINE HYDROCHLORIDE AND EPINEPHRINE 10; 10 MG/ML; UG/ML
INJECTION, SOLUTION INFILTRATION; PERINEURAL AS NEEDED
Status: DISCONTINUED | OUTPATIENT
Start: 2020-08-25 | End: 2020-08-25 | Stop reason: HOSPADM

## 2020-08-25 RX ORDER — PROPOFOL 10 MG/ML
INJECTION, EMULSION INTRAVENOUS
Status: DISCONTINUED | OUTPATIENT
Start: 2020-08-25 | End: 2020-08-25 | Stop reason: HOSPADM

## 2020-08-25 RX ORDER — LIDOCAINE HYDROCHLORIDE 10 MG/ML
0.1 INJECTION INFILTRATION; PERINEURAL AS NEEDED
Status: DISCONTINUED | OUTPATIENT
Start: 2020-08-25 | End: 2020-08-25 | Stop reason: HOSPADM

## 2020-08-25 RX ORDER — SODIUM CHLORIDE, SODIUM LACTATE, POTASSIUM CHLORIDE, CALCIUM CHLORIDE 600; 310; 30; 20 MG/100ML; MG/100ML; MG/100ML; MG/100ML
1000 INJECTION, SOLUTION INTRAVENOUS CONTINUOUS
Status: DISCONTINUED | OUTPATIENT
Start: 2020-08-25 | End: 2020-08-25 | Stop reason: HOSPADM

## 2020-08-25 RX ORDER — ACETAMINOPHEN 500 MG
1000 TABLET ORAL ONCE
Status: COMPLETED | OUTPATIENT
Start: 2020-08-25 | End: 2020-08-25

## 2020-08-25 RX ORDER — ONDANSETRON 2 MG/ML
4 INJECTION INTRAMUSCULAR; INTRAVENOUS
Status: DISCONTINUED | OUTPATIENT
Start: 2020-08-25 | End: 2020-08-25 | Stop reason: HOSPADM

## 2020-08-25 RX ORDER — MIDAZOLAM HYDROCHLORIDE 1 MG/ML
2 INJECTION, SOLUTION INTRAMUSCULAR; INTRAVENOUS
Status: COMPLETED | OUTPATIENT
Start: 2020-08-25 | End: 2020-08-25

## 2020-08-25 RX ORDER — HYDROMORPHONE HYDROCHLORIDE 2 MG/ML
0.5 INJECTION, SOLUTION INTRAMUSCULAR; INTRAVENOUS; SUBCUTANEOUS
Status: DISCONTINUED | OUTPATIENT
Start: 2020-08-25 | End: 2020-08-25 | Stop reason: HOSPADM

## 2020-08-25 RX ORDER — HYDROCODONE BITARTRATE AND ACETAMINOPHEN 7.5; 325 MG/1; MG/1
1 TABLET ORAL
Qty: 20 TAB | Refills: 0 | Status: SHIPPED | OUTPATIENT
Start: 2020-08-25 | End: 2020-08-30

## 2020-08-25 RX ADMIN — ONDANSETRON 4 MG: 2 INJECTION INTRAMUSCULAR; INTRAVENOUS at 09:02

## 2020-08-25 RX ADMIN — SODIUM CHLORIDE, SODIUM LACTATE, POTASSIUM CHLORIDE, AND CALCIUM CHLORIDE 1000 ML: 600; 310; 30; 20 INJECTION, SOLUTION INTRAVENOUS at 07:30

## 2020-08-25 RX ADMIN — PROPOFOL 40 MG: 10 INJECTION, EMULSION INTRAVENOUS at 08:49

## 2020-08-25 RX ADMIN — ACETAMINOPHEN 1000 MG: 500 TABLET, FILM COATED ORAL at 08:18

## 2020-08-25 RX ADMIN — FENTANYL CITRATE 25 MCG: 50 INJECTION INTRAMUSCULAR; INTRAVENOUS at 09:00

## 2020-08-25 RX ADMIN — PROPOFOL 120 MCG/KG/MIN: 10 INJECTION, EMULSION INTRAVENOUS at 08:50

## 2020-08-25 RX ADMIN — MIDAZOLAM 2 MG: 1 INJECTION INTRAMUSCULAR; INTRAVENOUS at 08:21

## 2020-08-25 NOTE — INTERVAL H&P NOTE
Update History & Physical    The Patient's History and Physical of August 7, 2020,   was reviewed with the patient and I examined the patient. There was no change. The surgical site was confirmed by the patient and me. Pt is alert and oriented. Chest: Clear w/o SOB. C/V:  RR&R    Plan:  The risk, benefits, expected outcome, and alternative to the recommended procedure have been discussed with the patient. Patient understands and wants to proceed with a right carpal tunnel release.     Electronically signed by George Fagan MD on 8/25/2020 at 7:47 AM

## 2020-08-25 NOTE — ANESTHESIA POSTPROCEDURE EVALUATION
Procedure(s):  RIGHT WRIST CARPAL TUNNEL RELEASE.    regional    Anesthesia Post Evaluation      Multimodal analgesia: multimodal analgesia used between 6 hours prior to anesthesia start to PACU discharge  Patient location during evaluation: bedside  Patient participation: complete - patient participated  Level of consciousness: awake and responsive to light touch  Pain management: adequate  Airway patency: patent  Anesthetic complications: no  Cardiovascular status: acceptable, hemodynamically stable, blood pressure returned to baseline and stable  Respiratory status: acceptable, unassisted, spontaneous ventilation and nonlabored ventilation  Hydration status: acceptable        INITIAL Post-op Vital signs:   Vitals Value Taken Time   /81 8/25/2020  9:42 AM   Temp 36.4 °C (97.5 °F) 8/25/2020  9:23 AM   Pulse 64 8/25/2020  9:43 AM   Resp 12 8/25/2020  9:23 AM   SpO2 100 % 8/25/2020  9:43 AM   Vitals shown include unvalidated device data.

## 2020-08-25 NOTE — DISCHARGE INSTRUCTIONS
POST OP INSTRUCTIONS      1. Patient has appointment for 9/11/20 at8:40 AM at the [facility]. 2.  For problems call Dr Cristopher Gonsalez, [address] [facility],  [phone]          Appointments only,  [phone]    3. Ice and elevate the surgical site. 4.  May remove dressings and wash in running water or shower. Then cover the incision with Band-aids. Do not submerge in bath or dish water. DIET  · Clear liquids until no nausea or vomiting; then light diet for the first day. · Advance to regular diet on second day, unless your doctor orders otherwise. · If nausea and vomiting continues, call your doctor. PAIN  · Take pain medication as directed by your doctor. · Call your doctor if pain is NOT relieved by medication. CALL YOUR DOCTOR IF   · Excessive bleeding that does not stop after holding pressure over the area  · Temperature of 101 degrees F or above  · Excessive redness, swelling or bruising, and/ or green or yellow, smelly discharge from incision    AFTER ANESTHESIA   · For the first 24 hours: DO NOT Drive, Drink alcoholic beverages, or Make important decisions. · Be aware of dizziness following anesthesia and while taking pain medication. YOUR DOCTOR'S PHONE NUMBER [phone]      DISCHARGE SUMMARY from Nurse    PATIENT INSTRUCTIONS:    After general anesthesia or intravenous sedation, for 24 hours or while taking prescription Narcotics:  · Limit your activities  · Do not drive and operate hazardous machinery  · Do not make important personal or business decisions  · Do  not drink alcoholic beverages  · If you have not urinated within 8 hours after discharge, please contact your surgeon on call. *  Please give a list of your current medications to your Primary Care Provider. *  Please update this list whenever your medications are discontinued, doses are      changed, or new medications (including over-the-counter products) are added.     *  Please carry medication information at all times in case of emergency situations. These are general instructions for a healthy lifestyle:    No smoking/ No tobacco products/ Avoid exposure to second hand smoke    Surgeon General's Warning:  Quitting smoking now greatly reduces serious risk to your health. Obesity, smoking, and sedentary lifestyle greatly increases your risk for illness    A healthy diet, regular physical exercise & weight monitoring are important for maintaining a healthy lifestyle    You may be retaining fluid if you have a history of heart failure or if you experience any of the following symptoms:  Weight gain of 3 pounds or more overnight or 5 pounds in a week, increased swelling in our hands or feet or shortness of breath while lying flat in bed. Please call your doctor as soon as you notice any of these symptoms; do not wait until your next office visit. Recognize signs and symptoms of STROKE:    F-face looks uneven    A-arms unable to move or move unevenly    S-speech slurred or non-existent    T-time-call 911 as soon as signs and symptoms begin-DO NOT go       Back to bed or wait to see if you get better-TIME IS BRAIN. Advance Care Planning  People with COVID-19 may have no symptoms, mild symptoms, such as fever, cough, and shortness of breath or they may have more severe illness, developing severe and fatal pneumonia. As a result, Advance Care Planning with attention to naming a health care decision maker (someone you trust to make healthcare decisions for you if you could not speak for yourself) and sharing other health care preferences is important BEFORE a possible health crisis. Please contact your Primary Care Provider to discuss Advance Care Planning.      Preventing the Spread of Coronavirus Disease 2019 in Homes and Residential Communities  For the most recent information go to [employer]    Prevention steps for People with confirmed or suspected COVID-19 (including persons under investigation) who do not need to be hospitalized  and   People with confirmed COVID-19 who were hospitalized and determined to be medically stable to go home    Your healthcare provider and public health staff will evaluate whether you can be cared for at home. If it is determined that you do not need to be hospitalized and can be isolated at home, you will be monitored by staff from your local or state health department. You should follow the prevention steps below until a healthcare provider or local or state health department says you can return to your normal activities. Stay home except to get medical care  People who are mildly ill with COVID-19 are able to isolate at home during their illness. You should restrict activities outside your home, except for getting medical care. Do not go to work, school, or public areas. Avoid using public transportation, ride-sharing, or taxis. Separate yourself from other people and animals in your home  People: As much as possible, you should stay in a specific room and away from other people in your home. Also, you should use a separate bathroom, if available. Animals: You should restrict contact with pets and other animals while you are sick with COVID-19, just like you would around other people. Although there have not been reports of pets or other animals becoming sick with COVID-19, it is still recommended that people sick with COVID-19 limit contact with animals until more information is known about the virus. When possible, have another member of your household care for your animals while you are sick. If you are sick with COVID-19, avoid contact with your pet, including petting, snuggling, being kissed or licked, and sharing food. If you must care for your pet or be around animals while you are sick, wash your hands before and after you interact with pets and wear a facemask.   Call ahead before visiting your doctor  If you have a medical appointment, call the healthcare provider and tell them that you have or may have COVID-19. This will help the healthcare providers office take steps to keep other people from getting infected or exposed. Wear a facemask  You should wear a facemask when you are around other people (e.g., sharing a room or vehicle) or pets and before you enter a healthcare providers office. If you are not able to wear a facemask (for example, because it causes trouble breathing), then people who live with you should not stay in the same room with you, or they should wear a facemask if they enter your room. Cover your coughs and sneezes  Cover your mouth and nose with a tissue when you cough or sneeze. Throw used tissues in a lined trash can. Immediately wash your hands with soap and water for at least 20 seconds or, if soap and water are not available, clean your hands with an alcohol-based hand  that contains at least 60% alcohol. Clean your hands often  Wash your hands often with soap and water for at least 20 seconds, especially after blowing your nose, coughing, or sneezing; going to the bathroom; and before eating or preparing food. If soap and water are not readily available, use an alcohol-based hand  with at least 60% alcohol, covering all surfaces of your hands and rubbing them together until they feel dry. Soap and water are the best option if hands are visibly dirty. Avoid touching your eyes, nose, and mouth with unwashed hands. Avoid sharing personal household items  You should not share dishes, drinking glasses, cups, eating utensils, towels, or bedding with other people or pets in your home. After using these items, they should be washed thoroughly with soap and water. Clean all high-touch surfaces everyday  High touch surfaces include counters, tabletops, doorknobs, bathroom fixtures, toilets, phones, keyboards, tablets, and bedside tables.  Also, clean any surfaces that may have blood, stool, or body fluids on them. Use a household cleaning spray or wipe, according to the label instructions. Labels contain instructions for safe and effective use of the cleaning product including precautions you should take when applying the product, such as wearing gloves and making sure you have good ventilation during use of the product. Monitor your symptoms  Seek prompt medical attention if your illness is worsening (e.g., difficulty breathing). Before seeking care, call your healthcare provider and tell them that you have, or are being evaluated for, COVID-19. Put on a facemask before you enter the facility. These steps will help the healthcare providers office to keep other people in the office or waiting room from getting infected or exposed. Ask your healthcare provider to call the local or state health department. Persons who are placed under active monitoring or facilitated self-monitoring should follow instructions provided by their local health department or occupational health professionals, as appropriate. When working with your local health department check their available hours. If you have a medical emergency and need to call 911, notify the dispatch personnel that you have, or are being evaluated for COVID-19. If possible, put on a facemask before emergency medical services arrive. Discontinuing home isolation  Patients with confirmed COVID-19 should remain under home isolation precautions until the risk of secondary transmission to others is thought to be low. The decision to discontinue home isolation precautions should be made on a case-by-case basis, in consultation with healthcare providers and state and local health departments.

## 2020-08-25 NOTE — ANESTHESIA PREPROCEDURE EVALUATION
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            Physical Exam    Airway  Mallampati: II  TM Distance: 4 - 6 cm  Neck ROM: decreased range of motion   Mouth opening: Normal     Cardiovascular    Rhythm: regular  Rate: normal         Dental  No notable dental hx       Pulmonary  Breath sounds clear to auscultation               Abdominal  GI exam deferred       Other Findings            Anesthetic Plan    ASA: 2  Anesthesia type: regional - [place] block          Induction: Intravenous  Anesthetic plan and risks discussed with: Patient

## 2020-08-25 NOTE — OP NOTES
Carpal Tunnel Release    Isiah Rocha     8/25/2020    Indications: This is a 72 y.o. male who presents with right carpal tunnel syndrome. The patient was admitted for surgery as conservative measures have failed. Pre-operative Diagnosis:  RIGHT CARPAL TUNNEL SYNDROME    Post-operative Diagnosis:  RIGHT CARPAL TUNNEL SYNDROME    Procedure:   RIGHT CARPAL TUNNEL RELEASE    Surgeon: Timbo Quintanilla MD    Anesthesia:   IV Regional    Procedure/Operative Note:  Appropriate informed consent was obtained previously in the office. A [place] block was given in the  right upper extremity. THe extremity was prepped and draped as a sterile field. A timeout was completed identifying the patient, procedure site and surgeon. Once confirmed by the operative team we proceeded. A slightly curved longitudinal incision was then made in the  right proximal palm. Dissection was carried down to the subcutaneous tissue. A small self retaining retractor was inserted and the palmar fascia opened with the tip of the scalpel. The transverse ligament was incised over 5-6 mm at the distal margin and the wrist then moderately extended over a folded towel.,  The JFrog system was used to complete the release. First the single  was passed deep to the deep ligament into the carpal canal.  This was followed by the stripper instrument and then the double . Finally the ligamentatome was used to release the ligament from distal to proximal with a single smooth pass. The completeness of the release was checked by palpating with the single  as well as visually. The median nerve was intact and no masses or other abnormalities noted. Small fascial bands distally were released with the tenotomy scissors. The margins of the canal were injected with 1% Lidocaine with epinephrine. The incision was closed with 4-0 Vicryl Rapide and Dermabond surgical adhesive. Sterile dressings were applied.   The patient tolerated the procedure well and was sent to the [facility] in good condition.      Tourniquet Time:   Total Tourniquet Time Documented:  Forearm (Right) - 23 minutes  Total: Forearm (Right) - 23 minutes          Signed By: Milton Barnes MD

## 2020-08-27 ENCOUNTER — HOSPITAL ENCOUNTER (OUTPATIENT)
Dept: PHYSICAL THERAPY | Age: 70
Discharge: HOME | End: 2020-08-27
Payer: COMMERCIAL

## 2020-08-27 PROCEDURE — 97140 MANUAL THERAPY 1/> REGIONS: CPT

## 2020-08-27 PROCEDURE — 97014 ELECTRIC STIMULATION THERAPY: CPT

## 2020-08-27 NOTE — PROGRESS NOTES
Luis Carlos Melgar  : 1950  Payor: [facility] / Plan: [address] [facility] [address] [facility] / Product Type: Commerical /  [address]  [facility] [address] [address] [address] [facility]  [phone]   [phone]      OUTPATIENT PHYSICAL THERAPY: Daily Treatment Note 2020  Visit Count:  35 visits    ICD-10: Treatment Diagnosis:   M54.5 Low Back Pain   R26.2 Difficulty in Walking, Not Elsewhere Classified    Precautions/Allergies:   Patient has no known allergies. TREATMENT PLAN:  Effective Dates:  Twice per week from 7/10/2020 until 2020 (8 weeks). Frequency/Duration: 1X times a week for 60 Days     PRE-TREATMENT SYMPTOMS/COMPLAINTS:  Pt reports his back mobility and motion is definitely improving with therapy  MEDICATIONS REVIEWED:  2020   TREATMENT:   (In addition to Assessment/Re-Assessment sessions the following treatments were rendered)    THERAPEUTIC EXERCISE: (0 minutes):  Exercises per grid below (and assessment in chart on 2020) to improve mobility, strength and balance. Required minimal visual and verbal cues to promote proper body alignment and promote proper body posture. Progressed resistance, range and complexity of movement as indicated. Reviewed HEP and performed to provided cues for technique and prevent compensations. Activity Date:                                                                   MANUAL THERAPY: (45 minutes):   Joint mobilization, Soft tissue mobilization and Manipulation was utilized and necessary because of the patient's restricted joint motion, painful spasm, loss of articular motion and restricted motion of soft tissue.    +PA L1-L5, B SI joints, Sacrum Grade III  + STM B lumbar paraspinals and QL  +Manual stretching B HS, glutes, piriformis, hip flexor, quads      (Used abbreviations: MET - muscle energy technique; PNF - proprioceptive neuromuscular facilitation; NMR - neuromuscular re-education; AP - anterior to posterior; PA - posterior to anterior)    MODALITIES: (15 minutes)  Estim level 28 with MHP for pain relief and promote mm relaxation      Assessment: Pt is making slow steady progress towards goals. Pt continues with hypomobility noted throughout lumbar spinal segments. Improving flexibility noted throughout hip mm. Updated POC to 1X per week with plan to transition to maintenance program.     RECOMMENDATIONS/INTENT FOR NEXT TREATMENT SESSION: \"Next visit will focus on advancements to more challenging activities\". PAIN: Initial: 4/10 Post Session: 3 /10     [facility] [facility]    Total Treatment Billable Duration:  60 minutes  PT Patient Time In/Time Out  Time In: 0901  Time Out: 9071  Jeanie Luu, PT, DPT    No future appointments.      Visit Approval Visit # Therapist initials Date A NS Cx Comments    31 SHAISTA 8/03/02 [x]  [] [] Recert/progress note on 7/27 - visit 4 today    32 SHAISTA 8/20/20 [x] [] []     35 SHAISTA 8/27/20 [x] [] [] Progress note today.       [] [] []        [] [] []        [] [] []        [] [] []        [] [] []        [] [] []        [] [] []        [] [] []        [] [] []        [] [] []        [] [] []        [] [] []        [] [] []        [] [] []        [] [] []

## 2020-08-27 NOTE — PROGRESS NOTES
Sharonda Gallegoadri  : 1950  Payor: MEDICAL [facility] / Plan: [facility] [employer]ICAL [employer] [address] / Product Type: Commerical /  [address]  at [facility]  [address], [address], [place] [address] [address] [facility]  [phone]   [phone]        OUTPATIENT PHYSICAL THERAPY:Progress Report 2020    ICD-10: Treatment Diagnosis:   M54.5 Low Back Pain   R26.2 Difficulty in Walking, Not Elsewhere Classified  Precautions/Allergies:   Patient has no known allergies. Fall Risk Score: 2 (? 5 = High Risk)  MD Orders: Evaluate and Treat MEDICAL/REFERRING DIAGNOSIS:  Other forms of scoliosis, lumbar region [M41.86]   DATE OF ONSET: Chronic  REFERRING PHYSICIAN: Chen Manuel MD  RETURN PHYSICIAN APPOINTMENT:    PROGRESS REPORT 20: Pt has attended a total of 35 physical therapy session for lower back pain. Pt demonstrating improvements in B LE strength and improvements in lumbar ROM flexion and extension. Pt reports overall improvements in pain and stiffness throughout the day with slight improvement in sitting tolerance. Pt reporting 1 point reduction on Oswestry compared to last session. Discussed decreasing visit frequency to 1x per week then to every other week depending on symptoms with hopes to transition fully to maintenance program of every other week to 1X per month. Pt will continue to benefit from skilled PT interventions to address hypomobility throughout L/S and decreased mm length throughout hip musculature. ASSESSMENT:  Mr. Drake Arroyo has attended 21 physical therapy sessions for low back pain. Pt is continuing to improve in his ability to perform more challenging LE and core stability exercises, indicating increased strength overall, but continues to have limited sitting tolerance.   Pt has significant hypomobility of lumbar spinal segments with decreased ROM and core strength affecting pt ability to tolerate functional mobility, ADLs, and work tasks. Recommending continued skilled PT: manual therapeutic techniques (as appropriate), therapeutic exercises and activities, balance interventions, and a comprehensive home exercise program to address the current impairments, as listed above. Garima Almaraz will benefit from skilled PT (medically necessary) to address above deficits affecting participation in basic ADLs and overall functional tolerance. PROBLEM LIST (Impacting functional limitations):  1. Decreased Strength  2. Decreased ADL/Functional Activities  3. Decreased Transfer Abilities  4. Decreased Ambulation Ability/Technique  5. Decreased Balance  6. Increased Pain  7. Decreased Flexibility/Joint Mobility  8. Decreased [place] with Home Exercise Program INTERVENTIONS PLANNED:  1. Balance Exercise  2. Cold  3. Cryotherapy  4. Electrical Stimulation  5. Family Education  6. Gait Training  7. Heat  8. Home Exercise Program (HEP)  9. Manual Therapy  10. Neuromuscular Re-education/Strengthening  11. Range of Motion (ROM)  12. Therapeutic Activites  13. Therapeutic Exercise/Strengthening  14. Transcutaneous Electrical Nerve Stimulation (TENS)  15. Transfer Training   TREATMENT PLAN:  TREATMENT PLAN:  Effective Dates: 1X per week from 7/10/2020 until 9/25/2020 (8 weeks). (UPDATED 8/27/20)     Short-Term Functional Goals: Time Frame: 4 weeks  1. Pt will be compliant with HEP. Goal met 5/11/20  2. Pt will decreased worst pain to 5/10 or less in order to improved standing and sitting tolerance for work and ADLs, Goal met 5/11/20 with exception of heavier tasks for example fly fishing  3. Pt will decreased KAI to 13/50 or less in order to return to daily functional activities and work tasks. (PROGRESSING, 8/27/2020) 20/50  4. Pt will report sitting tolerance > 30 minutes with <5/10 pain for return to functional mobility and work tasks (PROGRESSING, 8/27/2020) - able to sit 30 min 5/10   Discharge Goals: Time Frame: 8 weeks  1.  Pt will be independent with HEP. (PROGRESSING, 8/27/2020)   2. Pt will decreased worst pain to 3/10 or less in order to improve standing and sitting tolerance for work and ADLs (PROGRESSING, 8/27/2020) worst 8/10  3. Pt will decreased KAI to 8/50 or less in order to return to daily functional activities and work tasks ([place], 8/27/2020)   4. Pt will report sitting tolerance > 60 minutes with < 3/10 pain for return to functional mobility and work tasks (PROGRESSING, 8/27/2020) able to sit  60min  7-8/10      Rehabilitation Potential For Stated Goals: Good  Regarding [facility] therapy, I certify that the treatment plan above will be carried out by a therapist or under their direction. Thank you for this referral,  Milagros Vivas    Referring Physician Signature: Jose Alberto Davies MD          Date                                   The information in this section was collected on 5/11/20 (except where otherwise noted). HISTORY:   History of Present Injury/Illness (Reason for Referral):   Mechanical low back pain      CC/Primary Concern:      Pt reports a history of chronic lower back pain that began in his 35s. Pt reports having a lumbar fusion L5-S1 a few years ago but now with recent exaccerbation approximately 6-8 weeks ago. 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     Treatment Side: Bilateral central LBP      Past Medical History/Comorbidities:   Mr. Margareth Jensen  has a past medical history of Arthritis (07/06/2015), Back pain, DDD (degenerative disc disease), lumbar, Dry eyes, Enlarged prostate, GERD (gastroesophageal reflux disease), History of basal cell cancer, History of squamous cell carcinoma, IBS (irritable bowel syndrome), Insomnia (07/06/2015), Left inguinal hernia, MVP (mitral valve prolapse), Onychomycosis (7/6/2015), RBBB (7/6/2015), Right wrist pain (7/6/2015), Scoliosis, and Spondylosis of lumbar region without myelopathy or radiculopathy. Mr. Apolinar Manning  has a past surgical history that includes neurological procedure unlisted (2007); hx colonoscopy; hx orthopaedic; hx shoulder arthroscopy (Right); and hx orthopaedic (Left, 06/2020). 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 Max Daily Amount: 6 Tabs., Disp: 20 Tab, Rfl: 0    zolpidem (AMBIEN) 10 mg tablet, Take 1 Tab by mouth nightly as needed for Sleep. Max Daily Amount: 10 mg., Disp: 30 Tab, Rfl: 5    tadalafiL (CIALIS) 5 mg tablet, Take 1 Tab by mouth daily.  (Patient taking differently: Take 5 mg by mouth nightly.), Disp: 90 Tab, Rfl: 3    HYDROcodone-acetaminophen (NORCO) 7.5-325 mg per tablet, Take 1 Tab by mouth every four (4) hours as needed for Pain for up to 30 days. Max Daily Amount: 6 Tabs., Disp: 120 Tab, Rfl: 0    meloxicam (Mobic) 15 mg tablet, Take 1 Tab by mouth daily. , Disp: 90 Tab, Rfl: 3    tamsulosin (FLOMAX) 0.4 mg capsule, Take 1 Cap by mouth daily. (Patient taking differently: Take 0.4 mg by mouth nightly.), Disp: 90 Cap, Rfl: 3    linaCLOtide (LINZESS) 145 mcg cap capsule, Take 1 Cap by mouth Daily (before breakfast). (Patient taking differently: Take 145 mcg by mouth daily as needed.), Disp: 90 Cap, Rfl: 3    Cetirizine (ZYRTEC) 10 mg cap, Take  by mouth daily. , Disp: , Rfl:     lidocaine (LIDODERM) 5 %, by TransDERmal route daily as needed. Apply patch to the affected area for 12 hours a day and remove for 12 hours a day. , Disp: , Rfl:     dicyclomine (BENTYL) 20 mg tablet, Take 1 Tab by mouth every six (6) hours. (Patient taking differently: Take 20 mg by mouth every six (6) hours as needed.), Disp: 120 Tab, Rfl: 5    ipratropium (ATROVENT) 0.03 % nasal spray, 2 Sprays by Both Nostrils route every twelve (12) hours. (Patient taking differently: 2 Sprays by Both Nostrils route every twelve (12) hours as needed.), Disp: 30 mL, Rfl: 11    RESTASIS 0.05 % ophthalmic emulsion, INSTILL 1 DROP IN BOTH EYES TWICE DAILY, Disp: , Rfl: 12    omeprazole (PRILOSEC) 20 mg capsule, Take 20 mg by mouth daily as needed. , Disp: , Rfl:    Date Last Reviewed:  8/27/2020       Ambulatory/Rehab Services H2 Model Falls Risk Assessment    Risk Factors:       (1)  Gender [Male] Ability to Rise from Chair:       (1)  Pushes up, successful in one attempt    Falls Prevention Plan:       No modifications necessary   Total: (5 or greater = High Risk): 2    ©2010 [facility]. All Rights Reserved. [facility] States Patent #7,[id number].  Federal Law prohibits the replication, distribution or use without written permission from [facility]        Number of Personal Factors/Comorbidities that affect the Plan of Care: 1-2: MODERATE COMPLEXITY EXAMINATION:   Inspection        Pelvic alignment: good pelvic alignment (symmetrical), but R LE noted to be shorter on 7/27/2020        Additional Comments:   ________________________________________________________________________________________________  Observation          Posture: Decreased lumbar lordosis        Gait: Decreased Gait Speed, Decreased Stride Length and Decreased Step Length                  ________________________________________________________________________________________________  Range of Motion        Lumbar  Joint:   8/27/20    Right (Degrees/Percent) Left (Degrees/Percent)    Flexion  40 degrees pain  85 degrees, increased pulling and pain   Extension  15 degrees pain  25 degrees pain   Side Bending  Knee joint WFL    Rotation  Knee joint WFL          Lower  Joint: Passive Passive Active Active    Right (Degrees) Left (Degrees) Right (Degrees) Left (Degrees)   Hip Flexion   [facility] WFL    Hip Extension   [facility]   Hip Abduction   [facility] WFL   Hip Internal Rotation    NT NT   Hip External Rotation   NT NT             Additional Comments: + decreased tissue extensibility HS, piriformis, glutes, R hip flexor > L , quads  ________________________________________________________________________________________________  Strength          Lower Extremity  Joint:   7/27/2020 7/27/2020 8/27/20 8/27/20    RIGHT LEFT RIGHT LEFT Right Left   Hip Flexion 5/5  5/5  4+/5 4/5 5/5 5/5   Hip Extension 5/5  5/5  3+/5 4-/5 5/5 5/5   Hip Internal Rotation 4/5 4/5 4+/5 4+/5 5/5 5/5   Hip External Rotation 4/5 4/5 4/5 4+/5 5/5 5/5   Hip Abduction 5/5 (5/5 pain) 5/5  4/5 4/5 5/5 5/5   Knee flexion 5/5 5/5 4+/5 4+/5 5/5 5/5   Knee extension 5/5 5/5 5/5 5/5       ________________________________________________________________________________________________  [facility]        C/O Radicular Symptoms: No        Additional Comments: ________________________________________________________________________________________________            ________________________________________________________________________________________________  Palpation: lumbar paraspinals, increased tone noted  Joint mobilization: L1-L5 hypomobility with PA       Body Structures Involved:  9. Nerves  10. Bones  11. Joints  12. Muscles  13. Ligaments Body Functions Affected:  16. Sensory/Pain  17. Neuromusculoskeletal  18. Movement Related Activities and Participation Affected:  5. General Tasks and Demands  6. Mobility  7. Self Care  8. Domestic Life  9. Interpersonal Interactions and Relationships  10. Community, Social and [place]   Number of elements (examined above) that affect the Plan of Care: 4+: HIGH COMPLEXITY   CLINICAL PRESENTATION:   Presentation: Evolving clinical presentation with changing clinical characteristics: MODERATE COMPLEXITY   CLINICAL DECISION MAKING:   Outcome Measure: Tool Used: Modified Oswestry Low Back Pain Questionnaire  Score:  Initial:17/50  Most Recent: 21/50 (Date: 5/11/20 ) Most Recent: 21/50 (Date: 7/27/2020) Most recent: 20/50 (8/27/20)   Interpretation of Score: Each section is scored on a 0-5 scale, 5 representing the greatest disability. The scores of each section are added together for a total score of 50. Medical Necessity:   · Skilled intervention continues to be required due to decreased strength, balance, ROM, with increased pain affecting pt participation in daily functional mobility  Reason for Services/Other Comments:  · Patient continues to require skilled intervention due to decreased strength balance and ROM with increased pain affecting pt functional mobility and ADLS.    Use of outcome tool(s) and clinical judgement create a POC that gives a: Clear prediction of patient's progress: LOW COMPLEXITY        /

## 2020-09-09 ENCOUNTER — HOSPITAL ENCOUNTER (OUTPATIENT)
Dept: PHYSICAL THERAPY | Age: 70
Discharge: HOME | End: 2020-09-09
Payer: MEDICARE

## 2020-09-09 PROCEDURE — 97014 ELECTRIC STIMULATION THERAPY: CPT

## 2020-09-09 PROCEDURE — 97140 MANUAL THERAPY 1/> REGIONS: CPT

## 2020-09-09 NOTE — PROGRESS NOTES
Enrrique Hood  : 1950  Payor: [facility] / Plan: [address] [facility] [address] [facility] / Product Type: Commerical /  [address]  at [address] [facility] [address], [address] [facility], [place], [facility]  [phone]   [phone]      OUTPATIENT PHYSICAL THERAPY: Daily Treatment Note 2020  Visit Count:  35 visits    ICD-10: Treatment Diagnosis:   M54.5 Low Back Pain   R26.2 Difficulty in Walking, Not Elsewhere Classified    Precautions/Allergies:   Patient has no known allergies. TREATMENT PLAN:  Effective Dates:  Twice per week from 7/10/2020 until 2020 (8 weeks). Frequency/Duration: 1X times a week for 60 Days     PRE-TREATMENT SYMPTOMS/COMPLAINTS:  Pt reports back has been feeling worse since not coming to PT. Pt having more difficulty getting good stretch at home due to recent surgery on hand. MEDICATIONS REVIEWED:  2020   TREATMENT:   (In addition to Assessment/Re-Assessment sessions the following treatments were rendered)    THERAPEUTIC EXERCISE: (0 minutes):  Exercises per grid below to improve mobility, strength and balance. Required minimal visual and verbal cues to promote proper body alignment and promote proper body posture. Progressed resistance, range and complexity of movement as indicated. Reviewed HEP and performed to provided cues for technique and prevent compensations. Activity Date:                                                                   MANUAL THERAPY: (45 minutes):   Joint mobilization, Soft tissue mobilization and Manipulation was utilized and necessary because of the patient's restricted joint motion, painful spasm, loss of articular motion and restricted motion of soft tissue.    +PA L1-L5, B SI joints, Sacrum Grade III  + STM B lumbar paraspinals and QL  +Manual stretching B HS, glutes, piriformis, hip flexor, quads      (Used abbreviations: MET - muscle energy technique; PNF - proprioceptive neuromuscular facilitation; NMR - neuromuscular re-education; AP - anterior to posterior; PA - posterior to anterior)    MODALITIES: (15 minutes)  Estim level 28 with MHP for pain relief and promote mm relaxation      Assessment: Pt continues with hypomobility noted at L/S segments. Finds good relief with manual techniques    RECOMMENDATIONS/INTENT FOR NEXT TREATMENT SESSION: \"Next visit will focus on advancements to more challenging activities\". PAIN: Initial: 4/10 Post Session: 3 /10     [facility] Portal    Total Treatment Billable Duration:   PT Patient Time In/Time Out  Time In: 0932  Time Out: 1030  Meghan Bruce, PT, DPT    No future appointments. Visit Approval Visit # Therapist initials Date A NS Cx Comments    31 SHAISTA 8/95/98 [x]  [] [] Recert/progress note on 7/27 - visit 4 today    28 SHAISTA 8/20/20 [x] [] []     35 SHAISTA 8/27/20 [x] [] [] Progress note today.     [address] 9/9/20 [x] [] []        [] [] []        [] [] []        [] [] []        [] [] []        [] [] []        [] [] []        [] [] []        [] [] []        [] [] []        [] [] []        [] [] []        [] [] []        [] [] []        [] [] []

## 2020-09-23 ENCOUNTER — HOSPITAL ENCOUNTER (OUTPATIENT)
Dept: PHYSICAL THERAPY | Age: 70
Discharge: HOME | End: 2020-09-23
Payer: MEDICARE

## 2020-09-30 ENCOUNTER — HOSPITAL ENCOUNTER (OUTPATIENT)
Dept: PHYSICAL THERAPY | Age: 70
Discharge: HOME | End: 2020-09-30
Payer: MEDICARE

## 2020-09-30 PROCEDURE — 97140 MANUAL THERAPY 1/> REGIONS: CPT

## 2020-09-30 PROCEDURE — 97014 ELECTRIC STIMULATION THERAPY: CPT

## 2020-09-30 NOTE — PROGRESS NOTES
Phoebe Barton  : 1950  [place] / Product Type: Medicare /  [address]  at [address]  [phone]   [phone]      OUTPATIENT PHYSICAL THERAPY: Daily Treatment Note 2020  Visit Count:  35 visits    ICD-10: Treatment Diagnosis:   M54.5 Low Back Pain   R26.2 Difficulty in Walking, Not Elsewhere Classified    Precautions/Allergies:   Patient has no known allergies. TREATMENT PLAN:  Effective Dates:  Twice per week from 2020 until 2020 (12 weeks). Frequency/Duration: 1X times a week for 90 Days     PRE-TREATMENT SYMPTOMS/COMPLAINTS: Pt reports pulling his R HS. He reports his back has been more flared up since not coming to PT. MEDICATIONS REVIEWED:  2020   TREATMENT:   (In addition to Assessment/Re-Assessment sessions the following treatments were rendered)    THERAPEUTIC EXERCISE: (0 minutes):  Exercises per grid below to improve mobility, strength and balance. Required minimal visual and verbal cues to promote proper body alignment and promote proper body posture. Progressed resistance, range and complexity of movement as indicated. Reviewed HEP and performed to provided cues for technique and prevent compensations. Activity Date:                                                                   MANUAL THERAPY: (40 minutes):   Joint mobilization, Soft tissue mobilization and Manipulation was utilized and necessary because of the patient's restricted joint motion, painful spasm, loss of articular motion and restricted motion of soft tissue.    +PA L1-L5, B SI joints, Sacrum Grade III  + STM B lumbar paraspinals and QL  +Manual stretching B HS, glutes, piriformis, hip flexor, quads      (Used abbreviations: MET - muscle energy technique; PNF - proprioceptive neuromuscular facilitation; NMR - neuromuscular re-education; AP - anterior to posterior; PA - posterior to anterior)    MODALITIES: (15 minutes)  Estim level 27 with MHP for pain relief and promote mm relaxation      Assessment: Pt continues with hypomobility noted at L/S segments. Finds good relief with manual techniques    RECOMMENDATIONS/INTENT FOR NEXT TREATMENT SESSION: \"Next visit will focus on advancements to more challenging activities\". PAIN: Initial: 3/10 Post Session: 2 /10     [employer] Portal    Total Treatment Billable Duration:   PT Patient Time In/Time Out  Time In: 0930  Time Out: 56  Blayne Olvera, PT, DPT    Future Appointments   Date Time Provider Elisabeth Chauhan   10/7/2020  8:45 AM Margarita Cardenas [facility]   10/14/2020  9:30 AM Margarita [facility]        Visit Approval Visit # Therapist initials Date A NS Cx Comments    31 SHAISTA 9/43/53 [x]  [] [] Recert/progress note on 7/27 - visit 4 today    28 SHAISTA 8/20/20 [x] [] []     35 SHAISTA 8/27/20 [x] [] [] Progress note today.     [address] 9/9/20 [x] [] []     37 SHAISTA 3/04/62 [x] [] [] Recert today-maintenance program       [] [] []        [] [] []        [] [] []        [] [] []        [] [] []        [] [] []        [] [] []        [] [] []        [] [] []        [] [] []        [] [] []        [] [] []        [] [] []

## 2020-09-30 NOTE — PROGRESS NOTES
Soto Mann  : 1950  Payor: [employer]ICAL [employer]UAL [facility] / Plan: [facility] [employer]ICAL [employer]UAL [address] / Product Type: Commerical /  [address] [place] [place]  at [address] [facility]  [address], [address], [place], [address] [facility]  [phone]   [phone]        OUTPATIENT PHYSICAL [address] [facility]     ICD-10: Treatment Diagnosis:   M54.5 Low Back Pain   R26.2 Difficulty in Walking, Not Elsewhere Classified  Precautions/Allergies:   Patient has no known allergies. Fall Risk Score: 2 (? 5 = High Risk)  MD Orders: Evaluate and Treat MEDICAL/REFERRING DIAGNOSIS:  Other forms of scoliosis, lumbar region [M41.86]   DATE OF ONSET: Chronic  REFERRING PHYSICIAN: Moisés Ogden MD  RETURN PHYSICIAN APPOINTMENT:    Recertification:  Pt with recent progress note on 20 demonstrating improvements in B LE strenght and lumbar ROM. Pt also with subjective improvements with PT compared to without. Have reduced visit frequency to 1X per week with hoping to transition to every other week depending on symptoms to prevent exacerbation of symptoms and maintain improvements with PT. Pt will continue to benefit from skilled PT interventions. PROGRESS REPORT 20: Pt has attended a total of 35 physical therapy session for lower back pain. Pt demonstrating improvements in B LE strength and improvements in lumbar ROM flexion and extension. Pt reports overall improvements in pain and stiffness throughout the day with slight improvement in sitting tolerance. Pt reporting 1 point reduction on Oswestry compared to last session. Discussed decreasing visit frequency to 1x per week then to every other week depending on symptoms with hopes to transition fully to maintenance program of every other week to 1X per month. Pt will continue to benefit from skilled PT interventions to address hypomobility throughout L/S and decreased mm length throughout hip musculature.      ASSESSMENT: Mr. Travis Carmona has attended 21 physical therapy sessions for low back pain. Pt is continuing to improve in his ability to perform more challenging LE and core stability exercises, indicating increased strength overall, but continues to have limited sitting tolerance. Pt has significant hypomobility of lumbar spinal segments with decreased ROM and core strength affecting pt ability to tolerate functional mobility, ADLs, and work tasks. Recommending continued skilled PT: manual therapeutic techniques (as appropriate), therapeutic exercises and activities, balance interventions, and a comprehensive home exercise program to address the current impairments, as listed above. Evi Hager will benefit from skilled PT (medically necessary) to address above deficits affecting participation in basic ADLs and overall functional tolerance. PROBLEM LIST (Impacting functional limitations):  1. Decreased Strength  2. Decreased ADL/Functional Activities  3. Decreased Transfer Abilities  4. Decreased Ambulation Ability/Technique  5. Decreased Balance  6. Increased Pain  7. Decreased Flexibility/Joint Mobility  8. Decreased Luce with Home Exercise Program INTERVENTIONS PLANNED:  1. Balance Exercise  2. Cold  3. Cryotherapy  4. Electrical Stimulation  5. Family Education  6. Gait Training  7. Heat  8. Home Exercise Program (HEP)  9. Manual Therapy  10. Neuromuscular Re-education/Strengthening  11. Range of Motion (ROM)  12. Therapeutic Activites  13. Therapeutic Exercise/Strengthening  14. Transcutaneous Electrical Nerve Stimulation (TENS)  15. Transfer Training   TREATMENT PLAN:  TREATMENT PLAN:  Effective Dates: 1X per week from 9/30/2020 until 12/30/2020 (12 weeks). Short-Term Functional Goals: Time Frame: 4 weeks  1. Pt will be compliant with HEP. Goal met 5/11/20  2.  Pt will decreased worst pain to 5/10 or less in order to improved standing and sitting tolerance for work and ADLs, Goal met 5/11/20 with exception of heavier tasks for example fly fishing  3. Pt will decreased KAI to 13/50 or less in order to return to daily functional activities and work tasks. (PROGRESSING, 9/30/2020) 20/50  4. Pt will report sitting tolerance > 30 minutes with <5/10 pain for return to functional mobility and work tasks (PROGRESSING, 9/30/2020) - able to sit 30 min 5/10   Discharge Goals: Time Frame: 8 weeks  1. Pt will be independent with HEP. (PROGRESSING, 9/30/2020)   2. Pt will decreased worst pain to 3/10 or less in order to improve standing and sitting tolerance for work and ADLs (PROGRESSING, 9/30/2020) worst 8/10  3. Pt will decreased KAI to 8/50 or less in order to return to daily functional activities and work tasks ([facility], 9/30/2020)   4. Pt will report sitting tolerance > 60 minutes with < 3/10 pain for return to functional mobility and work tasks (PROGRESSING, 9/30/2020) able to sit  60min  7-8/10      Rehabilitation Potential For Stated Goals: Good  Regarding [facility] therapy, I certify that the treatment plan above will be carried out by a therapist or under their direction. Thank you for this referral,  Erna Ahmadi, PT, DPT  Referring Physician Signature: Cristo Thomson MD          Date                                   The information in this section was collected on 5/11/20 (except where otherwise noted). HISTORY:   History of Present Injury/Illness (Reason for Referral):   Mechanical low back pain      CC/Primary Concern:      Pt reports a history of chronic lower back pain that began in his 35s. Pt reports having a lumbar fusion L5-S1 a few years ago but now with recent exaccerbation approximately 6-8 weeks ago.  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 Treatment Side: Bilateral central LBP      Past Medical History/Comorbidities:   Mr. Villa Hurd  has a past medical history of Arthritis (07/06/2015), Back pain, DDD (degenerative disc disease), lumbar, Dry eyes, Enlarged prostate, GERD (gastroesophageal reflux disease), History of basal cell cancer, History of squamous cell carcinoma, IBS (irritable bowel syndrome), Insomnia (07/06/2015), Left inguinal hernia, MVP (mitral valve prolapse), Onychomycosis (7/6/2015), RBBB (7/6/2015), Right wrist pain (7/6/2015), Scoliosis, and Spondylosis of lumbar region without myelopathy or radiculopathy. Mr. Villa Hurd  has a past surgical history that includes neurological procedure unlisted (2007); hx colonoscopy; hx orthopaedic; hx shoulder arthroscopy (Right); and hx orthopaedic (Left, 06/2020). 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  Max Daily Amount: 200 mg., Disp: 120 Tab, Rfl: 5    zolpidem (AMBIEN) 10 mg tablet, Take 1 Tab by mouth nightly as needed for Sleep. Max Daily Amount: 10 mg., Disp: 30 Tab, Rfl: 5    tadalafiL (CIALIS) 5 mg tablet, Take 1 Tab by mouth daily. (Patient taking differently: Take 5 mg by mouth nightly.), Disp: 90 Tab, Rfl: 3    meloxicam (Mobic) 15 mg tablet, Take 1 Tab by mouth daily. , Disp: 90 Tab, Rfl: 3    tamsulosin (FLOMAX) 0.4 mg capsule, Take 1 Cap by mouth daily. (Patient taking differently: Take 0.4 mg by mouth nightly.), Disp: 90 Cap, Rfl: 3    linaCLOtide (LINZESS) 145 mcg cap capsule, Take 1 Cap by mouth Daily (before breakfast). (Patient taking differently: Take 145 mcg by mouth daily as needed.), Disp: 90 Cap, Rfl: 3    Cetirizine (ZYRTEC) 10 mg cap, Take  by mouth daily. , Disp: , Rfl:     lidocaine (LIDODERM) 5 %, by TransDERmal route daily as needed. Apply patch to the affected area for 12 hours a day and remove for 12 hours a day. , Disp: , Rfl:     dicyclomine (BENTYL) 20 mg tablet, Take 1 Tab by mouth every six (6) hours. (Patient taking differently: Take 20 mg by mouth every six (6) hours as needed.), Disp: 120 Tab, Rfl: 5    ipratropium (ATROVENT) 0.03 % nasal spray, 2 Sprays by Both Nostrils route every twelve (12) hours. (Patient taking differently: 2 Sprays by Both Nostrils route every twelve (12) hours as needed.), Disp: 30 mL, Rfl: 11    RESTASIS 0.05 % ophthalmic emulsion, INSTILL 1 DROP IN BOTH EYES TWICE DAILY, Disp: , Rfl: 12    omeprazole (PRILOSEC) 20 mg capsule, Take 20 mg by mouth daily as needed. , Disp: , Rfl:    Date Last Reviewed:  9/30/2020       Ambulatory/Rehab Services H2 Model Falls Risk Assessment    Risk Factors:       (1)  Gender [Male] Ability to Rise from Chair:       (1)  Pushes up, successful in one attempt    Falls Prevention Plan:       No modifications necessary   Total: (5 or greater = High Risk): 2    ©2010 [employer]. All Rights Reserved. [facility] States Patent #[id number].  [place] Law prohibits the replication, distribution or use without written permission from [facility] Peer39        Number of Personal Factors/Comorbidities that affect the Plan of Care: 1-2: MODERATE COMPLEXITY   EXAMINATION:   Inspection        Pelvic alignment: good pelvic alignment (symmetrical), but R LE noted to be shorter on 7/27/2020        Additional Comments:   ________________________________________________________________________________________________  Observation          Posture: Decreased lumbar lordosis        Gait: Decreased Gait Speed, Decreased Stride Length and Decreased Step Length                  ________________________________________________________________________________________________  Range of Motion        Lumbar  Joint:   8/27/20    Right (Degrees/Percent) Left (Degrees/Percent)    Flexion  40 degrees pain  85 degrees, increased pulling and pain   Extension  15 degrees pain  25 degrees pain   Side Bending  Knee joint WFL    Rotation  Knee joint WFL          Lower  Joint: Passive Passive Active Active    Right (Degrees) Left (Degrees) Right (Degrees) Left (Degrees)   Hip Flexion   [facility] WFL    Hip Extension   [facility]   Hip Abduction   [facility] WFL   Hip Internal Rotation    NT NT   Hip External Rotation   NT NT             Additional Comments: + decreased tissue extensibility HS, piriformis, glutes, R hip flexor > L , quads  ________________________________________________________________________________________________  Strength          Lower Extremity  Joint:   7/27/2020 7/27/2020 8/27/20 8/27/20    RIGHT LEFT RIGHT LEFT Right Left   Hip Flexion 5/5  5/5  4+/5 4/5 5/5 5/5   Hip Extension 5/5  5/5  3+/5 4-/5 5/5 5/5   Hip Internal Rotation 4/5 4/5 4+/5 4+/5 5/5 5/5   Hip External Rotation 4/5 4/5 4/5 4+/5 5/5 5/5   Hip Abduction 5/5 (5/5 pain) 5/5  4/5 4/5 5/5 5/5   Knee flexion 5/5 5/5 4+/5 4+/5 5/5 5/5   Knee extension 5/5 5/5 5/5 5/5 ________________________________________________________________________________________________  [facility]        C/O Radicular Symptoms: No        Additional Comments:   ________________________________________________________________________________________________            ________________________________________________________________________________________________  Palpation: lumbar paraspinals, increased tone noted  Joint mobilization: L1-L5 hypomobility with PA       Body Structures Involved:  9. Nerves  10. Bones  11. Joints  12. Muscles  13. Ligaments Body Functions Affected:  16. Sensory/Pain  17. Neuromusculoskeletal  18. Movement Related Activities and Participation Affected:  5. General Tasks and Demands  6. Mobility  7. Self Care  8. Domestic Life  9. Interpersonal Interactions and Relationships  10. Community, Social and [employer] [place]   Number of elements (examined above) that affect the Plan of Care: 4+: HIGH COMPLEXITY   CLINICAL PRESENTATION:   Presentation: Evolving clinical presentation with changing clinical characteristics: MODERATE COMPLEXITY   CLINICAL DECISION MAKING:   Outcome Measure: Tool Used: Modified Oswestry Low Back Pain Questionnaire  Score:  Initial:17/50  Most Recent: 21/50 (Date: 5/11/20 ) Most Recent: 21/50 (Date: 7/27/2020) Most recent: 20/50 (8/27/20)   Interpretation of Score: Each section is scored on a 0-5 scale, 5 representing the greatest disability. The scores of each section are added together for a total score of 50. Medical Necessity:   · Skilled intervention continues to be required due to decreased strength, balance, ROM, with increased pain affecting pt participation in daily functional mobility  Reason for Services/Other Comments:  · Patient continues to require skilled intervention due to decreased strength balance and ROM with increased pain affecting pt functional mobility and ADLS.    Use of outcome tool(s) and clinical judgement create a POC that gives a: Clear prediction of patient's progress: LOW COMPLEXITY        /

## 2020-10-07 ENCOUNTER — HOSPITAL ENCOUNTER (OUTPATIENT)
Dept: PHYSICAL THERAPY | Age: 70
Discharge: HOME | End: 2020-10-07
Payer: MEDICARE

## 2020-10-07 PROCEDURE — 97014 ELECTRIC STIMULATION THERAPY: CPT

## 2020-10-07 PROCEDURE — 97140 MANUAL THERAPY 1/> REGIONS: CPT

## 2020-10-07 NOTE — PROGRESS NOTES
Linh Valle  : 1950  Payor: Meron Smith / [place] [facility] / Product Type: PPO /  [address] at [address] [facility] [address], [address], [facility] [address] [facility]  [phone]   [phone]      OUTPATIENT PHYSICAL THERAPY: Daily Treatment Note 10/7/2020  Visit Count:  35 visits    ICD-10: Treatment Diagnosis:   M54.5 Low Back Pain   R26.2 Difficulty in Walking, Not Elsewhere Classified    Precautions/Allergies:   Patient has no known allergies. TREATMENT PLAN:  Effective Dates:  Twice per week from 2020 until 2020 (12 weeks). Frequency/Duration: 1X times a week for 90 Days     PRE-TREATMENT SYMPTOMS/COMPLAINTS: Pt reports his HS is feeling better. Pt wanting to come 1X per week for a few weeks noticing an increase in symptoms with reducing to every 2 weeks. MEDICATIONS REVIEWED:  10/7/2020   TREATMENT:   (In addition to Assessment/Re-Assessment sessions the following treatments were rendered)    MANUAL THERAPY: (40 minutes):   Joint mobilization, Soft tissue mobilization and Manipulation was utilized and necessary because of the patient's restricted joint motion, painful spasm, loss of articular motion and restricted motion of soft tissue. +PA L1-L5, B SI joints, Sacrum Grade III  + STM B lumbar paraspinals and QL  +Manual stretching B HS, glutes, piriformis, hip flexor, quads      (Used abbreviations: MET - muscle energy technique; PNF - proprioceptive neuromuscular facilitation; NMR - neuromuscular re-education; AP - anterior to posterior; PA - posterior to anterior)    MODALITIES: (15 minutes)  Estim level 28 with MHP for pain relief and promote mm relaxation      Assessment: Pt continues with hypomobility noted at L/S segments. Finds good relief with manual techniques    RECOMMENDATIONS/INTENT FOR NEXT TREATMENT SESSION: \"Next visit will focus on advancements to more challenging activities\".     PAIN: Initial: 3/10 Post Session: 2 /10     MedBridge Portal    Total Treatment Billable Duration:   PT Patient Time In/Time Out  Time In: 0845  Time Out: 0940  Clifton Daily, PT, DPT    Future Appointments   Date Time Provider Elisabeth Gissel   10/14/2020  9:30 AM B[facility]on [facility]        Visit Approval Visit # Therapist initials Date A NS Cx Comments    31 SHAISTA 7/13/04 [x]  [] [] Recert/progress note on 7/27 - visit 4 today    28 SHAISTA 8/20/20 [x] [] []     35 SHAISTA 8/27/20 [x] [] [] Progress note today.     [address] 9/9/20 [x] [] []     37 SHAISTA 4/03/45 [x] [] [] Recert today-maintenance program    38 SHAISTA 10/7/20 [x] [] []        [] [] []        [] [] []        [] [] []        [] [] []        [] [] []        [] [] []        [] [] []        [] [] []        [] [] []        [] [] []        [] [] []        [] [] []

## 2020-10-14 ENCOUNTER — HOSPITAL ENCOUNTER (OUTPATIENT)
Dept: PHYSICAL THERAPY | Age: 70
Discharge: HOME | End: 2020-10-14
Payer: MEDICARE

## 2020-10-14 PROCEDURE — 97140 MANUAL THERAPY 1/> REGIONS: CPT

## 2020-10-14 PROCEDURE — 97014 ELECTRIC STIMULATION THERAPY: CPT

## 2020-10-14 NOTE — PROGRESS NOTES
Jesus Manuel Dickinson  : 1950  [place] / Product Type: Medicare /  [address]  [facility], [address]  [phone]   [phone]      OUTPATIENT PHYSICAL THERAPY: Daily Treatment Note 10/14/2020  Visit Count:  39 visits    ICD-10: Treatment Diagnosis:   M54.5 Low Back Pain   R26.2 Difficulty in Walking, Not Elsewhere Classified    Precautions/Allergies:   Patient has no known allergies. TREATMENT PLAN:  Effective Dates:  Twice per week from 2020 until 2020 (12 weeks). Frequency/Duration: 1X times a week for 90 Days     PRE-TREATMENT SYMPTOMS/COMPLAINTS: Pt reports back is feeling a little better. He reports going to [email or web address] and did better than he thoughout he would in terms of back pain. MEDICATIONS REVIEWED:  10/14/2020   TREATMENT:   (In addition to Assessment/Re-Assessment sessions the following treatments were rendered)    MANUAL THERAPY: (40 minutes):   Joint mobilization, Soft tissue mobilization and Manipulation was utilized and necessary because of the patient's restricted joint motion, painful spasm, loss of articular motion and restricted motion of soft tissue. +PA L1-L5, B SI joints, Sacrum Grade III  + STM B lumbar paraspinals and QL  +Manual stretching B HS, glutes, piriformis, hip flexor, quads      (Used abbreviations: MET - muscle energy technique; PNF - proprioceptive neuromuscular facilitation; NMR - neuromuscular re-education; AP - anterior to posterior; PA - posterior to anterior)    MODALITIES: (15 minutes)  Estim level 30 with MHP for pain relief and promote mm relaxation      Assessment: Pt continues with hypomobility noted at L/S segments. Finds good relief with manual techniques    RECOMMENDATIONS/INTENT FOR NEXT TREATMENT SESSION: \"Next visit will focus on advancements to more challenging activities\".     PAIN: Initial: 3/10 Post Session: 2 /10     MedBridge Portal    Total Treatment Billable Duration:   PT Patient Time In/Time Out  Time In: 0930  Time Out: 1  Shashi Khan, PT, DPT    Future Appointments   Date Time Provider Elisabeth Chauhan   10/21/2020  8:45 AM Ellis Boyle [facility]   10/28/2020  9:30 AM Ellis Boyle [facility]        Visit Approval Visit # Therapist initials Date A NS Cx Comments    31 SHAISTA 9/13/32 [x]  [] [] Recert/progress note on 7/27 - visit 4 today    28 SHAISTA 8/20/20 [x] [] []     35 SHAISTA 8/27/20 [x] [] [] Progress note today.     [address] 9/9/20 [x] [] [] 1    37 SHAISTA 1/78/26 [x] [] [] Recert today-maintenance program 2    38 SHAISTA 10/7/20 [x] [] [] 3    39 SHAISTA 10/14/20 [x] [] [] 4       [] [] []        [] [] []        [] [] []        [] [] []        [] [] []        [] [] []        [] [] []        [] [] []        [] [] []        [] [] []        [] [] []

## 2020-10-21 ENCOUNTER — HOSPITAL ENCOUNTER (OUTPATIENT)
Dept: PHYSICAL THERAPY | Age: 70
Discharge: HOME | End: 2020-10-21
Payer: MEDICARE

## 2020-10-21 PROCEDURE — 97014 ELECTRIC STIMULATION THERAPY: CPT

## 2020-10-21 PROCEDURE — 97140 MANUAL THERAPY 1/> REGIONS: CPT

## 2020-10-21 NOTE — PROGRESS NOTES
Jesus Manuel Dickinson  : 1950  [place] / Product Type: Medicare /  [address]  at [facility]  Romina Andrews [facility] [address], [address], [place] [address] [address] [facility]  [phone]   [phone]      OUTPATIENT PHYSICAL THERAPY: Daily Treatment Note 10/21/2020  Visit Count:  40 visits    ICD-10: Treatment Diagnosis:   M54.5 Low Back Pain   R26.2 Difficulty in Walking, Not Elsewhere Classified    Precautions/Allergies:   Patient has no known allergies. TREATMENT PLAN:  Effective Dates:  Twice per week from 2020 until 2020 (12 weeks). Frequency/Duration: 1X times a week for 90 Days     PRE-TREATMENT SYMPTOMS/COMPLAINTS: Pt reports his back is doing pretty good. States car ride didn't bother his back too much and he was surprised. Pt states pain did wake him up at night. MEDICATIONS REVIEWED:  10/21/2020   TREATMENT:   (In addition to Assessment/Re-Assessment sessions the following treatments were rendered)    MANUAL THERAPY: (40 minutes):   Joint mobilization, Soft tissue mobilization and Manipulation was utilized and necessary because of the patient's restricted joint motion, painful spasm, loss of articular motion and restricted motion of soft tissue. +PA L1-L5, B SI joints, Sacrum Grade III  + STM B lumbar paraspinals and QL  +Manual stretching B HS, glutes, piriformis, hip flexor, quads      (Used abbreviations: MET - muscle energy technique; PNF - proprioceptive neuromuscular facilitation; NMR - neuromuscular re-education; AP - anterior to posterior; PA - posterior to anterior)    MODALITIES: (15 minutes)  Estim level 30 with MHP for pain relief and promote mm relaxation      Assessment: Pt continues with hypomobility noted at L/S segments. Finds good relief with manual techniques.  Increased tenderness at R glute today with manual.    RECOMMENDATIONS/INTENT FOR NEXT TREATMENT SESSION: \"Next visit will focus on advancements to more challenging activities\". PAIN: Initial: 2/10 Post Session: 1-2 /10     [employer] Portal    Total Treatment Billable Duration:   PT Patient Time In/Time Out  Time In: 0845  Time Out: 0945  Cate Landaverde, PT, DPT    Future Appointments   Date Time Provider Elisabeth Gissel   10/28/2020  9:30 AM [facility]        Visit Approval Visit # Therapist initials Date A NS Cx Comments    31 SHAISTA 5/24/84 [x]  [] [] Recert/progress note on 7/27 - visit 4 today    28 SHAISTA 8/20/20 [x] [] []     35 SHAISTA 8/27/20 [x] [] [] Progress note today.     [address] 9/9/20 [x] [] [] 1    37 SHAISTA 2/84/08 [x] [] [] Recert today-maintenance program 2    38 SHAISTA 10/7/20 [x] [] [] 3    39 SHAISTA 10/14/20 [x] [] [] 4    40 SHAISTA 10/21/20 [x] [] [] 5       [] [] []        [] [] []        [] [] []        [] [] []        [] [] []        [] [] []        [] [] []        [] [] []        [] [] []        [] [] []

## 2020-10-28 ENCOUNTER — HOSPITAL ENCOUNTER (OUTPATIENT)
Dept: PHYSICAL THERAPY | Age: 70
Discharge: HOME | End: 2020-10-28
Payer: MEDICARE

## 2020-10-28 PROCEDURE — 97014 ELECTRIC STIMULATION THERAPY: CPT

## 2020-10-28 PROCEDURE — 97140 MANUAL THERAPY 1/> REGIONS: CPT

## 2020-11-04 ENCOUNTER — HOSPITAL ENCOUNTER (OUTPATIENT)
Dept: PHYSICAL THERAPY | Age: 70
Discharge: HOME | End: 2020-11-04
Payer: MEDICARE

## 2020-11-04 PROCEDURE — 97140 MANUAL THERAPY 1/> REGIONS: CPT

## 2020-11-04 PROCEDURE — 97014 ELECTRIC STIMULATION THERAPY: CPT

## 2020-11-04 NOTE — PROGRESS NOTES
Vilma Urbina  : 1950  [place] / Product Type: Medicare /  [address]  at [facility]  [address] [facility], [address]  [phone]   [phone]      OUTPATIENT PHYSICAL THERAPY: Daily Treatment Note 2020  Visit Count:  40 visits    ICD-10: Treatment Diagnosis:   M54.5 Low Back Pain   R26.2 Difficulty in Walking, Not Elsewhere Classified    Precautions/Allergies:   Patient has no known allergies. TREATMENT PLAN:  Effective Dates:  Twice per week from 2020 until 2020 (12 weeks). Frequency/Duration: 1X times a week for 90 Days     PRE-TREATMENT SYMPTOMS/COMPLAINTS: Pt reports this is the best has back has felt since beginning. Pt reports still having limited tolerance for sitting but all other activities feel much better. MEDICATIONS REVIEWED:  2020   TREATMENT:   (In addition to Assessment/Re-Assessment sessions the following treatments were rendered)    MANUAL THERAPY: (40 minutes):   Joint mobilization, Soft tissue mobilization and Manipulation was utilized and necessary because of the patient's restricted joint motion, painful spasm, loss of articular motion and restricted motion of soft tissue. +PA L1-L5, B SI joints, Sacrum Grade III  + STM B lumbar paraspinals and QL  +Manual stretching B HS, glutes, piriformis, hip flexor, quads      (Used abbreviations: MET - muscle energy technique; PNF - proprioceptive neuromuscular facilitation; NMR - neuromuscular re-education; AP - anterior to posterior; PA - posterior to anterior)    MODALITIES: (15 minutes)  Estim level 34 with MHP for pain relief and promote mm relaxation      Assessment: Pt continues with hypomobility noted at L/S segments. Finds good relief with manual techniques. RECOMMENDATIONS/INTENT FOR NEXT TREATMENT SESSION: \"Next visit will focus on advancements to more challenging activities\".     PAIN: Initial: 2/10 Post Session: 1-2 /10     MedBridge Portal    Total Treatment Billable Duration:   PT Patient Time In/Time Out  Time In: 0620  Time Out: 200  Mary Mcbride, PT, DPT    Future Appointments   Date Time Provider Elisabeth Gissel   11/11/2020  9:30 AM Darrick Cobos [facility]   11/18/2020  9:30 AM Glenn oHuse OL[facility]   11/25/2020  9:30 AM Darrick Cobos [facility]        Visit Approval Visit # Therapist initials Date A NS Cx Comments    31 SHAISTA 5/80/27 [x]  [] [] Recert/progress note on 7/27 - visit 4 today    28 SHAISTA 8/20/20 [x] [] []     35 SHAISTA 8/27/20 [x] [] [] Progress note today.     [address] 9/9/20 [x] [] [] 1    37 SHAISTA 8/54/40 [x] [] [] Recert today-maintenance program 2    38 SHAISTA 10/7/20 [x] [] [] 3    39 SHAISTA 10/14/20 [x] [] [] 4    40 SHAISTA 10/21/20 [x] [] [] 5    41 SHAISTA 10/28/20 [x] [] [] 6    42 SHAISTA 11/4/20 [x] [] [] 7       [] [] []        [] [] []        [] [] []        [] [] []        [] [] []        [] [] []        [] [] []        [] [] []

## 2020-11-11 ENCOUNTER — HOSPITAL ENCOUNTER (OUTPATIENT)
Dept: PHYSICAL THERAPY | Age: 70
Discharge: HOME | End: 2020-11-11
Payer: MEDICARE

## 2020-11-11 PROCEDURE — 97140 MANUAL THERAPY 1/> REGIONS: CPT

## 2020-11-11 PROCEDURE — 97014 ELECTRIC STIMULATION THERAPY: CPT

## 2020-11-11 NOTE — PROGRESS NOTES
Lopez Soliman  : 1950  [place] / Product Type: Medicare /  [address]  at [facility]  [address], 101 [facility], [address]  [phone]   [phone]      OUTPATIENT PHYSICAL THERAPY: Daily Treatment Note 2020  Visit Count:  40 visits    ICD-10: Treatment Diagnosis:   M54.5 Low Back Pain   R26.2 Difficulty in Walking, Not Elsewhere Classified    Precautions/Allergies:   Patient has no known allergies. TREATMENT PLAN:  Effective Dates:  Twice per week from 2020 until 2020 (12 weeks). Frequency/Duration: 1X times a week for 90 Days     PRE-TREATMENT SYMPTOMS/COMPLAINTS: Pt reports significant stiffness in the AM but still feeling good from the injection. MEDICATIONS REVIEWED:  2020   TREATMENT:   (In addition to Assessment/Re-Assessment sessions the following treatments were rendered)    MANUAL THERAPY: (40 minutes):   Joint mobilization, Soft tissue mobilization and Manipulation was utilized and necessary because of the patient's restricted joint motion, painful spasm, loss of articular motion and restricted motion of soft tissue. +PA L1-L5, B SI joints, Sacrum Grade III  + STM B lumbar paraspinals and QL  +Manual stretching B HS, glutes, piriformis, hip flexor, quads      (Used abbreviations: MET - muscle energy technique; PNF - proprioceptive neuromuscular facilitation; NMR - neuromuscular re-education; AP - anterior to posterior; PA - posterior to anterior)    MODALITIES: (15 minutes)  Estim level 35 with MHP for pain relief and promote mm relaxation      Assessment: Pt continues with hypomobility noted at L/S segments. Finds good relief with manual techniques. RECOMMENDATIONS/INTENT FOR NEXT TREATMENT SESSION: \"Next visit will focus on advancements to more challenging activities\".     PAIN: Initial: 2/10 Post Session: 1-2 /10     [employer] Portal    Total Treatment Billable Duration:   PT Patient Time In/Time Out  Time In: 0930  Time Out: 56  Sofy Nguyễn, PT, DPT    Future Appointments   Date Time Provider Elisabeth Chauhan   11/18/2020  9:30 AM Antione [facility]   11/25/2020  9:30 AM Antione [facility]        Visit Approval Visit # Therapist initials Date A NS Cx Comments    31 SHAISTA 1/53/33 [x]  [] [] Recert/progress note on 7/27 - visit 4 today    28 SHAISTA 8/20/20 [x] [] []     35 SHAISTA 8/27/20 [x] [] [] Progress note today.     [address] 9/9/20 [x] [] [] 1    37 SHAISTA 1/76/43 [x] [] [] Recert today-maintenance program 2    38 SHAISTA 10/7/20 [x] [] [] 3    39 SHAISTA 10/14/20 [x] [] [] 4    40 SHAISTA 10/21/20 [x] [] [] 5    41 SHAISTA 10/28/20 [x] [] [] 6    42 SHAISTA 11/4/20 [x] [] [] 7    43 SHAISTA 11/11/20 [x] [] [] 8, PN due over next 2 visits.       [] [] []        [] [] []        [] [] []        [] [] []        [] [] []        [] [] []        [] [] []

## 2020-11-18 ENCOUNTER — HOSPITAL ENCOUNTER (OUTPATIENT)
Dept: PHYSICAL THERAPY | Age: 70
Discharge: HOME | End: 2020-11-18
Payer: MEDICARE

## 2020-11-18 PROCEDURE — 97140 MANUAL THERAPY 1/> REGIONS: CPT

## 2020-11-18 PROCEDURE — 97014 ELECTRIC STIMULATION THERAPY: CPT

## 2020-11-18 NOTE — PROGRESS NOTES
Michelle Bachelor  : 1950  [place] / Product Type: Medicare /  [address]  [facility] [address] [facility], [address]  [phone]   [phone]      OUTPATIENT PHYSICAL THERAPY: Daily Treatment Note 2020  Visit Count:  44 visits    ICD-10: Treatment Diagnosis:   M54.5 Low Back Pain   R26.2 Difficulty in Walking, Not Elsewhere Classified    Precautions/Allergies:   Patient has no known allergies. TREATMENT PLAN:  Effective Dates:  Twice per week from 2020 until 2020 (12 weeks). Frequency/Duration: 1X times a week for 90 Days     PRE-TREATMENT SYMPTOMS/COMPLAINTS: Pt reports more pain today. Pt reports feeling like injection has worn off at this point  MEDICATIONS REVIEWED:  2020   TREATMENT:   (In addition to Assessment/Re-Assessment sessions the following treatments were rendered)    MANUAL THERAPY: (40 minutes):   Joint mobilization, Soft tissue mobilization and Manipulation was utilized and necessary because of the patient's restricted joint motion, painful spasm, loss of articular motion and restricted motion of soft tissue. +PA L1-L5, B SI joints, Sacrum Grade III  + STM B lumbar paraspinals and QL  +Manual stretching B HS, glutes, piriformis, hip flexor, quads      (Used abbreviations: MET - muscle energy technique; PNF - proprioceptive neuromuscular facilitation; NMR - neuromuscular re-education; AP - anterior to posterior; PA - posterior to anterior)    MODALITIES: (15 minutes)  Estim level 37 with MHP for pain relief and promote mm relaxation      Assessment: Pt continues with hypomobility noted at L/S segments. Finds good relief with manual techniques. RECOMMENDATIONS/INTENT FOR NEXT TREATMENT SESSION: \"Next visit will focus on advancements to more challenging activities\".     PAIN: Initial: 3-4/10 Post Session: 3/10     [facility] Portal    Total Treatment Billable Duration:   PT Patient Time In/Time Out  Time In: 0935  Time Out: 56  Sofy Nguyễn, PT, DPT    Future Appointments   Date Time Provider Elisabeth Gissel   11/25/2020  9:30 AM Antione [facility]        Visit Approval Visit # Therapist initials Date A NS Cx Comments    31 SHAISTA 8/97/04 [x]  [] [] Recert/progress note on 7/27 - visit 4 today    28 SHAISTA 8/20/20 [x] [] []     35 SHAISTA 8/27/20 [x] [] [] Progress note today. [address] [employer] [facility] 9/9/20 [x] [] [] 1    37 SHAISTA 2/03/39 [x] [] [] Recert today-maintenance program 2    38 SHAISTA 10/7/20 [x] [] [] 3    39 SHAISTA 10/14/20 [x] [] [] 4    40 SHAISTA 10/21/20 [x] [] [] 5    41 SHAISTA 10/28/20 [x] [] [] 6    42 SHAISTA 11/4/20 [x] [] [] 7    43 SHAISTA 11/11/20 [x] [] [] 8, PN due over next 2 visits. 118 [facility] 11/18/20 [x] [] [] 9, PN due next visit.        [] [] []        [] [] []        [] [] []        [] [] []        [] [] []        [] [] []

## 2020-11-25 ENCOUNTER — HOSPITAL ENCOUNTER (OUTPATIENT)
Dept: PHYSICAL THERAPY | Age: 70
Discharge: HOME | End: 2020-11-25
Payer: MEDICARE

## 2020-11-25 PROCEDURE — 97014 ELECTRIC STIMULATION THERAPY: CPT

## 2020-11-25 PROCEDURE — 97140 MANUAL THERAPY 1/> REGIONS: CPT

## 2020-11-25 NOTE — PROGRESS NOTES
Faby Encarnacion  : 1950  [place] / Product Type: Medicare /  [address]  at [facility]  [address], [address] [facility], [address]  [phone]   [phone]        OUTPATIENT PHYSICAL THERAPY:Progress Report 2020    ICD-10: Treatment Diagnosis:   M54.5 Low Back Pain   R26.2 Difficulty in Walking, Not Elsewhere Classified  Precautions/Allergies:   Patient has no known allergies. Fall Risk Score: 2 (? 5 = High Risk)  MD Orders: Evaluate and Treat MEDICAL/REFERRING DIAGNOSIS:  Other forms of scoliosis, lumbar region [M41.86]   DATE OF ONSET: Chronic  REFERRING PHYSICIAN: Palma Whyte MD  RETURN PHYSICIAN APPOINTMENT:    Progress Report 20:  Pt has been seen for a total of 44 visits to date. Attempting to transition to every other week under maintenance at this time. Focus of PT sessions are lumbar and sacral joint mobilizations, manual stretching, and STM to lumbar paraspinals and quadratus lumborum at this time requiring skilled PT. Pt demonstrating improvements in lumbar extension ROM and KAI score this session. Pt is independent with HEP but progressing and modifying HEP each session as needed. Pt will continue to benefit from skilled PT interventions. Recertification:  Pt with recent progress note on 20 demonstrating improvements in B LE strenght and lumbar ROM. Pt also with subjective improvements with PT compared to without. Have reduced visit frequency to 1X per week with hoping to transition to every other week depending on symptoms to prevent exacerbation of symptoms and maintain improvements with PT. Pt will continue to benefit from skilled PT interventions. PROGRESS REPORT 20: Pt has attended a total of 35 physical therapy session for lower back pain. Pt demonstrating improvements in B LE strength and improvements in lumbar ROM flexion and extension.  Pt reports overall improvements in pain and stiffness throughout the day with slight improvement in sitting tolerance. Pt reporting 1 point reduction on Oswestry compared to last session. Discussed decreasing visit frequency to 1x per week then to every other week depending on symptoms with hopes to transition fully to maintenance program of every other week to 1X per month. Pt will continue to benefit from skilled PT interventions to address hypomobility throughout L/S and decreased mm length throughout hip musculature. ASSESSMENT:  Mr. Villa Hurd has attended 21 physical therapy sessions for low back pain. Pt is continuing to improve in his ability to perform more challenging LE and core stability exercises, indicating increased strength overall, but continues to have limited sitting tolerance. Pt has significant hypomobility of lumbar spinal segments with decreased ROM and core strength affecting pt ability to tolerate functional mobility, ADLs, and work tasks. Recommending continued skilled PT: manual therapeutic techniques (as appropriate), therapeutic exercises and activities, balance interventions, and a comprehensive home exercise program to address the current impairments, as listed above. Nathanael Zhu will benefit from skilled PT (medically necessary) to address above deficits affecting participation in basic ADLs and overall functional tolerance. PROBLEM LIST (Impacting functional limitations):  1. Decreased Strength  2. Decreased ADL/Functional Activities  3. Decreased Transfer Abilities  4. Decreased Ambulation Ability/Technique  5. Decreased Balance  6. Increased Pain  7. Decreased Flexibility/Joint Mobility  8. Decreased [place] with Home Exercise Program INTERVENTIONS PLANNED:  1. Balance Exercise  2. Cold  3. Cryotherapy  4. Electrical Stimulation  5. Family Education  6. Gait Training  7. Heat  8. Home Exercise Program (HEP)  9. Manual Therapy  10.  Neuromuscular Re-education/Strengthening  11. Range of Motion (ROM)  12. Therapeutic Activites  13. Therapeutic Exercise/Strengthening  14. Transcutaneous Electrical Nerve Stimulation (TENS)  15. Transfer Training   TREATMENT PLAN:  TREATMENT PLAN:  Effective Dates: 1X per week from 9/30/2020 until 12/30/2020 (12 weeks). Short-Term Functional Goals: Time Frame: 4 weeks  1. Pt will be compliant with HEP. Goal met 5/11/20  2. Pt will decreased worst pain to 5/10 or less in order to improved standing and sitting tolerance for work and ADLs, Goal met 5/11/20 with exception of heavier tasks for example fly fishing  3. Pt will decreased KAI to 13/50 or less in order to return to daily functional activities and work tasks. (PROGRESSING, 11/25/2020) 17/50  4. Pt will report sitting tolerance > 30 minutes with <5/10 pain for return to functional mobility and work tasks (PROGRESSING, 11/25/2020) - able to sit 30 min 6/10 (maintaining)  Discharge Goals: Time Frame: 8 weeks  1. Pt will be independent with HEP.  (MET, 11/25/2020)   2. Pt will decreased worst pain to 3/10 or less in order to improve standing and sitting tolerance for work and ADLs (PROGRESSING, 11/25/2020) worst 5/10 (maintaining)  3. Pt will decreased KAI to 8/50 or less in order to return to daily functional activities and work tasks (Larwance Goel, 11/25/2020)   4. Pt will report sitting tolerance > 60 minutes with < 3/10 pain for return to functional mobility and work tasks (PROGRESSING, 11/25/2020) able to sit  60min  7-8/10 (maintaing)      Rehabilitation Potential For Stated Goals: Good  Regarding Flip King therapy, I certify that the treatment plan above will be carried out by a therapist or under their direction. Thank you for this referral,  Alessandra Cerda, PT, DPT  Referring Physician Signature: Austin Gutiérrez MD                           The information in this section was collected on 5/11/20 (except where otherwise noted).   HISTORY: History of Present Injury/Illness (Reason for Referral):   Mechanical low back pain      CC/Primary Concern:      Pt reports a history of chronic lower back pain that began in his 35s. Pt reports having a lumbar fusion L5-S1 a few years ago but now with recent exaccerbation approximately 6-8 weeks ago. 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 Treatment Side: Bilateral central LBP      Past Medical History/Comorbidities:   Mr. Armas  has a past medical history of Arthritis (07/06/2015), Back pain, DDD (degenerative disc disease), lumbar, Dry eyes, Enlarged prostate, GERD (gastroesophageal reflux disease), History of basal cell cancer, History of squamous cell carcinoma, IBS (irritable bowel syndrome), Insomnia (07/06/2015), Left inguinal hernia, MVP (mitral valve prolapse), Onychomycosis (7/6/2015), RBBB (7/6/2015), Right wrist pain (7/6/2015), Scoliosis, and Spondylosis of lumbar region without myelopathy or radiculopathy. Mr. Armas  has a past surgical history that includes neurological procedure unlisted (2007); hx colonoscopy; hx orthopaedic; hx shoulder arthroscopy (Right); and hx orthopaedic (Left, 06/2020).   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 , Disp: 120 Tab, Rfl: 5    zolpidem (AMBIEN) 10 mg tablet, Take 1 Tab by mouth nightly as needed for Sleep. Max Daily Amount: 10 mg., Disp: 30 Tab, Rfl: 5    tadalafiL (CIALIS) 5 mg tablet, Take 1 Tab by mouth daily. (Patient taking differently: Take 5 mg by mouth nightly.), Disp: 90 Tab, Rfl: 3    meloxicam (Mobic) 15 mg tablet, Take 1 Tab by mouth daily. , Disp: 90 Tab, Rfl: 3    tamsulosin (FLOMAX) 0.4 mg capsule, Take 1 Cap by mouth daily. (Patient taking differently: Take 0.4 mg by mouth nightly.), Disp: 90 Cap, Rfl: 3    linaCLOtide (LINZESS) 145 mcg cap capsule, Take 1 Cap by mouth Daily (before breakfast). (Patient taking differently: Take 145 mcg by mouth daily as needed.), Disp: 90 Cap, Rfl: 3    Cetirizine (ZYRTEC) 10 mg cap, Take  by mouth daily. , Disp: , Rfl:     lidocaine (LIDODERM) 5 %, by TransDERmal route daily as needed. Apply patch to the affected area for 12 hours a day and remove for 12 hours a day. , Disp: , Rfl:     ipratropium (ATROVENT) 0.03 % nasal spray, 2 Sprays by Both Nostrils route every twelve (12) hours. (Patient taking differently: 2 Sprays by Both Nostrils route every twelve (12) hours as needed.), Disp: 30 mL, Rfl: 11    RESTASIS 0.05 % ophthalmic emulsion, INSTILL 1 DROP IN BOTH EYES TWICE DAILY, Disp: , Rfl: 12    omeprazole (PRILOSEC) 20 mg capsule, Take 20 mg by mouth daily as needed. , Disp: , Rfl:    Date Last Reviewed:  11/25/2020       Ambulatory/Rehab Services H2 Model Falls Risk Assessment    Risk Factors:       (1)  Gender [Male] Ability to Rise from Chair:       (1)  Pushes up, successful in one attempt    Falls Prevention Plan:       No modifications necessary   Total: (5 or greater = High Risk): 2    ©2010 [facility] [employer]. All Rights Reserved. [employer] Patent #0,[id number].  Federal Law prohibits the replication, distribution or use without written permission from [facility] [employer]        Number of Personal Factors/Comorbidities that affect the Plan of Care: 1-2: MODERATE COMPLEXITY   EXAMINATION:   Inspection        Pelvic alignment: good pelvic alignment (symmetrical), but R LE noted to be shorter on 7/27/2020        Additional Comments:   ________________________________________________________________________________________________  Observation          Posture: Decreased lumbar lordosis        Gait: Decreased Gait Speed, Decreased Stride Length and Decreased Step Length                  ________________________________________________________________________________________________  Range of Motion        Lumbar  Joint:   8/27/20 11/25/20    Right (Degrees/Percent) Left (Degrees/Percent)     Flexion  40 degrees pain  85 degrees, increased pulling and pain 70 degrees, limited by hamstring    Extension  15 degrees pain  25 degrees pain 32 degrees, slight pain   Side Bending  Knee joint WFL     Rotation  Knee joint WFL           Lower  Joint: Passive Passive Active Active    Right (Degrees) Left (Degrees) Right (Degrees) Left (Degrees)   Hip Flexion   [facility] WFL    Hip Extension   [facility]   Hip Abduction   [facility] WFL   Hip Internal Rotation    NT NT   Hip External Rotation   NT NT             Additional Comments: + decreased tissue extensibility HS, piriformis, glutes, R hip flexor > L , quads  ________________________________________________________________________________________________  Strength          Lower Extremity  Joint:   7/27/2020 7/27/2020 8/27/20 8/27/20 RIGHT LEFT RIGHT LEFT Right Left   Hip Flexion 5/5 5/5  4+/5 4/5 5/5 5/5   Hip Extension 5/5 5/5  3+/5 4-/5 5/5 5/5   Hip Internal Rotation 4/5 4/5 4+/5 4+/5 5/5 5/5   Hip External Rotation 4/5 4/5 4/5 4+/5 5/5 5/5   Hip Abduction 5/5 (5/5 pain) 5/5 4/5 4/5 5/5 5/5   Knee flexion 5/5 5/5 4+/5 4+/5 5/5 5/5   Knee extension 5/5 5/5 5/5 5/5       ________________________________________________________________________________________________  [facility]        C/O Radicular Symptoms: No        Additional Comments:   ________________________________________________________________________________________________            ________________________________________________________________________________________________  Palpation: lumbar paraspinals, increased tone noted  Joint mobilization: L1-L5 hypomobility with PA       Body Structures Involved:  9. Nerves  10. Bones  11. Joints  12. Muscles  13. Ligaments Body Functions Affected:  16. Sensory/Pain  17. Neuromusculoskeletal  18. Movement Related Activities and Participation Affected:  5. General Tasks and Demands  6. Mobility  7. Self Care  8. Domestic Life  9. Interpersonal Interactions and Relationships  10. Community, Social and [place]   Number of elements (examined above) that affect the Plan of Care: 4+: HIGH COMPLEXITY   CLINICAL PRESENTATION:   Presentation: Evolving clinical presentation with changing clinical characteristics: MODERATE COMPLEXITY   CLINICAL DECISION MAKING:   Outcome Measure: Tool Used: Modified Oswestry Low Back Pain Questionnaire  Score:  Initial:17/50  Most Recent: 21/50 (Date: 5/11/20 ) Most Recent: 21/50 (Date: 7/27/2020) Most recent: 20/50 (8/27/20) Most recent: 17/50 (11/25/20)   Interpretation of Score: Each section is scored on a 0-5 scale, 5 representing the greatest disability. The scores of each section are added together for a total score of 50.         Medical Necessity:   · Skilled intervention continues to be required due to decreased strength, balance, ROM, with increased pain affecting pt participation in daily functional mobility  Reason for Services/Other Comments:  · Patient continues to require skilled intervention due to decreased strength balance and ROM with increased pain affecting pt functional mobility and ADLS.    Use of outcome tool(s) and clinical judgement create a POC that gives a: Clear prediction of patient's progress: LOW COMPLEXITY        /

## 2020-11-25 NOTE — PROGRESS NOTES
Thania Candie  : 1950  [place] / Product Type: Medicare /  [address] at 614 [facility]  11 [facility], [address] [place] [address], [address]  [phone]   [phone]      OUTPATIENT PHYSICAL THERAPY: Daily Treatment Note 2020  Visit Count:  45 visits    ICD-10: Treatment Diagnosis:   M54.5 Low Back Pain   R26.2 Difficulty in Walking, Not Elsewhere Classified    Precautions/Allergies:   Patient has no known allergies. TREATMENT PLAN:  Effective Dates:  Twice per week from 2020 until 2020 (12 weeks). Frequency/Duration: 1X times a week for 90 Days     PRE-TREATMENT SYMPTOMS/COMPLAINTS: Pt reports some increased soreness after hunting but improvements in back pain today after a few days of rest.  MEDICATIONS REVIEWED:  2020   TREATMENT:   (In addition to Assessment/Re-Assessment sessions the following treatments were rendered)    MANUAL THERAPY: (40 minutes):   Joint mobilization, Soft tissue mobilization and Manipulation was utilized and necessary because of the patient's restricted joint motion, painful spasm, loss of articular motion and restricted motion of soft tissue. +PA L1-L5, B SI joints, Sacrum Grade III  + STM B lumbar paraspinals and QL  +Manual stretching B HS, glutes, piriformis, hip flexor, quads      (Used abbreviations: MET - muscle energy technique; PNF - proprioceptive neuromuscular facilitation; NMR - neuromuscular re-education; AP - anterior to posterior; PA - posterior to anterior)    MODALITIES: (15 minutes)  Estim level 37 with MHP for pain relief and promote mm relaxation      Assessment: Pt continues with hypomobility noted at L/S segments. Finds good relief with manual techniques. RECOMMENDATIONS/INTENT FOR NEXT TREATMENT SESSION: \"Next visit will focus on advancements to more challenging activities\".     PAIN: Initial: 3-4/10 Post Session: -3/10     Kimmy Portal    Total Treatment Billable Duration:   PT Patient Time In/Time Out  Time In: 0930  Time Out: 56  Christina Pretty, PT, DPT    Future Appointments   Date Time Provider Elisabeth Chauhan   12/9/2020 10:15 AM Rudean Primer [facility]   12/23/2020  9:30 AM Ruben Shipley [facility]        Visit Approval Visit # Therapist initials Date A NS Cx Comments    31 SHAISTA 0/14/08 [x]  [] [] Recert/progress note on 7/27 - visit 4 today    28 SHAISTA 8/20/20 [x] [] []     35 SHAISTA 8/27/20 [x] [] [] Progress note today. [address] [employer] [address] 9/9/20 [x] [] [] 1    37 SHAISTA 5/20/89 [x] [] [] Recert today-maintenance program 2    38 SHAISTA 10/7/20 [x] [] [] 3    39 SHAISTA 10/14/20 [x] [] [] 4    40 SHAISTA 10/21/20 [x] [] [] 5    41 SHAISTA 10/28/20 [x] [] [] 6    42 SHAISTA 11/4/20 [x] [] [] 7    43 SHAISTA 11/11/20 [x] [] [] 8, PN due over next 2 visits. 118 [facility] 11/18/20 [x] [] [] 9, PN due next visit.     850 [facility] 11/25/20 [x] [] [] PN today.       [] [] []        [] [] []        [] [] []        [] [] []        [] [] []

## 2020-12-09 ENCOUNTER — HOSPITAL ENCOUNTER (OUTPATIENT)
Dept: PHYSICAL THERAPY | Age: 70
Discharge: HOME | End: 2020-12-09
Payer: MEDICARE

## 2020-12-09 PROCEDURE — 97014 ELECTRIC STIMULATION THERAPY: CPT

## 2020-12-09 PROCEDURE — 97140 MANUAL THERAPY 1/> REGIONS: CPT

## 2020-12-09 NOTE — PROGRESS NOTES
Mauri Rebollar  : 1950  [place] / Product Type: Medicare /  [address] at 614 [facility], 101 [facility], [address]  [phone]   [phone]      OUTPATIENT PHYSICAL THERAPY: Daily Treatment Note 2020  Visit Count:  45 visits    ICD-10: Treatment Diagnosis:   M54.5 Low Back Pain   R26.2 Difficulty in Walking, Not Elsewhere Classified    Precautions/Allergies:   Patient has no known allergies. TREATMENT PLAN:  Effective Dates:  Twice per week from 2020 until 2020 (12 weeks). Frequency/Duration: 1X times a week for 90 Days     PRE-TREATMENT SYMPTOMS/COMPLAINTS: Pt reports the weather makes a big difference. Stating he could tell a difference in missing PT every week to every other. MEDICATIONS REVIEWED:  2020   TREATMENT:   (In addition to Assessment/Re-Assessment sessions the following treatments were rendered)    MANUAL THERAPY: (40 minutes):   Joint mobilization, Soft tissue mobilization and Manipulation was utilized and necessary because of the patient's restricted joint motion, painful spasm, loss of articular motion and restricted motion of soft tissue. +PA L1-L5, B SI joints, Sacrum Grade III  + STM B lumbar paraspinals and QL  +Manual stretching B HS, glutes, piriformis, hip flexor, quads      (Used abbreviations: MET - muscle energy technique; PNF - proprioceptive neuromuscular facilitation; NMR - neuromuscular re-education; AP - anterior to posterior; PA - posterior to anterior)    MODALITIES: (15 minutes)  Estim level 37 with MHP for pain relief and promote mm relaxation      Assessment: Pt continues with hypomobility noted at L/S segments. Finds good relief with manual techniques. Educated pt on ways to modify oblique exercises for core.   RECOMMENDATIONS/INTENT FOR NEXT TREATMENT SESSION: \"Next visit will focus on advancements to more challenging activities\". PAIN: Initial: 1-2/10 Post Session: 1-2/10     [employer] Portal    Total Treatment Billable Duration:   PT Patient Time In/Time Out  Time In: 1020  Time Out: 1115  Latha Abts, PT, DPT    Future Appointments   Date Time Provider Elisabeth Chauhan   12/23/2020  9:30 AM Janet Gaming [facility]        Visit Approval Visit # Therapist initials Date A NS Cx Comments    31 SHAISTA 1/33/92 [x]  [] [] Recert/progress note on 7/27 - visit 4 today    28 SHAISTA 8/20/20 [x] [] []     35 SHAISTA 8/27/20 [x] [] [] Progress note today. [address] 9/9/20 [x] [] [] 1    37 SHAISTA 1/05/95 [x] [] [] Recert today-maintenance program 2    38 SHAISTA 10/7/20 [x] [] [] 3    39 SHAISTA 10/14/20 [x] [] [] 4    40 SHAISTA 10/21/20 [x] [] [] 5    41 SHAISTA 10/28/20 [x] [] [] 6    42 SHAISTA 11/4/20 [x] [] [] 7    43 SHAISTA 11/11/20 [x] [] [] 8, PN due over next 2 visits. 118 [facility] 11/18/20 [x] [] [] 9, PN due next visit. [address] 11/25/20 [x] [] [] PN today.     [address] 12/9/20 [x] [] [] 1       [] [] []        [] [] []        [] [] []        [] [] []

## 2020-12-22 ENCOUNTER — HOSPITAL ENCOUNTER (OUTPATIENT)
Dept: PHYSICAL THERAPY | Age: 70
Discharge: HOME | End: 2020-12-22
Payer: MEDICARE

## 2020-12-22 PROCEDURE — 97014 ELECTRIC STIMULATION THERAPY: CPT

## 2020-12-22 PROCEDURE — 97140 MANUAL THERAPY 1/> REGIONS: CPT

## 2020-12-22 NOTE — PROGRESS NOTES
Orlando Real  : 1950  [place] / Product Type: Medicare /  [address]  at 614 [facility] 68, [address] [place] [address], [address]  [phone]   [phone]      OUTPATIENT PHYSICAL THERAPY: Daily Treatment Note 2020  Visit Count:  45 visits    ICD-10: Treatment Diagnosis:   M54.5 Low Back Pain   R26.2 Difficulty in Walking, Not Elsewhere Classified    Precautions/Allergies:   Patient has no known allergies. TREATMENT PLAN:  Effective Dates:  Twice per week from 2020 until 2020 (12 weeks). Frequency/Duration: 1X times a week for 90 Days     PRE-TREATMENT SYMPTOMS/COMPLAINTS: Pt reports his back is feeling more aggravated. MEDICATIONS REVIEWED:  2020   TREATMENT:   (In addition to Assessment/Re-Assessment sessions the following treatments were rendered)    MANUAL THERAPY: (40 minutes):   Joint mobilization, Soft tissue mobilization and Manipulation was utilized and necessary because of the patient's restricted joint motion, painful spasm, loss of articular motion and restricted motion of soft tissue. +PA L1-L5, B SI joints, Sacrum Grade III  + STM B lumbar paraspinals and QL  +Manual stretching B HS, glutes, piriformis, hip flexor, quads      (Used abbreviations: MET - muscle energy technique; PNF - proprioceptive neuromuscular facilitation; NMR - neuromuscular re-education; AP - anterior to posterior; PA - posterior to anterior)    MODALITIES: (15 minutes)  Estim level 38 with MHP for pain relief and promote mm relaxation      Assessment: Pt continues with hypomobility noted at L/S segments. Finds good relief with manual techniques. Educated pt on ways to modify oblique exercises for core. RECOMMENDATIONS/INTENT FOR NEXT TREATMENT SESSION: \"Next visit will focus on advancements to more challenging activities\".     PAIN: Initial: 2/10 Post Session: -2/10     [employer] Portal    Total Treatment Billable Duration:   PT Patient Time In/Time Out  Time In: 0930  Time Out: 1  Smita Moss, PT, DPT    Future Appointments   Date Time Provider Elisabeth Gissel   12/23/2020  7:00 PM Nestor James [facility]   1/6/2021  9:30 AM Michelle Bone A [facility]   1/20/2021  9:30 AM Nestor James [facility]        Visit Approval Visit # Therapist initials Date A NS Cx Comments    31 SHAISTA 2/63/82 [x]  [] [] Recert/progress note on 7/27 - visit 4 today    28 SHAISTA 8/20/20 [x] [] []     35 SHAISTA 8/27/20 [x] [] [] Progress note today. 111 [facility] 9/9/20 [x] [] [] 1    37 SHAISTA 4/06/36 [x] [] [] Recert today-maintenance program 2    38 SHAISTA 10/7/20 [x] [] [] 3    39 SHAISTA 10/14/20 [x] [] [] 4    40 SHAISTA 10/21/20 [x] [] [] 5    41 SHAISTA 10/28/20 [x] [] [] 6    42 SHAISTA 11/4/20 [x] [] [] 7    43 SHAISTA 11/11/20 [x] [] [] 8, PN due over next 2 visits. 118 [facility] 11/18/20 [x] [] [] 9, PN due next visit. 850 [facility] 11/25/20 [x] [] [] PN today.     [address] 12/9/20 [x] [] [] 1    52 SHAISTA 12/22/20 [x] [] [] 2 (pt will need recert next session POC expires 12/30/20)       [] [] []        [] [] []        [] [] []

## 2020-12-23 ENCOUNTER — APPOINTMENT (OUTPATIENT)
Dept: PHYSICAL THERAPY | Age: 70
End: 2020-12-23
Payer: MEDICARE

## 2021-01-06 ENCOUNTER — APPOINTMENT (OUTPATIENT)
Dept: PHYSICAL THERAPY | Age: 71
End: 2021-01-06
Payer: MEDICARE

## 2021-01-11 ENCOUNTER — APPOINTMENT (OUTPATIENT)
Dept: PHYSICAL THERAPY | Age: 71
End: 2021-01-11
Payer: MEDICARE

## 2021-01-13 ENCOUNTER — HOSPITAL ENCOUNTER (OUTPATIENT)
Dept: PHYSICAL THERAPY | Age: 71
Discharge: HOME | End: 2021-01-13
Payer: MEDICARE

## 2021-01-13 PROCEDURE — 97140 MANUAL THERAPY 1/> REGIONS: CPT

## 2021-01-13 PROCEDURE — 97014 ELECTRIC STIMULATION THERAPY: CPT

## 2021-01-13 NOTE — PROGRESS NOTES
Nataliia Held  : 1950  [place] / Product Type: Medicare /  [address]  at 614 [facility] 63, 101 \[facility]\"", [place] [address] [facility]  [phone]   [phone]      OUTPATIENT PHYSICAL THERAPY: Daily Treatment Note 2021  Visit Count:  48 visits    ICD-10: Treatment Diagnosis:   M54.5 Low Back Pain   R26.2 Difficulty in Walking, Not Elsewhere Classified    Precautions/Allergies:   Patient has no known allergies. TREATMENT PLAN:  Effective Dates:  Twice per week from 2021 until 2021 (90 days). Frequency/Duration: 1-2X per month for 90 Days     PRE-TREATMENT SYMPTOMS/COMPLAINTS: Pt reports continuing to have difficulty sitting for any amount of time. MEDICATIONS REVIEWED:  2021   TREATMENT:   (In addition to Assessment/Re-Assessment sessions the following treatments were rendered)    MANUAL THERAPY: (40 minutes):   Joint mobilization, Soft tissue mobilization and Manipulation was utilized and necessary because of the patient's restricted joint motion, painful spasm, loss of articular motion and restricted motion of soft tissue. +PA L1-L5, B SI joints, Sacrum Grade III  + STM B lumbar paraspinals and QL  +Manual stretching B HS, glutes, piriformis, hip flexor, quads      (Used abbreviations: MET - muscle energy technique; PNF - proprioceptive neuromuscular facilitation; NMR - neuromuscular re-education; AP - anterior to posterior; PA - posterior to anterior)    MODALITIES: (15 minutes)  Estim level 40 with MHP for pain relief and promote mm relaxation      Assessment: Pt continues with hypomobility noted at L/S segments. Finds good relief with manual techniques. Educated pt on ways to modify oblique exercises for core. RECOMMENDATIONS/INTENT FOR NEXT TREATMENT SESSION: \"Next visit will focus on advancements to more challenging activities\".     PAIN: Initial: 2/10 Post Session: 1/10     Kimmy Portal    Total Treatment Billable Duration:   PT Patient Time In/Time Out  Time In: 1015  Time Out: 0  Emilee Jenkins, PT, DPT    Future Appointments   Date Time Provider Elisabeth Chauhan   1/18/2021 11:15 AM Mulu Lester MD [facility]   1/20/2021  9:30 AM Velia Meghann [facility]        Visit Approval Visit # Therapist initials Date A NS Cx Comments    31 SHAISTA 0/36/31 [x]  [] [] Recert/progress note on 7/27 - visit 4 today    28 SHAISTA 8/20/20 [x] [] []     35 SHAISTA 8/27/20 [x] [] [] Progress note today. 111 [facility] 9/9/20 [x] [] [] 1    37 SHAISTA 7/94/72 [x] [] [] Recert today-maintenance program 2    38 SHAISTA 10/7/20 [x] [] [] 3    39 SHAISTA 10/14/20 [x] [] [] 4    40 SHAISTA 10/21/20 [x] [] [] 5    41 SHAISTA 10/28/20 [x] [] [] 6    42 SHAISTA 11/4/20 [x] [] [] 7    43 SHAISTA 11/11/20 [x] [] [] 8, PN due over next 2 visits. 118 [facility] 11/18/20 [x] [] [] 9, PN due next visit. [address] 11/25/20 [x] [] [] PN today.     [address] 12/9/20 [x] [] [] 1    47 SHAISTA 12/22/20 [x] [] [] 2 (pt will need recert next session POC expires 12/30/20)    48 SHAISTA 1/13/21 [x] [] [] PN and recert today       [] [] []        [] [] []

## 2021-01-13 NOTE — THERAPY RECERTIFICATION
Tho Sanders  : 1950  [place] / Product Type: Medicare /  [address]  at [facility]  [address], [address] [facility], [address]  [phone]   [phone]        OUTPATIENT PHYSICAL THERAPY:Progress Report and Recertification     ICD-10: Treatment Diagnosis:   M54.5 Low Back Pain   R26.2 Difficulty in Walking, Not Elsewhere Classified  Precautions/Allergies:   Patient has no known allergies. Fall Risk Score: 2 (? 5 = High Risk)  MD Orders: Evaluate and Treat MEDICAL/REFERRING DIAGNOSIS:  Other forms of scoliosis, lumbar region [M41.86]   DATE OF ONSET: Chronic  REFERRING PHYSICIAN: Treva Huffman MD  RETURN PHYSICIAN APPOINTMENT:    PN and Recertification :  Pt has been seen for a total of 47 visits to date. Pt is currently being treated under maintenance program as pt requiring skilled PT interventions to prevent exacerbation and worsening of symptoms. Pt did demonstrate improvements in lumbar flexion ROM and 5 point improvement in KAI. Pt will continue to benefit from skilled PT interventions. Recertification:  Pt with recent progress note on 20 demonstrating improvements in B LE strenght and lumbar ROM. Pt also with subjective improvements with PT compared to without. Have reduced visit frequency to 1X per week with hoping to transition to every other week depending on symptoms to prevent exacerbation of symptoms and maintain improvements with PT. Pt will continue to benefit from skilled PT interventions. PROGRESS REPORT 20: Pt has attended a total of 35 physical therapy session for lower back pain. Pt demonstrating improvements in B LE strength and improvements in lumbar ROM flexion and extension. Pt reports overall improvements in pain and stiffness throughout the day with slight improvement in sitting tolerance.  Pt reporting 1 point reduction on Oswestry compared to last session. Discussed decreasing visit frequency to 1x per week then to every other week depending on symptoms with hopes to transition fully to maintenance program of every other week to 1X per month. Pt will continue to benefit from skilled PT interventions to address hypomobility throughout L/S and decreased mm length throughout hip musculature. ASSESSMENT:  Mr. Justus Silveira has attended 21 physical therapy sessions for low back pain. Pt is continuing to improve in his ability to perform more challenging LE and core stability exercises, indicating increased strength overall, but continues to have limited sitting tolerance. Pt has significant hypomobility of lumbar spinal segments with decreased ROM and core strength affecting pt ability to tolerate functional mobility, ADLs, and work tasks. Recommending continued skilled PT: manual therapeutic techniques (as appropriate), therapeutic exercises and activities, balance interventions, and a comprehensive home exercise program to address the current impairments, as listed above. Cathy Toscano will benefit from skilled PT (medically necessary) to address above deficits affecting participation in basic ADLs and overall functional tolerance. PROBLEM LIST (Impacting functional limitations):  1. Decreased Strength  2. Decreased ADL/Functional Activities  3. Decreased Transfer Abilities  4. Decreased Ambulation Ability/Technique  5. Decreased Balance  6. Increased Pain  7. Decreased Flexibility/Joint Mobility  8. Decreased [place] with Home Exercise Program INTERVENTIONS PLANNED:  1. Balance Exercise  2. Cold  3. Cryotherapy  4. Electrical Stimulation  5. Family Education  6. Gait Training  7. Heat  8. Home Exercise Program (HEP)  9. Manual Therapy  10. Neuromuscular Re-education/Strengthening  11. Range of Motion (ROM)  12. Therapeutic Activites  13. Therapeutic Exercise/Strengthening  14.  Transcutaneous Electrical Nerve Stimulation (TENS)  15. Transfer Training   TREATMENT PLAN:  TREATMENT PLAN:  Effective Dates: 1-2X per month from 1/13/2021 until 4/13/2021 (90 days). Short-Term Functional Goals: Time Frame: 4 weeks  1. Pt will be compliant with HEP. Goal met 5/11/20  2. Pt will decreased worst pain to 5/10 or less in order to improved standing and sitting tolerance for work and ADLs, Goal met 5/11/20 with exception of heavier tasks for example fly fishing  3. Pt will decreased KAI to 13/50 or less in order to return to daily functional activities and work tasks. (PROGRESSING, 1/13/2021) 15/50  4. Pt will report sitting tolerance > 30 minutes with <5/10 pain for return to functional mobility and work tasks (PROGRESSING, 1/13/2021) - able to sit 30 min 6/10   Discharge Goals: Time Frame: 8 weeks  1. Pt will be independent with HEP.  (MET, 1/13/2021)   2. Pt will decreased worst pain to 3/10 or less in order to improve standing and sitting tolerance for work and ADLs (PROGRESSING, 1/13/2021) worst 4/10  3. Pt will decreased KAI to 8/50 or less in order to return to daily functional activities and work tasks ([place], 1/13/2021)   4. Pt will report sitting tolerance > 60 minutes with < 3/10 pain for return to functional mobility and work tasks (PROGRESSING, 1/13/2021) able to sit  60min  7-8/10      Rehabilitation Potential For Stated Goals: Good  Regarding Corlis Dux therapy, I certify that the treatment plan above will be carried out by a therapist or under their direction. Thank you for this referral,  Rangel Salgado, PT, DPT  Referring Physician Signature: Charlie Wheat MD          Date                                   The information in this section was collected on 5/11/20 (except where otherwise noted). HISTORY:   History of Present Injury/Illness (Reason for Referral):   Mechanical low back pain      CC/Primary Concern:      Pt reports a history of chronic lower back pain that began in his 35s.  Pt reports having a lumbar fusion L5-S1 a few years ago but now with recent exaccerbation approximately 6-8 weeks ago. 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 Treatment Side: Bilateral central LBP      Past Medical History/Comorbidities:   Mr. Luda Waters  has a past medical history of Arthritis (07/06/2015), Back pain, DDD (degenerative disc disease), lumbar, Dry eyes, Enlarged prostate, GERD (gastroesophageal reflux disease), History of basal cell cancer, History of squamous cell carcinoma, IBS (irritable bowel syndrome), Insomnia (07/06/2015), Left inguinal hernia, MVP (mitral valve prolapse), Onychomycosis (7/6/2015), RBBB (7/6/2015), Right wrist pain (7/6/2015), Scoliosis, and Spondylosis of lumbar region without myelopathy or radiculopathy. Mr. Luda Waters  has a past surgical history that includes neurological procedure unlisted (2007); hx colonoscopy; hx orthopaedic; hx shoulder arthroscopy (Right); and hx orthopaedic (Left, 06/2020).   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 , Disp: 120 Tab, Rfl: 5    zolpidem (AMBIEN) 10 mg tablet, Take 1 Tab by mouth nightly as needed for Sleep. Max Daily Amount: 10 mg., Disp: 30 Tab, Rfl: 5    tadalafiL (CIALIS) 5 mg tablet, Take 1 Tab by mouth daily. (Patient taking differently: Take 5 mg by mouth nightly.), Disp: 90 Tab, Rfl: 3    meloxicam (Mobic) 15 mg tablet, Take 1 Tab by mouth daily. , Disp: 90 Tab, Rfl: 3    tamsulosin (FLOMAX) 0.4 mg capsule, Take 1 Cap by mouth daily. (Patient taking differently: Take 0.4 mg by mouth nightly.), Disp: 90 Cap, Rfl: 3    linaCLOtide (LINZESS) 145 mcg cap capsule, Take 1 Cap by mouth Daily (before breakfast). (Patient taking differently: Take 145 mcg by mouth daily as needed.), Disp: 90 Cap, Rfl: 3    Cetirizine (ZYRTEC) 10 mg cap, Take  by mouth daily. , Disp: , Rfl:     lidocaine (LIDODERM) 5 %, by TransDERmal route daily as needed. Apply patch to the affected area for 12 hours a day and remove for 12 hours a day. , Disp: , Rfl:     ipratropium (ATROVENT) 0.03 % nasal spray, 2 Sprays by Both Nostrils route every twelve (12) hours. (Patient taking differently: 2 Sprays by Both Nostrils route every twelve (12) hours as needed.), Disp: 30 mL, Rfl: 11    RESTASIS 0.05 % ophthalmic emulsion, INSTILL 1 DROP IN BOTH EYES TWICE DAILY, Disp: , Rfl: 12    omeprazole (PRILOSEC) 20 mg capsule, Take 20 mg by mouth daily as needed. , Disp: , Rfl:    Date Last Reviewed:  1/13/2021       Ambulatory/Rehab Services H2 Model Falls Risk Assessment    Risk Factors:       (1)  Gender [Male] Ability to Rise from Chair:       (1)  Pushes up, successful in one attempt    Falls Prevention Plan:       No modifications necessary   Total: (5 or greater = High Risk): 2    ©2010 [facility] [employer] States Patent #[id number].  Federal Law prohibits the replication, distribution or use without written permission from [facility] of [address] [place] [address]        Number of Personal Factors/Comorbidities that affect the Plan of Care: 1-2: MODERATE COMPLEXITY   EXAMINATION:   Inspection        Pelvic alignment: good pelvic alignment (symmetrical), but R LE noted to be shorter on 7/27/2020        Additional Comments:   ________________________________________________________________________________________________  Observation          Posture: Decreased lumbar lordosis        Gait: Decreased Gait Speed, Decreased Stride Length and Decreased Step Length                  ________________________________________________________________________________________________  Range of Motion        Lumbar  Joint:   8/27/20 1/13/21    Right (Degrees/Percent) Left (Degrees/Percent)     Flexion  40 degrees pain  85 degrees, increased pulling and pain 89 degrees, slight pain   Extension  15 degrees pain  25 degrees pain 25 degrees, slight pain   Side Bending  Knee joint WFL     Rotation  Knee joint WFL           Lower  Joint: Passive Passive Active Active    Right (Degrees) Left (Degrees) Right (Degrees) Left (Degrees)   Hip Flexion   [facility] WFL    Hip Extension   [facility]   Hip Abduction   [facility] WFL   Hip Internal Rotation    NT NT   Hip External Rotation   NT NT             Additional Comments: + decreased tissue extensibility HS, piriformis, glutes, R hip flexor > L , quads  ________________________________________________________________________________________________  Strength          Lower Extremity  Joint:   7/27/2020 7/27/2020 8/27/20 8/27/20 1/13/21 1/13/21    RIGHT LEFT RIGHT LEFT Right Left Right Left   Hip Flexion 5/5  5/5  4+/5 4/5 5/5 5/5 5/5 5/5   Hip Extension 5/5  5/5  3+/5 4-/5 5/5 5/5 5/5 5/5   Hip Internal Rotation 4/5 4/5 4+/5 4+/5 5/5 5/5 5/5 5/5   Hip External Rotation 4/5 4/5 4/5 4+/5 5/5 5/5 5/5 5/5   Hip Abduction 5/5 (5/5 pain) 5/5  4/5 4/5 5/5 5/5 5/5 5/5   Knee flexion 5/5 5/5 4+/5 4+/5 5/5 5/5 5/5 5/5   Knee extension 5/5 5/5 5/5 5/5         ________________________________________________________________________________________________  [facility]        C/O Radicular Symptoms: No        Additional Comments:   ________________________________________________________________________________________________            ________________________________________________________________________________________________  Palpation: lumbar paraspinals, increased tone noted  Joint mobilization: L1-L5 hypomobility with PA       Body Structures Involved:  9. Nerves  10. Bones  11. Joints  12. Muscles  13. Ligaments Body Functions Affected:  16. Sensory/Pain  17. Neuromusculoskeletal  18. Movement Related Activities and Participation Affected:  5. General Tasks and Demands  6. Mobility  7. Self Care  8. Domestic Life  9. Interpersonal Interactions and Relationships  10. Community, Social and [place]   Number of elements (examined above) that affect the Plan of Care: 4+: HIGH COMPLEXITY   CLINICAL PRESENTATION:   Presentation: Evolving clinical presentation with changing clinical characteristics: MODERATE COMPLEXITY   CLINICAL DECISION MAKING:   Outcome Measure: Tool Used: Modified Oswestry Low Back Pain Questionnaire  Score:  Initial:17/50  Most Recent: 21/50 (Date: 5/11/20 ) Most Recent: 21/50 (Date: 7/27/2020) Most recent: 20/50 (8/27/20) Most recent: 15/50 (1/13/21)   Interpretation of Score: Each section is scored on a 0-5 scale, 5 representing the greatest disability. The scores of each section are added together for a total score of 50.         Medical Necessity:   · Skilled intervention continues to be required due to decreased strength, balance, ROM, with increased pain affecting pt participation in daily functional mobility  Reason for Services/Other Comments:  · Patient continues to require skilled intervention due to decreased strength balance and ROM with increased pain affecting pt functional mobility and ADLS.    Use of outcome tool(s) and clinical judgement create a POC that gives a: Clear prediction of patient's progress: LOW COMPLEXITY        /

## 2021-01-20 ENCOUNTER — APPOINTMENT (OUTPATIENT)
Dept: PHYSICAL THERAPY | Age: 71
End: 2021-01-20
Payer: MEDICARE

## 2021-01-27 ENCOUNTER — HOSPITAL ENCOUNTER (OUTPATIENT)
Dept: PHYSICAL THERAPY | Age: 71
Discharge: HOME | End: 2021-01-27
Payer: MEDICARE

## 2021-01-27 PROCEDURE — 97140 MANUAL THERAPY 1/> REGIONS: CPT

## 2021-01-27 PROCEDURE — 97014 ELECTRIC STIMULATION THERAPY: CPT

## 2021-01-27 NOTE — PROGRESS NOTES
Jimi Joseph  : 1950  [place] / Product Type: Medicare /  [address]  at [facility]  Romina Andrews [facility], [address], [place] [address] [address] [facility]  [phone]   [phone]      OUTPATIENT PHYSICAL THERAPY: Daily Treatment Note 2021  Visit Count:  49 visits    ICD-10: Treatment Diagnosis:   M54.5 Low Back Pain   R26.2 Difficulty in Walking, Not Elsewhere Classified    Precautions/Allergies:   Patient has no known allergies. TREATMENT PLAN:  Effective Dates:  Twice per week from 2021 until 2021 (90 days). Frequency/Duration: 1-2X per month for 90 Days     PRE-TREATMENT SYMPTOMS/COMPLAINTS: Pt reports his back has been more aggravated for the past 2 weeks. Pt unable to call any specific activity that irritated it. MEDICATIONS REVIEWED:  2021   TREATMENT:   (In addition to Assessment/Re-Assessment sessions the following treatments were rendered)    MANUAL THERAPY: (40 minutes):   Joint mobilization, Soft tissue mobilization and Manipulation was utilized and necessary because of the patient's restricted joint motion, painful spasm, loss of articular motion and restricted motion of soft tissue. +PA L1-L5, B SI joints, Sacrum Grade III  + STM B lumbar paraspinals and QL  +Manual stretching B HS, glutes, piriformis, hip flexor, quads      (Used abbreviations: MET - muscle energy technique; PNF - proprioceptive neuromuscular facilitation; NMR - neuromuscular re-education; AP - anterior to posterior; PA - posterior to anterior)    MODALITIES: (15 minutes)  Estim level 41 with MHP for pain relief and promote mm relaxation      Assessment: Pt continues with hypomobility noted at L/S segments. Finds good relief with manual techniques. Educated pt on ways to modify oblique exercises for core.   RECOMMENDATIONS/INTENT FOR NEXT TREATMENT SESSION: \"Next visit will focus on advancements to more challenging activities\". PAIN: Initial: 3/10 Post Session: 1/10     [employer] Portal    Total Treatment Billable Duration:   PT Patient Time In/Time Out  Time In: 1100  Time Out: 200  Jimmy Ax, PT, DPT    Future Appointments   Date Time Provider Elisabeth Gissel   1/27/2021  4:20 PM Radha Robertson MD [facility]        Visit Approval Visit # Therapist initials Date A NS Cx Comments    31 SHAISTA 3/34/01 [x]  [] [] Recert/progress note on 7/27 - visit 4 today    28 SHAISTA 8/20/20 [x] [] []     35 SHAISTA 8/27/20 [x] [] [] Progress note today. 111 [facility] 9/9/20 [x] [] [] 1    37 SHAISTA 0/58/42 [x] [] [] Recert today-maintenance program 2    38 SHAISTA 10/7/20 [x] [] [] 3    39 SHAISTA 10/14/20 [x] [] [] 4    40 SHAISTA 10/21/20 [x] [] [] 5    41 SHAISTA 10/28/20 [x] [] [] 6    42 SHAISTA 11/4/20 [x] [] [] 7    43 SHAISTA 11/11/20 [x] [] [] 8, PN due over next 2 visits. 118 [facility] 11/18/20 [x] [] [] 9, PN due next visit. [address] 11/25/20 [x] [] [] PN today.     Jose [address] 12/9/20 [x] [] [] 1    47 SHAISTA 12/22/20 [x] [] [] 2 (pt will need recert next session POC expires 12/30/20)    48 SHAISTA 1/13/21 [x] [] [] PN and recert today    49 SHAISTA 1/27/21 [x] [] [] 1       [] [] []

## 2021-02-10 ENCOUNTER — HOSPITAL ENCOUNTER (OUTPATIENT)
Dept: PHYSICAL THERAPY | Age: 71
Discharge: HOME | End: 2021-02-10
Payer: MEDICARE

## 2021-02-10 PROCEDURE — 97140 MANUAL THERAPY 1/> REGIONS: CPT

## 2021-02-10 PROCEDURE — 97014 ELECTRIC STIMULATION THERAPY: CPT

## 2021-02-10 NOTE — PROGRESS NOTES
Wendie Lozoya  : 1950  [place] / Product Type: Medicare /  [address]  at [address]  [phone]   [phone]      OUTPATIENT PHYSICAL THERAPY: Daily Treatment Note 2/10/2021  Visit Count:  50 visits    ICD-10: Treatment Diagnosis:   M54.5 Low Back Pain   R26.2 Difficulty in Walking, Not Elsewhere Classified    Precautions/Allergies:   Patient has no known allergies. TREATMENT PLAN:  Effective Dates:  Twice per week from 2021 until 2021 (90 days). Frequency/Duration: 1-2X per month for 90 Days     PRE-TREATMENT SYMPTOMS/COMPLAINTS: Pt reports his back doesn't feel as bad in the mornings 3/10 today but reports with 5-6/10 in the afternoons lately. Pt reports following up with Dr. Kristen Zapata in the next couple weeks to see if any progression of scoliosis. MEDICATIONS REVIEWED:  2/10/2021   TREATMENT:   (In addition to Assessment/Re-Assessment sessions the following treatments were rendered)    MANUAL THERAPY: (40 minutes):   Joint mobilization, Soft tissue mobilization and Manipulation was utilized and necessary because of the patient's restricted joint motion, painful spasm, loss of articular motion and restricted motion of soft tissue. +PA L1-L5, B SI joints, Sacrum Grade III  + STM B lumbar paraspinals and QL  +Manual stretching B HS, glutes, piriformis, hip flexor, quads      (Used abbreviations: MET - muscle energy technique; PNF - proprioceptive neuromuscular facilitation; NMR - neuromuscular re-education; AP - anterior to posterior; PA - posterior to anterior)    MODALITIES: (15 minutes)  Estim level 48 with MHP for pain relief and promote mm relaxation      Assessment: Pt continues with hypomobility noted at L/S segments. Finds fair relief with manual techniques.      RECOMMENDATIONS/INTENT FOR NEXT TREATMENT SESSION: \"Next visit will focus on advancements to more challenging activities\". PAIN: Initial: 3/10 Post Session: 2/10     [employer] Portal    Total Treatment Billable Duration:   PT Patient Time In/Time Out  Time In: 0935  Time Out: 56  Sosa Sanchez, PT, DPT    Future Appointments   Date Time Provider Elisabeth Gissel   2/19/2021  2:40 PM Jigna Remy MD [id number]        Visit Approval Visit # Therapist initials Date A NS Cx Comments    31 SHAISTA 7/23/86 [x]  [] [] Recert/progress note on 7/27 - visit 4 today    28 SHAISTA 8/20/20 [x] [] []     35 SHAISTA 8/27/20 [x] [] [] Progress note today. 111 [facility] 9/9/20 [x] [] [] 1    37 SHAISTA 5/22/72 [x] [] [] Recert today-maintenance program 2    38 SHAISTA 10/7/20 [x] [] [] 3    39 SHAISTA 10/14/20 [x] [] [] 4    40 SHAISTA 10/21/20 [x] [] [] 5    41 SHAISTA 10/28/20 [x] [] [] 6    42 SHAISTA 11/4/20 [x] [] [] 7    43 SHAISTA 11/11/20 [x] [] [] 8, PN due over next 2 visits. 118 [facility] 11/18/20 [x] [] [] 9, PN due next visit. [address] 11/25/20 [x] [] [] PN today.     Jose 94 12/9/20 [x] [] [] 1    47 SHAISTA 12/22/20 [x] [] [] 2 (pt will need recert next session POC expires 12/30/20)    48 SHAISTA 1/13/21 [x] [] [] PN and recert today    49 SHAISTA 1/27/21 [x] [] [] 1    50 SHAISTA 2/10/21 [x] [] [] 2

## 2021-02-23 ENCOUNTER — HOSPITAL ENCOUNTER (OUTPATIENT)
Dept: PHYSICAL THERAPY | Age: 71
Discharge: HOME | End: 2021-02-23
Payer: MEDICARE

## 2021-02-23 PROCEDURE — 97140 MANUAL THERAPY 1/> REGIONS: CPT

## 2021-02-23 PROCEDURE — 97014 ELECTRIC STIMULATION THERAPY: CPT

## 2021-02-23 NOTE — PROGRESS NOTES
Nataliia Held  : 1950  [place] / Product Type: Medicare /  [address] [place]  at [address] [facility] [address], [address] [facility], [place] [address] [address] [facility]  [phone]   [phone]      OUTPATIENT PHYSICAL THERAPY: Daily Treatment Note 2021  Visit Count:  51 visits    ICD-10: Treatment Diagnosis:   M54.5 Low Back Pain   R26.2 Difficulty in Walking, Not Elsewhere Classified    Precautions/Allergies:   Patient has no known allergies. TREATMENT PLAN:  Effective Dates:  Twice per week from 2021 until 2021 (90 days). Frequency/Duration: 1-2X per month for 90 Days     PRE-TREATMENT SYMPTOMS/COMPLAINTS: Pt reports following up with Dr. Patricio Gutierrez and scoliosis is unchanged at this time. Pt reports finding good relief with therapy after last session. Stating he can really feel a difference in his back pain with versus without PT.   MEDICATIONS REVIEWED:  2021   TREATMENT:   (In addition to Assessment/Re-Assessment sessions the following treatments were rendered)    MANUAL THERAPY: (40 minutes):   Joint mobilization, Soft tissue mobilization and Manipulation was utilized and necessary because of the patient's restricted joint motion, painful spasm, loss of articular motion and restricted motion of soft tissue. +PA L1-L5, B SI joints, Sacrum Grade III  + STM B lumbar paraspinals and QL  +Manual stretching B HS, glutes, piriformis, hip flexor, quads      (Used abbreviations: MET - muscle energy technique; PNF - proprioceptive neuromuscular facilitation; NMR - neuromuscular re-education; AP - anterior to posterior; PA - posterior to anterior)    MODALITIES: (15 minutes)  Estim level 38 with MHP for pain relief and promote mm relaxation      Assessment: Pt continues with hypomobility noted at L/S segments. Finds fair relief with manual techniques.      RECOMMENDATIONS/INTENT FOR NEXT TREATMENT SESSION: \"Next visit will focus on advancements to more challenging activities\". PAIN: Initial: 2/10 Post Session: 1/10     [email or web address] Portal    Total Treatment Billable Duration:   PT Patient Time In/Time Out  Time In: 0930  Time Out: 1  Arvind Wade, PT, DPT    Future Appointments   Date Time Provider Elisabeth Gissel   3/19/2021  2:00 PM Gianfranco Horton MD [id number]        Visit Approval Visit # Therapist initials Date A NS Cx Comments    31 SHAISTA 5/53/20 [x]  [] [] Recert/progress note on 7/27 - visit 4 today    28 SHAISTA 8/20/20 [x] [] []     35 SHAISTA 8/27/20 [x] [] [] Progress note today. 111 [facility] 9/9/20 [x] [] [] 1    37 SHAISTA 7/64/45 [x] [] [] Recert today-maintenance program 2    38 SHAISTA 10/7/20 [x] [] [] 3    39 SHAISTA 10/14/20 [x] [] [] 4    40 SHAISTA 10/21/20 [x] [] [] 5    41 SHAISTA 10/28/20 [x] [] [] 6    42 SHAISTA 11/4/20 [x] [] [] 7    43 SHAISTA 11/11/20 [x] [] [] 8, PN due over next 2 visits. 118 [facility] 11/18/20 [x] [] [] 9, PN due next visit. [address] 11/25/20 [x] [] [] PN today.     Jose 94 12/9/20 [x] [] [] 1    47 SHAISTA 12/22/20 [x] [] [] 2 (pt will need recert next session POC expires 12/30/20)    48 SHAISTA 1/13/21 [x] [] [] PN and recert today    49 SHAISTA 1/27/21 [x] [] [] 1    50 SHAISTA 2/10/21 [x] [] [] 2    51 SHAISTA 2/23/21 X   3

## 2021-03-10 ENCOUNTER — HOSPITAL ENCOUNTER (OUTPATIENT)
Dept: PHYSICAL THERAPY | Age: 71
Discharge: HOME | End: 2021-03-10
Payer: MEDICARE

## 2021-03-10 PROCEDURE — 97014 ELECTRIC STIMULATION THERAPY: CPT

## 2021-03-10 PROCEDURE — 97140 MANUAL THERAPY 1/> REGIONS: CPT

## 2021-03-10 NOTE — PROGRESS NOTES
Vicente Garcia  : 1950  [place] / Product Type: Medicare /  [address]  [facility] [address] [facility], [place] [address] [address] [facility]  [phone]   [phone]      OUTPATIENT PHYSICAL THERAPY: Daily Treatment Note 3/10/2021  Visit Count:  52 visits    ICD-10: Treatment Diagnosis:   M54.5 Low Back Pain   R26.2 Difficulty in Walking, Not Elsewhere Classified    Precautions/Allergies:   Patient has no known allergies. TREATMENT PLAN:  Effective Dates:  Twice per week from 2021 until 2021 (90 days). Frequency/Duration: 1-2X per month for 90 Days     PRE-TREATMENT SYMPTOMS/COMPLAINTS: Pt reports more soreness today due to doing more pruning of bushes at [address] over the weekend. MEDICATIONS REVIEWED:  3/10/2021   TREATMENT:   (In addition to Assessment/Re-Assessment sessions the following treatments were rendered)    MANUAL THERAPY: (40 minutes):   Joint mobilization, Soft tissue mobilization and Manipulation was utilized and necessary because of the patient's restricted joint motion, painful spasm, loss of articular motion and restricted motion of soft tissue. +PA L1-L5, B SI joints, Sacrum Grade III  + STM B lumbar paraspinals and QL  +Manual stretching B HS, glutes, piriformis, hip flexor, quads      (Used abbreviations: MET - muscle energy technique; PNF - proprioceptive neuromuscular facilitation; NMR - neuromuscular re-education; AP - anterior to posterior; PA - posterior to anterior)    MODALITIES: (15 minutes)  Estim level 40 with MHP for pain relief and promote mm relaxation      Assessment: Pt continues with hypomobility noted at L/S segments. Finds fair relief with manual techniques. RECOMMENDATIONS/INTENT FOR NEXT TREATMENT SESSION: \"Next visit will focus on advancements to more challenging activities\".     PAIN: Initial: 3/10 Post Session: 2/10     [employer] Portal    Total Treatment Billable Duration:      Jeffrey Goldsmith, PT, DPT    Future Appointments   Date Time Provider Elisabeth Chauhan   3/10/2021  9:30 AM [facility]   3/19/2021  2:00 PM Mihai Howe MD [id number]        Visit Approval Visit # Therapist initials Date A NS Cx Comments    31 SHAITSA 4/33/69 [x]  [] [] Recert/progress note on 7/27 - visit 4 today    28 SHAISTA 8/20/20 [x] [] []     35 SHAISTA 8/27/20 [x] [] [] Progress note today. [address] 9/9/20 [x] [] [] 1    37 SHAISTA 3/59/01 [x] [] [] Recert today-maintenance program 2    38 SHAISTA 10/7/20 [x] [] [] 3    39 SHAISTA 10/14/20 [x] [] [] 4    40 SHAISTA 10/21/20 [x] [] [] 5    41 SHAISTA 10/28/20 [x] [] [] 6    42 SHAISTA 11/4/20 [x] [] [] 7    43 SHAISTA 11/11/20 [x] [] [] 8, PN due over next 2 visits. [facility] 11/18/20 [x] [] [] 9, PN due next visit. [address] [facility] 11/25/20 [x] [] [] PN today.     [address] 12/9/20 [x] [] [] 1    47 SHAISTA 12/22/20 [x] [] [] 2 (pt will need recert next session POC expires 12/30/20)    48 SHAISTA 1/13/21 [x] [] [] PN and recert today    49 SHAISTA 1/27/21 [x] [] [] 1    50 SHAISTA 2/10/21 [x] [] [] 2    51 SHAISTA 2/23/21 X   3    52 SHAISTA 3/10/21 x   4

## 2021-03-24 ENCOUNTER — HOSPITAL ENCOUNTER (OUTPATIENT)
Dept: PHYSICAL THERAPY | Age: 71
Discharge: HOME | End: 2021-03-24
Payer: MEDICARE

## 2021-03-24 PROCEDURE — 97140 MANUAL THERAPY 1/> REGIONS: CPT

## 2021-03-24 PROCEDURE — 97014 ELECTRIC STIMULATION THERAPY: CPT

## 2021-03-24 NOTE — PROGRESS NOTES
Tho Sanders  : 1950  [place] / Product Type: Medicare /  [address]  at [facility]  [address], [address] \[facility]", [address]  [phone]   [phone]      OUTPATIENT PHYSICAL THERAPY: Daily Treatment Note 3/24/2021  Visit Count:  53 visits    ICD-10: Treatment Diagnosis:   M54.5 Low Back Pain   R26.2 Difficulty in Walking, Not Elsewhere Classified    Precautions/Allergies:   Patient has no known allergies. TREATMENT PLAN:  Effective Dates:  Twice per week from 2021 until 2021 (90 days). Frequency/Duration: 1-2X per month for 90 Days     PRE-TREATMENT SYMPTOMS/COMPLAINTS: Pt reports back is feeling a little bit better today. MEDICATIONS REVIEWED:  3/24/2021   TREATMENT:   (In addition to Assessment/Re-Assessment sessions the following treatments were rendered)    MANUAL THERAPY: (40 minutes):   Joint mobilization, Soft tissue mobilization and Manipulation was utilized and necessary because of the patient's restricted joint motion, painful spasm, loss of articular motion and restricted motion of soft tissue. +PA L1-L5, B SI joints, Sacrum Grade III  + STM B lumbar paraspinals and QL  +Manual stretching B HS, glutes, piriformis, hip flexor, quads      (Used abbreviations: MET - muscle energy technique; PNF - proprioceptive neuromuscular facilitation; NMR - neuromuscular re-education; AP - anterior to posterior; PA - posterior to anterior)    MODALITIES: (15 minutes)  Estim level 39 with MHP for pain relief and promote mm relaxation      Assessment: Pt continues with hypomobility noted at L/S segments. Finds fair relief with manual techniques. RECOMMENDATIONS/INTENT FOR NEXT TREATMENT SESSION: \"Next visit will focus on advancements to more challenging activities\".     PAIN: Initial: 2/10, 4/10 first thing in AM Post Session: 1-2/10     [employer] Portal    Total Treatment Billable Duration: PT Patient Time In/Time Out  Time In: 0930  Time Out: 1030  Raphael Rosetta, PT, DPT    Future Appointments   Date Time Provider Elisabeth Chauhan   4/7/2021  9:30 AM Renate Connor [facility]   3/21/2022  2:10 PM Robin Chappell MD [id number]        Visit Approval Visit # Therapist initials Date A NS Cx Comments    31 SHAISTA 7/42/19 [x]  [] [] Recert/progress note on 7/27 - visit 4 today    28 SHAISTA 8/20/20 [x] [] []     35 SHAISTA 8/27/20 [x] [] [] Progress note today. [address] 9/9/20 [x] [] [] 1    37 SHAISTA 9/40/59 [x] [] [] Recert today-maintenance program 2    38 SHAISTA 10/7/20 [x] [] [] 3    39 SHAISTA 10/14/20 [x] [] [] 4    40 SHAISTA 10/21/20 [x] [] [] 5    41 SHAISTA 10/28/20 [x] [] [] 6    42 SHAISTA 11/4/20 [x] [] [] 7    43 SHAISTA 11/11/20 [x] [] [] 8, PN due over next 2 visits. [facility] 11/18/20 [x] [] [] 9, PN due next visit. [facility] 11/25/20 [x] [] [] PN today.     [address] 12/9/20 [x] [] [] 1    47 SHAISTA 12/22/20 [x] [] [] 2 (pt will need recert next session POC expires 12/30/20)    48 SHAISTA 1/13/21 [x] [] [] PN and recert today    49 SHAISTA 1/27/21 [x] [] [] 1    50 SHAISTA 2/10/21 [x] [] [] 2    51 SHAISTA 2/23/21 X   3    52 SHAISTA 3/10/21 x   4    53 SHAISTA 3/24/21 x   5

## 2021-04-07 ENCOUNTER — HOSPITAL ENCOUNTER (OUTPATIENT)
Dept: PHYSICAL THERAPY | Age: 71
Discharge: HOME | End: 2021-04-07
Payer: MEDICARE

## 2021-04-07 PROCEDURE — 97014 ELECTRIC STIMULATION THERAPY: CPT

## 2021-04-07 PROCEDURE — 97140 MANUAL THERAPY 1/> REGIONS: CPT

## 2021-04-07 NOTE — PROGRESS NOTES
Gerard Dodge  : 1950  [place] / Product Type: Medicare /  [address]  at [address] [facility] [address], [address], [facility], [address] [facility]  [phone]   [phone]      OUTPATIENT PHYSICAL THERAPY: Daily Treatment Note 2021  Visit Count:  54 visits    ICD-10: Treatment Diagnosis:   M54.5 Low Back Pain   R26.2 Difficulty in Walking, Not Elsewhere Classified    Precautions/Allergies:   Patient has no known allergies. TREATMENT PLAN:  Effective Dates:  1-2X per month from 2021 until 2021 (90 days). Frequency/Duration: 1-2X per month for 90 Days     PRE-TREATMENT SYMPTOMS/COMPLAINTS: Pt reports back is more aggravated today with fishing and taking long car trip to fishing spot. MEDICATIONS REVIEWED:  2021   TREATMENT:   (In addition to Assessment/Re-Assessment sessions the following treatments were rendered)    MANUAL THERAPY: (40 minutes):   Joint mobilization, Soft tissue mobilization and Manipulation was utilized and necessary because of the patient's restricted joint motion, painful spasm, loss of articular motion and restricted motion of soft tissue. +PA L1-L5, B SI joints, Sacrum Grade III  + STM B lumbar paraspinals and QL  +Manual stretching B HS, glutes, piriformis, hip flexor, quads      (Used abbreviations: MET - muscle energy technique; PNF - proprioceptive neuromuscular facilitation; NMR - neuromuscular re-education; AP - anterior to posterior; PA - posterior to anterior)    MODALITIES: (15 minutes)  Estim level 37 with MHP for pain relief and promote mm relaxation      Assessment: Pt continues with hypomobility noted at L/S segments. Finds fair relief with manual techniques. RECOMMENDATIONS/INTENT FOR NEXT TREATMENT SESSION: \"Next visit will focus on advancements to more challenging activities\".     PAIN: Initial: 3/10 Post Session: 2/10     [employer] Portal    Total Treatment Billable Duration:   PT Patient Time In/Time Out  Time In: 1015  Time Out: [address] [facility] [address], PT, DPT    Future Appointments   Date Time Provider Elisabeth Chauhan   3/21/2022  2:10 PM Roxi Roland MD [id number]        Visit Approval Visit # Therapist initials Date A NS Cx Comments    31 SHAISTA 9/82/36 [x]  [] [] Recert/progress note on 7/27 - visit 4 today    28 SHAISTA 8/20/20 [x] [] []     35 SHAISTA 8/27/20 [x] [] [] Progress note today. [address] [facility] 9/9/20 [x] [] [] 1    37 SHAISTA 5/02/56 [x] [] [] Recert today-maintenance program 2    38 SHAISTA 10/7/20 [x] [] [] 3    39 SHAISTA 10/14/20 [x] [] [] 4    40 SHAISTA 10/21/20 [x] [] [] 5    41 SHAISTA 10/28/20 [x] [] [] 6    42 SHAISTA 11/4/20 [x] [] [] 7    43 SHAISTA 11/11/20 [x] [] [] 8, PN due over next 2 visits. 118 [facility] 11/18/20 [x] [] [] 9, PN due next visit. [facility] 11/25/20 [x] [] [] PN today.     [address] 12/9/20 [x] [] [] 1    52 SHAISTA 12/22/20 [x] [] [] 2 (pt will need recert next session POC expires 12/30/20)    48 SHAISTA 1/13/21 [x] [] [] PN and recert today    49 SHAISTA 1/27/21 [x] [] [] 1    50 SHAISTA 2/10/21 [x] [] [] 2    51 SHAISTA 2/23/21 X   3    52 SHAISTA 3/10/21 x   4    53 SHAISTA 3/24/21 x   5    54 SHAISTA 7/7/75 x   6, Recert/PN today

## 2021-04-07 NOTE — THERAPY RECERTIFICATION
Max Bourgeois  : 1950  [place] / [place] / Product Type: Medicare /  [address]  at [facility]  Heather 69, [address]  [phone]   [phone]        OUTPATIENT PHYSICAL THERAPY:Progress Report and Recertification     ICD-10: Treatment Diagnosis:   M54.5 Low Back Pain   R26.2 Difficulty in Walking, Not Elsewhere Classified  Precautions/Allergies:   Patient has no known allergies. Fall Risk Score: 2 (? 5 = High Risk)  MD Orders: Evaluate and Treat MEDICAL/REFERRING DIAGNOSIS:  Other forms of scoliosis, lumbar region [M41.86]   DATE OF ONSET: Chronic  REFERRING PHYSICIAN: Barney Scott MD  RETURN PHYSICIAN APPOINTMENT:    PN and Re certification 71:  Pt has been seen for a total of 54 visits to date. Pt continues under maintenance program requiring skilled PT interventions to prevent further exacerbation or worsening of symptoms. Pt demonstrated decrease in lumbar flexion ROM this session but maintained lumbar extension. Pt stating he did have more pain today due to being more active yesterday going fly fishing. Pt with 2 point improvement noted on KAI. Pt will continue to benefit from skilled PT interventions. PN and Recertification :  Pt has been seen for a total of 47 visits to date. Pt is currently being treated under maintenance program as pt requiring skilled PT interventions to prevent exacerbation and worsening of symptoms. Pt did demonstrate improvements in lumbar flexion ROM and 5 point improvement in KAI. Pt will continue to benefit from skilled PT interventions. Recertification:  Pt with recent progress note on 20 demonstrating improvements in B LE strenght and lumbar ROM. Pt also with subjective improvements with PT compared to without.  Have reduced visit frequency to 1X per week with hoping to transition to every other week depending on symptoms to prevent exacerbation of symptoms and maintain improvements with PT. Pt will continue to benefit from skilled PT interventions. PROGRESS REPORT 8/27/20: Pt has attended a total of 35 physical therapy session for lower back pain. Pt demonstrating improvements in B LE strength and improvements in lumbar ROM flexion and extension. Pt reports overall improvements in pain and stiffness throughout the day with slight improvement in sitting tolerance. Pt reporting 1 point reduction on Oswestry compared to last session. Discussed decreasing visit frequency to 1x per week then to every other week depending on symptoms with hopes to transition fully to maintenance program of every other week to 1X per month. Pt will continue to benefit from skilled PT interventions to address hypomobility throughout L/S and decreased mm length throughout hip musculature. ASSESSMENT:  Mr. Ankita Espinosa has attended 21 physical therapy sessions for low back pain. Pt is continuing to improve in his ability to perform more challenging LE and core stability exercises, indicating increased strength overall, but continues to have limited sitting tolerance. Pt has significant hypomobility of lumbar spinal segments with decreased ROM and core strength affecting pt ability to tolerate functional mobility, ADLs, and work tasks. Recommending continued skilled PT: manual therapeutic techniques (as appropriate), therapeutic exercises and activities, balance interventions, and a comprehensive home exercise program to address the current impairments, as listed above. Veronika Frias will benefit from skilled PT (medically necessary) to address above deficits affecting participation in basic ADLs and overall functional tolerance. PROBLEM LIST (Impacting functional limitations):  1. Decreased Strength  2. Decreased ADL/Functional Activities  3. Decreased Transfer Abilities  4. Decreased Ambulation Ability/Technique  5.  Decreased Balance  6. Increased Pain  7. Decreased Flexibility/Joint Mobility  8. Decreased [place] with Home Exercise Program INTERVENTIONS PLANNED:  1. Balance Exercise  2. Cold  3. Cryotherapy  4. Electrical Stimulation  5. Family Education  6. Gait Training  7. Heat  8. Home Exercise Program (HEP)  9. Manual Therapy  10. Neuromuscular Re-education/Strengthening  11. Range of Motion (ROM)  12. Therapeutic Activites  13. Therapeutic Exercise/Strengthening  14. Transcutaneous Electrical Nerve Stimulation (TENS)  15. Transfer Training   TREATMENT PLAN:  TREATMENT PLAN:  Effective Dates: 1-2X per month from 4/7/2021 until 7/7/2021 (90 days). Short-Term Functional Goals: Time Frame: 4 weeks  1. Pt will be compliant with HEP. Goal met 5/11/20  2. Pt will decreased worst pain to 5/10 or less in order to improved standing and sitting tolerance for work and ADLs, Goal met 5/11/20 with exception of heavier tasks for example fly fishing  3. Pt will decreased KAI to 13/50 or less in order to return to daily functional activities and work tasks. (MET, 4/7/2021) 13/50  4. Pt will report sitting tolerance > 30 minutes with <5/10 pain for return to functional mobility and work tasks (PROGRESSING, 4/7/2021) - able to sit 30 min 6/10   Discharge Goals: Time Frame: 8 weeks  1. Pt will be independent with HEP.  (MET, 4/7/2021)   2. Pt will decreased worst pain to 3/10 or less in order to improve standing and sitting tolerance for work and ADLs (PROGRESSING, 4/7/2021) worst 4/10  3. Pt will decreased KAI to 8/50 or less in order to return to daily functional activities and work tasks (PROGRESSING, 4/7/2021)   4.  Pt will report sitting tolerance > 60 minutes with < 3/10 pain for return to functional mobility and work tasks (PROGRESSING, 4/7/2021) able to sit  60min  7-8/10      Rehabilitation Potential For Stated Goals: Good  Regarding [facility] therapy, I certify that the treatment plan above will be carried out by a therapist or under their direction. Thank you for this referral,  Jose Miguel Pollock, PT, DPT    Referring Physician Signature: Harmony Burks MD          Date                                   The information in this section was collected on 5/11/20 (except where otherwise noted). HISTORY:   History of Present Injury/Illness (Reason for Referral):   Mechanical low back pain      CC/Primary Concern:      Pt reports a history of chronic lower back pain that began in his 35s. Pt reports having a lumbar fusion L5-S1 a few years ago but now with recent exaccerbation approximately 6-8 weeks ago. 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 Treatment Side: Bilateral central LBP      Past Medical History/Comorbidities:   Mr. Lc Argueta  has a past medical history of Arthritis (07/06/2015), Back pain, DDD (degenerative disc disease), lumbar, Dry eyes, Enlarged prostate, GERD (gastroesophageal reflux disease), History of basal cell cancer, History of squamous cell carcinoma, IBS (irritable bowel syndrome), Insomnia (07/06/2015), Left inguinal hernia, MVP (mitral valve prolapse), Onychomycosis (7/6/2015), RBBB (7/6/2015), Right wrist pain (7/6/2015), Scoliosis, and Spondylosis of lumbar region without myelopathy or radiculopathy. Mr. Lc Argueta  has a past surgical history that includes neurological procedure unlisted (2007); hx colonoscopy; hx orthopaedic; hx shoulder arthroscopy (Right); hx orthopaedic (Left, 06/2020); and hx orthopaedic (Right, 08/2020).   Social History/Living Environment:    Lives with wife, enjoys being active (fly fishing, going to [address] [place] [address])    Pain/Symptom Location: central LBP     Worst Pain: 7-8/10 with \"fly fishing\" this weekend, past week 3-5/10 with ADLs  Current Pain: 4/10     Aggravating Factors: Sitting, Standing, Bending, Squat and Laying    Alleviating Factors/Positions/Motions: lying on side preference (right side)    Occupation: cardiologist - taking 1 month leave to address back pain    Prior Level of Function/Work/Activity:  Prior to 6-8 weeks ago pain was manageable      Patient Goals: Return to work tasks with manageable pain, be able to sit for > 1 hour without significant back pain, be able to stand for >1 hour without significant back pain    Current Medications:       Current Outpatient Medications:     Myrbetriq 50 mg ER tablet, Take 1 Tab by mouth daily. , Disp: 30 Tab, Rfl: 12    eszopiclone (Lunesta) 3 mg tablet, Take 1 Tab by mouth nightly as needed for Sleep. Max Daily Amount: 3 mg., Disp: 90 Tab, Rfl: 5    azelastine (ASTELIN) 137 mcg (0.1 %) nasal spray, 1 [place] by Both Nostrils route two (2) times a day. Use in each nostril as directed, Disp: 1 Bottle, Rfl: 5    tamsulosin (FLOMAX) 0.4 mg capsule, Take 2 Caps by mouth daily. , Disp: 180 Cap, Rfl: 3    dicyclomine (BentyL) 20 mg tablet, Take 1 Tab by mouth every six (6) hours. , Disp: 120 Tab, Rfl: 5    tadalafiL (CIALIS) 5 mg tablet, Take 1 Tab by mouth daily. (Patient taking differently: Take 5 mg by mouth nightly.), Disp: 90 Tab, Rfl: 3    meloxicam (Mobic) 15 mg tablet, Take 1 Tab by mouth daily. , Disp: 90 Tab, Rfl: 3    linaCLOtide (LINZESS) 145 mcg cap capsule, Take 1 Cap by mouth Daily (before breakfast). (Patient taking differently: Take 145 mcg by mouth daily as needed.), Disp: 90 Cap, Rfl: 3    Cetirizine (ZYRTEC) 10 mg cap, Take  by mouth daily. , Disp: , Rfl:     lidocaine (LIDODERM) 5 %, by TransDERmal route daily as needed.  Apply patch to the affected area for 12 hours a day and remove for 12 hours a day. , Disp: , Rfl:    ipratropium (ATROVENT) 0.03 % nasal spray, 2 Sprays by Both Nostrils route every twelve (12) hours. (Patient taking differently: 2 Sprays by Both Nostrils route every twelve (12) hours as needed.), Disp: 30 mL, Rfl: 11    RESTASIS 0.05 % ophthalmic emulsion, INSTILL 1 DROP IN BOTH EYES TWICE DAILY, Disp: , Rfl: 12    omeprazole (PRILOSEC) 20 mg capsule, Take 20 mg by mouth daily as needed. , Disp: , Rfl:    Date Last Reviewed:  4/7/2021       Ambulatory/Rehab Services H2 Model Falls Risk Assessment    Risk Factors:       (1)  Gender [Male] Ability to Rise from Chair:       (1)  Pushes up, successful in one attempt    Falls Prevention Plan:       No modifications necessary   Total: (5 or greater = High Risk): 2    ©2010 [facility] of [employer]. All Rights Reserved. [facility] States Patent #6,357,777.  Federal Law prohibits the replication, distribution or use without written permission from [facility] [employer]        Number of Personal Factors/Comorbidities that affect the Plan of Care: 1-2: MODERATE COMPLEXITY   EXAMINATION:   Inspection        Pelvic alignment: good pelvic alignment (symmetrical), but R LE noted to be shorter on 7/27/2020        Additional Comments:   ________________________________________________________________________________________________  Observation          Posture: Decreased lumbar lordosis        Gait: Decreased Gait Speed, Decreased Stride Length and Decreased Step Length                  ________________________________________________________________________________________________  Range of Motion        Lumbar  Joint:   8/27/20 1/13/21 4/7/21    Right (Degrees/Percent) Left (Degrees/Percent)      Flexion  40 degrees pain  85 degrees, increased pulling and pain 89 degrees, slight pain 72 degrees, slight pain, tightness at HS   Extension  15 degrees pain  25 degrees pain 25 degrees, slight pain 25 degrees, slight pain   Side Bending  Knee joint WFL      Rotation  Knee joint [facility] Lower  Joint: Passive Passive Active Active    Right (Degrees) Left (Degrees) Right (Degrees) Left (Degrees)   Hip Flexion   [facility]    Hip Extension   [facility]   Hip Abduction   [facility] WFL   Hip Internal Rotation    NT NT   Hip External Rotation   NT NT             Additional Comments: + decreased tissue extensibility HS, piriformis, glutes, R hip flexor > L , quads  ________________________________________________________________________________________________  Strength          Lower Extremity  Joint:   7/27/2020 7/27/2020 8/27/20 8/27/20 1/13/21 1/13/21 4/7/21 4/7/21    RIGHT LEFT RIGHT LEFT Right Left Right Left Right Left   Hip Flexion 5/5  5/5  4+/5 4/5 5/5 5/5 5/5 5/5 5/5 5/5   Hip Extension 5/5  5/5  3+/5 4-/5 5/5 5/5 5/5 5/5 5/5 5/5   Hip Internal Rotation 4/5 4/5 4+/5 4+/5 5/5 5/5 5/5 5/5 5/5 5/5   Hip External Rotation 4/5 4/5 4/5 4+/5 5/5 5/5 5/5 5/5 5/5 5/5   Hip Abduction 5/5 (5/5 pain) 5/5  4/5 4/5 5/5 5/5 5/5 5/5 5/5 5/5   Knee flexion 5/5 5/5 4+/5 4+/5 5/5 5/5 5/5 5/5 5/5 5/5   Knee extension 5/5 5/5 5/5 5/5           ________________________________________________________________________________________________  [facility]        C/O Radicular Symptoms: No        Additional Comments:   ________________________________________________________________________________________________            ________________________________________________________________________________________________  Palpation: lumbar paraspinals, increased tone noted  Joint mobilization: L1-L5 hypomobility with PA       Body Structures Involved:  9. Nerves  10. Bones  11. Joints  12. Muscles  13. Ligaments Body Functions Affected:  16. Sensory/Pain  17. Neuromusculoskeletal  18. Movement Related Activities and Participation Affected:  5. General Tasks and Demands  6. Mobility  7. Self Care  8. Domestic Life  9. Interpersonal Interactions and Relationships  10.  Community, Social and [place]   Number of elements (examined above) that affect the Plan of Care: 4+: HIGH COMPLEXITY   CLINICAL PRESENTATION:   Presentation: Evolving clinical presentation with changing clinical characteristics: MODERATE COMPLEXITY   CLINICAL DECISION MAKING:   Outcome Measure: Tool Used: Modified Oswestry Low Back Pain Questionnaire  Score:  Initial:17/50  Most Recent: 21/50 (Date: 5/11/20 ) Most Recent: 21/50 (Date: 7/27/2020) Most recent: 20/50 (8/27/20) Most recent: 15/50 (1/13/21) Most Recent: 13/50 (4/7/21)   Interpretation of Score: Each section is scored on a 0-5 scale, 5 representing the greatest disability. The scores of each section are added together for a total score of 50. Medical Necessity:   · Skilled intervention continues to be required due to decreased strength, balance, ROM, with increased pain affecting pt participation in daily functional mobility  Reason for Services/Other Comments:  · Patient continues to require skilled intervention due to decreased strength balance and ROM with increased pain affecting pt functional mobility and ADLS.    Use of outcome tool(s) and clinical judgement create a POC that gives a: Clear prediction of patient's progress: LOW COMPLEXITY        /

## 2021-04-20 ENCOUNTER — HOSPITAL ENCOUNTER (OUTPATIENT)
Dept: PHYSICAL THERAPY | Age: 71
Discharge: HOME | End: 2021-04-20
Payer: MEDICARE

## 2021-04-20 PROCEDURE — 97140 MANUAL THERAPY 1/> REGIONS: CPT

## 2021-04-20 PROCEDURE — 97014 ELECTRIC STIMULATION THERAPY: CPT

## 2021-04-27 ENCOUNTER — APPOINTMENT (OUTPATIENT)
Dept: PHYSICAL THERAPY | Age: 71
End: 2021-04-27
Payer: MEDICARE

## 2021-04-28 ENCOUNTER — APPOINTMENT (OUTPATIENT)
Dept: PHYSICAL THERAPY | Age: 71
End: 2021-04-28
Payer: MEDICARE

## 2021-05-12 ENCOUNTER — HOSPITAL ENCOUNTER (OUTPATIENT)
Dept: PHYSICAL THERAPY | Age: 71
Discharge: HOME | End: 2021-05-12
Payer: MEDICARE

## 2021-05-12 PROCEDURE — 97140 MANUAL THERAPY 1/> REGIONS: CPT

## 2021-05-12 PROCEDURE — 97014 ELECTRIC STIMULATION THERAPY: CPT

## 2021-05-12 NOTE — PROGRESS NOTES
Vadim Coon  : 1950  [place] / [place] / Product Type: Medicare /  [address]  [facility], [address]  [phone]   [phone]      OUTPATIENT PHYSICAL THERAPY: Daily Treatment Note 2021  Visit Count:  56 visits    ICD-10: Treatment Diagnosis:   M54.5 Low Back Pain   R26.2 Difficulty in Walking, Not Elsewhere Classified    Precautions/Allergies:   Patient has no known allergies. TREATMENT PLAN:  Effective Dates:  1-2X per month from 2021 until 2021 (90 days). Frequency/Duration: 1-2X per month for 90 Days     PRE-TREATMENT SYMPTOMS/COMPLAINTS: Pt had trigger point injection from pain management earlier last week. Pt finding some relief. Pt reports going fishing yesterday and not hurting too bad today. MEDICATIONS REVIEWED:  2021   TREATMENT:   (In addition to Assessment/Re-Assessment sessions the following treatments were rendered)    MANUAL THERAPY: (40 minutes):   Joint mobilization, Soft tissue mobilization and Manipulation was utilized and necessary because of the patient's restricted joint motion, painful spasm, loss of articular motion and restricted motion of soft tissue. +PA L1-L5, B SI joints, Sacrum Grade III  + STM B lumbar paraspinals and QL  +Manual stretching B HS, glutes, piriformis, hip flexor, quads      (Used abbreviations: MET - muscle energy technique; PNF - proprioceptive neuromuscular facilitation; NMR - neuromuscular re-education; AP - anterior to posterior; PA - posterior to anterior)    MODALITIES: (15 minutes)  Estim level 46 with MHP for pain relief and promote mm relaxation      Assessment: Pt continues with hypomobility noted at L/S segments. Finds fair relief with manual techniques. RECOMMENDATIONS/INTENT FOR NEXT TREATMENT SESSION: \"Next visit will focus on advancements to more challenging activities\".     PAIN: Initial: 2/10 Post Session: 1-2/10     [facility]    Total Treatment Billable Duration:   PT Patient Time In/Time Out  Time In: 0081  Time Out: 56  Nam Rdz, PT, DPT    Future Appointments   Date Time Provider Elisabeth Gissel   3/21/2022  2:10 PM Brooke Persaud MD [id number]        Visit Approval Visit # Therapist initials Date A NS Cx Comments    31 SHAISTA 4/38/34 [x]  [] [] Recert/progress note on 7/27 - visit 4 today    28 SHAISTA 8/20/20 [x] [] []     35 SHAISTA 8/27/20 [x] [] [] Progress note today. [address] 9/9/20 [x] [] [] 1    37 SHAISTA 3/38/57 [x] [] [] Recert today-maintenance program 2    38 SHAISTA 10/7/20 [x] [] [] 3    39 SHAISTA 10/14/20 [x] [] [] 4    40 SHAISTA 10/21/20 [x] [] [] 5    41 SHAISTA 10/28/20 [x] [] [] 6    42 SHAISTA 11/4/20 [x] [] [] 7    43 SHAISTA 11/11/20 [x] [] [] 8, PN due over next 2 visits. [facility] 11/18/20 [x] [] [] 9, PN due next visit. [facility] 11/25/20 [x] [] [] PN today.     Jose 89 12/9/20 [x] [] [] 1    52 SHAISTA 12/22/20 [x] [] [] 2 (pt will need recert next session POC expires 12/30/20)    48 SHAISTA 1/13/21 [x] [] [] PN and recert today    49 SHAISTA 1/27/21 [x] [] [] 1    50 SHAISTA 2/10/21 [x] [] [] 2    51 SHAISTA 2/23/21 X   3    52 SHAISTA 3/10/21 x   4    53 SHAISTA 3/24/21 x   5    54 SHAISTA 6/1/88 x   6, Recert/PN today    55 SHAISTA 4/20/21 x   1    56 SHAISTA 5/12/21 x   2

## 2021-06-01 NOTE — THERAPY DISCHARGE
Paula Ballesteros  : 1950  [place] / [place] / Product Type: Medicare /  [address] at [facility]  [facility], [address] [place] [address]  [phone]   [phone]        OUTPATIENT PHYSICAL THERAPY:Discontinuation Summary 2021    ICD-10: Treatment Diagnosis:   M54.5 Low Back Pain   R26.2 Difficulty in Walking, Not Elsewhere Classified  Precautions/Allergies:   Patient has no known allergies. Fall Risk Score: 2 (? 5 = High Risk)  MD Orders: Evaluate and Treat MEDICAL/REFERRING DIAGNOSIS:  Other forms of scoliosis, lumbar region [M41.86]   DATE OF ONSET: Chronic  REFERRING PHYSICIAN: Rodolfo Beltrán MD  RETURN PHYSICIAN APPOINTMENT:    Discontinuation Summary:  Pt had been seen for a total of 56 PT sessions to date. Pt being treated under maintenance program to prevent decline in function. With support of PT, pt lower back pain symptoms staying consistently at 2-3/10 range allowing pt to continue with daily functional mobility with improved tolerance and improved QOL. Discharging pt from PT at this time as referring physician has not signed POC or recerts despite multiple attempts and unable to continue PT at this time. Pt in agreement and aware. Will discharge case. PN and Re certification 0/3/54:  Pt has been seen for a total of 54 visits to date. Pt continues under maintenance program requiring skilled PT interventions to prevent further exacerbation or worsening of symptoms. Pt demonstrated decrease in lumbar flexion ROM this session but maintained lumbar extension. Pt stating he did have more pain today due to being more active yesterday going fly fishing. Pt with 2 point improvement noted on KAI. Pt will continue to benefit from skilled PT interventions. PN and Recertification :  Pt has been seen for a total of 47 visits to date.  Pt is currently being treated under maintenance program as pt requiring skilled PT interventions to prevent exacerbation and worsening of symptoms. Pt did demonstrate improvements in lumbar flexion ROM and 5 point improvement in KAI. Pt will continue to benefit from skilled PT interventions. Recertification:  Pt with recent progress note on 8/27/20 demonstrating improvements in B LE strenght and lumbar ROM. Pt also with subjective improvements with PT compared to without. Have reduced visit frequency to 1X per week with hoping to transition to every other week depending on symptoms to prevent exacerbation of symptoms and maintain improvements with PT. Pt will continue to benefit from skilled PT interventions. PROGRESS REPORT 8/27/20: Pt has attended a total of 35 physical therapy session for lower back pain. Pt demonstrating improvements in B LE strength and improvements in lumbar ROM flexion and extension. Pt reports overall improvements in pain and stiffness throughout the day with slight improvement in sitting tolerance. Pt reporting 1 point reduction on Oswestry compared to last session. Discussed decreasing visit frequency to 1x per week then to every other week depending on symptoms with hopes to transition fully to maintenance program of every other week to 1X per month. Pt will continue to benefit from skilled PT interventions to address hypomobility throughout L/S and decreased mm length throughout hip musculature. ASSESSMENT:  Mr. Lexy Harding has attended 21 physical therapy sessions for low back pain. Pt is continuing to improve in his ability to perform more challenging LE and core stability exercises, indicating increased strength overall, but continues to have limited sitting tolerance. Pt has significant hypomobility of lumbar spinal segments with decreased ROM and core strength affecting pt ability to tolerate functional mobility, ADLs, and work tasks.    Recommending continued skilled PT: manual therapeutic techniques (as appropriate), therapeutic exercises and activities, balance interventions, and a comprehensive home exercise program to address the current impairments, as listed above. Hebron Anil will benefit from skilled PT (medically necessary) to address above deficits affecting participation in basic ADLs and overall functional tolerance. PROBLEM LIST (Impacting functional limitations):  1. Decreased Strength  2. Decreased ADL/Functional Activities  3. Decreased Transfer Abilities  4. Decreased Ambulation Ability/Technique  5. Decreased Balance  6. Increased Pain  7. Decreased Flexibility/Joint Mobility  8. Decreased [place] with Home Exercise Program INTERVENTIONS PLANNED:  1. Balance Exercise  2. Cold  3. Cryotherapy  4. Electrical Stimulation  5. Family Education  6. Gait Training  7. Heat  8. Home Exercise Program (HEP)  9. Manual Therapy  10. Neuromuscular Re-education/Strengthening  11. Range of Motion (ROM)  12. Therapeutic Activites  13. Therapeutic Exercise/Strengthening  14. Transcutaneous Electrical Nerve Stimulation (TENS)  15. Transfer Training   TREATMENT PLAN:  TREATMENT PLAN:  Effective Dates: 1-2X per month from 4/7/2021 until 7/7/2021 (90 days). Short-Term Functional Goals: Time Frame: 4 weeks  1. Pt will be compliant with HEP. Goal met 5/11/20  2. Pt will decreased worst pain to 5/10 or less in order to improved standing and sitting tolerance for work and ADLs, Goal met 5/11/20 with exception of heavier tasks for example fly fishing  3. Pt will decreased KAI to 13/50 or less in order to return to daily functional activities and work tasks. (MET, 5/12/2021) 13/50  4. Pt will report sitting tolerance > 30 minutes with <5/10 pain for return to functional mobility and work tasks (PROGRESSING, 5/12/2021) - able to sit 30 min 6/10   Discharge Goals: Time Frame: 8 weeks  1. Pt will be independent with HEP.  (MET, 5/12/2021)   2.  Pt will decreased worst pain to 3/10 or less in order to improve standing and sitting tolerance for work and ADLs (PROGRESSING, 5/12/2021) worst 4/10  3. Pt will decreased KAI to 8/50 or less in order to return to daily functional activities and work tasks ([place], 5/12/2021)   4. Pt will report sitting tolerance > 60 minutes with < 3/10 pain for return to functional mobility and work tasks (PROGRESSING, 5/12/2021) able to sit  60min  7-8/10      Rehabilitation Potential For Stated Goals: Good  Regarding Agatha Rather therapy, I certify that the treatment plan above will be carried out by a therapist or under their direction. Thank you for this referral,  Deep Torres, PT, DPT    Referring Physician Signature: Pierre Boyle MD                      The information in this section was collected on 5/11/20 (except where otherwise noted). HISTORY:   History of Present Injury/Illness (Reason for Referral):   Mechanical low back pain      CC/Primary Concern:      Pt reports a history of chronic lower back pain that began in his 35s. Pt reports having a lumbar fusion L5-S1 a few years ago but now with recent exaccerbation approximately 6-8 weeks ago. 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     Treatment Side: Bilateral central LBP      Past Medical History/Comorbidities:   Mr. Will Pizano  has a past medical history of Arthritis (07/06/2015), Back pain, DDD (degenerative disc disease), lumbar, Dry eyes, Enlarged prostate, GERD (gastroesophageal reflux disease), History of basal cell cancer, History of squamous cell carcinoma, IBS (irritable bowel syndrome), Insomnia (07/06/2015), Left inguinal hernia, MVP (mitral valve prolapse), Onychomycosis (7/6/2015), RBBB (7/6/2015), Right wrist pain (7/6/2015), Scoliosis, and Spondylosis of lumbar region without myelopathy or radiculopathy. Mr. Tamie Rivera  has a past surgical history that includes neurological procedure unlisted (2007); hx colonoscopy; hx orthopaedic; hx shoulder arthroscopy (Right); hx orthopaedic (Left, 06/2020); and hx orthopaedic (Right, 08/2020). 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 , Disp: 90 Cap, Rfl: 0    Myrbetriq 50 mg ER tablet, Take 1 Tab by mouth daily. , Disp: 30 Tab, Rfl: 12    eszopiclone (Lunesta) 3 mg tablet, Take 1 Tab by mouth nightly as needed for Sleep. Max Daily Amount: 3 mg., Disp: 90 Tab, Rfl: 5    azelastine (ASTELIN) 137 mcg (0.1 %) nasal spray, 1 [place] by Both Nostrils route two (2) times a day. Use in each nostril as directed, Disp: 1 Bottle, Rfl: 5    tamsulosin (FLOMAX) 0.4 mg capsule, Take 2 Caps by mouth daily. , Disp: 180 Cap, Rfl: 3    dicyclomine (BentyL) 20 mg tablet, Take 1 Tab by mouth every six (6) hours. , Disp: 120 Tab, Rfl: 5    tadalafiL (CIALIS) 5 mg tablet, Take 1 Tab by mouth daily. (Patient taking differently: Take 5 mg by mouth nightly.), Disp: 90 Tab, Rfl: 3    meloxicam (Mobic) 15 mg tablet, Take 1 Tab by mouth daily. , Disp: 90 Tab, Rfl: 3    Cetirizine (ZYRTEC) 10 mg cap, Take  by mouth daily. , Disp: , Rfl:     lidocaine (LIDODERM) 5 %, by TransDERmal route daily as needed. Apply patch to the affected area for 12 hours a day and remove for 12 hours a day. , Disp: , Rfl:     ipratropium (ATROVENT) 0.03 % nasal spray, 2 Sprays by Both Nostrils route every twelve (12) hours. (Patient taking differently: 2 Sprays by Both Nostrils route every twelve (12) hours as needed.), Disp: 30 mL, Rfl: 11    RESTASIS 0.05 % ophthalmic emulsion, INSTILL 1 DROP IN BOTH EYES TWICE DAILY, Disp: , Rfl: 12    omeprazole (PRILOSEC) 20 mg capsule, Take 20 mg by mouth daily as needed. , Disp: , Rfl:    Date Last Reviewed:  5/12/2021       Ambulatory/Rehab Services H2 Model Falls Risk Assessment    Risk Factors:       (1)  Gender [Male] Ability to Rise from Chair:       (1)  Pushes up, successful in one attempt    Falls Prevention Plan:       No modifications necessary   Total: (5 or greater = High Risk): 2    ©2010 [facility] [employer]. All Rights Reserved. [facility] States Patent #4,594,590.  Federal Law prohibits the replication, distribution or use without written permission from [facility] [employer]        Number of Personal Factors/Comorbidities that affect the Plan of Care: 1-2: MODERATE COMPLEXITY   EXAMINATION:   Inspection        Pelvic alignment: good pelvic alignment (symmetrical), but R LE noted to be shorter on 7/27/2020        Additional Comments:   ________________________________________________________________________________________________  Observation          Posture: Decreased lumbar lordosis        Gait: Decreased Gait Speed, Decreased Stride Length and Decreased Step Length ________________________________________________________________________________________________  Range of Motion        Lumbar  Joint:   8/27/20 1/13/21 4/7/21    Right (Degrees/Percent) Left (Degrees/Percent)      Flexion  40 degrees pain  85 degrees, increased pulling and pain 89 degrees, slight pain 72 degrees, slight pain, tightness at HS   Extension  15 degrees pain  25 degrees pain 25 degrees, slight pain 25 degrees, slight pain   Side Bending  Knee joint WFL      Rotation  Knee joint WFL            Lower  Joint: Passive Passive Active Active    Right (Degrees) Left (Degrees) Right (Degrees) Left (Degrees)   Hip Flexion   [facility]    Hip Extension   [facility]   Hip Abduction   [facility] WFL   Hip Internal Rotation    NT NT   Hip External Rotation   NT NT             Additional Comments: + decreased tissue extensibility HS, piriformis, glutes, R hip flexor > L , quads  ________________________________________________________________________________________________  Strength          Lower Extremity  Joint:   7/27/2020 7/27/2020 8/27/20 8/27/20 1/13/21 1/13/21 4/7/21 4/7/21    RIGHT LEFT RIGHT LEFT Right Left Right Left Right Left   Hip Flexion 5/5  5/5  4+/5 4/5 5/5 5/5 5/5 5/5 5/5 5/5   Hip Extension 5/5  5/5  3+/5 4-/5 5/5 5/5 5/5 5/5 5/5 5/5   Hip Internal Rotation 4/5 4/5 4+/5 4+/5 5/5 5/5 5/5 5/5 5/5 5/5   Hip External Rotation 4/5 4/5 4/5 4+/5 5/5 5/5 5/5 5/5 5/5 5/5   Hip Abduction 5/5 (5/5 pain) 5/5  4/5 4/5 5/5 5/5 5/5 5/5 5/5 5/5   Knee flexion 5/5 5/5 4+/5 4+/5 5/5 5/5 5/5 5/5 5/5 5/5   Knee extension 5/5 5/5 5/5 5/5           ________________________________________________________________________________________________  [facility]        C/O Radicular Symptoms: No        Additional Comments:   ________________________________________________________________________________________________ ________________________________________________________________________________________________  Palpation: lumbar paraspinals, increased tone noted  Joint mobilization: L1-L5 hypomobility with PA       Body Structures Involved:  9. Nerves  10. Bones  11. Joints  12. Muscles  13. Ligaments Body Functions Affected:  16. Sensory/Pain  17. Neuromusculoskeletal  18. Movement Related Activities and Participation Affected:  5. General Tasks and Demands  6. Mobility  7. Self Care  8. Domestic Life  9. Interpersonal Interactions and Relationships  10. Community, Social and [place]   Number of elements (examined above) that affect the Plan of Care: 4+: HIGH COMPLEXITY   CLINICAL PRESENTATION:   Presentation: Evolving clinical presentation with changing clinical characteristics: MODERATE COMPLEXITY   CLINICAL DECISION MAKING:   Outcome Measure: Tool Used: Modified Oswestry Low Back Pain Questionnaire  Score:  Initial:17/50  Most Recent: 21/50 (Date: 5/11/20 ) Most Recent: 21/50 (Date: 7/27/2020) Most recent: 20/50 (8/27/20) Most recent: 15/50 (1/13/21) Most Recent: 13/50 (4/7/21)   Interpretation of Score: Each section is scored on a 0-5 scale, 5 representing the greatest disability. The scores of each section are added together for a total score of 50.        Use of outcome tool(s) and clinical judgement create a POC that gives a: Clear prediction of patient's progress: LOW COMPLEXITY        /

## 2021-06-09 ENCOUNTER — APPOINTMENT (OUTPATIENT)
Dept: PHYSICAL THERAPY | Age: 71
End: 2021-06-09

## 2021-07-08 PROBLEM — J32.0 CHRONIC MAXILLARY SINUSITIS: Status: ACTIVE | Noted: 2021-02-24

## 2021-07-08 PROBLEM — J34.2 DEVIATED SEPTUM: Status: ACTIVE | Noted: 2021-02-24

## 2021-07-08 PROBLEM — N52.1 ERECTILE DISORDER DUE TO MEDICAL CONDITION IN MALE PATIENT: Status: ACTIVE | Noted: 2021-07-08

## 2021-07-12 NOTE — PROGRESS NOTES
Dianna Moya  : 1950  [place] / [place] / Product Type: Medicare /  [address]  [facility], [address]  [phone]   [phone]      OUTPATIENT PHYSICAL THERAPY: Daily Treatment Note 2021  Visit Count:  1    ICD-10: Treatment Diagnosis:   Difficulty in walking, not elsewhere classified (R26.2)  Pain in right ankle and joints of right foot (M25.571)  Stiffness of right ankle, not elsewhere classified (M25.671)      Precautions/Allergies:   Patient has no known allergies. TREATMENT PLAN:  Effective Dates: 2021 TO 10/10/2021 (90 days). Frequency/Duration: 2 times a week for 90 Days        PRE-TREATMENT SYMPTOMS/COMPLAINTS:  R foot pain    MEDICATIONS REVIEWED:  2021   TREATMENT:   (In addition to Assessment/Re-Assessment sessions the following treatments were rendered)    THERAPEUTIC EXERCISE: (0 minutes):  Exercises per grid below to improve mobility, strength and balance. Required minimal visual and verbal cues to promote proper body alignment and promote proper body posture. Progressed resistance, range and complexity of movement as indicated. Date:  2021 Date:   Date:     Activity/Exercise Parameters Parameters Parameters                                               MANUAL THERAPY: (10 minutes): Joint mobilization, Soft tissue mobilization and Manipulation was utilized and necessary because of the patient's restricted joint motion, painful spasm, loss of articular motion and restricted motion of soft tissue.    + joint mobilization Grade III: medial glide to Subtalar joint, AP-PA to 5th MTP, AP to cuniform   (Used abbreviations: MET - muscle energy technique; PNF - proprioceptive neuromuscular facilitation; NMR - neuromuscular re-education; AP - anterior to posterior; PA - posterior to anterior)    MODALITIES: (0 minutes):      none TREATMENT/SESSION ASSESSMENT:  Johnna Narvaez verbalized understanding of role of PT and POC. Education: on objective findings and HEP    RECOMMENDATIONS/INTENT FOR NEXT TREATMENT SESSION: \"Next visit will focus on advancements to more challenging activities\".     PAIN: Initial: 2/10 Post Session:  2/10     MedBridge Portal    Total Treatment Billable Duration:  PT Patient Time In/Time Out  Time In: 1110  Time Out: 1145  Pete Trejo, PT, DPT    Future Appointments   Date Time Provider Elisabeth Chauhan   7/20/2021 10:15 AM Jamal Arbour A [facility]   7/23/2021 10:15 AM Jamal Arbour A [facility]   7/27/2021 10:15 AM Jamal Arbour A [facility]   7/29/2021 11:00 AM Jamal Arbour A [facility]   8/3/2021 10:15 AM Jamal Arbour A [facility]   8/5/2021 11:00 AM Jamal Arbour A [facility]   3/21/2022  2:10 PM Latoya Pinto MD [id number]       Visit Approval Visit # Therapist initials Date A NS / Cx < 24 hr >24 hr Cx Comments    1 SHAISTA 7/12/21 [x]  [] [] Initial evaluation       [] [] []        [] [] []        [] [] []        [] [] []        [] [] []        [] [] []        [] [] []        [] [] []        [] [] []        [] [] []        [] [] []        [] [] []        [] [] []        [] [] []        [] [] []        [] [] []        [] [] []

## 2021-07-12 NOTE — THERAPY EVALUATION
Kathryn Espinosa  : 1950  [place] / [place] / Product Type: Medicare /  [address]  at [facility]  [address], [address] [facility], [address]  [phone]   [phone]         OUTPATIENT PHYSICAL THERAPY:Initial Assessment 2021    ICD-10: Treatment Diagnosis:   Difficulty in walking, not elsewhere classified (R26.2)  Pain in right ankle and joints of right foot (M25.571)  Stiffness of right ankle, not elsewhere classified (M25.671)      PRECAUTIONS/ALLERGIES:   Patient has no known allergies. FALL RISK SCORE: 1(? 5 = High Risk)    MD ORDERS: Eval and Treat  MEDICAL/REFERRING DIAGNOSIS:  Right foot pain [M79.671]    DATE OF ONSET: 4 months ago    REFERRING PHYSICIAN: Abby Bryson MD    RETURN PHYSICIAN APPOINTMENT:  TBD by pt      Ambulatory/Rehab Services H2 Model Falls Risk Assessment    Risk Factors:       (1)  Gender [Male] Ability to Rise from Chair:       (0)  Ability to rise in a single movement    Falls Prevention Plan:       No modifications necessary   Total: (5 or greater = High Risk): 1     [facility]. All Rights Reserved. [employer] States Patent #[id number]. Federal Law prohibits the replication, distribution or use without written permission from [address] [place]:  Mr. Kathryn Espinosa has attended 1 physical therapy session including initial evaluation as of 2021. Kathryn Espinosa demonstrates decreased strength, decreased ROM, decreased tolerance of ADLs, decreased functional mobility, and decreased activity tolerance, all consistent with S/S of possible peroneal tendonosis. According to their responses on the Foot and Ankle Ability Measure, Kathryn Espinosa is 20% limited by their ankle pain and dysfunction in their ability to participate in ADLs and their overall functional tolerance.  Recommending skilled PT: manual therapeutic techniques (as appropriate), therapeutic exercises and activities, balance and comprehensive home exercises program to address current impairment. Hal Mccollum will benefit from skilled PT (medically necessary) to address above deficits affecting participation in basic ADLs and overall functional tolerance. PROBLEM LIST (Impacting functional limitations):  1. Decreased Strength  2. Decreased ADL/Functional Activities  3. Decreased Transfer Abilities  4. Decreased Ambulation Ability/Technique  5. Decreased Balance  6. Increased Pain  7. Decreased Activity Tolerance  8. Decreased [place] with Home Exercise Program INTERVENTIONS PLANNED:  1. Balance Exercise  2. Bed Mobility  3. Cold  4. Cryotherapy  5. Family Education  6. Gait Training  7. Heat  8. Home Exercise Program (HEP)  9. Manual Therapy  10. Neuromuscular Re-education/Strengthening  11. Range of Motion (ROM)  12. Therapeutic Activites  13. Therapeutic Exercise/Strengthening  14. Transfer Training  15. Ultrasound (US)  16. Aquatic therapy  17. Electrical Stimulation  18. Vasopneumatic Device  19. Dry Needling   TREATMENT PLAN:  Effective Dates: 7/13/2021 TO 10/10/2021 (90 days). Frequency/Duration: 2 times a week for 90 Days    SHORT-TERM FUNCTIONAL GOALS: Time Frame: 4 weeks  1. Hal Mccollum will be compliant with home exercise program within 4 weeks in order to improve active participation with management of patient's symptoms and/or functional deficits. 2. Hal Mccollum will report <=3/10 pain with walking >15 minutes in order to participate in daily exercise and daily activities. 3. Hal Mccollum will be able to stand >=15 minutes with <3/10 pain to feet in order to participate in household duties without issues/compromise. 4. Hal Mccollum will report being able to sleep through the night without waking up secondary to R foot pain.   [address]  will improve R ankle PF AROM from 11 to 20 in order to show improvement in ankle ROM and tolerance for functional activity. [place] will be report improved score on the Foot and Ankle Ability Measure from 67 to 75 to indicate improvement in functional independence. DISCHARGE GOALS: Time Frame: 12 weeks  1. Maninder Jones will be independent with home exercise program within 8 weeks in order to improve independence with management of patient's symptoms and/or functional deficits. 2. Maninder Jones will report <=1/10 pain with participation in activities of daily living and overall functional mobility including walking and stair ambulation. 3. Maninder Jones will be able to stand >=30 minutes without reports of increased foot/ankle pain. 4. Maninder Jones will be report improved score on the Foot and Ankle Ability Measure from 67 to 84 to indicate improvement in functional independence. REHABILITATION POTENTIAL FOR STATED GOALS: GOOD    TOOL USED:   -FOOT AND ANKLE ABILITY MEASURE (FAAM)  Score:  Initial: 67/84 Most Recent: X (Date: -- )   Interpretation of Score: For the \"Activities of Daily Living\", there are 21 questions each scored on a 5 point scale with 0 representing \"Unable to do\" and 4 representing \"No difficulty\". The lower the score, the greater the functional disability. 84/84 represents no disability. Minimal detectable change is 5.7 points. With the addition of the 8 questions in the \"Sports Subscale,\" there are 29 questions, each scored on a 5 point scale with 0 representing \"Unable to do\" and 4 representing \"No difficulty\". The lower the score, the greater the functional disability. 116/116 represents no disability. Minimal detectable change is 12.3 points. MEDICAL NECESSITY:  · Skilled intervention continues to be required due to above deficits affecting participation in basic ADLs and overall functional tolerance.     REASON FOR SERVICES/ OTHER COMMENTS:  · Patient continues to require skilled intervention due to  above deficits affecting participation in basic ADLs and overall functional tolerance. Regarding [facility] therapy, I certify that the treatment plan above will be carried out by a therapist or under their direction. Thank you for this referral,  Delma Magdaleno     Referring Physician Signature: Tatum Warren MD _______________________________ Date _____________                HISTORY:  All history obtained on 7/13/2021 unless otherwise noted. PATIENT STATED GOAL:  To be able to return to walking normal without increased pain. Pt suffers from chronic back pain that makes prolonged sitting difficult with relieving position being standing and walking. Very difficult now that standing and walking is affecting by R foot pain. HISTORY OF PRESENT INJURY/ILLNESS (REASON FOR REFERRAL):   Pt reports he has been experiencing R lateral foot pain for approximately 4 months now. Pt reports having a 1st MT fracture years ago and was recommended to get inserts. Pt just got new ones approximately 2 months ago after being out of them for several months after stopping work. Pt reports the orthotics have actually made it feel worse. Pt reports feeling like he's walking on the outside of his foot. Pt reports getting xray with some OA noted. Pt reports having pain at the end of day at night and especially with walking. Pt reports pain is better in the morning but progressively gets worse the more he is up on his feet. Pt states the pain can get as high as 6/10 and as low as 0/10. Per referring physician: We discussed that he has pes planus feet bilaterally but for some reason he is overloading the fifth metatarsal tuberosity.   I am not sure if he has a subtle imbalance between his posterior tibial tendon is peroneal tendon causing him to roll on that side but he definitely has no evidence of cavovarus.     When he first noticed his pain, he had new orthotics made and he feels those insoles increased his discomfort. Claire Ny has tried to modify the insole but he still has pain  I think his arch supports forced him to roll on the outer border of his foot so he will try Vasyli+Kacey insoles first and if those work then he will see Claire Ny for replication of the [facility]. PAST MEDICAL HISTORY/COMORBIDITIES:  Mr. Tiffanie Joseph  has a past medical history of Arthritis (07/06/2015), Back pain, DDD (degenerative disc disease), lumbar, Dry eyes, Enlarged prostate, GERD (gastroesophageal reflux disease), History of basal cell cancer, History of squamous cell carcinoma, IBS (irritable bowel syndrome), Insomnia (07/06/2015), Left inguinal hernia, MVP (mitral valve prolapse), Onychomycosis (7/6/2015), RBBB (7/6/2015), Right wrist pain (7/6/2015), Scoliosis, and Spondylosis of lumbar region without myelopathy or radiculopathy. Mr. Tiffanie Joseph  has a past surgical history that includes neurological procedure unlisted (2007); hx colonoscopy; hx orthopaedic; hx shoulder arthroscopy (Right); hx orthopaedic (Left, 06/2020); hx orthopaedic (Right, 08/2020); hx cataract removal; and hx carpal tunnel release.     Active Ambulatory Problems     Diagnosis Date Noted    Chronic low back pain 07/05/2015    MVP (mitral valve prolapse) 07/05/2015    Lumbar disc disease 07/06/2015    IBS (irritable bowel syndrome) 07/06/2015    Insomnia 07/06/2015    Arthritis 07/06/2015    Right wrist pain 07/06/2015    Onychomycosis 07/06/2015    Rising PSA level 67/48/9756    Oral lichen planus 53/04/5029    BPH with obstruction/lower urinary tract symptoms 07/06/2015    RBBB 07/06/2015    Low TSH level 06/30/2017    Onychomycosis of toenail 06/30/2017    Hyponatremia 06/30/2017    Insufficiency of tear film of both eyes 02/08/2018    Left inguinal hernia 02/14/2018    Abnormal prostate on physical examination 07/06/2018    Rhinitis 07/06/2018    Nonmelanoma skin cancer 07/06/2018    Postural imbalance 03/19/2020    Paresthesia 03/19/2020    Neuropathy 03/19/2020    Weakness 03/19/2020    Deviated septum 02/24/2021    Chronic maxillary sinusitis 02/24/2021    Erectile disorder due to medical condition in male patient 07/08/2021     Resolved Ambulatory Problems     Diagnosis Date Noted    Special screening for malignant neoplasm of prostate 07/02/2015     Past Medical History:   Diagnosis Date    Back pain     DDD (degenerative disc disease), lumbar     Dry eyes     Enlarged prostate     GERD (gastroesophageal reflux disease)     History of basal cell cancer     History of squamous cell carcinoma     Scoliosis     Spondylosis of lumbar region without myelopathy or radiculopathy        SOCIAL HISTORY/LIVING ENVIRONMENT:       Social History     Socioeconomic History    Marital status:      Spouse name: Not on file    Number of children: Not on file    Years of education: Not on file    Highest education level: Not on file   Occupational History    Not on file   Tobacco Use    Smoking status: Never Smoker    Smokeless tobacco: Never Used   Vaping Use    Vaping Use: Never used   Substance and Sexual Activity    Alcohol use: Yes     Alcohol/week: 5.0 standard drinks     Types: 5 Cans of beer per week    Drug use: No    Sexual activity: Not on file   Other Topics Concern    Not on file   Social History Narrative    Not on file     Social Determinants of Health     Financial Resource Strain:     Difficulty of Paying Living Expenses:    Food Insecurity:     Worried About Running Out of Food in the Last Year:     [address] in the Last Year:    Transportation Needs:     Lack of Transportation (Medical):      Lack of Transportation (Non-Medical):    Physical Activity:     Days of Exercise per Week:     Minutes of Exercise per Session:    Stress:     Feeling of Stress :    Social Connections:     Frequency of Communication with Friends and Family:     Frequency of Social Gatherings with Friends and Family:     Attends Jewish Services:     Active Member of Clubs or Organizations:     Attends Club or Organization Meetings:     Marital Status:    Intimate Partner Violence:     Fear of Current or Ex-Partner:     Emotionally Abused:     Physically Abused:     Sexually Abused:        PRIOR LEVEL OF FUNCTION/WOR/ACTIVITY:    Chronic lower back pain    CURRENT MEDICATIONS:    Current Outpatient Medications:     fluocinonide (LIDEX) 0.05 % topical gel, Apply  to affected area two (2) times a day., Disp: , Rfl:     linaCLOtide (Linzess) 145 mcg cap capsule, Take 1 Capsule by mouth Daily (before breakfast). , Disp: 90 Capsule, Rfl: 3    meloxicam (Mobic) 15 mg tablet, Take 1 Tablet by mouth daily. , Disp: 90 Tablet, Rfl: 3    tadalafiL (CIALIS) 5 mg tablet, Take 1 Tablet by mouth daily. , Disp: 90 Tablet, Rfl: 3    HYDROcodone-acetaminophen (NORCO) 5-325 mg per tablet, by Oral/Gastric Tube route, Disp: , Rfl:     traMADoL (ULTRAM) 50 mg tablet, TAKE 1 TAB BY MOUTH EVERY 6 HOURS AS NEEDED FOR PAIN FOR UP TO 30 DAYS. MAX 4/DAY, Disp: , Rfl:     tretinoin (RETIN-A) 0.05 % topical cream, APPLY TO AFFECTED AREA AT BEDTIME, Disp: , Rfl:     Myrbetriq 50 mg ER tablet, Take 1 Tab by mouth daily. , Disp: 30 Tab, Rfl: 12    eszopiclone (Lunesta) 3 mg tablet, Take 1 Tab by mouth nightly as needed for Sleep. Max Daily Amount: 3 mg., Disp: 90 Tab, Rfl: 5    tamsulosin (FLOMAX) 0.4 mg capsule, Take 2 Caps by mouth daily. (Patient taking differently: Take 0.4 mg by mouth daily.), Disp: 180 Cap, Rfl: 3    dicyclomine (BentyL) 20 mg tablet, Take 1 Tab by mouth every six (6) hours. , Disp: 120 Tab, Rfl: 5    lidocaine (LIDODERM) 5 %, by TransDERmal route daily as needed.  Apply patch to the affected area for 12 hours a day and remove for 12 hours a day. , Disp: , Rfl:     ipratropium (ATROVENT) 0.03 % nasal spray, 2 Sprays by Both Nostrils route every twelve (12) hours. (Patient taking differently: 2 Sprays by Both Nostrils route every twelve (12) hours as needed.), Disp: 30 mL, Rfl: 11    RESTASIS 0.05 % ophthalmic emulsion, INSTILL 1 DROP IN BOTH EYES TWICE DAILY, Disp: , Rfl: 12    omeprazole (PRILOSEC) 20 mg capsule, Take 20 mg by mouth daily as needed. , Disp: , Rfl:      Date Last Reviewed:  7/12/2021   Number of Personal Factors/Comorbidities that affect the Plan of Care: 1-2: MODERATE COMPLEXITY   EXAMINATION:   OBSERVATION/ORTHOSTATIC POSTURAL ASSESSMENT: Assessed @ Initial Visit:   Sitting: WFL  Standing: mild pes planus with body weight      BALANCE (SLS) Date:  7/13/2021    Date: Date:   Right 30 seconds     Left 30 seconds         MEASUREMENTS:          Date:  7/13/2021  Date:  Date:     Right Left Right Left Right Left   Ankle Circumference 23.5 cm 23.0       Figure 8 54.0 55.0         AROM/PROM         Joint: Date:7/13/2021  Date:  Date:    Active LE ROM Right Left Right Left Right Left   Hip Flexion         Hip Extension         Hip IR         Hip ER         Knee Extension         Knee Flexion         Ankle DF 12 degrees 10 degrees       Ankle PF 11 degrees (p) 20 degrees       Ankle IV 25 degrees 35 degrees       Ankle EV 35 degrees 35 degrees       Passive LE ROM         Ankle PF         Ankle DF         Ankle IV         Ankle EV           STRENGTH         Joint: Date: 7/13/2021  Date:  Date:     Right Left Right Left Right Left   Hip Abduction         Hip Adduction         Hip IR         Hip ER         Hip Flexion         Knee Extension         Knee Flexion         Ankle DF 5/5 5/5       Ankle PF 5/5 5/5       Ankle IV 5/5 5/5       Ankle EV 5/5 5/5           PALPATION Date: 7/13/2021   TTP Along distal peroneals, head of 5th MTP   Joint mobilization Hypomobility noted medial STJ, posterior and anterior at TCJ, 5th MTP J     NEUROLOGICAL SCREEN: Assessed @ Initial Visit    -RADIATING SYMPTOMS: NO Body Structures Involved:  1. Bones  2. Joints  3. Muscles  4. Ligaments Body Functions Affected:  1. Sensory/Pain  2. Neuromusculoskeletal  3. Movement Related Activities and Participation Affected:  1. Mobility  2. Self Care  3. Domestic Life  4. Interpersonal Interactions and Relationships  5.  Community, Social and [place]   Number of elements that affect the Plan of Care: 4+: HIGH COMPLEXITY   CLINICAL PRESENTATION:   Presentation: Evolving clinical presentation with changing clinical characteristics: MODERATE COMPLEXITY   CLINICAL DECISION MAKING:      Use of outcome tool(s) and clinical judgement create a POC that gives a: Questionable prediction of patient's progress: MODERATE COMPLEXITY

## 2021-07-13 ENCOUNTER — HOSPITAL ENCOUNTER (OUTPATIENT)
Dept: PHYSICAL THERAPY | Age: 71
Discharge: HOME | End: 2021-07-13
Payer: MEDICARE

## 2021-07-13 DIAGNOSIS — M79.671 RIGHT FOOT PAIN: ICD-10-CM

## 2021-07-13 PROCEDURE — 97162 PT EVAL MOD COMPLEX 30 MIN: CPT

## 2021-07-13 PROCEDURE — 97140 MANUAL THERAPY 1/> REGIONS: CPT

## 2021-07-20 ENCOUNTER — HOSPITAL ENCOUNTER (OUTPATIENT)
Dept: PHYSICAL THERAPY | Age: 71
Discharge: HOME | End: 2021-07-20
Payer: MEDICARE

## 2021-07-20 PROCEDURE — 97140 MANUAL THERAPY 1/> REGIONS: CPT

## 2021-07-20 PROCEDURE — 97110 THERAPEUTIC EXERCISES: CPT

## 2021-07-20 NOTE — PROGRESS NOTES
Yesenia Guzman  : 1950  [place] / Product Type: Medicare /  [address]  [facility], [address]  [phone]   [phone]      OUTPATIENT PHYSICAL THERAPY: Daily Treatment Note 2021  Visit Count:  2    ICD-10: Treatment Diagnosis:   Difficulty in walking, not elsewhere classified (R26.2)  Pain in right ankle and joints of right foot (M25.571)  Stiffness of right ankle, not elsewhere classified (M25.671)      Precautions/Allergies:   Patient has no known allergies. TREATMENT PLAN:  Effective Dates: 2021 TO 10/10/2021 (90 days). Frequency/Duration: 2 times a week for 90 Days        PRE-TREATMENT SYMPTOMS/COMPLAINTS:  Pt reports getting new inserts and seems to be helping some. MEDICATIONS REVIEWED:  2021   TREATMENT:   (In addition to Assessment/Re-Assessment sessions the following treatments were rendered)    THERAPEUTIC EXERCISE: (15 minutes):  Exercises per grid below to improve mobility, strength and balance. Required minimal visual and verbal cues to promote proper body alignment and promote proper body posture. Progressed resistance, range and complexity of movement as indicated. Date:  2021 Date:   Date:     Activity/Exercise Parameters Parameters Parameters   Intrinsic foot 15 X     Ankle TB 4 way RTB 15 X each     Tilt board 15 X each direction     BAPS board      slantboard stretch 30 seconds 3 X     Heel raise 15 X     Towel scrunches 4X     Lateral trunk shift (R ) 10 X       MANUAL THERAPY: (25 minutes): Joint mobilization, Soft tissue mobilization and Manipulation was utilized and necessary because of the patient's restricted joint motion, painful spasm, loss of articular motion and restricted motion of soft tissue.    + joint mobilization Grade III: medial glide to Subtalar joint, AP-PA to 5th MTP, AP to cuniform   +manual stretching R ankle all directions    (Used abbreviations: MET - muscle energy technique; PNF - proprioceptive neuromuscular facilitation; NMR - neuromuscular re-education; AP - anterior to posterior; PA - posterior to anterior)    MODALITIES: (0 minutes):      none      TREATMENT/SESSION ASSESSMENT:  Tolerated addition of exercises well requiring VC and TC for technique. Pt presents with R lateral trunk shift. Added correction to HEP today. Education: on objective findings and HEP    RECOMMENDATIONS/INTENT FOR NEXT TREATMENT SESSION: \"Next visit will focus on advancements to more challenging activities\".     PAIN: Initial: 1/10 Post Session:  1/10     MedBridge Portal    Total Treatment Billable Duration:  PT Patient Time In/Time Out  Time In: 1020  Time Out: 80  Nate Rollins, PT, DPT    Future Appointments   Date Time Provider Elisabeth Chauhan   7/23/2021 10:15 AM Velinda Gilbert A [facility]   7/27/2021 10:15 AM Velinda Paty A [facility]   7/29/2021 11:00 AM Velinda Paty A [facility]   8/3/2021 10:15 AM Velinda Paty A [facility]   8/5/2021 11:00 AM Velinda Gilbert A [facility]   3/21/2022  2:10 PM Edwige Alves MD [id number] PGU       Visit Approval Visit # Therapist initials Date A NS / Cx < 24 hr >24 hr Cx Comments    1 SHAISTA 7/12/21 [x]  [] [] Initial evaluation    2 SHAISTA 7/20/21 [x] [] []        [] [] []        [] [] []        [] [] []        [] [] []        [] [] []        [] [] []        [] [] []        [] [] []        [] [] []        [] [] []        [] [] []        [] [] []        [] [] []        [] [] []        [] [] []        [] [] []

## 2021-07-23 ENCOUNTER — HOSPITAL ENCOUNTER (OUTPATIENT)
Dept: PHYSICAL THERAPY | Age: 71
Discharge: HOME | End: 2021-07-23
Payer: MEDICARE

## 2021-07-23 PROCEDURE — 97110 THERAPEUTIC EXERCISES: CPT

## 2021-07-23 PROCEDURE — 97140 MANUAL THERAPY 1/> REGIONS: CPT

## 2021-07-23 NOTE — PROGRESS NOTES
Beverly Cruz  : 1950  [place] / Product Type: Medicare /  [address]  at [facility]  [address] [facility], [address] [place] [address]  [phone]   [phone]      OUTPATIENT PHYSICAL THERAPY: Daily Treatment Note 2021  Visit Count:  3    ICD-10: Treatment Diagnosis:   Difficulty in walking, not elsewhere classified (R26.2)  Pain in right ankle and joints of right foot (M25.571)  Stiffness of right ankle, not elsewhere classified (M25.671)      Precautions/Allergies:   Patient has no known allergies. TREATMENT PLAN:  Effective Dates: 2021 TO 10/10/2021 (90 days). Frequency/Duration: 2 times a week for 90 Days        PRE-TREATMENT SYMPTOMS/COMPLAINTS:  Pt reports feeling some better after last session. MEDICATIONS REVIEWED:  2021   TREATMENT:   (In addition to Assessment/Re-Assessment sessions the following treatments were rendered)    THERAPEUTIC EXERCISE: (25 minutes):  Exercises per grid below to improve mobility, strength and balance. Required minimal visual and verbal cues to promote proper body alignment and promote proper body posture. Progressed resistance, range and complexity of movement as indicated. Date:  2021 Date:  21 Date:     Activity/Exercise Parameters Parameters Parameters   Intrinsic foot 15 X 20 X in standing    Ankle TB 4 way RTB 15 X each RTB 20 X each    Tilt board 15 X each direction 20 X each direction    BAPS board      slantboard stretch 30 seconds 3 X 30 seconds 3 X    Heel raise 15 X 20 X    Towel scrunches 4X 5X    Lateral trunk shift (R ) 10 X 10 X 10 second holds    Tap downs  4\" 10 X 2      MANUAL THERAPY: (15 minutes): Joint mobilization, Soft tissue mobilization and Manipulation was utilized and necessary because of the patient's restricted joint motion, painful spasm, loss of articular motion and restricted motion of soft tissue.    + joint mobilization Grade III: medial glide to Subtalar joint, AP-PA to 5th MTP, AP to cuniform   +manual stretching R ankle all directions    (Used abbreviations: MET - muscle energy technique; PNF - proprioceptive neuromuscular facilitation; NMR - neuromuscular re-education; AP - anterior to posterior; PA - posterior to anterior)    MODALITIES: (0 minutes):      none      TREATMENT/SESSION ASSESSMENT:  Tolerated addition of exercises well requiring VC and TC for technique. Added forward tap downs this session. Education: on objective findings and HEP    RECOMMENDATIONS/INTENT FOR NEXT TREATMENT SESSION: \"Next visit will focus on advancements to more challenging activities\".     PAIN: Initial: 1/10 Post Session:  1/10     MedBridge Portal    Total Treatment Billable Duration:  PT Patient Time In/Time Out  Time In: 1020  Time Out: 80  Kirit Geronimo PT, DPT    Future Appointments   Date Time Provider Elisabeth Chauhan   7/27/2021 10:15 AM Malcom Cathryn A [facility]   7/29/2021 11:00 AM Malcom Cathryn A [facility]   8/3/2021 10:15 AM Malcom Cathryn A [facility]   8/5/2021 11:00 AM Malcom Cathryn A [facility]   3/21/2022  2:10 PM Bobby Novak MD [id number] PGU       Visit Approval Visit # Therapist initials Date A NS / Cx < 24 hr >24 hr Cx Comments    1 SHAISTA 7/12/21 [x]  [] [] Initial evaluation    2 SHAISTA 7/20/21 [x] [] []     3 SHAISTA 7/23/21 [x] [] []        [] [] []        [] [] []        [] [] []        [] [] []        [] [] []        [] [] []        [] [] []        [] [] []        [] [] []        [] [] []        [] [] []        [] [] []        [] [] []        [] [] []        [] [] []

## 2021-07-27 ENCOUNTER — HOSPITAL ENCOUNTER (OUTPATIENT)
Dept: PHYSICAL THERAPY | Age: 71
Discharge: HOME | End: 2021-07-27
Payer: MEDICARE

## 2021-07-27 PROCEDURE — 97110 THERAPEUTIC EXERCISES: CPT

## 2021-07-27 PROCEDURE — 97140 MANUAL THERAPY 1/> REGIONS: CPT

## 2021-07-27 NOTE — PROGRESS NOTES
Lisbet Damon  : 1950  [place] / Product Type: Medicare /  [address]  at [facility]  [address], [address] [facility], [address]  [phone]   [phone]      OUTPATIENT PHYSICAL THERAPY: Daily Treatment Note 2021  Visit Count:  4    ICD-10: Treatment Diagnosis:   Difficulty in walking, not elsewhere classified (R26.2)  Pain in right ankle and joints of right foot (M25.571)  Stiffness of right ankle, not elsewhere classified (M25.671)      Precautions/Allergies:   Patient has no known allergies. TREATMENT PLAN:  Effective Dates: 2021 TO 10/10/2021 (90 days). Frequency/Duration: 2 times a week for 90 Days        PRE-TREATMENT SYMPTOMS/COMPLAINTS:  Pt reports finding some relief with PT. Pain slightly improved. MEDICATIONS REVIEWED:  2021   TREATMENT:   (In addition to Assessment/Re-Assessment sessions the following treatments were rendered)    THERAPEUTIC EXERCISE: (25 minutes):  Exercises per grid below to improve mobility, strength and balance. Required minimal visual and verbal cues to promote proper body alignment and promote proper body posture. Progressed resistance, range and complexity of movement as indicated.      Date:  2021 Date:  21 Date:  21   Activity/Exercise Parameters Parameters Parameters   Intrinsic foot 15 X 20 X in standing    Ankle TB 4 way RTB 15 X each RTB 20 X each GTB 15 X each   Tilt board 15 X each direction 20 X each direction 20 X each direction   BAPS board      slantboard stretch 30 seconds 3 X 30 seconds 3 X 30 seconds 3 X    Heel raise 15 X 20 X    Towel scrunches 4X 5X 5X   Lateral trunk shift (R ) 10 X 10 X 10 second holds 10 X 10 seconds   Tap downs  4\" 10 X 2 4\" 10 X 2   Lateral tap downs   4\" 10 X 2     MANUAL THERAPY: (15 minutes): Joint mobilization, Soft tissue mobilization and Manipulation was utilized and necessary because of the patient's restricted joint motion, painful spasm, loss of articular motion and restricted motion of soft tissue. + joint mobilization Grade III: medial glide to Subtalar joint, AP-PA to 5th MTP, AP to cuniform   +manual stretching R ankle all directions  +STM proximal peroneals    (Used abbreviations: MET - muscle energy technique; PNF - proprioceptive neuromuscular facilitation; NMR - neuromuscular re-education; AP - anterior to posterior; PA - posterior to anterior)    MODALITIES: (0 minutes):      none      TREATMENT/SESSION ASSESSMENT:  Added soft tissue work to proximal peroneals today with trigger points and tone noted at mm belly. Added lateral tap downs to exercises today. Pt tolerated well. Education: on objective findings and HEP    RECOMMENDATIONS/INTENT FOR NEXT TREATMENT SESSION: \"Next visit will focus on advancements to more challenging activities\".     PAIN: Initial: 2/10 Post Session:  1-2/10     [facility] Portal    Total Treatment Billable Duration:  PT Patient Time In/Time Out  Time In: 1015  Time Out: 80  Noah Millard PT, DPT    Future Appointments   Date Time Provider [facility]   7/29/2021 11:00 AM [facility] SFD   8/3/2021 10:15 AM Hue Sham A SFDORPT D   8/5/2021 11:00 AM Hue Sham A SFDORPT D   3/21/2022  2:10 PM Juan Carlos Mclean MD [id number] PGU       Visit Approval Visit # Therapist initials Date A NS / Cx < 24 hr >24 hr Cx Comments    1 SHAISTA 7/12/21 [x]  [] [] Initial evaluation    2 SHAISTA 7/20/21 [x] [] []     3 SHAISTA 7/23/21 [x] [] []     4 SHAISTA 7/27/21 [x] [] []        [] [] []        [] [] []        [] [] []        [] [] []        [] [] []        [] [] []        [] [] []        [] [] []        [] [] []        [] [] []        [] [] []        [] [] []        [] [] []        [] [] []

## 2021-07-29 ENCOUNTER — HOSPITAL ENCOUNTER (OUTPATIENT)
Dept: PHYSICAL THERAPY | Age: 71
Discharge: HOME | End: 2021-07-29
Payer: MEDICARE

## 2021-07-29 PROCEDURE — 97110 THERAPEUTIC EXERCISES: CPT

## 2021-07-29 PROCEDURE — 97140 MANUAL THERAPY 1/> REGIONS: CPT

## 2021-07-29 NOTE — PROGRESS NOTES
Roxanne Pires  : 1950  [place] / Product Type: Medicare /  [address]  [facility] [address] [facility], [address]  [phone]   [phone]      OUTPATIENT PHYSICAL THERAPY: Daily Treatment Note 2021  Visit Count:  5    ICD-10: Treatment Diagnosis:   Difficulty in walking, not elsewhere classified (R26.2)  Pain in right ankle and joints of right foot (M25.571)  Stiffness of right ankle, not elsewhere classified (M25.671)      Precautions/Allergies:   Patient has no known allergies. TREATMENT PLAN:  Effective Dates: 2021 TO 10/10/2021 (90 days). Frequency/Duration: 2 times a week for 90 Days        PRE-TREATMENT SYMPTOMS/COMPLAINTS:  Pt having some increased pain more at lateral aspect of ankle today. MEDICATIONS REVIEWED:  2021   TREATMENT:   (In addition to Assessment/Re-Assessment sessions the following treatments were rendered)    THERAPEUTIC EXERCISE: (25 minutes):  Exercises per grid below to improve mobility, strength and balance. Required minimal visual and verbal cues to promote proper body alignment and promote proper body posture. Progressed resistance, range and complexity of movement as indicated.      Date:  2021 Date:  21 Date:  21 Date:  21     Activity/Exercise Parameters Parameters Parameters      Intrinsic foot 15 X 20 X in standing       Ankle TB 4 way RTB 15 X each RTB 20 X each GTB 15 X each GTB 15 X each     Tilt board 15 X each direction 20 X each direction 20 X each direction      BAPS board         slantboard stretch 30 seconds 3 X 30 seconds 3 X 30 seconds 3 X  Standing 2 way 30 seconds 2 X each     Heel raise 15 X 20 X       Towel scrunches 4X 5X 5X      Lateral trunk shift (R ) 10 X 10 X 10 second holds 10 X 10 seconds 5 X 10 second holds     Tap downs  4\" 10 X 2 4\" 10 X 2 6\" 10 X     Lateral tap downs   4\" 10 X 2 6\" 10 X BOSU step ups    10 X 1 UE support required       MANUAL THERAPY: (15 minutes): Joint mobilization, Soft tissue mobilization and Manipulation was utilized and necessary because of the patient's restricted joint motion, painful spasm, loss of articular motion and restricted motion of soft tissue. + joint mobilization Grade III: medial glide to Subtalar joint, AP-PA to 5th MTP, AP to cuniform   +manual stretching R ankle all directions  +STM proximal peroneals    (Used abbreviations: MET - muscle energy technique; PNF - proprioceptive neuromuscular facilitation; NMR - neuromuscular re-education; AP - anterior to posterior; PA - posterior to anterior)    MODALITIES: (0 minutes):      none      TREATMENT/SESSION ASSESSMENT:  Continued TTP and trigger point noted at peroneal mm belly. Added bosu step ups for continued work on strengthening, mobility and ankle stability. Hypomobility at cuniform with most reported pain today. Education: on objective findings and HEP    RECOMMENDATIONS/INTENT FOR NEXT TREATMENT SESSION: \"Next visit will focus on advancements to more challenging activities\".     PAIN: Initial: 2-3/10 Post Session:  2-3/10     [facility] Portal    Total Treatment Billable Duration:  PT Patient Time In/Time Out  Time In: 1105  Time Out: [facility], PT, DPT    Future Appointments   Date Time Provider Elisabeth Chauhan   8/3/2021 10:15 AM Velinda Paty A [facility]PT SFD   8/5/2021 11:00 AM Velinda Gregory A SFDORPT SFD   8/9/2021  1:00 PM Velinda Gregory A SFDORPT SFD   8/11/2021 10:15 AM Velinda Paty A SFDORPT SFD   3/21/2022  2:10 PM Edwige Alves MD [id number] PGU       Visit Approval Visit # Therapist initials Date A NS / Cx < 24 hr >24 hr Cx Comments    1 SHAISTA 7/12/21 [x]  [] [] Initial evaluation    2 SHAISTA 7/20/21 [x] [] []     3 SHAISTA 7/23/21 [x] [] []     4 SHAISTA 7/27/21 [x] [] []     5 SHAISTA 7/29/21 [x] [] []        [] [] []        [] [] []        [] [] []        [] [] []        [] [] []        [] [] [] [] [] []        [] [] []        [] [] []        [] [] []        [] [] []        [] [] []        [] [] []

## 2021-08-03 ENCOUNTER — HOSPITAL ENCOUNTER (OUTPATIENT)
Dept: PHYSICAL THERAPY | Age: 71
Discharge: HOME | End: 2021-08-03
Payer: MEDICARE

## 2021-08-03 PROCEDURE — 97110 THERAPEUTIC EXERCISES: CPT

## 2021-08-03 PROCEDURE — 97140 MANUAL THERAPY 1/> REGIONS: CPT

## 2021-08-03 NOTE — PROGRESS NOTES
Kathryn Espinosa  : 1950  [place] / Product Type: Medicare /  [address]  at [facility]  [address], [address] [facility] [place], [address]  [phone]   [phone]      OUTPATIENT PHYSICAL THERAPY: Daily Treatment Note 8/3/2021  Visit Count:  6    ICD-10: Treatment Diagnosis:   Difficulty in walking, not elsewhere classified (R26.2)  Pain in right ankle and joints of right foot (M25.571)  Stiffness of right ankle, not elsewhere classified (M25.671)      Precautions/Allergies:   Patient has no known allergies. TREATMENT PLAN:  Effective Dates: 2021 TO 10/10/2021 (90 days). Frequency/Duration: 2 times a week for 90 Days        PRE-TREATMENT SYMPTOMS/COMPLAINTS:  Pt reports adding the insoles back due to increased pain without them. He reports having more pain at ankle with doing the tap downs at home due to only having a 8\" high step    MEDICATIONS REVIEWED:  8/3/2021   TREATMENT:   (In addition to Assessment/Re-Assessment sessions the following treatments were rendered)    THERAPEUTIC EXERCISE: (25 minutes):  Exercises per grid below to improve mobility, strength and balance. Required minimal visual and verbal cues to promote proper body alignment and promote proper body posture. Progressed resistance, range and complexity of movement as indicated.      Date:  2021 Date:  21 Date:  21 Date:  21 Date:  8/3/21    Activity/Exercise Parameters Parameters Parameters      Intrinsic foot 15 X 20 X in standing       Ankle TB 4 way RTB 15 X each RTB 20 X each GTB 15 X each GTB 15 X each GTB 20 X each    Tilt board 15 X each direction 20 X each direction 20 X each direction      BAPS board         slantboard stretch 30 seconds 3 X 30 seconds 3 X 30 seconds 3 X  Standing 2 way 30 seconds 2 X each 30 seconds 3 X    Heel raise 15 X 20 X   20 X    Towel scrunches 4X 5X 5X  5 X    Lateral trunk shift (R ) 10 X 10 X 10 second holds 10 X 10 seconds 5 X 10 second holds 10 second holds 5 X    Tap downs  4\" 10 X 2 4\" 10 X 2 6\" 10 X 4\" 10 X    Lateral tap downs   4\" 10 X 2 6\" 10 X     BOSU step ups    10 X 1 UE support required       MANUAL THERAPY: (15 minutes): Joint mobilization, Soft tissue mobilization and Manipulation was utilized and necessary because of the patient's restricted joint motion, painful spasm, loss of articular motion and restricted motion of soft tissue. + joint mobilization Grade III: medial glide to Subtalar joint, AP-PA to 5th MTP, AP to cuniform   +manual stretching R ankle all directions  +STM proximal peroneals    (Used abbreviations: MET - muscle energy technique; PNF - proprioceptive neuromuscular facilitation; NMR - neuromuscular re-education; AP - anterior to posterior; PA - posterior to anterior)    MODALITIES: (0 minutes):      none      TREATMENT/SESSION ASSESSMENT:  Improving ankle mobility noted throughout joint. Still hypomobility at cuniform. Tolerated exercises well. No pain with tap downs on 4\" step. Education: on objective findings and HEP    RECOMMENDATIONS/INTENT FOR NEXT TREATMENT SESSION: \"Next visit will focus on advancements to more challenging activities\".     PAIN: Initial: 3/10 Post Session: 3 /10     [facility] Portal    Total Treatment Billable Duration:  PT Patient Time In/Time Out  Time In: 1015  Time Out: [address], PT, DPT    Future Appointments   Date Time Provider Elisabeth Rodriguezi   8/5/2021 11:00 AM Nicholas BAKER SFDORPT D   8[facility]2021  [facility]   8/11/2021 10:15 AM Nicholas BAKER SFDORPT D   3/21/2022  2:10 PM Aquiles Posada MD [id number] PGU       Visit Approval Visit # Therapist initials Date A NS / Cx < 24 hr >24 hr Cx Comments    1 SHAISTA 7/12/21 [x]  [] [] Initial evaluation    2 SHAISTA 7/20/21 [x] [] []     3 SHAISTA 7/23/21 [x] [] []     4 SHAISTA 7/27/21 [x] [] []     5 SHAISTA 7/29/21 [x] [] []     6 SHAISTA 8/3/21 [x] [] [] [] [] []        [] [] []        [] [] []        [] [] []        [] [] []        [] [] []        [] [] []        [] [] []        [] [] []        [] [] []        [] [] []        [] [] []

## 2021-08-05 ENCOUNTER — HOSPITAL ENCOUNTER (OUTPATIENT)
Dept: PHYSICAL THERAPY | Age: 71
Discharge: HOME | End: 2021-08-05
Payer: MEDICARE

## 2021-08-05 PROCEDURE — 97140 MANUAL THERAPY 1/> REGIONS: CPT

## 2021-08-05 PROCEDURE — 97110 THERAPEUTIC EXERCISES: CPT

## 2021-08-05 NOTE — PROGRESS NOTES
Kike Arenas  : 1950  [place] / Product Type: Medicare /  [address]  at [facility]  [address], [address], [place] [address] [address] [facility]  [phone]   [phone]      OUTPATIENT PHYSICAL THERAPY: Daily Treatment Note 2021  Visit Count:  7    ICD-10: Treatment Diagnosis:   Difficulty in walking, not elsewhere classified (R26.2)  Pain in right ankle and joints of right foot (M25.571)  Stiffness of right ankle, not elsewhere classified (M25.671)      Precautions/Allergies:   Patient has no known allergies. TREATMENT PLAN:  Effective Dates: 2021 TO 10/10/2021 (90 days). Frequency/Duration: 2 times a week for 90 Days        PRE-TREATMENT SYMPTOMS/COMPLAINTS:  Pt reports overall pain is improving. MEDICATIONS REVIEWED:  2021   TREATMENT:   (In addition to Assessment/Re-Assessment sessions the following treatments were rendered)    THERAPEUTIC EXERCISE: (25 minutes):  Exercises per grid below to improve mobility, strength and balance. Required minimal visual and verbal cues to promote proper body alignment and promote proper body posture. Progressed resistance, range and complexity of movement as indicated.      Date:  2021 Date:  21 Date:  21 Date:  21 Date:  8/3/21 Date:  21   Activity/Exercise Parameters Parameters Parameters      Intrinsic foot 15 X 20 X in standing       Ankle TB 4 way RTB 15 X each RTB 20 X each GTB 15 X each GTB 15 X each GTB 20 X each BTB 20 X each   Tilt board 15 X each direction 20 X each direction 20 X each direction      BAPS board         slantboard stretch 30 seconds 3 X 30 seconds 3 X 30 seconds 3 X  Standing 2 way 30 seconds 2 X each 30 seconds 3 X 30 seconds 3 X   Heel raise 15 X 20 X   20 X    Towel scrunches 4X 5X 5X  5 X 5X   Lateral trunk shift (R ) 10 X 10 X 10 second holds 10 X 10 seconds 5 X 10 second holds 10 second holds 5 X    Tap downs  4\" 10 X 2 4\" 10 X 2 6\" 10 X 4\" 10 X 4\" 10 X 2   Lateral tap downs   4\" 10 X 2 6\" 10 X  4\" 10 X 2   BOSU step ups    10 X 1 UE support required       MANUAL THERAPY: (15 minutes): Joint mobilization, Soft tissue mobilization and Manipulation was utilized and necessary because of the patient's restricted joint motion, painful spasm, loss of articular motion and restricted motion of soft tissue. + joint mobilization Grade III: medial glide to Subtalar joint, AP-PA to 5th MTP, AP to cuniform   +manual stretching R ankle all directions  +STM proximal peroneals    (Used abbreviations: MET - muscle energy technique; PNF - proprioceptive neuromuscular facilitation; NMR - neuromuscular re-education; AP - anterior to posterior; PA - posterior to anterior)    MODALITIES: (0 minutes):      none      TREATMENT/SESSION ASSESSMENT:  Tolerated progression of exercises well. Able to resume steps ups/lateral ups without pain today. Education: on objective findings and HEP    RECOMMENDATIONS/INTENT FOR NEXT TREATMENT SESSION: \"Next visit will focus on advancements to more challenging activities\".     PAIN: Initial: 1/10 Post Session:  1/10     [employer] Portal    Total Treatment Billable Duration:  PT Patient Time In/Time Out  Time In: 1100  Time Out: 1140  Loretta Dancer, PT, DPT    Future Appointments   Date Time Provider Elisabeth Chauhan   8/9/2021  1:00 PM [facility]   8/11/2021 10:15 AM Sanjay BAKER SFDORPT SFD   3/21/2022  2:10 PM Maritza Lagos MD [id number] PGU       Visit Approval Visit # Therapist initials Date A NS / Cx < 24 hr >24 hr Cx Comments    1 SHAISTA 7/12/21 [x]  [] [] Initial evaluation    2 SHAISTA 7/20/21 [x] [] []     3 SHAISTA 7/23/21 [x] [] []     4 SHAISTA 7/27/21 [x] [] []     5 SHAISTA 7/29/21 [x] [] []     6 SHAISTA 8/3/21 [x] [] []     7 SHAISTA 8/5/21 [x] [] []        [] [] []        [] [] []        [] [] []        [] [] []        [] [] []        [] [] []        [] [] []        [] [] []        [] [] [] [] [] []        [] [] []

## 2021-08-09 ENCOUNTER — HOSPITAL ENCOUNTER (OUTPATIENT)
Dept: PHYSICAL THERAPY | Age: 71
Discharge: HOME | End: 2021-08-09
Payer: MEDICARE

## 2021-08-09 PROCEDURE — 97110 THERAPEUTIC EXERCISES: CPT

## 2021-08-09 PROCEDURE — 97140 MANUAL THERAPY 1/> REGIONS: CPT

## 2021-08-09 NOTE — PROGRESS NOTES
Ashvin Lim  : 1950  [place] / Product Type: Medicare /  [address] [place]  at [facility]  [address], [address] [facility], [address]  [phone]   [phone]      OUTPATIENT PHYSICAL THERAPY: Daily Treatment Note 2021  Visit Count:  8    ICD-10: Treatment Diagnosis:   Difficulty in walking, not elsewhere classified (R26.2)  Pain in right ankle and joints of right foot (M25.571)  Stiffness of right ankle, not elsewhere classified (M25.671)      Precautions/Allergies:   Patient has no known allergies. TREATMENT PLAN:  Effective Dates: 2021 TO 10/10/2021 (90 days). Frequency/Duration: 2 times a week for 90 Days        PRE-TREATMENT SYMPTOMS/COMPLAINTS:  Pt reports the orthotics seem to be helping. MEDICATIONS REVIEWED:  2021   TREATMENT:   (In addition to Assessment/Re-Assessment sessions the following treatments were rendered)    THERAPEUTIC EXERCISE: (25 minutes):  Exercises per grid below to improve mobility, strength and balance. Required minimal visual and verbal cues to promote proper body alignment and promote proper body posture. Progressed resistance, range and complexity of movement as indicated.      Date:  2021 Date:  21 Date:  21 Date:  21 Date:  8/3/21 Date:  21 Date:  21   Activity/Exercise Parameters Parameters Parameters       Intrinsic foot 15 X 20 X in standing        Ankle TB 4 way RTB 15 X each RTB 20 X each GTB 15 X each GTB 15 X each GTB 20 X each BTB 20 X each BTB 20 X each   Tilt board 15 X each direction 20 X each direction 20 X each direction       BAPS board       5 X    slantboard stretch 30 seconds 3 X 30 seconds 3 X 30 seconds 3 X  Standing 2 way 30 seconds 2 X each 30 seconds 3 X 30 seconds 3 X 30 seconds 3 X   Heel raise 15 X 20 X   20 X  20 X   Towel scrunches 4X 5X 5X  5 X 5X 5 X   Lateral trunk shift (R ) 10 X 10 X 10 second holds 10 X 10 seconds 5 X 10 second holds 10 second holds 5 X     Tap downs  4\" 10 X 2 4\" 10 X 2 6\" 10 X 4\" 10 X 4\" 10 X 2    Lateral tap downs   4\" 10 X 2 6\" 10 X  4\" 10 X 2    BOSU step ups    10 X 1 UE support required      SLS       Foam 3 X 30 seconds holds     MANUAL THERAPY: (15 minutes): Joint mobilization, Soft tissue mobilization and Manipulation was utilized and necessary because of the patient's restricted joint motion, painful spasm, loss of articular motion and restricted motion of soft tissue. + joint mobilization Grade III: medial glide to Subtalar joint, AP-PA to 5th MTP, AP to cuniform   +manual stretching R ankle all directions  +STM proximal peroneals    (Used abbreviations: MET - muscle energy technique; PNF - proprioceptive neuromuscular facilitation; NMR - neuromuscular re-education; AP - anterior to posterior; PA - posterior to anterior)    MODALITIES: (0 minutes):      none      TREATMENT/SESSION ASSESSMENT:  Tolerated progression of exercises well. Discontinued step ups/lateral steps ups due to pain. Education: on objective findings and HEP    RECOMMENDATIONS/INTENT FOR NEXT TREATMENT SESSION: \"Next visit will focus on advancements to more challenging activities\".     PAIN: Initial: 1/10 Post Session:  1/10     [facility]    Total Treatment Billable Duration:  PT Patient Time In/Time Out  Time In: 1300  Time Out: 0834  Moi Wolfe, PT, DPT    Future Appointments   Date Time Provider Elisabeth Chauhan   8/11/2021 10:15 AM Tunde BAKER SFDORPT SFD   3/21/2022  2:10 PM Jeffy Sheth MD [id number] PGU       Visit Approval Visit # Therapist initials Date A NS / Cx < 24 hr >24 hr Cx Comments    1 SHAISTA 7/12/21 [x]  [] [] Initial evaluation    2 SHAISTA 7/20/21 [x] [] []     3 SHAISTA 7/23/21 [x] [] []     4 SHAISTA 7/27/21 [x] [] []     5 SHAISTA 7/29/21 [x] [] []     6 SHAISTA 8/3/21 [x] [] []     7 SHAISTA 8/5/21 [x] [] []     8 SHAISTA 8/9/21 [x] [] [] PN next visit       [] [] []        [] [] []        [] [] [] [] [] []        [] [] []        [] [] []        [] [] []        [] [] []        [] [] []        [] [] []

## 2021-08-11 ENCOUNTER — HOSPITAL ENCOUNTER (OUTPATIENT)
Dept: PHYSICAL THERAPY | Age: 71
Discharge: HOME | End: 2021-08-11
Payer: MEDICARE

## 2021-08-11 PROCEDURE — 97110 THERAPEUTIC EXERCISES: CPT

## 2021-08-11 PROCEDURE — 97140 MANUAL THERAPY 1/> REGIONS: CPT

## 2021-08-11 NOTE — PROGRESS NOTES
Bekah Sender  : 1950  [place] / [place] / Product Type: Medicare /  [address]  at [facility]  [address], [address] [facility], [address]  [phone]   [phone]      OUTPATIENT PHYSICAL THERAPY: Daily Treatment Note 2021  Visit Count:  9    ICD-10: Treatment Diagnosis:   Difficulty in walking, not elsewhere classified (R26.2)  Pain in right ankle and joints of right foot (M25.571)  Stiffness of right ankle, not elsewhere classified (M25.671)      Precautions/Allergies:   Patient has no known allergies. TREATMENT PLAN:  Effective Dates: 2021 TO 10/10/2021 (90 days). Frequency/Duration: 2 times a week for 90 Days        PRE-TREATMENT SYMPTOMS/COMPLAINTS:  Pt reports the orthotics seem to be helping. MEDICATIONS REVIEWED:  2021   TREATMENT:   (In addition to Assessment/Re-Assessment sessions the following treatments were rendered)    THERAPEUTIC EXERCISE: (25 minutes):  Exercises per grid below to improve mobility, strength and balance. Required minimal visual and verbal cues to promote proper body alignment and promote proper body posture. Progressed resistance, range and complexity of movement as indicated.      Date:  2021 Date:  21 Date:  21 Date:  21 Date:  8/3/21 Date:  21 Date:  21 Date:  21   Activity/Exercise Parameters Parameters Parameters        Intrinsic foot 15 X 20 X in standing         Ankle TB 4 way RTB 15 X each RTB 20 X each GTB 15 X each GTB 15 X each GTB 20 X each BTB 20 X each BTB 20 X each BTB 25 X each   Tilt board 15 X each direction 20 X each direction 20 X each direction        BAPS board       5 X  5X   slantboard stretch 30 seconds 3 X 30 seconds 3 X 30 seconds 3 X  Standing 2 way 30 seconds 2 X each 30 seconds 3 X 30 seconds 3 X 30 seconds 3 X 30 seconds 3    Heel raise 15 X 20 X   20 X  20 X SL 10 X 2   Towel scrunches 4X 5X 5X  5 X 5X 5 X 5 X   Lateral trunk shift (R ) 10 X 10 X 10 second holds 10 X 10 seconds 5 X 10 second holds 10 second holds 5 X      Tap downs  4\" 10 X 2 4\" 10 X 2 6\" 10 X 4\" 10 X 4\" 10 X 2     Lateral tap downs   4\" 10 X 2 6\" 10 X  4\" 10 X 2     BOSU step ups    10 X 1 UE support required    Wobble board mini squats 5 X each direction   SLS       Foam 3 X 30 seconds holds Foam 3 X 30 second holds     MANUAL THERAPY: (15 minutes): Joint mobilization, Soft tissue mobilization and Manipulation was utilized and necessary because of the patient's restricted joint motion, painful spasm, loss of articular motion and restricted motion of soft tissue. + joint mobilization Grade III: medial glide to Subtalar joint, AP-PA to 5th MTP, AP to cuniform   +manual stretching R ankle all directions  +STM proximal peroneals    (Used abbreviations: MET - muscle energy technique; PNF - proprioceptive neuromuscular facilitation; NMR - neuromuscular re-education; AP - anterior to posterior; PA - posterior to anterior)    MODALITIES: (0 minutes):      none      TREATMENT/SESSION ASSESSMENT:  Tolerated progression of exercises well. Added wobble board + mini squats for continued work on proprioception and ankle strategy. Improving ROM noted this session. Pt will be out of town next week but plan to continue following    Education: on objective findings and HEP    RECOMMENDATIONS/INTENT FOR NEXT TREATMENT SESSION: \"Next visit will focus on advancements to more challenging activities\".     PAIN: Initial: 1/10 Post Session:  1/10     [facility] Portal    Total Treatment Billable Duration:  PT Patient Time In/Time Out  Time In: 6754  Time Out: [address], PT, DPT    Future Appointments   Date Time Provider Elisabeth Chauhan   8/24/2021 10:15 AM Dimitri Camejo [facility]   8/26/2021 10:25 AM Dwain Lanza MD [facility]   8/27/2021  1:45 PM Dimitri Camejo [facility]   3/21/2022  2:10 PM Ashanti Hansen MD [id number] [facility]       Visit Approval Visit # Therapist initials Date A NS / Cx < 24 hr >24 hr Cx Comments    1 SHAISTA 7/12/21 [x]  [] [] Initial evaluation    2 SHAISTA 7/20/21 [x] [] []     3 KAI 7/23/21 [x] [] []     4 KAI 7/27/21 [x] [] []     5 SHAISTA 7/29/21 [x] [] []     6 SHAISTA 8/3/21 [x] [] []     7 KAI 8/5/21 [x] [] []     8 KAI 8/9/21 [x] [] [] PN next visit    9 KAI 8/11/21 [x] [] [] PN today       [] [] []        [] [] []        [] [] []        [] [] []        [] [] []        [] [] []        [] [] []        [] [] []        [] [] []

## 2021-08-11 NOTE — THERAPY EVALUATION
Michael Perry  : 1950  [place] / [place] / Product Type: Medicare /  [address] at [address] [facility]  11 [facility], [address] [place] [address], [address]  [phone]   [phone]         OUTPATIENT PHYSICAL THERAPY:Progress Report 2021    ICD-10: Treatment Diagnosis:   Difficulty in walking, not elsewhere classified (R26.2)  Pain in right ankle and joints of right foot (M25.571)  Stiffness of right ankle, not elsewhere classified (M25.671)      PRECAUTIONS/ALLERGIES:   Patient has no known allergies. FALL RISK SCORE: 1(? 5 = High Risk)    MD ORDERS: Eval and Treat  MEDICAL/REFERRING DIAGNOSIS:  Pain in right foot [M79.671]    DATE OF ONSET: 4 months ago    REFERRING PHYSICIAN: Lakesha Cazares MD    RETURN PHYSICIAN APPOINTMENT:  TBD by pt      Ambulatory/Rehab Services H2 Model Falls Risk Assessment    Risk Factors:       (1)  Gender [Male] Ability to Rise from Chair:       (0)  Ability to rise in a single movement    Falls Prevention Plan:       No modifications necessary   Total: (5 or greater = High Risk): 1     [facility] [employer]. All Rights Reserved. [facility] Patent #[id number]. Federal Law prohibits the replication, distribution or use without written permission from [facility] [employer] [facility]      Progress Report:  Pt has been seen for a total of 9 PT sessions to date. Pt has met 5/6 STG and 0/4 LTG this session. Pt demonstrating improvements in AROM, pain, and overall improvements in functional mobility. Pt will continue to benefit from continued skilled PT interventions. INITIAL ASSESSMENT:  Mr. Michael Perry has attended 1 physical therapy session including initial evaluation as of 2021.  Micheal Perry demonstrates decreased strength, decreased ROM, decreased tolerance of ADLs, decreased functional mobility, and decreased activity tolerance, all consistent with S/S of possible peroneal tendonosis. According to their responses on the Foot and Ankle Ability Measure, Ashanti Kim is 20% limited by their ankle pain and dysfunction in their ability to participate in ADLs and their overall functional tolerance. Recommending skilled PT: manual therapeutic techniques (as appropriate), therapeutic exercises and activities, balance and comprehensive home exercises program to address current impairment. Ashanti Kim will benefit from skilled PT (medically necessary) to address above deficits affecting participation in basic ADLs and overall functional tolerance. PROBLEM LIST (Impacting functional limitations):  1. Decreased Strength  2. Decreased ADL/Functional Activities  3. Decreased Transfer Abilities  4. Decreased Ambulation Ability/Technique  5. Decreased Balance  6. Increased Pain  7. Decreased Activity Tolerance  8. Decreased [place] with Home Exercise Program INTERVENTIONS PLANNED:  1. Balance Exercise  2. Bed Mobility  3. Cold  4. Cryotherapy  5. Family Education  6. Gait Training  7. Heat  8. Home Exercise Program (HEP)  9. Manual Therapy  10. Neuromuscular Re-education/Strengthening  11. Range of Motion (ROM)  12. Therapeutic Activites  13. Therapeutic Exercise/Strengthening  14. Transfer Training  15. Ultrasound (US)  16. Aquatic therapy  17. Electrical Stimulation  18. Vasopneumatic Device  19. Dry Needling   TREATMENT PLAN:  Effective Dates: 7/13/2021 TO 10/10/2021 (90 days). Frequency/Duration: 2 times a week for 90 Days    SHORT-TERM FUNCTIONAL GOALS: Time Frame: 4 weeks  1. Ashanti Kim will be compliant with home exercise program within 4 weeks in order to improve active participation with management of patient's symptoms and/or functional deficits. Goal met 8/11/21  2. Ashanti Kim will report <=3/10 pain with walking >15 minutes in order to participate in daily exercise and daily activities.  Goal met 8/11 21 (2-3)  3. Joana Doran will be able to stand >=15 minutes with <3/10 pain to feet in order to participate in household duties without issues/compromise. Goal met 8/11/21 (1-2)  4. Joana Doran will report being able to sleep through the night without waking up secondary to R foot pain. Goal met 8/11/21  5. Joana Doran  will improve R ankle PF AROM from 11 to 20 in order to show improvement in ankle ROM and tolerance for functional activity. Progressing 8/11/21  6. Joana Doran will be report improved score on the Foot and Ankle Ability Measure from 67 to 75 to indicate improvement in functional independence. Goal met 8/11/21    DISCHARGE GOALS: Time Frame: 12 weeks  1. Joana Doran will be independent with home exercise program within 8 weeks in order to improve independence with management of patient's symptoms and/or functional deficits. Progressing 8/11/21  2. Joana Doran will report <=1/10 pain with participation in activities of daily living and overall functional mobility including walking and stair ambulation. Progressing 8/11/21  3. Joana Doran will be able to stand >=30 minutes without reports of increased foot/ankle pain. Progressing 8/11/21  4. Joana Doran will be report improved score on the Foot and Ankle Ability Measure from 67 to 84 to indicate improvement in functional independence. Progressing 8/11/21    REHABILITATION POTENTIAL FOR STATED GOALS: GOOD    TOOL USED:   -FOOT AND ANKLE ABILITY MEASURE (FAAM)  Score:  Initial: 67/84 Most Recent: 75/84(Date: 8/11/21)   Interpretation of Score: For the \"Activities of Daily Living\", there are 21 questions each scored on a 5 point scale with 0 representing \"Unable to do\" and 4 representing \"No difficulty\". The lower the score, the greater the functional disability. 84/84 represents no disability. Minimal detectable change is 5.7 points.   With the addition of the 8 questions in the \"Sports Subscale,\" there are 29 questions, each scored on a 5 point scale with 0 representing \"Unable to do\" and 4 representing \"No difficulty\". The lower the score, the greater the functional disability. 116/116 represents no disability. Minimal detectable change is 12.3 points. MEDICAL NECESSITY:  · Skilled intervention continues to be required due to above deficits affecting participation in basic ADLs and overall functional tolerance. REASON FOR SERVICES/ OTHER COMMENTS:  · Patient continues to require skilled intervention due to  above deficits affecting participation in basic ADLs and overall functional tolerance. Regarding [facility] therapy, I certify that the treatment plan above will be carried out by a therapist or under their direction. Thank you for this referral,  Jose Saavedra     Referring Physician Signature: Sourav Santana MD No Signature is Required for this note. HISTORY:  All history obtained on 7/13/2021 unless otherwise noted. PATIENT STATED GOAL:  To be able to return to walking normal without increased pain. Pt suffers from chronic back pain that makes prolonged sitting difficult with relieving position being standing and walking. Very difficult now that standing and walking is affecting by R foot pain. HISTORY OF PRESENT INJURY/ILLNESS (REASON FOR REFERRAL):   Pt reports he has been experiencing R lateral foot pain for approximately 4 months now. Pt reports having a 1st MT fracture years ago and was recommended to get inserts. Pt just got new ones approximately 2 months ago after being out of them for several months after stopping work. Pt reports the orthotics have actually made it feel worse. Pt reports feeling like he's walking on the outside of his foot. Pt reports getting xray with some OA noted. Pt reports having pain at the end of day at night and especially with walking.  Pt reports pain is better in the morning but progressively gets worse the more he is up on his feet. Pt states the pain can get as high as 6/10 and as low as 0/10. Per referring physician: We discussed that he has pes planus feet bilaterally but for some reason he is overloading the fifth metatarsal tuberosity. I am not sure if he has a subtle imbalance between his posterior tibial tendon is peroneal tendon causing him to roll on that side but he definitely has no evidence of cavovarus.     When he first noticed his pain, he had new orthotics made and he feels those insoles increased his discomfort. Gonzáleznita Mack has tried to modify the insole but he still has pain  I think his arch supports forced him to roll on the outer border of his foot so he will try Vasyli[place] insoles first and if those work then he will see Eugenia Giron for replication of the [facility]. PAST MEDICAL HISTORY/COMORBIDITIES:  Mr. Annmarie Ramirez  has a past medical history of Arthritis (07/06/2015), Back pain, DDD (degenerative disc disease), lumbar, Dry eyes, Enlarged prostate, GERD (gastroesophageal reflux disease), History of basal cell cancer, History of squamous cell carcinoma, IBS (irritable bowel syndrome), Insomnia (07/06/2015), Left inguinal hernia, MVP (mitral valve prolapse), Onychomycosis (7/6/2015), RBBB (7/6/2015), Right wrist pain (7/6/2015), Scoliosis, and Spondylosis of lumbar region without myelopathy or radiculopathy. Mr. Annmarie Ramirez  has a past surgical history that includes neurological procedure unlisted (2007); hx colonoscopy; hx orthopaedic; hx shoulder arthroscopy (Right); hx orthopaedic (Left, 06/2020); hx orthopaedic (Right, 08/2020); hx cataract removal; and hx carpal tunnel release.     Active Ambulatory Problems     Diagnosis Date Noted    Chronic low back pain 07/05/2015    MVP (mitral valve prolapse) 07/05/2015    Lumbar disc disease 07/06/2015    IBS (irritable bowel syndrome) 07/06/2015    Insomnia 07/06/2015    Arthritis 07/06/2015    Right wrist pain 07/06/2015    Onychomycosis 07/06/2015    Rising PSA level 20/37/2200    Oral lichen planus 91/25/1148    BPH with obstruction/lower urinary tract symptoms 07/06/2015    RBBB 07/06/2015    Low TSH level 06/30/2017    Onychomycosis of toenail 06/30/2017    Hyponatremia 06/30/2017    Insufficiency of tear film of both eyes 02/08/2018    Left inguinal hernia 02/14/2018    Abnormal prostate on physical examination 07/06/2018    Rhinitis 07/06/2018    Nonmelanoma skin cancer 07/06/2018    Postural imbalance 03/19/2020    Paresthesia 03/19/2020    Neuropathy 03/19/2020    Weakness 03/19/2020    Deviated septum 02/24/2021    Chronic maxillary sinusitis 02/24/2021    Erectile disorder due to medical condition in male patient 07/08/2021     Resolved Ambulatory Problems     Diagnosis Date Noted    Special screening for malignant neoplasm of prostate 07/02/2015     Past Medical History:   Diagnosis Date    Back pain     DDD (degenerative disc disease), lumbar     Dry eyes     Enlarged prostate     GERD (gastroesophageal reflux disease)     History of basal cell cancer     History of squamous cell carcinoma     Scoliosis     Spondylosis of lumbar region without myelopathy or radiculopathy        SOCIAL HISTORY/LIVING ENVIRONMENT:       Social History     Socioeconomic History    Marital status:      Spouse name: Not on file    Number of children: Not on file    Years of education: Not on file    Highest education level: Not on file   Occupational History    Not on file   Tobacco Use    Smoking status: Never Smoker    Smokeless tobacco: Never Used   Vaping Use    Vaping Use: Never used   Substance and Sexual Activity    Alcohol use:  Yes     Alcohol/week: 5.0 standard drinks     Types: 5 Cans of beer per week    Drug use: No    Sexual activity: Not on file   Other Topics Concern    Not on file   Social History Narrative    Not on file     Social Determinants of Health     Financial Resource Strain:     Difficulty of Paying Living Expenses:    Food Insecurity:     Worried About Running Out of Food in the Last Year:     [address] in the Last Year:    Transportation Needs:     Lack of Transportation (Medical):  Lack of Transportation (Non-Medical):    Physical Activity:     Days of Exercise per Week:     Minutes of Exercise per Session:    Stress:     Feeling of Stress :    Social Connections:     Frequency of Communication with Friends and Family:     Frequency of Social Gatherings with Friends and Family:     Attends Che[employer] Services:     Active Member of Clubs or Organizations:     Attends Club or Organization Meetings:     Marital Status:    Intimate Partner Violence:     Fear of Current or Ex-Partner:     Emotionally Abused:     Physically Abused:     Sexually Abused:        PRIOR LEVEL OF FUNCTION/WOR/ACTIVITY:    Chronic lower back pain    CURRENT MEDICATIONS:    Current Outpatient Medications:     linaCLOtide (Linzess) 145 mcg cap capsule, Take 1 Capsule by mouth Daily (before breakfast). , Disp: 30 Capsule, Rfl: 5    fluocinonide (LIDEX) 0.05 % topical gel, Apply  to affected area two (2) times a day., Disp: , Rfl:     meloxicam (Mobic) 15 mg tablet, Take 1 Tablet by mouth daily. , Disp: 90 Tablet, Rfl: 3    tadalafiL (CIALIS) 5 mg tablet, Take 1 Tablet by mouth daily. , Disp: 90 Tablet, Rfl: 3    HYDROcodone-acetaminophen (NORCO) 5-325 mg per tablet, by Oral/Gastric Tube route, Disp: , Rfl:     traMADoL (ULTRAM) 50 mg tablet, TAKE 1 TAB BY MOUTH EVERY 6 HOURS AS NEEDED FOR PAIN FOR UP TO 30 DAYS. MAX 4/DAY, Disp: , Rfl:     tretinoin (RETIN-A) 0.05 % topical cream, APPLY TO AFFECTED AREA AT BEDTIME, Disp: , Rfl:     Myrbetriq 50 mg ER tablet, Take 1 Tab by mouth daily. , Disp: 30 Tab, Rfl: 12    eszopiclone (Lunesta) 3 mg tablet, Take 1 Tab by mouth nightly as needed for Sleep.  Max Daily Amount: 3 mg., Disp: 90 Tab, Rfl: 5   tamsulosin (FLOMAX) 0.4 mg capsule, Take 2 Caps by mouth daily. (Patient taking differently: Take 0.4 mg by mouth daily.), Disp: 180 Cap, Rfl: 3    dicyclomine (BentyL) 20 mg tablet, Take 1 Tab by mouth every six (6) hours. , Disp: 120 Tab, Rfl: 5    lidocaine (LIDODERM) 5 %, by TransDERmal route daily as needed. Apply patch to the affected area for 12 hours a day and remove for 12 hours a day. , Disp: , Rfl:     ipratropium (ATROVENT) 0.03 % nasal spray, 2 Sprays by Both Nostrils route every twelve (12) hours. (Patient taking differently: 2 Sprays by Both Nostrils route every twelve (12) hours as needed.), Disp: 30 mL, Rfl: 11    RESTASIS 0.05 % ophthalmic emulsion, INSTILL 1 DROP IN BOTH EYES TWICE DAILY, Disp: , Rfl: 12    omeprazole (PRILOSEC) 20 mg capsule, Take 20 mg by mouth daily as needed. , Disp: , Rfl:      Date Last Reviewed:  8/11/2021   Number of Personal Factors/Comorbidities that affect the Plan of Care: 1-2: MODERATE COMPLEXITY   EXAMINATION:   OBSERVATION/ORTHOSTATIC POSTURAL ASSESSMENT: Assessed @ Initial Visit:   Sitting: WFL  Standing: mild pes planus with body weight      BALANCE (SLS) Date:  7/13/2021    Date: Date:   Right 30 seconds     Left 30 seconds         MEASUREMENTS:          Date:  7/13/2021  Date:  Date:     Right Left Right Left Right Left   Ankle Circumference 23.5 cm 23.0       Figure 8 54.0 55.0         AROM/PROM         Joint: Date:7/13/2021  Date: 8/11/21  Date:    Active LE ROM Right Left Right Left Right Left   Hip Flexion         Hip Extension         Hip IR         Hip ER         Knee Extension         Knee Flexion         Ankle DF 12 degrees 10 degrees 18 degrees      Ankle PF 11 degrees (p) 20 degrees 15 degrees      Ankle IV 25 degrees 35 degrees 30 degrees      Ankle EV 35 degrees 35 degrees 38 degrees      Passive LE ROM         Ankle PF         Ankle DF         Ankle IV         Ankle EV           STRENGTH         Joint: Date: 7/13/2021  Date:  8/11/21  Date: Right Left Right Left Right Left   Hip Abduction         Hip Adduction         Hip IR         Hip ER         Hip Flexion         Knee Extension         Knee Flexion         Ankle DF 5/5 5/5       Ankle PF 5/5 5/5       Ankle IV 5/5 5/5       Ankle EV 5/5 5/5           PALPATION Date: 7/13/2021 Date: 8/11/21   TTP Along distal peroneals, head of 5th MTP    Joint mobilization Hypomobility noted medial STJ, posterior and anterior at TCJ, 5th MTP J      NEUROLOGICAL SCREEN: Assessed @ Initial Visit    -RADIATING SYMPTOMS: NO           Body Structures Involved:  1. Bones  2. Joints  3. Muscles  4. Ligaments Body Functions Affected:  1. Sensory/Pain  2. Neuromusculoskeletal  3. Movement Related Activities and Participation Affected:  1. Mobility  2. Self Care  3. Domestic Life  4. Interpersonal Interactions and Relationships  5.  Community, Social and [place]   Number of elements that affect the Plan of Care: 4+: HIGH COMPLEXITY   CLINICAL PRESENTATION:   Presentation: Evolving clinical presentation with changing clinical characteristics: MODERATE COMPLEXITY   CLINICAL DECISION MAKING:      Use of outcome tool(s) and clinical judgement create a POC that gives a: Questionable prediction of patient's progress: MODERATE COMPLEXITY

## 2021-08-24 ENCOUNTER — HOSPITAL ENCOUNTER (OUTPATIENT)
Dept: PHYSICAL THERAPY | Age: 71
Discharge: HOME | End: 2021-08-24
Payer: MEDICARE

## 2021-08-24 PROCEDURE — 97110 THERAPEUTIC EXERCISES: CPT

## 2021-08-24 PROCEDURE — 97140 MANUAL THERAPY 1/> REGIONS: CPT

## 2021-08-24 NOTE — PROGRESS NOTES
Kike Arenas  : 1950  [place] / [place] / Product Type: Medicare /  [address]  at [facility]  [address] [facility], [place] [address] [address] [facility]  [phone]   [phone]      OUTPATIENT PHYSICAL THERAPY: Daily Treatment Note 2021  Visit Count:  10    ICD-10: Treatment Diagnosis:   Difficulty in walking, not elsewhere classified (R26.2)  Pain in right ankle and joints of right foot (M25.571)  Stiffness of right ankle, not elsewhere classified (M25.671)      Precautions/Allergies:   Patient has no known allergies. TREATMENT PLAN:  Effective Dates: 2021 TO 10/10/2021 (90 days). Frequency/Duration: 2 times a week for 90 Days        PRE-TREATMENT SYMPTOMS/COMPLAINTS:  Pt ankle/foot is feeling a little worse with weight non supportive shoes. Most pain across metatarsals and toes    MEDICATIONS REVIEWED:  2021   TREATMENT:   (In addition to Assessment/Re-Assessment sessions the following treatments were rendered)    THERAPEUTIC EXERCISE: (25 minutes):  Exercises per grid below to improve mobility, strength and balance. Required minimal visual and verbal cues to promote proper body alignment and promote proper body posture. Progressed resistance, range and complexity of movement as indicated.      Date:  2021 Date:  21 Date:  21 Date:  21 Date:  8/3/21 Date:  21 Date:  21 Date:  21 Date:  21   Activity/Exercise Parameters Parameters Parameters         Intrinsic foot 15 X 20 X in standing          Ankle TB 4 way RTB 15 X each RTB 20 X each GTB 15 X each GTB 15 X each GTB 20 X each BTB 20 X each BTB 20 X each BTB 25 X each BTB 25 X each   Tilt board 15 X each direction 20 X each direction 20 X each direction         BAPS board       5 X  5X 10 X    slantboard stretch 30 seconds 3 X 30 seconds 3 X 30 seconds 3 X  Standing 2 way 30 seconds 2 X each 30 seconds 3 X 30 seconds 3 X 30 seconds 3 X 30 seconds 3  30 seconds 3 X   Heel raise 15 X 20 X   20 X  20 X SL 10 X 2 SL 10 X 2   Towel scrunches 4X 5X 5X  5 X 5X 5 X 5 X    Lateral trunk shift (R ) 10 X 10 X 10 second holds 10 X 10 seconds 5 X 10 second holds 10 second holds 5 X       Tap downs  4\" 10 X 2 4\" 10 X 2 6\" 10 X 4\" 10 X 4\" 10 X 2      Lateral tap downs   4\" 10 X 2 6\" 10 X  4\" 10 X 2      BOSU step ups    10 X 1 UE support required    Wobble board mini squats 5 X each direction Wobble board mini squat 5 X each direction   SLS       Foam 3 X 30 seconds holds Foam 3 X 30 second holds Foam 3 X 30 second hold     MANUAL THERAPY: (15 minutes): Joint mobilization, Soft tissue mobilization and Manipulation was utilized and necessary because of the patient's restricted joint motion, painful spasm, loss of articular motion and restricted motion of soft tissue. + joint mobilization Grade III: medial glide to Subtalar joint, AP-PA to 5th MTP, AP to cuniform   +manual stretching R ankle all directions  +STM proximal peroneals    (Used abbreviations: MET - muscle energy technique; PNF - proprioceptive neuromuscular facilitation; NMR - neuromuscular re-education; AP - anterior to posterior; PA - posterior to anterior)    MODALITIES: (0 minutes):      none      TREATMENT/SESSION ASSESSMENT:  Tolerated exercises well requiring minimal cues for technique. Print out updated HEP for continued work on proprioception and balance next visit. Minimal TTP along peroneals and improving mobility noted at tarsals overall. Education: on objective findings and HEP    RECOMMENDATIONS/INTENT FOR NEXT TREATMENT SESSION: \"Next visit will focus on advancements to more challenging activities\".     PAIN: Initial: 2/10 Post Session:  2/10     MedBridge Portal    Total Treatment Billable Duration:  PT Patient Time In/Time Out  Time In: 1015  Time Out: [address], PT, DPT    Future Appointments   Date Time Provider Elisabeth Chauhan   8/26/2021 10:25 AM Venkatesh Merritt MD [facility] POA   8/27/2021 11:00 AM Alfredo Her A [facility]   3/21/2022  2:10 PM Jaymes Ahumada, MD [id number]       Visit Approval Visit # Therapist initials Date A NS / Cx < 24 hr >24 hr Cx Comments    1 SHAISTA 7/12/21 [x]  [] [] Initial evaluation    2 SHAISTAI 7/20/21 [x] [] []     3 SHAISTA 7/23/21 [x] [] []     4 SHAISTA 7/27/21 [x] [] []     5 SHAISTA 7/29/21 [x] [] []     6 SHAISTA 8/3/21 [x] [] []     7 KAI 8/5/21 [x] [] []     8 KAI 8/9/21 [x] [] [] PN next visit    9 KAI 8/11/21 [x] [] [] PN today    10 SHAISTA 8/24/21 [x] [] [] 1       [] [] []        [] [] []        [] [] []        [] [] []        [] [] []        [] [] []        [] [] []        [] [] []

## 2021-08-27 ENCOUNTER — APPOINTMENT (OUTPATIENT)
Dept: PHYSICAL THERAPY | Age: 71
End: 2021-08-27
Payer: MEDICARE

## 2021-08-27 ENCOUNTER — HOSPITAL ENCOUNTER (OUTPATIENT)
Dept: PHYSICAL THERAPY | Age: 71
Discharge: HOME | End: 2021-08-27
Payer: MEDICARE

## 2021-08-27 PROCEDURE — 97140 MANUAL THERAPY 1/> REGIONS: CPT

## 2021-08-27 PROCEDURE — 97110 THERAPEUTIC EXERCISES: CPT

## 2021-08-27 NOTE — THERAPY DISCHARGE
Suyapa Steinberg  : 1950  [place] / [place] / Product Type: Medicare /  [address]  at [facility] [address], [address] [facility], [address]  [phone]   [phone]         OUTPATIENT PHYSICAL [facility] 2021    ICD-10: Treatment Diagnosis:   Difficulty in walking, not elsewhere classified (R26.2)  Pain in right ankle and joints of right foot (M25.571)  Stiffness of right ankle, not elsewhere classified (M25.671)      PRECAUTIONS/ALLERGIES:   Patient has no known allergies. FALL RISK SCORE: 1(? 5 = High Risk)    MD ORDERS: Eval and Treat  MEDICAL/REFERRING DIAGNOSIS:  Pain in right foot [M79.671]    DATE OF ONSET: 4 months ago    REFERRING PHYSICIAN: Mary Ann Carson MD    RETURN PHYSICIAN APPOINTMENT:  TBD by pt      Ambulatory/Rehab Services H2 Model Falls Risk Assessment    Risk Factors:       (1)  Gender [Male] Ability to Rise from Chair:       (0)  Ability to rise in a single movement    Falls Prevention Plan:       No modifications necessary   Total: (5 or greater = High Risk): 1     [facility] [employer]. All Rights Reserved. [facility] Patent #[id number]. Federal Law prohibits the replication, distribution or use without written permission from [email or web address]      Discharge Summary:  Pt has been seen for a total of 11 PT sessions to date. Pt has met maximum rehab potential at this time and is appropriate for discharge at this time. Pt is independent with [facility]    Progress Report:  Pt has been seen for a total of 9 PT sessions to date. Pt has met 5/6 STG and 0/4 LTG this session. Pt demonstrating improvements in AROM, pain, and overall improvements in functional mobility. Pt will continue to benefit from continued skilled PT interventions. INITIAL ASSESSMENT:  Mr. Suyapa Steinberg has attended 1 physical therapy session including initial evaluation as of 2021. Bienvenido Andrews demonstrates decreased strength, decreased ROM, decreased tolerance of ADLs, decreased functional mobility, and decreased activity tolerance, all consistent with S/S of possible peroneal tendonosis. According to their responses on the Foot and Ankle Ability Measure, Bienvenido Andrews is 20% limited by their ankle pain and dysfunction in their ability to participate in ADLs and their overall functional tolerance. Recommending skilled PT: manual therapeutic techniques (as appropriate), therapeutic exercises and activities, balance and comprehensive home exercises program to address current impairment. Bienvenido Andrews will benefit from skilled PT (medically necessary) to address above deficits affecting participation in basic ADLs and overall functional tolerance. PROBLEM LIST (Impacting functional limitations):  1. Decreased Strength  2. Decreased ADL/Functional Activities  3. Decreased Transfer Abilities  4. Decreased Ambulation Ability/Technique  5. Decreased Balance  6. Increased Pain  7. Decreased Activity Tolerance  8. Decreased [place] with Home Exercise Program INTERVENTIONS PLANNED:  1. Balance Exercise  2. Bed Mobility  3. Cold  4. Cryotherapy  5. Family Education  6. Gait Training  7. Heat  8. Home Exercise Program (HEP)  9. Manual Therapy  10. Neuromuscular Re-education/Strengthening  11. Range of Motion (ROM)  12. Therapeutic Activites  13. Therapeutic Exercise/Strengthening  14. Transfer Training  15. Ultrasound (US)  16. Aquatic therapy  17. Electrical Stimulation  18. Vasopneumatic Device  19. Dry Needling   TREATMENT PLAN:  Effective Dates: 7/13/2021 TO 10/10/2021 (90 days). Frequency/Duration: 2 times a week for 90 Days    SHORT-TERM FUNCTIONAL GOALS: Time Frame: 4 weeks  1.  Bienvenido Andrews will be compliant with home exercise program within 4 weeks in order to improve active participation with management of patient's symptoms and/or functional deficits. Goal met 8/11/21  2. Karlos Bhandari will report <=3/10 pain with walking >15 minutes in order to participate in daily exercise and daily activities. Goal met 8/11 21 (2-3)  3. Karlos Bhandari will be able to stand >=15 minutes with <3/10 pain to feet in order to participate in household duties without issues/compromise. Goal met 8/11/21 (1-2)  4. Karlos Bhandari will report being able to sleep through the night without waking up secondary to R foot pain. Goal met 8/11/21  5. Karlos Bhandari  will improve R ankle PF AROM from 11 to 20 in order to show improvement in ankle ROM and tolerance for functional activity. Progressing 8/11/21  6. Karlos Bhandari will be report improved score on the Foot and Ankle Ability Measure from 67 to 75 to indicate improvement in functional independence. Goal met 8/11/21    DISCHARGE GOALS: Time Frame: 12 weeks  1. Karlos Bhandari will be independent with home exercise program within 8 weeks in order to improve independence with management of patient's symptoms and/or functional deficits. Progressing 8/11/21  2. Karlos Bhandari will report <=1/10 pain with participation in activities of daily living and overall functional mobility including walking and stair ambulation. Progressing 8/11/21  3. Karlos Bhandari will be able to stand >=30 minutes without reports of increased foot/ankle pain. Progressing 8/11/21  4. Karlos Bhandari will be report improved score on the Foot and Ankle Ability Measure from 67 to 84 to indicate improvement in functional independence. Progressing 8/11/21    REHABILITATION POTENTIAL FOR STATED GOALS: GOOD    TOOL USED:   -FOOT AND ANKLE ABILITY MEASURE (FAAM)  Score:  Initial: 67/84 Most Recent: 75/84(Date: 8/11/21)   Interpretation of Score:  For the \"Activities of Daily Living\", there are 21 questions each scored on a 5 point scale with 0 representing \"Unable to do\" and 4 representing \"No difficulty\". The lower the score, the greater the functional disability. 84/84 represents no disability. Minimal detectable change is 5.7 points. With the addition of the 8 questions in the \"Sports Subscale,\" there are 29 questions, each scored on a 5 point scale with 0 representing \"Unable to do\" and 4 representing \"No difficulty\". The lower the score, the greater the functional disability. 116/116 represents no disability. Minimal detectable change is 12.3 points. Regarding [facility] therapy, I certify that the treatment plan above will be carried out by a therapist or under their direction. Thank you for this referral,  Juan Jose Menon     Referring Physician Signature: Dary Broderick MD No Signature is Required for this note. HISTORY:  All history obtained on 7/13/2021 unless otherwise noted. PATIENT STATED GOAL:  To be able to return to walking normal without increased pain. Pt suffers from chronic back pain that makes prolonged sitting difficult with relieving position being standing and walking. Very difficult now that standing and walking is affecting by R foot pain. HISTORY OF PRESENT INJURY/ILLNESS (REASON FOR REFERRAL):   Pt reports he has been experiencing R lateral foot pain for approximately 4 months now. Pt reports having a 1st MT fracture years ago and was recommended to get inserts. Pt just got new ones approximately 2 months ago after being out of them for several months after stopping work. Pt reports the orthotics have actually made it feel worse. Pt reports feeling like he's walking on the outside of his foot. Pt reports getting xray with some OA noted. Pt reports having pain at the end of day at night and especially with walking. Pt reports pain is better in the morning but progressively gets worse the more he is up on his feet. Pt states the pain can get as high as 6/10 and as low as 0/10.    Per referring physician: We discussed that he has pes planus feet bilaterally but for some reason he is overloading the fifth metatarsal tuberosity. I am not sure if he has a subtle imbalance between his posterior tibial tendon is peroneal tendon causing him to roll on that side but he definitely has no evidence of cavovarus.     When he first noticed his pain, he had new orthotics made and he feels those insoles increased his discomfort. Toney Marcos has tried to modify the insole but he still has pain  I think his arch supports forced him to roll on the outer border of his foot so he will try Vasyli+Kacey insoles first and if those work then he will see Toney Marcos for replication of the [facility]. PAST MEDICAL HISTORY/COMORBIDITIES:  Mr. Sky Kong  has a past medical history of Arthritis (07/06/2015), Back pain, DDD (degenerative disc disease), lumbar, Dry eyes, Enlarged prostate, GERD (gastroesophageal reflux disease), History of basal cell cancer, History of squamous cell carcinoma, IBS (irritable bowel syndrome), Insomnia (07/06/2015), Left inguinal hernia, MVP (mitral valve prolapse), Onychomycosis (7/6/2015), RBBB (7/6/2015), Right wrist pain (7/6/2015), Scoliosis, and Spondylosis of lumbar region without myelopathy or radiculopathy. Mr. Sky Kong  has a past surgical history that includes neurological procedure unlisted (2007); hx colonoscopy; hx orthopaedic; hx shoulder arthroscopy (Right); hx orthopaedic (Left, 06/2020); hx orthopaedic (Right, 08/2020); hx cataract removal; and hx carpal tunnel release.     Active Ambulatory Problems     Diagnosis Date Noted    Chronic low back pain 07/05/2015    MVP (mitral valve prolapse) 07/05/2015    Lumbar disc disease 07/06/2015    IBS (irritable bowel syndrome) 07/06/2015    Insomnia 07/06/2015    Arthritis 07/06/2015    Right wrist pain 07/06/2015    Onychomycosis 07/06/2015    Rising PSA level 61/08/0189    Oral lichen planus 87/63/2266    BPH with obstruction/lower urinary tract symptoms 07/06/2015    RBBB 07/06/2015    Low TSH level 06/30/2017    Onychomycosis of toenail 06/30/2017    Hyponatremia 06/30/2017    Insufficiency of tear film of both eyes 02/08/2018    Left inguinal hernia 02/14/2018    Abnormal prostate on physical examination 07/06/2018    Rhinitis 07/06/2018    Nonmelanoma skin cancer 07/06/2018    Postural imbalance 03/19/2020    Paresthesia 03/19/2020    Neuropathy 03/19/2020    Weakness 03/19/2020    Deviated septum 02/24/2021    Chronic maxillary sinusitis 02/24/2021    Erectile disorder due to medical condition in male patient 07/08/2021     Resolved Ambulatory Problems     Diagnosis Date Noted    Special screening for malignant neoplasm of prostate 07/02/2015     Past Medical History:   Diagnosis Date    Back pain     DDD (degenerative disc disease), lumbar     Dry eyes     Enlarged prostate     GERD (gastroesophageal reflux disease)     History of basal cell cancer     History of squamous cell carcinoma     Scoliosis     Spondylosis of lumbar region without myelopathy or radiculopathy        SOCIAL HISTORY/LIVING ENVIRONMENT:       Social History     Socioeconomic History    Marital status:      Spouse name: Not on file    Number of children: Not on file    Years of education: Not on file    Highest education level: Not on file   Occupational History    Not on file   Tobacco Use    Smoking status: Never Smoker    Smokeless tobacco: Never Used   Vaping Use    Vaping Use: Never used   Substance and Sexual Activity    Alcohol use:  Yes     Alcohol/week: 5.0 standard drinks     Types: 5 Cans of beer per week    Drug use: No    Sexual activity: Not on file   Other Topics Concern    Not on file   Social History Narrative    Not on file     Social Determinants of Health     Financial Resource Strain:     Difficulty of Paying Living Expenses:    Food Insecurity:     Worried About Running Out of Food in the Last Year:    [address] in the Last Year:    Transportation Needs:     Lack of Transportation (Medical):  Lack of Transportation (Non-Medical):    Physical Activity:     Days of Exercise per Week:     Minutes of Exercise per Session:    Stress:     Feeling of Stress :    Social Connections:     Frequency of Communication with Friends and Family:     Frequency of Social Gatherings with Friends and Family:     Attends [employer] Services:     Active Member of Clubs or Organizations:     Attends Club or Organization Meetings:     Marital Status:    Intimate Partner Violence:     Fear of Current or Ex-Partner:     Emotionally Abused:     Physically Abused:     Sexually Abused:        PRIOR LEVEL OF FUNCTION/WOR/ACTIVITY:    Chronic lower back pain    CURRENT MEDICATIONS:    Current Outpatient Medications:     terbinafine HCL (LAMISIL) 250 mg tablet, Take 250 mg by mouth daily. , Disp: , Rfl:     linaCLOtide (Linzess) 145 mcg cap capsule, Take 1 Capsule by mouth Daily (before breakfast). , Disp: 30 Capsule, Rfl: 5    fluocinonide (LIDEX) 0.05 % topical gel, Apply  to affected area two (2) times a day., Disp: , Rfl:     meloxicam (Mobic) 15 mg tablet, Take 1 Tablet by mouth daily. , Disp: 90 Tablet, Rfl: 3    tadalafiL (CIALIS) 5 mg tablet, Take 1 Tablet by mouth daily. , Disp: 90 Tablet, Rfl: 3    HYDROcodone-acetaminophen (NORCO) 5-325 mg per tablet, by Oral/Gastric Tube route, Disp: , Rfl:     traMADoL (ULTRAM) 50 mg tablet, TAKE 1 TAB BY MOUTH EVERY 6 HOURS AS NEEDED FOR PAIN FOR UP TO 30 DAYS. MAX 4/DAY, Disp: , Rfl:     tretinoin (RETIN-A) 0.05 % topical cream, APPLY TO AFFECTED AREA AT BEDTIME, Disp: , Rfl:     Myrbetriq 50 mg ER tablet, Take 1 Tab by mouth daily. , Disp: 30 Tab, Rfl: 12    eszopiclone (Lunesta) 3 mg tablet, Take 1 Tab by mouth nightly as needed for Sleep.  Max Daily Amount: 3 mg., Disp: 90 Tab, Rfl: 5    tamsulosin (FLOMAX) 0.4 mg capsule, Take 2 Caps by mouth daily. (Patient taking differently: Take 0.4 mg by mouth daily.), Disp: 180 Cap, Rfl: 3    dicyclomine (BentyL) 20 mg tablet, Take 1 Tab by mouth every six (6) hours. , Disp: 120 Tab, Rfl: 5    lidocaine (LIDODERM) 5 %, by TransDERmal route daily as needed. Apply patch to the affected area for 12 hours a day and remove for 12 hours a day. , Disp: , Rfl:     ipratropium (ATROVENT) 0.03 % nasal spray, 2 Sprays by Both Nostrils route every twelve (12) hours. (Patient taking differently: 2 Sprays by Both Nostrils route every twelve (12) hours as needed.), Disp: 30 mL, Rfl: 11    RESTASIS 0.05 % ophthalmic emulsion, INSTILL 1 DROP IN BOTH EYES TWICE DAILY, Disp: , Rfl: 12    omeprazole (PRILOSEC) 20 mg capsule, Take 20 mg by mouth daily as needed. , Disp: , Rfl:      Date Last Reviewed:  8/27/2021   Number of Personal Factors/Comorbidities that affect the Plan of Care: 1-2: MODERATE COMPLEXITY   EXAMINATION:   OBSERVATION/ORTHOSTATIC POSTURAL ASSESSMENT: Assessed @ Initial Visit:   Sitting: WFL  Standing: mild pes planus with body weight      BALANCE (SLS) Date:  7/13/2021    Date: Date:   Right 30 seconds     Left 30 seconds         MEASUREMENTS:          Date:  7/13/2021  Date:  Date:     Right Left Right Left Right Left   Ankle Circumference 23.5 cm 23.0       Figure 8 54.0 55.0         AROM/PROM         Joint: Date:7/13/2021  Date: 8/11/21  Date:    Active LE ROM Right Left Right Left Right Left   Hip Flexion         Hip Extension         Hip IR         Hip ER         Knee Extension         Knee Flexion         Ankle DF 12 degrees 10 degrees 18 degrees      Ankle PF 11 degrees (p) 20 degrees 15 degrees      Ankle IV 25 degrees 35 degrees 30 degrees      Ankle EV 35 degrees 35 degrees 38 degrees      Passive LE ROM         Ankle PF         Ankle DF         Ankle IV         Ankle EV           STRENGTH         Joint: Date: 7/13/2021  Date:  8/11/21  Date:     Right Left Right Left Right Left   Hip Abduction Hip Adduction         Hip IR         Hip ER         Hip Flexion         Knee Extension         Knee Flexion         Ankle DF 5/5 5/5       Ankle PF 5/5 5/5       Ankle IV 5/5 5/5       Ankle EV 5/5 5/5           PALPATION Date: 7/13/2021 Date: 8/11/21   TTP Along distal peroneals, head of 5th MTP    Joint mobilization Hypomobility noted medial STJ, posterior and anterior at TCJ, 5th MTP J      NEUROLOGICAL SCREEN: Assessed @ Initial Visit    -RADIATING SYMPTOMS: NO           Body Structures Involved:  1. Bones  2. Joints  3. Muscles  4. Ligaments Body Functions Affected:  1. Sensory/Pain  2. Neuromusculoskeletal  3. Movement Related Activities and Participation Affected:  1. Mobility  2. Self Care  3. Domestic Life  4. Interpersonal Interactions and Relationships  5.  Community, Social and [place]   Number of elements that affect the Plan of Care: 4+: HIGH COMPLEXITY   CLINICAL PRESENTATION:   Presentation: Evolving clinical presentation with changing clinical characteristics: MODERATE COMPLEXITY   CLINICAL DECISION MAKING:      Use of outcome tool(s) and clinical judgement create a POC that gives a: Questionable prediction of patient's progress: MODERATE COMPLEXITY

## 2021-08-27 NOTE — PROGRESS NOTES
Suyapa Steinberg  : 1950  [place] / [place] / Product Type: Medicare /  [address]  at [facility]  [address] [facility], [place] [address] [address] [facility]  [phone]   [phone]      OUTPATIENT PHYSICAL THERAPY: Daily Treatment Note 2021  Visit Count:  11    ICD-10: Treatment Diagnosis:   Difficulty in walking, not elsewhere classified (R26.2)  Pain in right ankle and joints of right foot (M25.571)  Stiffness of right ankle, not elsewhere classified (M25.671)      Precautions/Allergies:   Patient has no known allergies. TREATMENT PLAN:  Effective Dates: 2021 TO 10/10/2021 (90 days). Frequency/Duration: 2 times a week for 90 Days        PRE-TREATMENT SYMPTOMS/COMPLAINTS:  Pt reports getting injection at follow up appointment. He recommended getting out of new orthotic. MEDICATIONS REVIEWED:  2021   TREATMENT:   (In addition to Assessment/Re-Assessment sessions the following treatments were rendered)    THERAPEUTIC EXERCISE: (25 minutes):  Exercises per grid below to improve mobility, strength and balance. Required minimal visual and verbal cues to promote proper body alignment and promote proper body posture. Progressed resistance, range and complexity of movement as indicated.      Date:  2021 Date:  21 Date:  21 Date:  21 Date:  8/3/21 Date:  21 Date:  21 Date:  21 Date:  21 Date:  21   Activity/Exercise Parameters Parameters Parameters          Intrinsic foot 15 X 20 X in standing           Ankle TB 4 way RTB 15 X each RTB 20 X each GTB 15 X each GTB 15 X each GTB 20 X each BTB 20 X each BTB 20 X each BTB 25 X each BTB 25 X each BTB 25 X each    Tilt board 15 X each direction 20 X each direction 20 X each direction          BAPS board       5 X  5X 10 X  10 X    slantboard stretch 30 seconds 3 X 30 seconds 3 X 30 seconds 3 X  Standing 2 way 30 seconds 2 X each 30 seconds 3 X 30 seconds 3 X 30 seconds 3 X 30 seconds 3  30 seconds 3 X 30 seconds 3 X   Heel raise 15 X 20 X   20 X  20 X SL 10 X 2 SL 10 X 2 SL 10 X 2   Towel scrunches 4X 5X 5X  5 X 5X 5 X 5 X     Lateral trunk shift (R ) 10 X 10 X 10 second holds 10 X 10 seconds 5 X 10 second holds 10 second holds 5 X        Tap downs  4\" 10 X 2 4\" 10 X 2 6\" 10 X 4\" 10 X 4\" 10 X 2       Lateral tap downs   4\" 10 X 2 6\" 10 X  4\" 10 X 2       BOSU step ups    10 X 1 UE support required    Wobble board mini squats 5 X each direction Wobble board mini squat 5 X each direction WB 5 X squat each direction   SLS       Foam 3 X 30 seconds holds Foam 3 X 30 second holds Foam 3 X 30 second hold Foam 3 X 30 seconds     MANUAL THERAPY: (15 minutes): Joint mobilization, Soft tissue mobilization and Manipulation was utilized and necessary because of the patient's restricted joint motion, painful spasm, loss of articular motion and restricted motion of soft tissue.    + joint mobilization Grade III: medial glide to Subtalar joint, AP-PA to 5th MTP, AP, PA to cuboid  +manual stretching R ankle all directions  +STM proximal peroneals    (Used abbreviations: MET - muscle energy technique; PNF - proprioceptive neuromuscular facilitation; NMR - neuromuscular re-education; AP - anterior to posterior; PA - posterior to anterior)    MODALITIES: (0 minutes):      none      TREATMENT/SESSION ASSESSMENT:  Discharge today with HEP, has met  Max rehab potential    Education: on discharge HEP        PAIN: Initial: 2/10 Post Session:  2/10     [facility] Portal    Total Treatment Billable Duration:  PT Patient Time In/Time Out  Time In: 1100  Time Out: [address], PT, DPT    Future Appointments   Date Time Provider Elisabeth Gissel   3/21/2022  2:10 PM Tamara Dupont MD [id number] PGU       Visit Approval Visit # Therapist initials Date A NS / Cx < 24 hr >24 hr Cx Comments    1 SHAISTA 7/12/21 [x]  [] [] Initial evaluation    2 SHAISTA 7/20/21 [x] [] []     3 KAI 7/23/21 [x] [] []     4 KAI 7/27/21 [x] [] []     5 KAI 7/29/21 [x] [] []     6 SHAISTA 8/3/21 [x] [] []     7 KAI 8/5/21 [x] [] []     8 KAI 8/9/21 [x] [] [] PN next visit    9 KAI 8/11/21 [x] [] [] PN today    10 KAI 8/24/21 [x] [] [] 1    11 KAI 8/27/21 [x] [] [] 2, d/c today       [] [] []        [] [] []        [] [] []        [] [] []        [] [] []        [] [] []        [] [] []

## 2021-11-16 ENCOUNTER — HOSPITAL ENCOUNTER (OUTPATIENT)
Dept: GENERAL RADIOLOGY | Age: 71
Discharge: HOME | End: 2021-11-16

## 2021-11-16 DIAGNOSIS — R05.9 COUGH: ICD-10-CM

## 2021-11-24 ENCOUNTER — HOSPITAL ENCOUNTER (OUTPATIENT)
Dept: SURGERY | Age: 71
Discharge: HOME | End: 2021-11-24
Attending: ORTHOPAEDIC SURGERY
Payer: MEDICARE

## 2021-11-24 VITALS
HEIGHT: 70 IN | HEART RATE: 96 BPM | BODY MASS INDEX: 23.62 KG/M2 | TEMPERATURE: 97.9 F | WEIGHT: 165 LBS | DIASTOLIC BLOOD PRESSURE: 90 MMHG | SYSTOLIC BLOOD PRESSURE: 145 MMHG | OXYGEN SATURATION: 98 %

## 2021-11-24 LAB
ANION GAP SERPL CALC-SCNC: 0 MMOL/L (ref 7–16)
BACTERIA SPEC CULT: NORMAL
BUN SERPL-MCNC: 20 MG/DL (ref 8–23)
CALCIUM SERPL-MCNC: 9.3 MG/DL (ref 8.3–10.4)
CHLORIDE SERPL-SCNC: 99 MMOL/L (ref 98–107)
CO2 SERPL-SCNC: 31 MMOL/L (ref 21–32)
CREAT SERPL-MCNC: 1.22 MG/DL (ref 0.8–1.5)
ERYTHROCYTE [DISTWIDTH] IN BLOOD BY AUTOMATED COUNT: 11.9 % (ref 11.9–14.6)
GLUCOSE SERPL-MCNC: 100 MG/DL (ref 65–100)
HCT VFR BLD AUTO: 39.9 % (ref 41.1–50.3)
HGB BLD-MCNC: 13.4 G/DL (ref 13.6–17.2)
MCH RBC QN AUTO: 32.6 PG (ref 26.1–32.9)
MCHC RBC AUTO-ENTMCNC: 33.6 G/DL (ref 31.4–35)
MCV RBC AUTO: 97.1 FL (ref 79.6–97.8)
NRBC # BLD: 0 K/UL (ref 0–0.2)
PLATELET # BLD AUTO: 320 K/UL (ref 150–450)
PMV BLD AUTO: 8.6 FL (ref 9.4–12.3)
POTASSIUM SERPL-SCNC: 4.5 MMOL/L (ref 3.5–5.1)
RBC # BLD AUTO: 4.11 M/UL (ref 4.23–5.6)
SERVICE CMNT-IMP: NORMAL
SODIUM SERPL-SCNC: 130 MMOL/L (ref 136–145)
WBC # BLD AUTO: 8.7 K/UL (ref 4.3–11.1)

## 2021-11-24 PROCEDURE — 85027 COMPLETE CBC AUTOMATED: CPT

## 2021-11-24 PROCEDURE — 36415 COLL VENOUS BLD VENIPUNCTURE: CPT

## 2021-11-24 PROCEDURE — 80048 BASIC METABOLIC PNL TOTAL CA: CPT

## 2021-11-24 PROCEDURE — 87641 MR-STAPH DNA AMP PROBE: CPT

## 2021-11-24 PROCEDURE — 77030027138 HC INCENT SPIROMETER -A

## 2021-11-24 RX ORDER — LORATADINE AND PSEUDOEPHEDRINE SULFATE 5; 120 MG/1; MG/1
1 TABLET, EXTENDED RELEASE ORAL 2 TIMES DAILY
COMMUNITY

## 2021-11-24 NOTE — PERIOP NOTES
Patient verified name and . Order for consent found in EHR and matches case posting; patient verified. Type 3 surgery, walk-in assessment complete. Labs per surgeon: Kali Gaona; results pending. Labs per anesthesia protocol: CBC, BMP; results pending-charge nurse to f/u. T&S DOS; order signed and held in EHR. EKG: Completed 21; results to be reviewed by anesthesia. Charge nurse to f/u. Patient COVID test date 21 at 0930; Patient aware. The testing center [facility] [address] [place]  and telephone number [phone] provided to the patient if not appointment found. MRSA/MSSA swab collected; pharmacy to review and dose antibiotic as appropriate. Hospital approved surgical skin cleanser and instructions to return bottle on DOS given per hospital policy. Patient provided with handouts including Guide to Surgery, Pain Management, Hand Hygiene, Blood Transfusion Education, and Center Anesthesia Brochure. Patient answered medical/surgical history questions at their best of ability. All prior to admission medications documented in [facility]. Original medication prescription bottle NOT visualized during patient appointment. Patient instructed to hold all vitamins 3 weeks prior to surgery and NSAIDS 5 days prior to surgery. Patient teach back successful and patient demonstrates knowledge of instruction.

## 2021-11-24 NOTE — PERIOP NOTES
PLEASE CONTINUE TAKING ALL PRESCRIPTION MEDICATIONS UP TO THE DAY OF SURGERY UNLESS OTHERWISE DIRECTED BELOW. DISCONTINUE all vitamins, herbals and supplements 7 days prior to surgery. DISCONTINUE Non-Steriodal Anti-Inflammatory (NSAIDS) such as Advil, Ibuprofen, and Aleve 5 days prior to surgery. Home Medications to HOLD      All vitamins, supplements, and herbals stop 7 days prior to surgery   All NSAIDs such as Meloxicam,Advil, Aleve, Ibuprofen, Diclofenac, Naproxen, etc. Stop 5 days prior to surgery. Aspirin and Aspirin products stop 5 days prior to surgery. *IT IS OK TO TAKE TYLENOL*     Home Medications to take  the day of surgery   Nasal spray as needed, Norco if needed or Tramadol if needed, Myrbetriq, Omeprazole        Comments      BRING: Omeprazole, Myrbetriq, eye drops, nasal sprays, bottle of soap (Dynahex) and incentive spirometer. Please do not bring home medications with you on the day of surgery unless otherwise directed by your nurse. If you are instructed to bring home medications, please give them to your nurse as they will be administered by the nursing staff. If you have any questions, please call [facility] [phone] or [facility] [phone]. Copy of above instructions given to patient.

## 2021-11-29 NOTE — PERIOP NOTES
Dr. Jorge Jeffery reviewed chart - EKGs dated 07/08/21 & 07/03/19. No new orders received. Ok to proceed.

## 2021-12-02 ENCOUNTER — ANESTHESIA EVENT (OUTPATIENT)
Dept: SURGERY | Age: 71
DRG: 483 | End: 2021-12-02
Payer: MEDICARE

## 2021-12-03 ENCOUNTER — APPOINTMENT (OUTPATIENT)
Dept: GENERAL RADIOLOGY | Age: 71
DRG: 483 | End: 2021-12-03
Attending: ORTHOPAEDIC SURGERY
Payer: MEDICARE

## 2021-12-03 ENCOUNTER — ANESTHESIA (OUTPATIENT)
Dept: SURGERY | Age: 71
DRG: 483 | End: 2021-12-03
Payer: MEDICARE

## 2021-12-03 ENCOUNTER — HOSPITAL ENCOUNTER (INPATIENT)
Age: 71
LOS: 1 days | Discharge: HOME | DRG: 483 | End: 2021-12-04
Attending: ORTHOPAEDIC SURGERY | Admitting: ORTHOPAEDIC SURGERY
Payer: MEDICARE

## 2021-12-03 DIAGNOSIS — R33.9 URINARY RETENTION: ICD-10-CM

## 2021-12-03 PROBLEM — M19.011 OSTEOARTHRITIS OF RIGHT SHOULDER REGION: Status: ACTIVE | Noted: 2021-12-03

## 2021-12-03 LAB
ABO + RH BLD: NORMAL
BLOOD GROUP ANTIBODIES SERPL: NORMAL
SPECIMEN EXP DATE BLD: NORMAL

## 2021-12-03 PROCEDURE — 77030002913 HC SUT ETHBND J&J -B: Performed by: ORTHOPAEDIC SURGERY

## 2021-12-03 PROCEDURE — 74011250636 HC RX REV CODE- 250/636: Performed by: NURSE ANESTHETIST, CERTIFIED REGISTERED

## 2021-12-03 PROCEDURE — 77030006777 HC BLD SAW OSC CNMD -B: Performed by: ORTHOPAEDIC SURGERY

## 2021-12-03 PROCEDURE — 74011000250 HC RX REV CODE- 250: Performed by: ANESTHESIOLOGY

## 2021-12-03 PROCEDURE — 74011250636 HC RX REV CODE- 250/636: Performed by: ORTHOPAEDIC SURGERY

## 2021-12-03 PROCEDURE — 73020 X-RAY EXAM OF SHOULDER: CPT

## 2021-12-03 PROCEDURE — 76010000173 HC OR TIME 3 TO 3.5 HR INTENSV-TIER 1: Performed by: ORTHOPAEDIC SURGERY

## 2021-12-03 PROCEDURE — 74011250636 HC RX REV CODE- 250/636: Performed by: ANESTHESIOLOGY

## 2021-12-03 PROCEDURE — 77030040503 HC DRN WND PENRS MDII -A: Performed by: ORTHOPAEDIC SURGERY

## 2021-12-03 PROCEDURE — 77030018836 HC SOL IRR NACL ICUM -A: Performed by: ORTHOPAEDIC SURGERY

## 2021-12-03 PROCEDURE — 74011000250 HC RX REV CODE- 250: Performed by: NURSE ANESTHETIST, CERTIFIED REGISTERED

## 2021-12-03 PROCEDURE — 0RRJ0JZ REPLACEMENT OF RIGHT SHOULDER JOINT WITH SYNTHETIC SUBSTITUTE, OPEN APPROACH: ICD-10-PCS | Performed by: ORTHOPAEDIC SURGERY

## 2021-12-03 PROCEDURE — 76210000006 HC OR PH I REC 0.5 TO 1 HR: Performed by: ORTHOPAEDIC SURGERY

## 2021-12-03 PROCEDURE — 76010010054 HC POST OP PAIN BLOCK: Performed by: ORTHOPAEDIC SURGERY

## 2021-12-03 PROCEDURE — 86901 BLOOD TYPING SEROLOGIC RH(D): CPT

## 2021-12-03 PROCEDURE — 76942 ECHO GUIDE FOR BIOPSY: CPT | Performed by: ORTHOPAEDIC SURGERY

## 2021-12-03 PROCEDURE — 77030020269 HC MISC IMPL: Performed by: ORTHOPAEDIC SURGERY

## 2021-12-03 PROCEDURE — 2709999900 HC NON-CHARGEABLE SUPPLY: Performed by: ORTHOPAEDIC SURGERY

## 2021-12-03 PROCEDURE — 77030002933 HC SUT MCRYL J&J -A: Performed by: ORTHOPAEDIC SURGERY

## 2021-12-03 PROCEDURE — 94760 N-INVAS EAR/PLS OXIMETRY 1: CPT

## 2021-12-03 PROCEDURE — 77030021678 HC GLIDESCP STAT DISP VERT -B: Performed by: ANESTHESIOLOGY

## 2021-12-03 PROCEDURE — 36415 COLL VENOUS BLD VENIPUNCTURE: CPT

## 2021-12-03 PROCEDURE — C1776 JOINT DEVICE (IMPLANTABLE): HCPCS | Performed by: ORTHOPAEDIC SURGERY

## 2021-12-03 PROCEDURE — 77030037088 HC TUBE ENDOTRACH ORAL NSL COVD-A: Performed by: ANESTHESIOLOGY

## 2021-12-03 PROCEDURE — 77030018673: Performed by: ORTHOPAEDIC SURGERY

## 2021-12-03 PROCEDURE — 65270000029 HC RM PRIVATE

## 2021-12-03 PROCEDURE — A4565 SLINGS: HCPCS | Performed by: ORTHOPAEDIC SURGERY

## 2021-12-03 PROCEDURE — 77030019905 HC CATH URETH INTMIT MDII -A

## 2021-12-03 PROCEDURE — 94762 N-INVAS EAR/PLS OXIMTRY CONT: CPT

## 2021-12-03 PROCEDURE — 2709999900 HC NON-CHARGEABLE SUPPLY

## 2021-12-03 PROCEDURE — 77030003602 HC NDL NRV BLK BBMI -B: Performed by: ANESTHESIOLOGY

## 2021-12-03 PROCEDURE — 77030039266 HC ADH SKN EXOFIN S2SG -A: Performed by: ORTHOPAEDIC SURGERY

## 2021-12-03 PROCEDURE — 76060000038 HC ANESTHESIA 3.5 TO 4 HR: Performed by: ORTHOPAEDIC SURGERY

## 2021-12-03 PROCEDURE — 74011250637 HC RX REV CODE- 250/637: Performed by: PHYSICIAN ASSISTANT

## 2021-12-03 PROCEDURE — 74011250637 HC RX REV CODE- 250/637: Performed by: ORTHOPAEDIC SURGERY

## 2021-12-03 PROCEDURE — 77030040922 HC BLNKT HYPOTHRM STRY -A: Performed by: ANESTHESIOLOGY

## 2021-12-03 PROCEDURE — 77030031139 HC SUT VCRL2 J&J -A: Performed by: ORTHOPAEDIC SURGERY

## 2021-12-03 PROCEDURE — 74011000250 HC RX REV CODE- 250: Performed by: ORTHOPAEDIC SURGERY

## 2021-12-03 DEVICE — SHOULDER S1 TOT STD ANAT -- IMPL CAPPED S1: Type: IMPLANTABLE DEVICE | Status: FUNCTIONAL

## 2021-12-03 DEVICE — IMPLANTABLE DEVICE
Type: IMPLANTABLE DEVICE | Site: SHOULDER | Status: FUNCTIONAL
Brand: EQUINOXE

## 2021-12-03 RX ORDER — CETIRIZINE HCL 10 MG
5 TABLET ORAL EVERY 12 HOURS
Status: DISCONTINUED | OUTPATIENT
Start: 2021-12-03 | End: 2021-12-04 | Stop reason: HOSPADM

## 2021-12-03 RX ORDER — TRETINOIN 0.5 MG/G
CREAM TOPICAL
Status: DISCONTINUED | OUTPATIENT
Start: 2021-12-03 | End: 2021-12-04 | Stop reason: HOSPADM

## 2021-12-03 RX ORDER — EPHEDRINE SULFATE/0.9% NACL/PF 50 MG/5 ML
SYRINGE (ML) INTRAVENOUS AS NEEDED
Status: DISCONTINUED | OUTPATIENT
Start: 2021-12-03 | End: 2021-12-03 | Stop reason: HOSPADM

## 2021-12-03 RX ORDER — ROCURONIUM BROMIDE 10 MG/ML
INJECTION, SOLUTION INTRAVENOUS AS NEEDED
Status: DISCONTINUED | OUTPATIENT
Start: 2021-12-03 | End: 2021-12-03 | Stop reason: HOSPADM

## 2021-12-03 RX ORDER — IPRATROPIUM BROMIDE 21 UG/1
2 SPRAY, METERED NASAL
Status: DISCONTINUED | OUTPATIENT
Start: 2021-12-03 | End: 2021-12-04 | Stop reason: HOSPADM

## 2021-12-03 RX ORDER — LIDOCAINE HYDROCHLORIDE 20 MG/ML
INJECTION, SOLUTION EPIDURAL; INFILTRATION; INTRACAUDAL; PERINEURAL AS NEEDED
Status: DISCONTINUED | OUTPATIENT
Start: 2021-12-03 | End: 2021-12-03 | Stop reason: HOSPADM

## 2021-12-03 RX ORDER — TRAMADOL HYDROCHLORIDE 50 MG/1
50 TABLET ORAL
Status: DISCONTINUED | OUTPATIENT
Start: 2021-12-03 | End: 2021-12-04 | Stop reason: HOSPADM

## 2021-12-03 RX ORDER — TEMAZEPAM 15 MG/1
15 CAPSULE ORAL
Status: DISCONTINUED | OUTPATIENT
Start: 2021-12-03 | End: 2021-12-04 | Stop reason: HOSPADM

## 2021-12-03 RX ORDER — HYDROMORPHONE HYDROCHLORIDE 2 MG/ML
0.5 INJECTION, SOLUTION INTRAMUSCULAR; INTRAVENOUS; SUBCUTANEOUS
Status: DISCONTINUED | OUTPATIENT
Start: 2021-12-03 | End: 2021-12-03 | Stop reason: HOSPADM

## 2021-12-03 RX ORDER — ZOLPIDEM TARTRATE 5 MG/1
10 TABLET ORAL
Status: DISCONTINUED | OUTPATIENT
Start: 2021-12-03 | End: 2021-12-04 | Stop reason: HOSPADM

## 2021-12-03 RX ORDER — SODIUM CHLORIDE, SODIUM LACTATE, POTASSIUM CHLORIDE, CALCIUM CHLORIDE 600; 310; 30; 20 MG/100ML; MG/100ML; MG/100ML; MG/100ML
100 INJECTION, SOLUTION INTRAVENOUS CONTINUOUS
Status: DISCONTINUED | OUTPATIENT
Start: 2021-12-03 | End: 2021-12-03 | Stop reason: HOSPADM

## 2021-12-03 RX ORDER — SODIUM CHLORIDE 9 MG/ML
100 INJECTION, SOLUTION INTRAVENOUS CONTINUOUS
Status: DISCONTINUED | OUTPATIENT
Start: 2021-12-03 | End: 2021-12-04 | Stop reason: HOSPADM

## 2021-12-03 RX ORDER — ACETAMINOPHEN 500 MG
1000 TABLET ORAL EVERY 6 HOURS
Status: DISCONTINUED | OUTPATIENT
Start: 2021-12-04 | End: 2021-12-04 | Stop reason: HOSPADM

## 2021-12-03 RX ORDER — OXYCODONE HYDROCHLORIDE 5 MG/1
10 TABLET ORAL
Status: DISCONTINUED | OUTPATIENT
Start: 2021-12-03 | End: 2021-12-04 | Stop reason: HOSPADM

## 2021-12-03 RX ORDER — HYDROMORPHONE HYDROCHLORIDE 1 MG/ML
1 INJECTION, SOLUTION INTRAMUSCULAR; INTRAVENOUS; SUBCUTANEOUS
Status: DISCONTINUED | OUTPATIENT
Start: 2021-12-03 | End: 2021-12-04 | Stop reason: HOSPADM

## 2021-12-03 RX ORDER — SODIUM CHLORIDE 0.9 % (FLUSH) 0.9 %
5-40 SYRINGE (ML) INJECTION EVERY 8 HOURS
Status: DISCONTINUED | OUTPATIENT
Start: 2021-12-03 | End: 2021-12-04 | Stop reason: HOSPADM

## 2021-12-03 RX ORDER — CEFAZOLIN SODIUM/WATER 2 G/20 ML
2 SYRINGE (ML) INTRAVENOUS ONCE
Status: COMPLETED | OUTPATIENT
Start: 2021-12-03 | End: 2021-12-03

## 2021-12-03 RX ORDER — OXYMETAZOLINE HCL 0.05 %
1 SPRAY, NON-AEROSOL (ML) NASAL DAILY PRN
Status: DISCONTINUED | OUTPATIENT
Start: 2021-12-03 | End: 2021-12-04 | Stop reason: HOSPADM

## 2021-12-03 RX ORDER — DEXAMETHASONE SODIUM PHOSPHATE 4 MG/ML
INJECTION, SOLUTION INTRA-ARTICULAR; INTRALESIONAL; INTRAMUSCULAR; INTRAVENOUS; SOFT TISSUE AS NEEDED
Status: DISCONTINUED | OUTPATIENT
Start: 2021-12-03 | End: 2021-12-03 | Stop reason: HOSPADM

## 2021-12-03 RX ORDER — TERBINAFINE HYDROCHLORIDE 250 MG/1
250 TABLET ORAL DAILY
Status: DISCONTINUED | OUTPATIENT
Start: 2021-12-04 | End: 2021-12-04 | Stop reason: HOSPADM

## 2021-12-03 RX ORDER — LIDOCAINE HYDROCHLORIDE 10 MG/ML
0.3 INJECTION INFILTRATION; PERINEURAL ONCE
Status: COMPLETED | OUTPATIENT
Start: 2021-12-03 | End: 2021-12-03

## 2021-12-03 RX ORDER — DEXAMETHASONE SODIUM PHOSPHATE 4 MG/ML
INJECTION, SOLUTION INTRA-ARTICULAR; INTRALESIONAL; INTRAMUSCULAR; INTRAVENOUS; SOFT TISSUE
Status: DISCONTINUED | OUTPATIENT
Start: 2021-12-03 | End: 2021-12-03 | Stop reason: HOSPADM

## 2021-12-03 RX ORDER — ACETAMINOPHEN 325 MG/1
975 TABLET ORAL ONCE
Status: COMPLETED | OUTPATIENT
Start: 2021-12-03 | End: 2021-12-03

## 2021-12-03 RX ORDER — LIDOCAINE 4 G/100G
1 PATCH TOPICAL DAILY PRN
Status: DISCONTINUED | OUTPATIENT
Start: 2021-12-03 | End: 2021-12-04 | Stop reason: HOSPADM

## 2021-12-03 RX ORDER — ACETAMINOPHEN 650 MG/1
650 SUPPOSITORY RECTAL ONCE
Status: COMPLETED | OUTPATIENT
Start: 2021-12-03 | End: 2021-12-03

## 2021-12-03 RX ORDER — SODIUM CHLORIDE 0.9 % (FLUSH) 0.9 %
5-40 SYRINGE (ML) INJECTION AS NEEDED
Status: DISCONTINUED | OUTPATIENT
Start: 2021-12-03 | End: 2021-12-04 | Stop reason: HOSPADM

## 2021-12-03 RX ORDER — MELOXICAM 7.5 MG/1
15 TABLET ORAL DAILY
Status: DISCONTINUED | OUTPATIENT
Start: 2021-12-04 | End: 2021-12-04 | Stop reason: HOSPADM

## 2021-12-03 RX ORDER — CEFAZOLIN SODIUM/WATER 2 G/20 ML
2 SYRINGE (ML) INTRAVENOUS EVERY 8 HOURS
Status: COMPLETED | OUTPATIENT
Start: 2021-12-03 | End: 2021-12-04

## 2021-12-03 RX ORDER — TAMSULOSIN HYDROCHLORIDE 0.4 MG/1
0.4 CAPSULE ORAL
Status: DISCONTINUED | OUTPATIENT
Start: 2021-12-03 | End: 2021-12-04

## 2021-12-03 RX ORDER — MIDAZOLAM HYDROCHLORIDE 1 MG/ML
2 INJECTION, SOLUTION INTRAMUSCULAR; INTRAVENOUS
Status: COMPLETED | OUTPATIENT
Start: 2021-12-03 | End: 2021-12-03

## 2021-12-03 RX ORDER — EPINEPHRINE 1 MG/ML
INJECTION, SOLUTION, CONCENTRATE INTRAVENOUS
Status: DISCONTINUED | OUTPATIENT
Start: 2021-12-03 | End: 2021-12-03 | Stop reason: HOSPADM

## 2021-12-03 RX ORDER — FENTANYL CITRATE 50 UG/ML
100 INJECTION, SOLUTION INTRAMUSCULAR; INTRAVENOUS ONCE
Status: COMPLETED | OUTPATIENT
Start: 2021-12-03 | End: 2021-12-03

## 2021-12-03 RX ORDER — GLYCOPYRROLATE 0.2 MG/ML
INJECTION INTRAMUSCULAR; INTRAVENOUS AS NEEDED
Status: DISCONTINUED | OUTPATIENT
Start: 2021-12-03 | End: 2021-12-03 | Stop reason: HOSPADM

## 2021-12-03 RX ORDER — NALOXONE HYDROCHLORIDE 0.4 MG/ML
0.4 INJECTION, SOLUTION INTRAMUSCULAR; INTRAVENOUS; SUBCUTANEOUS
Status: DISCONTINUED | OUTPATIENT
Start: 2021-12-03 | End: 2021-12-04 | Stop reason: HOSPADM

## 2021-12-03 RX ORDER — BUPIVACAINE HYDROCHLORIDE 5 MG/ML
INJECTION, SOLUTION EPIDURAL; INTRACAUDAL
Status: DISCONTINUED | OUTPATIENT
Start: 2021-12-03 | End: 2021-12-03 | Stop reason: HOSPADM

## 2021-12-03 RX ORDER — BUPIVACAINE HYDROCHLORIDE AND EPINEPHRINE 5; 5 MG/ML; UG/ML
INJECTION, SOLUTION EPIDURAL; INTRACAUDAL; PERINEURAL AS NEEDED
Status: DISCONTINUED | OUTPATIENT
Start: 2021-12-03 | End: 2021-12-03 | Stop reason: HOSPADM

## 2021-12-03 RX ORDER — DICYCLOMINE HYDROCHLORIDE 20 MG/1
20 TABLET ORAL EVERY 6 HOURS
Status: DISCONTINUED | OUTPATIENT
Start: 2021-12-03 | End: 2021-12-04 | Stop reason: HOSPADM

## 2021-12-03 RX ORDER — ONDANSETRON 2 MG/ML
INJECTION INTRAMUSCULAR; INTRAVENOUS AS NEEDED
Status: DISCONTINUED | OUTPATIENT
Start: 2021-12-03 | End: 2021-12-03 | Stop reason: HOSPADM

## 2021-12-03 RX ORDER — PROPOFOL 10 MG/ML
INJECTION, EMULSION INTRAVENOUS AS NEEDED
Status: DISCONTINUED | OUTPATIENT
Start: 2021-12-03 | End: 2021-12-03 | Stop reason: HOSPADM

## 2021-12-03 RX ORDER — NEOSTIGMINE METHYLSULFATE 1 MG/ML
INJECTION, SOLUTION INTRAVENOUS AS NEEDED
Status: DISCONTINUED | OUTPATIENT
Start: 2021-12-03 | End: 2021-12-03 | Stop reason: HOSPADM

## 2021-12-03 RX ORDER — OXYCODONE HYDROCHLORIDE 5 MG/1
10 TABLET ORAL
Status: DISCONTINUED | OUTPATIENT
Start: 2021-12-03 | End: 2021-12-03 | Stop reason: HOSPADM

## 2021-12-03 RX ADMIN — ZOLPIDEM TARTRATE 10 MG: 5 TABLET ORAL at 22:27

## 2021-12-03 RX ADMIN — CEFAZOLIN SODIUM 2 G: 100 INJECTION, POWDER, LYOPHILIZED, FOR SOLUTION INTRAVENOUS at 15:51

## 2021-12-03 RX ADMIN — GLYCOPYRROLATE 0.4 MG: 0.2 INJECTION, SOLUTION INTRAMUSCULAR; INTRAVENOUS at 10:58

## 2021-12-03 RX ADMIN — PHENYLEPHRINE HYDROCHLORIDE 30 MCG/MIN: 10 INJECTION INTRAVENOUS at 09:05

## 2021-12-03 RX ADMIN — PROPOFOL 200 MG: 10 INJECTION, EMULSION INTRAVENOUS at 08:17

## 2021-12-03 RX ADMIN — PHENYLEPHRINE HYDROCHLORIDE 100 MCG: 10 INJECTION INTRAVENOUS at 10:20

## 2021-12-03 RX ADMIN — LIDOCAINE HYDROCHLORIDE 80 MG: 20 INJECTION, SOLUTION EPIDURAL; INFILTRATION; INTRACAUDAL; PERINEURAL at 08:17

## 2021-12-03 RX ADMIN — ACETAMINOPHEN 975 MG: 325 TABLET, FILM COATED ORAL at 17:18

## 2021-12-03 RX ADMIN — PHENYLEPHRINE HYDROCHLORIDE 100 MCG: 10 INJECTION INTRAVENOUS at 10:42

## 2021-12-03 RX ADMIN — PHENYLEPHRINE HYDROCHLORIDE 50 MCG: 10 INJECTION INTRAVENOUS at 08:48

## 2021-12-03 RX ADMIN — PHENYLEPHRINE HYDROCHLORIDE 50 MCG: 10 INJECTION INTRAVENOUS at 08:57

## 2021-12-03 RX ADMIN — DEXAMETHASONE SODIUM PHOSPHATE 10 MG: 4 INJECTION, SOLUTION INTRAMUSCULAR; INTRAVENOUS at 10:24

## 2021-12-03 RX ADMIN — TAMSULOSIN HYDROCHLORIDE 0.4 MG: 0.4 CAPSULE ORAL at 18:35

## 2021-12-03 RX ADMIN — Medication 3 AMPULE: at 07:17

## 2021-12-03 RX ADMIN — EPINEPHRINE 0.1 MG: 1 INJECTION, SOLUTION, CONCENTRATE INTRAVENOUS at 07:55

## 2021-12-03 RX ADMIN — LIDOCAINE HYDROCHLORIDE 0.3 ML: 10 INJECTION, SOLUTION INFILTRATION; PERINEURAL at 07:19

## 2021-12-03 RX ADMIN — OXYCODONE 10 MG: 5 TABLET ORAL at 20:48

## 2021-12-03 RX ADMIN — SODIUM CHLORIDE, SODIUM LACTATE, POTASSIUM CHLORIDE, AND CALCIUM CHLORIDE: 600; 310; 30; 20 INJECTION, SOLUTION INTRAVENOUS at 10:53

## 2021-12-03 RX ADMIN — PHENYLEPHRINE HYDROCHLORIDE 100 MCG: 10 INJECTION INTRAVENOUS at 08:50

## 2021-12-03 RX ADMIN — BUPIVACAINE HYDROCHLORIDE 20 ML: 5 INJECTION, SOLUTION EPIDURAL; INTRACAUDAL; PERINEURAL at 07:55

## 2021-12-03 RX ADMIN — DEXAMETHASONE SODIUM PHOSPHATE 4 MG: 4 INJECTION, SOLUTION INTRA-ARTICULAR; INTRALESIONAL; INTRAMUSCULAR; INTRAVENOUS; SOFT TISSUE at 07:55

## 2021-12-03 RX ADMIN — Medication 3 MG: at 10:58

## 2021-12-03 RX ADMIN — PROMETHAZINE HYDROCHLORIDE 3 MG: 25 INJECTION INTRAMUSCULAR; INTRAVENOUS at 12:14

## 2021-12-03 RX ADMIN — CEFAZOLIN 2 G: 1 INJECTION, POWDER, FOR SOLUTION INTRAVENOUS at 08:08

## 2021-12-03 RX ADMIN — TAMSULOSIN HYDROCHLORIDE 0.4 MG: 0.4 CAPSULE ORAL at 19:00

## 2021-12-03 RX ADMIN — PHENYLEPHRINE HYDROCHLORIDE 100 MCG: 10 INJECTION INTRAVENOUS at 09:03

## 2021-12-03 RX ADMIN — ROCURONIUM BROMIDE 10 MG: 10 INJECTION, SOLUTION INTRAVENOUS at 10:07

## 2021-12-03 RX ADMIN — Medication 10 MG: at 09:02

## 2021-12-03 RX ADMIN — SODIUM CHLORIDE, SODIUM LACTATE, POTASSIUM CHLORIDE, AND CALCIUM CHLORIDE 100 ML/HR: 600; 310; 30; 20 INJECTION, SOLUTION INTRAVENOUS at 07:18

## 2021-12-03 RX ADMIN — OXYCODONE 10 MG: 5 TABLET ORAL at 15:51

## 2021-12-03 RX ADMIN — SODIUM CHLORIDE, SODIUM LACTATE, POTASSIUM CHLORIDE, AND CALCIUM CHLORIDE: 600; 310; 30; 20 INJECTION, SOLUTION INTRAVENOUS at 09:15

## 2021-12-03 RX ADMIN — ROCURONIUM BROMIDE 50 MG: 10 INJECTION, SOLUTION INTRAVENOUS at 08:17

## 2021-12-03 RX ADMIN — PHENYLEPHRINE HYDROCHLORIDE 100 MCG: 10 INJECTION INTRAVENOUS at 09:50

## 2021-12-03 RX ADMIN — ONDANSETRON 4 MG: 2 INJECTION INTRAMUSCULAR; INTRAVENOUS at 10:49

## 2021-12-03 RX ADMIN — FENTANYL CITRATE 100 MCG: 50 INJECTION INTRAMUSCULAR; INTRAVENOUS at 07:52

## 2021-12-03 RX ADMIN — MIDAZOLAM 2 MG: 1 INJECTION INTRAMUSCULAR; INTRAVENOUS at 07:52

## 2021-12-03 NOTE — ANESTHESIA PREPROCEDURE EVALUATION
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            Physical Exam    Airway  Mallampati: II  TM Distance: 4 - 6 cm  Neck ROM: decreased range of motion   Mouth opening: Normal     Cardiovascular    Rhythm: regular  Rate: normal         Dental  No notable dental hx       Pulmonary  Breath sounds clear to auscultation               Abdominal  GI exam deferred       Other Findings            Anesthetic Plan    ASA: 2  Anesthesia type: general      Post-op pain plan if not by surgeon: peripheral nerve block single    Induction: Intravenous  Anesthetic plan and risks discussed with: Patient and Spouse

## 2021-12-03 NOTE — PROGRESS NOTES
Alert and oriented x4. No pain. Decreased sensation to RUE. Bruising and swelling present around incision and anterior side of shoulder. Patient is concerned about a hematoma. Ice applied to shoulder and swelling has decreased. Sitting on the side of the bed eating lunch.  IS use is encouraged

## 2021-12-03 NOTE — ANESTHESIA POSTPROCEDURE EVALUATION
Procedure(s):  RIGHT SHOULDER ARTHROPLASTY/TOTAL/ EXACTECH GEN/ SCAL. No value filed. Anesthesia Post Evaluation      Multimodal analgesia: multimodal analgesia used between 6 hours prior to anesthesia start to PACU discharge  Patient location during evaluation: PACU  Patient participation: complete - patient participated  Level of consciousness: awake  Pain management: adequate  Airway patency: patent  Anesthetic complications: no  Cardiovascular status: acceptable  Respiratory status: acceptable  Hydration status: acceptable  Post anesthesia nausea and vomiting:  controlled      INITIAL Post-op Vital signs:   Vitals Value Taken Time   /87 12/03/21 1210   Temp 37.4 °C (99.4 °F) 12/03/21 1138   Pulse 103 12/03/21 1213   Resp 16 12/03/21 1200   SpO2 97 % 12/03/21 1213   Vitals shown include unvalidated device data.

## 2021-12-03 NOTE — PROGRESS NOTES
TRANSFER - IN REPORT:    Verbal report received from [address] RN(name) on [facility]  being received from pacu(unit) for routine post - op      Report consisted of patients Situation, Background, Assessment and   Recommendations(SBAR). Information from the following report(s) SBAR was reviewed with the receiving nurse. Opportunity for questions and clarification was provided. Assessment completed upon patients arrival to unit and care assumed.

## 2021-12-03 NOTE — ANESTHESIA PROCEDURE NOTES
Peripheral Block    Start time: 12/3/2021 7:50 AM  End time: 12/3/2021 7:55 AM  Performed by: Zoran Heck MD  Authorized by: Zoran Heck MD       Pre-procedure: Indications: at surgeon's request and post-op pain management    Preanesthetic Checklist: patient identified, risks and benefits discussed, site marked, timeout performed, anesthesia consent given and patient being monitored    Timeout Time: 07:50 EST          Block Type:   Block Type:   Interscalene  Laterality:  Right  Monitoring:  Continuous pulse ox, frequent vital sign checks, heart rate, responsive to questions and oxygen  Injection Technique:  Single shot  Procedures: ultrasound guided    Patient Position: supine (head elevated 45 degrees)  Prep: chlorhexidine    Location:  Interscalene  Needle Type:  Stimuplex  Needle Gauge:  20 G  Needle Localization:  Ultrasound guidance  Medication Injected:  Bupivacaine (PF) (MARCAINE) 0.5% injection, 20 mL  dexamethasone (DECADRON) 4 mg/mL injection, 4 mg  EPINEPHrine HCl (PF) (ADRENALIN) 1 mg/mL (1 mL) injection, 0.1 mg  Med Admin Time: 12/3/2021 7:55 AM    Assessment:  Number of attempts:  1  Injection Assessment:  Incremental injection every 5 mL, local visualized surrounding nerve on ultrasound, negative aspiration for blood, no paresthesia, ultrasound image on chart and no intravascular symptoms  Patient tolerance:  Patient tolerated the procedure well with no immediate complications

## 2021-12-03 NOTE — PROGRESS NOTES
Voiding small amounts since surgery, bladder scan shows >674ml. Attempting to void once more. If unable will straight cath per order.

## 2021-12-03 NOTE — PROGRESS NOTES
12/03/21 1716   Oxygen Therapy   O2 Sat (%) 96 %   Pulse via Oximetry 84 beats per minute   O2 Device None (Room air)   O2 Flow Rate (L/min) 0 l/min   Incentive Spirometry Treatment   Actual Volume (ml) 1850 ml   Number of Attempts 1   Patient achieved     1850    Ml/sec on IS. Patient encouraged to do every hour while awake-patient agreed and demonstrated. No shortness of breath or distress noted. BS are clear b/l.    [facility] notes reviewed- continuous sat  ordered HS

## 2021-12-03 NOTE — PERIOP NOTES
Patient's family was updated at the start of the surgery, and of the progress at the 1 hour louis of the procedure.

## 2021-12-03 NOTE — H&P
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 He wants to talk abou arthroplasty. He has known severe osteoarthritis of the right shoulder. He has responded favorably to injections but the last injection did not give very much relief. Of note is he states that his left shoulder, which was injected with platelet rich plasma, has felt better than many of the prior injections done on the left shoulder. He states that he is recently had some return of left shoulder area pain. His right shoulder is disturbing his sleep consistently. It is profoundly stiff. It limits his activities substantially. PE:  His right shoulder has severely restricted range as documented in earlier notes. Is not hot red or swollen. It has no effusion. N/V of the RUE is intact  Ht: to be done by Dr Boby Paz day of surgery  Lungs:to be done by Dr Boby Paz day of surgery    Diagnosis: Osteoarthritis of the right shoulder with exacerbation. This is a chronic condition with exacerbation. I recommend elective surgery as treatment. Surgery for anatomic total shoulder arthroplasty.

## 2021-12-03 NOTE — BRIEF OP NOTE
Brief Postoperative Note    Patient: Tho Sanders  YOB: 1950  MRN: [id number]    Date of Procedure: 12/3/2021     Pre-Op Diagnosis: Osteoarthritis of right shoulder, unspecified osteoarthritis type [M19.011]    Post-Op Diagnosis: Same as preoperative diagnosis. Procedure(s):  RIGHT SHOULDER ARTHROPLASTY/TOTAL/ EXACTECH GEN/ SCAL    Surgeon(s):  Redd Hinkle MD    Surgical Assistant: None    Anesthesia: General     Estimated Blood Loss (mL): 657     Complications: None    Specimens: * No specimens in log *     Implants:   Implant Name Type Inv.  Item Serial No.  Lot No. LRB No. Used Action   STEM HUM [id number] SHLDR GALDINO PRESSFIT EQUINOXE - [id number]  STEM HUM [id number] SHLDR GALDINO PRESSFIT EQUINOXE [id number] [employer]  Right 1 Implanted   CAGE GLENOIDXL STD POLYETH B NASRIN OR PRSS FIT BILAT INVERSE - [id number]  CAGE GLENOIDXL STD POLYETH B NASRIN OR PRSS FIT BILAT INVERSE [id number] [employer]  Right 1 Implanted   SCREW BNE AD PED L47MM [id number] CANC SHLDR NONLOCKING - [id number]  SCREW BNE AD PED L47MM [id number] CANC SHLDR NONLOCKING [id number] [employer]  Right 1 Implanted   PLATE BNE 8.3QT TOÑA REPLICATOR O/S EQUINOXE - [id number]  PLATE BNE 5.8BX TOÑA REPLICATOR O/S EQUINOXE [id number] [employer]  Right 1 Implanted   HEAD HUM H22MM [id number] TALL SHLDR CO CHROM EQUINOXE - [id number]  HEAD HUM H22MM [id number] TALL SHLDR CO Maryhelen Mariza [id number] [employer] [id number] Right 1 Implanted       Drains: * No LDAs found *    Findings: as dictated    Electronically Signed by Alyce Cantu MD on 12/3/2021 at 11:51 AM

## 2021-12-04 VITALS
HEIGHT: 70 IN | SYSTOLIC BLOOD PRESSURE: 155 MMHG | HEART RATE: 99 BPM | RESPIRATION RATE: 18 BRPM | TEMPERATURE: 98 F | OXYGEN SATURATION: 94 % | DIASTOLIC BLOOD PRESSURE: 75 MMHG | WEIGHT: 164 LBS | BODY MASS INDEX: 23.48 KG/M2

## 2021-12-04 PROCEDURE — 74011250637 HC RX REV CODE- 250/637: Performed by: ORTHOPAEDIC SURGERY

## 2021-12-04 PROCEDURE — 97161 PT EVAL LOW COMPLEX 20 MIN: CPT

## 2021-12-04 PROCEDURE — 74011000250 HC RX REV CODE- 250: Performed by: ORTHOPAEDIC SURGERY

## 2021-12-04 PROCEDURE — 97530 THERAPEUTIC ACTIVITIES: CPT

## 2021-12-04 PROCEDURE — 74011250637 HC RX REV CODE- 250/637: Performed by: UROLOGY

## 2021-12-04 PROCEDURE — 74011250636 HC RX REV CODE- 250/636: Performed by: ORTHOPAEDIC SURGERY

## 2021-12-04 PROCEDURE — 99222 1ST HOSP IP/OBS MODERATE 55: CPT | Performed by: UROLOGY

## 2021-12-04 RX ORDER — TAMSULOSIN HYDROCHLORIDE 0.4 MG/1
0.4 CAPSULE ORAL 2 TIMES DAILY
Status: DISCONTINUED | OUTPATIENT
Start: 2021-12-04 | End: 2021-12-04 | Stop reason: HOSPADM

## 2021-12-04 RX ADMIN — OXYCODONE 10 MG: 5 TABLET ORAL at 12:37

## 2021-12-04 RX ADMIN — CEFAZOLIN SODIUM 2 G: 100 INJECTION, POWDER, LYOPHILIZED, FOR SOLUTION INTRAVENOUS at 00:42

## 2021-12-04 RX ADMIN — ACETAMINOPHEN 1000 MG: 500 TABLET, FILM COATED ORAL at 06:44

## 2021-12-04 RX ADMIN — Medication 10 ML: at 00:42

## 2021-12-04 RX ADMIN — HYDROMORPHONE HYDROCHLORIDE 1 MG: 1 INJECTION, SOLUTION INTRAMUSCULAR; INTRAVENOUS; SUBCUTANEOUS at 09:28

## 2021-12-04 RX ADMIN — TAMSULOSIN HYDROCHLORIDE 0.4 MG: 0.4 CAPSULE ORAL at 09:14

## 2021-12-04 RX ADMIN — OXYCODONE 10 MG: 5 TABLET ORAL at 06:44

## 2021-12-04 RX ADMIN — OXYCODONE 10 MG: 5 TABLET ORAL at 00:54

## 2021-12-04 RX ADMIN — MELOXICAM 15 MG: 7.5 TABLET ORAL at 09:14

## 2021-12-04 RX ADMIN — ACETAMINOPHEN 1000 MG: 500 TABLET, FILM COATED ORAL at 00:44

## 2021-12-04 RX ADMIN — TRAMADOL HYDROCHLORIDE 50 MG: 50 TABLET, COATED ORAL at 12:41

## 2021-12-04 RX ADMIN — ACETAMINOPHEN 1000 MG: 500 TABLET, FILM COATED ORAL at 12:37

## 2021-12-04 NOTE — OP NOTES
[address] [address]  OPERATIVE REPORT    Name:  Conchita Luna  MR#:  [id number]  :  1950  ACCOUNT #:  [de-identified]  DATE OF SERVICE:  2021    PREOPERATIVE DIAGNOSIS:  Painful osteoarthritis of the right shoulder. POSTOPERATIVE DIAGNOSIS:  Painful osteoarthritis of the right shoulder. PROCEDURE PERFORMED:  Right total shoulder arthroplasty. SURGEON:  Emily Parkinson MD    ASSISTANT:      ANESTHESIA:      COMPLICATIONS:  none    SPECIMENS REMOVED:  None. IMPLANTS:  Yes     ESTIMATED BLOOD LOSS:  350 mL. FINDINGS:  His preoperative range was profoundly restricted. Flexion estimated as 70 degrees glenohumeral.  His humeral head was bare bone with substantial flattening. The glenoid was bare bone with posterior inclination and minimal biconcavity. The rotator cuff tendon was healthy as was the subscapularis. There was an aberrant branch of a vascular structure that crossed from the pectoralis major branching out to the undersurface of the deltoid. At the completion of the arthroplasty, I could easily touch his hand to his contralateral shoulder. I could perch the humeral head inferiorly and posteriorly. PROCEDURE IN DETAIL:  In the operating room, he was anesthetized. In the beach-chair position, his right shoulder was prepped and draped in the sterile fashion. A deltopectoral incision was made through the minimal subcutaneous fat that was about 3mm thick. The deltopectoral interval was developed. His cephalic vein was retracted laterally. Blunt dissection was continued deeper and the structure crossing from pectoralis and branching out to multiple tiny insertions to the undersurface of the anterior deltoid as described above was noted. This was a vascular structure, but I believe there was a small neural structure with it. I stimulated this with a low-voltage cautery and it resulted in no deltoid muscle contraction.   Similar cautery directly applied to the same muscle area created a brisk contraction. I chose to ligate and transect the structure to enable the necessary exposure for total shoulder arthroplasty. I incised the biceps tendon sheath and transected the biceps tendon 1 inch below the transverse humeral ligament. The anterior circumflex vessels were cauterized. The subscapularis was transected about 3 mm from its insertion and the anterior capsule and inferior capsule was removed from the humerus. The shoulder was dislocated. A proximal humeral cutting guide was secured with smooth pins and used to guide a saw cut to remove the humeral head. Peripheral osteophytes at the cut surface were removed with a rongeur and a sharp chisel. The subscapularis was freed from the underlying capsule and under direct vision, superior, middle and the inferior glenohumeral ligament was transected and removed from the edge of the glenoid. His posterior labrum was markedly thickened. The biceps stump and all labrum was removed under direct vision. He had a large loose body posteriorly that was removed. It was about 12-14 mm in diameter. I did not palpate loose bodies in the subcoracoid recess. I reamed the medullary canal to 17 mm, broached to 17 and then impacted an [employer] uncemented humeral component in place. I reamed the proximal cut surface with a rotary reamer before impacting the prosthesis. The deltoid was freed from minor adhesions to the rotator cuff by finger dissection. A central hole was drilled in the glenoid and this was used to guide rotary reamers to ream a concentric hard bone surface of the glenoid. The glenoid prosthesis was chosen by the use of trials after I drilled three holes around the central hole in the glenoid using the appropriate guide. Bone graft taken from the head was packed in the central peg of the glenoid and the glenoid prosthesis was impacted in place. It had an excellent interference fit and excellent peripheral support.   A replicator was secured to the humerus in appropriate rotational alignment and the bolt tightened appropriately. A head was chosen by the use of trials and the actual head, a 47 x 22, was impacted in place on the [facility]. The joint was reduced and I found that I could easily touch the contralateral shoulder with his hand. I could flex to 140, abduct to 120 glenohumeral.  The subscapularis was reapproximated with vertical mattress and simple interrupted Ethibond sutures. The rotator cuff interval was closed with a couple of simple interrupted Ethibond sutures. The deltopectoral interval was closed with running Vicryl, interrupted Monocryl, subcuticular Monocryl and surgical glue on the skin. He tolerated this procedure well with a blood loss estimated at 350 mL and a shoulder immobilizer was applied at the completion of the case. There were no specimens sent. He left the recovery room in good condition.       Cecilio Barrett MD      [id number]  D:  12/03/2021 12:08  T:  12/03/2021 23:51  JOB #:  [id number]

## 2021-12-04 NOTE — PROGRESS NOTES
2021         Post Op day: 1 Day Post-Op   Admit Diagnosis: Osteoarthritis of right shoulder, unspecified osteoarthritis type [M19.011]; Osteoarthritis of right shoulder region [M19.011]  LAB:    No results found for this or any previous visit (from the past 24 hour(s)). Vital Signs:    Patient Vitals for the past 8 hrs:   BP Temp Pulse Resp SpO2   21 0747 122/69 98.2 °F (36.8 °C) 83 18 95 %   21 0359 128/76 98.2 °F (36.8 °C) 86 18 96 %   21 0030 116/66 98.6 °F (37 °C) 89 18 97 %     Temp (24hrs), Av.5 °F (36.9 °C), Min:98 °F (36.7 °C), Max:99.4 °F (37.4 °C)    Pain Control:   Pain Assessment  Pain Scale 1: Numeric (0 - 10)  Pain Intensity 1: 4  Pain Location 1: Shoulder  Pain Orientation 1: Right  Pain Description 1: Aching  Pain Intervention(s) 1: Medication (see MAR)  Subjective: Doing well, normal recovery experienced.      Objective:  No Acute Distress, Alert and Oriented, Neurovascular exam is normal       Assessment:   Patient Active Problem List   Diagnosis Code    Chronic low back pain M54.50, G89.29    MVP (mitral valve prolapse) I34.1    Lumbar disc disease M51.9    IBS (irritable bowel syndrome) K58.9    Insomnia G47.00    Arthritis M19.90    Right wrist pain M25.531    Onychomycosis B35.1    Rising PSA level G32.29    Oral lichen planus E46.1    BPH with obstruction/lower urinary tract symptoms N40.1, N13.8    RBBB I45.10    Low TSH level R79.89    Onychomycosis of toenail B35.1    Hyponatremia E87.1    Insufficiency of tear film of both eyes H04.123    Left inguinal hernia K40.90    Abnormal prostate on physical examination N42.9    Rhinitis J31.0    Nonmelanoma skin cancer C44.90    Postural imbalance R29.3    Paresthesia R20.2    Neuropathy G62.9    Weakness R53.1    Deviated septum J34.2    Chronic maxillary sinusitis J32.0    Erectile disorder due to medical condition in male patient N52.1    Osteoarthritis of right shoulder region M19.011 Status Post Procedure(s) (LRB):  RIGHT SHOULDER ARTHROPLASTY/TOTAL/ EXACTECH GEN/ SCAL (Right)        Plan: Continue Physical Therapy, discharge home anticipated today. Looks good and pain is less. Suspect he can go home anytime per [facility].    Signed By: Pepito Gallego MD

## 2021-12-04 NOTE — PROGRESS NOTES
Notified of urology consult but currently still in surgery at [facility] and unable to come to [place] to assess at this moment. Patient is voiding but just not completely emptying and not in pain from retention per RN. Recommended patient ambulate and try to void for next 1.5 hours, plus start flomax. If still cannot void or becomes uncomfortable, would have RN catheterize patient with 16 Fr coudet catheter. Notify urology if unsuccessful. Chaparro Dubose M.D.     [facility]  [address]  Phone: [phone]  Fax: [phone]

## 2021-12-04 NOTE — PROGRESS NOTES
Care Management Interventions  PCP Verified by CM: Yes  Support Systems: Spouse/Significant Other  Discharge Location  Discharge Placement: Home with family assistance  80 yr-old MM with TSA with Dr. Freedom Damon. Pt will follow-up with Dr. Freedom Damon for outpatient therapy. Chart screened by  for discharge planning. No needs identified at this time. Please consult  if any new issues arise.

## 2021-12-04 NOTE — PROGRESS NOTES
Problem: Mobility Impaired (Adult and Pediatric)  Goal: *Acute Goals and Plan of Care (Insert Text)  Outcome: Progressing Towards Goal  Note: GOALS (1-4 days):  (1.)  Patient will move from supine to sit and sit to supine  in bed with SUPERVISION. (2.)  Patient will transfer from bed to chair and chair to bed with SUPERVISION using the least restrictive device. Met 12/4  (3.)  Patient will ambulate with SUPERVISION for 300 feet with the least restrictive device. Met 12/4  (4.)  Patient will be safe with shoulder HEP, with caregiver's help, to increase range of motion per MD orders. Met 12/4  ) pt able to go up & down 3 steps using left rail & SBA. Met 12/4  ________________________________________________________________________________________________      PHYSICAL THERAPY: Daily Note, Treatment Day: Day of Assessment and PM 12/4/2021  INPATIENT: Hospital Day: 2  [place] / [place] / Product Type: Medicare /      NAME/AGE/GENDER: Blair Contreras is a 80 y.o. male   PRIMARY DIAGNOSIS: Osteoarthritis of right shoulder, unspecified osteoarthritis type [M19.011]  Osteoarthritis of right shoulder region [M19.011] Osteoarthritis of right shoulder region Osteoarthritis of right shoulder region  Procedure(s) (LRB):  RIGHT SHOULDER ARTHROPLASTY/TOTAL/ EXACTECH GEN/ SCAL (Right)  1 Day Post-Op  ICD-10: Treatment Diagnosis:   · Pain in Right Shoulder (M25.511)  · Stiffness of Right Shoulder, Not elsewhere classified (M25.611)  · Generalized Muscle Weakness (M62.81)  · Difficulty in walking, Not elsewhere classified (R26.2)   Precaution/Allergies:  Patient has no known allergies. ASSESSMENT:     Mr. Rosa Luu presents with sore & stiff right shoulder along with mildly impaired functional mobility. This pt will benefit from follow up therapy to help restore safe functional mobility & to establish a HEP.    In pm session, pt showed increased gait distance & improved level of assist for transfers & gait. This pt also had a good understanding of HEP & PT expectations. This section established at most recent assessment   PROBLEM LIST (Impairments causing functional limitations):  1. Decreased [place] with Bed Mobility  2. Decreased [place] with Transfers  3. Decreased [place] with Ambulation   4. Decreased [place] with shoulder HEP   INTERVENTIONS PLANNED: (Benefits and precautions of physical therapy have been discussed with the patient.)  1. Bed Mobility Training  2. Transfer Training  3. Gait Training  4. Therapeutic Exercises per MD orders  5. Modalities for Pain     TREATMENT PLAN: Frequency/Duration: twice daily for duration of hospital stay  Rehabilitation Potential For Stated Goals: Good     RECOMMENDED REHABILITATION/EQUIPMENT: (at time of discharge pending progress): Continue Skilled Therapy and Outpatient: Physical Therapy. HISTORY:   History of Present Injury/Illness (Reason for Referral):  Right TSA  Past Medical History/Comorbidities:   Mr. Francisco Narvaez  has a past medical history of Arthritis (07/06/2015), Back pain, DDD (degenerative disc disease), lumbar, Dry eyes, Enlarged prostate, GERD (gastroesophageal reflux disease), History of basal cell cancer, History of squamous cell carcinoma, IBS (irritable bowel syndrome), Insomnia (07/06/2015), Left inguinal hernia, MVP (mitral valve prolapse), Onychomycosis (7/6/2015), RBBB (7/6/2015), Right wrist pain (7/6/2015), Scoliosis, and Spondylosis of lumbar region without myelopathy or radiculopathy. Mr. Francisco Narvaez  has a past surgical history that includes neurological procedure unlisted (2007); hx colonoscopy; hx orthopaedic; hx shoulder arthroscopy (Right); hx orthopaedic (Left, 06/2020); hx orthopaedic (Right, 08/2020); hx cataract removal; and hx carpal tunnel release.   Social History/Living Environment:   Home Environment: Private residence  # Steps to Enter: 3  Rails to Enter: Yes  [employer] : Left  One/Two Story Residence: One story  Living Alone: No  Support Systems: Spouse/Significant Other  Patient Expects to be Discharged to[de-identified] House  Prior Level of Function/Work/Activity:  Pt independent functionally   Number of Personal Factors/Comorbidities that affect the Plan of Care: 3+: HIGH COMPLEXITY   EXAMINATION:   Most Recent Physical Functioning:   Gross Assessment:  AROM: Within functional limits (left UE & both LE's)  Strength: Within functional limits (left UE & both LE's)  Coordination: Within functional limits (left UE & both LE's)                    Balance:  Sitting: Intact; Without support  Standing: Intact; Without support Bed Mobility:  Supine to Sit:  (NT)  Sit to Supine:  (NT)  Scooting: Supervision       Transfers:  Sit to Stand: Supervision  Stand to Sit: Supervision  Bed to Chair: Supervision  Gait:     Speed/Mary: Delayed  Step Length: Left shortened; Right shortened  Gait Abnormalities: Decreased step clearance  Distance (ft): 300 Feet (ft)  Assistive Device:  (none)  Ambulation - Level of Assistance: Supervision  Number of Stairs Trained: 3  Stairs - Level of Assistance: Stand-by assistance  Rail Use: Left    Functional Mobility:         Gait/Ambulation:  sup        Transfers:  sup        Bed Mobility:  nt        Stairs:  sba   Body Structures Involved:  1. Joints  2. Muscles Body Functions Affected:  1. Sensory/Pain  2. Movement Related Activities and Participation Affected:  1. General Tasks and Demands  2. Mobility   Number of elements that affect the Plan of Care: 4+: HIGH COMPLEXITY   CLINICAL PRESENTATION:   Presentation: Stable and uncomplicated: LOW COMPLEXITY   CLINICAL DECISION MAKIN [facility] [id number] AM-PAC 6 Clicks   Basic Mobility Inpatient Short Form  How much difficulty does the patient currently have. .. Unable A Lot A Little None   1. Turning over in bed (including adjusting bedclothes, sheets and blankets)? [] 1   [] 2   [x] 3   [] 4   2.   Sitting down on and standing up from a chair with arms ( e.g., wheelchair, bedside commode, etc.)   [] 1   [] 2   [x] 3   [] 4   3. Moving from lying on back to sitting on the side of the bed? [] 1   [] 2   [x] 3   [] 4   How much help from another person does the patient currently need. .. Total A Lot A Little None   4. Moving to and from a bed to a chair (including a wheelchair)? [] 1   [] 2   [x] 3   [] 4   5. Need to walk in hospital room? [] 1   [] 2   [x] 3   [] 4   6. Climbing 3-5 steps with a railing? [] 1   [] 2   [x] 3   [] 4   © 2007, Trustees of [facility] [employer], under license to [employer]. All rights reserved      Score:  Initial: 18 Most Recent: X (Date: -- )    Interpretation of Tool:  Represents activities that are increasingly more difficult (i.e. Bed mobility, Transfers, Gait). Medical Necessity:     · Patient is expected to demonstrate progress in   · strength, range of motion, balance, coordination, and functional technique  ·  to   · decrease assistance required with bed mobility, transfers, gait & HEP  · .  Reason for Services/Other Comments:  · Patient continues to require skilled intervention due to   · Pt not independent with functional mobility & HEP  · .    Use of outcome tool(s) and clinical judgement create a POC that gives a: Clear prediction of patient's progress: LOW COMPLEXITY            TREATMENT:   (In addition to Assessment/Re-Assessment sessions the following treatments were rendered)   Pre-treatment Symptoms/Complaints:  Pt eager to return home  Pain: Initial: numeric scale  Pain Intensity 1: 3  Pain Location 1: Shoulder  Pain Orientation 1: Right  Pain Intervention(s) 1: Cold pack, Exercise  Post Session:  3/10          Date:  12/4 Date:   Date:     ACTIVITY/EXERCISE AM PM AM PM AM PM   Gripping 25 25       Wrist Flexion/Extension 25 25       Wrist Ulnar/Radial Deviation         Pronation/Supination 25 25       Elbow Flexion/Extension -- --       Shoulder Flexion/Extension 25aa  <=80deg 25aa  <=80deg       Shoulder AB/ADduction         Shoulder IR/ER         Pulleys 25p  <=120deg 25p  <=120deg       Pendulums         Shrugs -- 25       Isometric:                 Flexion 25 25       Extension 25 25       ABduction         ADduction         Biceps/Triceps 25/25 25/25                B = bilateral; AA = active assistive; A = active; P = passive  Education:  [x]  Home Exercises  [x]  Sling Application   [x]  Movement Precautions   [x]  Pulleys   [x]  Use of Ice   []  Other:   Treatment/Session Assessment:    · Response to Treatment:  Good progress from am session  · Interdisciplinary Collaboration:   o Registered Nurse  · After treatment position/precautions:   o Up in chair  o Bed/Chair-wheels locked  o Caregiver at bedside  o Call light within reach  o RN notified  o Family at bedside   · Compliance with Program/Exercises: Will assess as treatment progresses. · Recommendations/Intent for next treatment session:  Treatment next visit will focus on increasing Mr. Youngbloods independence with bed mobility, transfers, gait training, strength/ROM exercises, modalities for pain, and patient education.    Total Treatment Duration:  PT Patient Time In/Time Out  Time In: 2735  Time Out: 6915 [facility], PT

## 2021-12-04 NOTE — PROGRESS NOTES
Problem: Mobility Impaired (Adult and Pediatric)  Goal: *Acute Goals and Plan of Care (Insert Text)  Outcome: Progressing Towards Goal  Note: GOALS (1-4 days):  (1.)  Patient will move from supine to sit and sit to supine  in bed with SUPERVISION. (2.)  Patient will transfer from bed to chair and chair to bed with SUPERVISION using the least restrictive device. (3.)  Patient will ambulate with SUPERVISION for 300 feet with the least restrictive device. (4.)  Patient will be safe with shoulder HEP, with caregiver's help, to increase range of motion per MD orders.  ) pt able to go up & down 3 steps using left rail & SBA.  ________________________________________________________________________________________________      PHYSICAL THERAPY: Initial Assessment, Treatment Day: Day of Assessment, and AM 12/4/2021  INPATIENT: Hospital Day: 2  [place] / Product Type: Medicare /      NAME/AGE/GENDER: Rashad Ny is a 80 y.o. male   PRIMARY DIAGNOSIS: Osteoarthritis of right shoulder, unspecified osteoarthritis type [M19.011]  Osteoarthritis of right shoulder region [M19.011] Osteoarthritis of right shoulder region Osteoarthritis of right shoulder region  Procedure(s) (LRB):  RIGHT SHOULDER ARTHROPLASTY/TOTAL/ EXACTECH GEN/ SCAL (Right)  1 Day Post-Op  ICD-10: Treatment Diagnosis:   · Pain in Right Shoulder (M25.511)  · Stiffness of Right Shoulder, Not elsewhere classified (M25.611)  · Generalized Muscle Weakness (M62.81)  · Difficulty in walking, Not elsewhere classified (R26.2)   Precaution/Allergies:  Patient has no known allergies. ASSESSMENT:     Mr. Josette Han presents with sore & stiff right shoulder along with mildly impaired functional mobility. This pt will benefit from follow up therapy to help restore safe functional mobility & to establish a HEP.      This section established at most recent assessment   PROBLEM LIST (Impairments causing functional limitations):  1. Decreased [place] with Bed Mobility  2. Decreased [place] with Transfers  3. Decreased [place] with Ambulation   4. Decreased [place] with shoulder HEP   INTERVENTIONS PLANNED: (Benefits and precautions of physical therapy have been discussed with the patient.)  1. Bed Mobility Training  2. Transfer Training  3. Gait Training  4. Therapeutic Exercises per MD orders  5. Modalities for Pain     TREATMENT PLAN: Frequency/Duration: twice daily for duration of hospital stay  Rehabilitation Potential For Stated Goals: Good     RECOMMENDED REHABILITATION/EQUIPMENT: (at time of discharge pending progress): Continue Skilled Therapy and Outpatient: Physical Therapy. HISTORY:   History of Present Injury/Illness (Reason for Referral):  Right TSA  Past Medical History/Comorbidities:   Mr. Kathy Swartz  has a past medical history of Arthritis (07/06/2015), Back pain, DDD (degenerative disc disease), lumbar, Dry eyes, Enlarged prostate, GERD (gastroesophageal reflux disease), History of basal cell cancer, History of squamous cell carcinoma, IBS (irritable bowel syndrome), Insomnia (07/06/2015), Left inguinal hernia, MVP (mitral valve prolapse), Onychomycosis (7/6/2015), RBBB (7/6/2015), Right wrist pain (7/6/2015), Scoliosis, and Spondylosis of lumbar region without myelopathy or radiculopathy. Mr. Kathy Swartz  has a past surgical history that includes neurological procedure unlisted (2007); hx colonoscopy; hx orthopaedic; hx shoulder arthroscopy (Right); hx orthopaedic (Left, 06/2020); hx orthopaedic (Right, 08/2020); hx cataract removal; and hx carpal tunnel release.   Social History/Living Environment:   Home Environment: Private residence  # Steps to Enter: 3  Rails to Enter: Yes  Hand Rails : Left  One/Two Story Residence: One story  Living Alone: No  Support Systems: Spouse/Significant Other  Patient Expects to be Discharged to[de-identified] [place]  Prior Level of Function/Work/Activity:  Pt independent functionally   Number of Personal Factors/Comorbidities that affect the Plan of Care: 3+: HIGH COMPLEXITY   EXAMINATION:   Most Recent Physical Functioning:   Gross Assessment:  AROM: Within functional limits (left UE & both LE's)  Strength: Within functional limits (left UE & both LE's)  Coordination: Within functional limits (left UE & both LE's)                    Balance:  Sitting: Intact; Without support  Standing: Impaired; Without support Bed Mobility:  Supine to Sit: Stand-by assistance  Sit to Supine:  (NT)  Scooting: Stand-by assistance       Transfers:  Sit to Stand: Stand-by assistance  Stand to Sit: Stand-by assistance  Bed to Chair: Stand-by assistance  Gait:     Speed/Mary: Delayed  Step Length: Left shortened; Right shortened  Gait Abnormalities: Decreased step clearance (mild sway)  Distance (ft):  (additional 250ft after an initial 50ft gait assessment)  Assistive Device:  (none)  Ambulation - Level of Assistance: Stand-by assistance  Number of Stairs Trained: 3  Stairs - Level of Assistance: Stand-by assistance  Rail Use: Left    Functional Mobility:         Gait/Ambulation:  sba        Transfers:  sba        Bed Mobility:  sba        Stairs:  sba   Body Structures Involved:  1. Joints  2. Muscles Body Functions Affected:  1. Sensory/Pain  2. Movement Related Activities and Participation Affected:  1. General Tasks and Demands  2. Mobility   Number of elements that affect the Plan of Care: 4+: HIGH COMPLEXITY   CLINICAL PRESENTATION:   Presentation: Stable and uncomplicated: LOW COMPLEXITY   CLINICAL DECISION MAKING:   [facility] AM-PAC 6 Clicks   Basic Mobility Inpatient Short Form  How much difficulty does the patient currently have. .. Unable A Lot A Little None   1. Turning over in bed (including adjusting bedclothes, sheets and blankets)? [] 1   [] 2   [x] 3   [] 4   2.   Sitting down on and standing up from a chair with arms ( e.g., wheelchair, bedside commode, etc.)   [] 1   [] 2   [x] 3   [] 4   3. Moving from lying on back to sitting on the side of the bed? [] 1   [] 2   [x] 3   [] 4   How much help from another person does the patient currently need. .. Total A Lot A Little None   4. Moving to and from a bed to a chair (including a wheelchair)? [] 1   [] 2   [x] 3   [] 4   5. Need to walk in hospital room? [] 1   [] 2   [x] 3   [] 4   6. Climbing 3-5 steps with a railing? [] 1   [] 2   [x] 3   [] 4   © 2007, Trustees of [address] [place] [address] [address] [address], under license to [employer]. All rights reserved      Score:  Initial: 18 Most Recent: X (Date: -- )    Interpretation of Tool:  Represents activities that are increasingly more difficult (i.e. Bed mobility, Transfers, Gait). Medical Necessity:     · Patient is expected to demonstrate progress in   · strength, range of motion, balance, coordination, and functional technique  ·  to   · decrease assistance required with bed mobility, transfers, gait & HEP  · .  Reason for Services/Other Comments:  · Patient continues to require skilled intervention due to   · Pt not independent with functional mobility & HEP  · . Use of outcome tool(s) and clinical judgement create a POC that gives a: Clear prediction of patient's progress: LOW COMPLEXITY            TREATMENT:   (In addition to Assessment/Re-Assessment sessions the following treatments were rendered)   Pre-treatment Symptoms/Complaints:  pt agreeable  Pain: Initial: numeric scale  Pain Intensity 1: 3  Pain Location 1: Shoulder  Pain Orientation 1: Right  Pain Intervention(s) 1: Cold pack, Exercise  Post Session:  3/10     Therapeutic Activity: (    38 min ( extra time to work through activity noted):   Therapeutic activities including TSA exercises, review of precautions, progressive gait training an additional 250 ft after an initial 50 ft gait assessment to improve mobility, strength, balance, coordination, and dynamic movement of arm - bilateral and leg - bilateral to improve functional endurance & stability . Assessment     Date:  12/4 Date:   Date:     ACTIVITY/EXERCISE AM PM AM PM AM PM   Gripping 25        Wrist Flexion/Extension 25        Wrist Ulnar/Radial Deviation         Pronation/Supination 25        Elbow Flexion/Extension --        Shoulder Flexion/Extension 25aa  <=80deg        Shoulder AB/ADduction         Shoulder IR/ER         Pulleys 25p  <=120dg        Pendulums         Shrugs --        Isometric:                 Flexion 25        Extension 25        ABduction         ADduction         Biceps/Triceps 25/25                 B = bilateral; AA = active assistive; A = active; P = passive  Education:  [x]  Home Exercises  [x]  Sling Application   [x]  Movement Precautions   [x]  Pulleys   [x]  Use of Ice   []  Other:   Treatment/Session Assessment:    · Response to Treatment:  tolerated well  · Interdisciplinary Collaboration:   o Registered Nurse  · After treatment position/precautions:   o Up in chair  o Bed/Chair-wheels locked  o Call light within reach  o RN notified   · Compliance with Program/Exercises: Will assess as treatment progresses. · Recommendations/Intent for next treatment session:  Treatment next visit will focus on increasing Mr. Johnson's independence with bed mobility, transfers, gait training, strength/ROM exercises, modalities for pain, and patient education.    Total Treatment Duration:  PT Patient Time In/Time Out  Time In: 1001  Time Out: LIS [place]

## 2021-12-04 NOTE — PROGRESS NOTES
Patient walking in room , alert and oriented, no distress noted. Right shoulder dressing c/d/i, with sling in place. Neurovascular and peripheral vascular checks WNL. Call light in place on bed. Patient instructed to call for assistance, verbalizes understanding. Nursing assessment complete.

## 2021-12-04 NOTE — CONSULTS
[address] Urology Consult Note                                           12/04/21     Patient: Juan Christensen  MRN: [id number]    Admission Date:  12/3/2021, 1  Admission Diagnosis: Osteoarthritis of right shoulder, unspecified osteoarthritis type [M19.011]; Osteoarthritis of right shoulder region [M19.011]  Reason for Consult: Urinary Retention Post-Op    ASSESSMENT: 80 y.o. male with post-op urinary retention after shoulder surgery. Urology consulted. PLAN:  -We discussed course of post-op urinary retention which usually resolves on its own and indications for miller catheter. He does not have indication for catheter at this time as he is voiding on his own, not in pain and has PVRs now < 200 cc. Recommend to continue to allow him to void on his own for now. -Will increase flomax to 0.4 mg BID  -Will hold mirabegron as this can worsen urinary retention.   -He should continue to above regimen for 1 week and if doing well, then he can decrease flomax back to once daily and resume his mirabegron.   -Will sign off. Call with questions. __________________________________________________________________________________    HPI:     Juan Christensen is a 80 y.o.  male with a PMH of BPH/LUTS on flomax 0.4 mg QHS and Mirabegron daily who follows with Dr. Kaylin Mercer. He had cystoscopy in 2/2021 with Dr. Kaylin Mercer that showed mild BPH as the source. He currently is admitted to the hospital s/p shoulder surgery and has had difficulty with urination post-op. He has voided small amounts ~ 150 cc and does not have abdominal pain but bladder scan showed ~650 cc. RN attempted straight cath X 1 but was unsuccessful (coiled at prostate) and therefore urology consulted. This AM, he reports that he has continued to void and bladder scans show PVRs ~180-190 cc which is much improved.      On review, he does report occasional difficulty with urination after procedures but never has had nina urinary retention. Denies hematuria. No other concerns. Past Medical History:  Past Medical History:   Diagnosis Date    Arthritis 07/06/2015    mostly lumbar area    Back pain     DDD (degenerative disc disease), lumbar     Dry eyes     Enlarged prostate     GERD (gastroesophageal reflux disease)     occassionally- managed with PRN OTC meds    History of basal cell cancer     removed from scalp    History of squamous cell carcinoma     removed from RLE    IBS (irritable bowel syndrome)     Insomnia 07/06/2015    managed with PRN meds    Left inguinal hernia     MVP (mitral valve prolapse)     mild per pt-- he is cardiologist at [facility]--- per pt-- last echo 2019 results \" mild\"-- report not in cc per pt-- done in office    Onychomycosis 7/6/2015    toenail     RBBB 7/6/2015    Right wrist pain 7/6/2015    Scoliosis     mild    Spondylosis of lumbar region without myelopathy or radiculopathy     chronic LBP       Past Surgical History:  Past Surgical History:   Procedure Laterality Date    HX CARPAL TUNNEL RELEASE      bilateral    HX CATARACT REMOVAL      [place]    HX COLONOSCOPY      HX ORTHOPAEDIC      RIGHT knee scoped, Dr. Kat Watkins HX ORTHOPAEDIC Left 06/2020    CTR    HX ORTHOPAEDIC Right 08/2020    CTR    HX SHOULDER ARTHROSCOPY Right     NEUROLOGICAL PROCEDURE UNLISTED  2007    L5 - S1 fused, Dr. Ottie Kawasaki       Medications:  No current facility-administered medications on file prior to encounter. Current Outpatient Medications on File Prior to Encounter   Medication Sig Dispense Refill    zolpidem (AMBIEN) 10 mg tablet Take 1 Tablet by mouth nightly as needed for Sleep. Max Daily Amount: 10 mg. 90 Tablet 1    terbinafine HCL (LAMISIL) 250 mg tablet Take 250 mg by mouth daily.  linaCLOtide (Linzess) 145 mcg cap capsule Take 1 Capsule by mouth Daily (before breakfast).  30 Capsule 5    HYDROcodone-acetaminophen (NORCO) 5-325 mg per tablet by Oral/Gastric Tube route      Myrbetriq 50 mg ER tablet Take 1 Tab by mouth daily. 30 Tab 12    tamsulosin (FLOMAX) 0.4 mg capsule Take 2 Caps by mouth daily. (Patient taking differently: Take 0.4 mg by mouth nightly.) 180 Cap 3    dicyclomine (BentyL) 20 mg tablet Take 1 Tab by mouth every six (6) hours. 120 Tab 5    lidocaine (LIDODERM) 5 % by TransDERmal route daily as needed. Apply patch to the affected area for 12 hours a day and remove for 12 hours a day.  RESTASIS 0.05 % ophthalmic emulsion INSTILL 1 DROP IN BOTH EYES TWICE DAILY  12    omeprazole (PRILOSEC) 20 mg capsule Take 20 mg by mouth daily as needed.  fluocinonide (LIDEX) 0.05 % topical gel Apply  to affected area two (2) times a day. (Patient not taking: Reported on 11/24/2021)      meloxicam (Mobic) 15 mg tablet Take 1 Tablet by mouth daily. 90 Tablet 3    tadalafiL (CIALIS) 5 mg tablet Take 1 Tablet by mouth daily. 90 Tablet 3    tretinoin (RETIN-A) 0.05 % topical cream APPLY TO AFFECTED AREA AT BEDTIME (Patient not taking: Reported on 11/24/2021)         Allergies:  No Known Allergies    Social History:  Social History     Socioeconomic History    Marital status:      Spouse name: Not on file    Number of children: Not on file    Years of education: Not on file    Highest education level: Not on file   Occupational History    Not on file   Tobacco Use    Smoking status: Never Smoker    Smokeless tobacco: Never Used   Vaping Use    Vaping Use: Never used   Substance and Sexual Activity    Alcohol use:  Yes     Alcohol/week: 5.0 standard drinks     Types: 5 Cans of beer per week    Drug use: No    Sexual activity: Not on file   Other Topics Concern    Not on file   Social History Narrative    Not on file     Social Determinants of Health     Financial Resource Strain:     Difficulty of Paying Living Expenses: Not on file   Food Insecurity:     Worried About Running Out of Food in the Last Year: Not on file    [address] in the Last Year: Not on file   Transportation Needs:     Lack of Transportation (Medical): Not on file    Lack of Transportation (Non-Medical):  Not on file   Physical Activity:     Days of Exercise per Week: Not on file    Minutes of Exercise per Session: Not on file   Stress:     Feeling of Stress : Not on file   Social Connections:     Frequency of Communication with Friends and Family: Not on file    Frequency of Social Gatherings with Friends and Family: Not on file    Attends [employer]ism Services: Not on file    Active Member of [address] [place] [address] [employer] or Organizations: Not on file    Attends Club or Organization Meetings: Not on file    Marital Status: Not on file   Intimate Partner Violence:     Fear of Current or Ex-Partner: Not on file    Emotionally Abused: Not on file    Physically Abused: Not on file    Sexually Abused: Not on file   Housing Stability:     Unable to Pay for Housing in the Last Year: Not on file    Number of [place] in the Last Year: Not on file    Unstable Housing in the Last Year: Not on file       Family History:  Family History   Problem Relation Age of Onset    Cancer Mother         breast cancer    Cancer Father         colon cancer    Cancer Brother         Low-grade malignant versus premalignant thyroid nodule    Malignant Hyperthermia Neg Hx     Pseudocholinesterase Deficiency Neg Hx     Delayed Awakening Neg Hx     Post-op Nausea/Vomiting Neg Hx     Emergence Delirium Neg Hx     Post-op Cognitive Dysfunction Neg Hx     Other Neg Hx        ROS:  Review of Systems - General ROS: negative for - chills, fatigue or fever  Respiratory ROS: no cough, shortness of breath, or wheezing  Cardiovascular ROS: no chest pain or dyspnea on exertion  Gastrointestinal ROS: no abdominal pain, change in bowel habits, or black or bloody stools  Genito-Urinary ROS: positive for - change in urinary stream and urinary frequency/urgency  negative for - hematuria or incontinence  Musculoskeletal ROS: negative  negative for - muscular weakness    Vitals:  Visit Vitals  /75 (BP 1 Location: Left upper arm, BP Patient Position: At rest)   Pulse 86   Temp 98.3 °F (36.8 °C)   Resp 18   Ht 5' 10\" (1.778 m)   Wt 164 lb (74.4 kg)   SpO2 99%   BMI 23.53 kg/m²       Intake/Output:    Intake/Output Summary (Last 24 hours) at 12/4/2021 1313  Last data filed at 12/3/2021 2134  Gross per 24 hour   Intake --   Output 550 ml   Net -550 ml        Physical Exam:   Visit Vitals  /75 (BP 1 Location: Left upper arm, BP Patient Position: At rest)   Pulse 86   Temp 98.3 °F (36.8 °C)   Resp 18   Ht 5' 10\" (1.778 m)   Wt 164 lb (74.4 kg)   SpO2 99%   BMI 23.53 kg/m²        GENERAL: No acute distress, Awake, Alert, Oriented X 3  CARDIAC: regular rate and rhythm  CHEST AND LUNG: Easy work of breathing  ABDOMEN: soft, non tender, non-distended    Lab/Radiology/Other Diagnostic Tests:  No results for input(s): HGB, HCT, WBC, NA, K, CL, CO2, BUN, CR, GLU, CA, MG, ALBUMIN, AST, ALT, PREALB, INR, HGBEXT, HCTEXT, INREXT in the last 72 hours. No lab exists for component: PLTCT, PO4, TOTPROT, TOTBILI, ALKPHOS, RANCHO, LIPASE, PT, PTT      Chaparro Flower M.D.     [facility]  [address] [place] [address], [address]  Phone: [phone]  Fax: [phone]

## 2021-12-04 NOTE — PROGRESS NOTES
Sitting up in chair eating breakfast. Voiding yellow urine with no discomfort. Urology saw patient this morning and increased flomax to twice daily. Advised him not to take myrbetriq today. He did not end up needing the coude cath last night.

## 2021-12-04 NOTE — DISCHARGE INSTRUCTIONS
Orthopedic Total  Shoulder Discharge Instructions    ACTIVITY:   - As tolerated and as directed by Dr. Gale Barajas  - Remember to \"stay within the box\" with shoulder movements  - Use your arm sling as needed. It's fine to go without it if you're comfortable without it. - A bath or shower is OK  - Please use the incentive spirometer for at least three more days    DIET:  - Please resume your usual diet    PAIN:  - Take your pain medication(s) as directed by the prescription you were given. Try to not use a narcotic unless non-narcotic pain relievers are not adequate. - Remember that it often helps to take acetaminophen with your pain medicine IF YOUR PAIN MEDICINE DOES NOT CONTAIN ACETAMINOPHEN. You may take up to 6 extra-strength tylenol or up to 9 regular tylenol per 24 hours. - You may also use ibuprofen 800 mg every six hours or aleve 440 mg every 8 hours IN ADDITION TO your prescribed pain medicine  - Call Anant Sandoval if pain is NOT relieved by medication. If it's after hours please call Eliezer at [phone]. DRESSING CARE:  - The surgical glue on your wound may be treated like normal skin. It is OK to wash the wound. It is fine to get it wet. CALL YOUR DOCTOR IF:  - If you experience excessive bleeding that does not stop after holding mild pressure over the area  - Temperature of 100.5°F or greater  - Redness, excessive swelling or bruising, and/or green or yellow, smelly discharge from incision  -Bruising around the wound, down the arm, and even along much of the forearm is very common.     MEDICATIONS:  - Continue your home medications as previously prescribed       Signed:  Mike Maloney MD  12/3/2021

## 2021-12-04 NOTE — PROGRESS NOTES
12/03/21 2134   Oxygen Therapy   O2 Sat (%) 95 %   Pulse via Oximetry 89 beats per minute   O2 Device None (Room air)   Incentive Spirometry Treatment   Actual Volume (ml) 2300 ml   Pt connected to c/s monitor. Alarms on and functioning- set per protocol. Pt working on IS, encourage to keep practicing. No distress noted at this time.

## 2022-01-05 ENCOUNTER — HOSPITAL ENCOUNTER (OUTPATIENT)
Dept: PHYSICAL THERAPY | Age: 72
Discharge: HOME | End: 2022-01-05
Payer: MEDICARE

## 2022-01-05 PROCEDURE — 97140 MANUAL THERAPY 1/> REGIONS: CPT

## 2022-01-05 PROCEDURE — 97161 PT EVAL LOW COMPLEX 20 MIN: CPT

## 2022-01-05 NOTE — PROGRESS NOTES
Katie Winter  : 1950  [place] / [place] / Product Type: Medicare /  [address]  at [facility]  [address] [facility], [address]  [phone]   [phone]      OUTPATIENT PHYSICAL THERAPY: Daily Treatment Note 2022  Visit Count:  1    ICD-10: Treatment Diagnosis:   M25.511 Pain in Right Shoulder  M25.611 Stiffness in Right Shoulder  M62.81 Muscle Weakness Generalized  Z47.89 Orthopedic Aftercare    Precautions/Allergies:   Patient has no known allergies. Follow Protocol  -Limit AROM to : Flexion (80 degrees), Extension (20 degrees), Abduction (80 degrees, IR (full), ER (to neutral only)  -Sling may be off  -No subscapularis stretch or strengthening until 6 weeks post op date  TREATMENT PLAN:  Effective Dates: 2022 TO 2022 (90 days). Frequency/Duration: 2 times a week for 90 Days        PRE-TREATMENT SYMPTOMS/COMPLAINTS:  S/p R TSA    MEDICATIONS REVIEWED:  2022   TREATMENT:   (In addition to Assessment/Re-Assessment sessions the following treatments were rendered)    THERAPEUTIC EXERCISE: (0 minutes):  Exercises per grid below to improve mobility, strength and balance. Required minimal visual and verbal cues to promote proper body alignment and promote proper body posture. Progressed resistance, range and complexity of movement as indicated. Date:  2022 Date:   Date:     Activity/Exercise Parameters Parameters Parameters   Wand flexion      Wand IR, ER (supine)      Shoulder isometrics- all directions      P[place]ley      Rhythmic stabilization exercises                    MANUAL THERAPY: (15 minutes): Joint mobilization, Soft tissue mobilization and Manipulation was utilized and necessary because of the patient's restricted joint motion, painful spasm, loss of articular motion and restricted motion of soft tissue.    +PROM Flexion and abduction to 80 degrees per protocol  +STM to lateral deltoid, biceps  +Scar massage    (Used abbreviations: MET - muscle energy technique; PNF - proprioceptive neuromuscular facilitation; NMR - neuromuscular re-education; AP - anterior to posterior; PA - posterior to anterior)    MODALITIES: (0 minutes):      none      TREATMENT/SESSION ASSESSMENT:  Lucretia Gramajo verbalized understanding of role of PT and POC. Education: on objective findings and HEP    RECOMMENDATIONS/INTENT FOR NEXT TREATMENT SESSION: \"Next visit will focus on advancements to more challenging activities\".     PAIN: Initial: 1/10 Post Session:  1/10     Med[facility] Portal    Total Treatment Billable Duration:  PT Patient Time In/Time Out  Time In: 1100  Time Out: 1140  Neha Riley, PT, DPT    Future Appointments   Date Time Provider Elisabeth Chauhan   1/7/2022  1:45 PM [facility]   1/11/2022 11:00 AM Gian Roles A [facility]   1/13/2022 11:00 AM Gian Roles A [facility]   1/18/2022 11:00 AM [place] Roles A [facility]   1/20/2022 11:00 AM Gian Roles A [facility]   1/25/2022 11:00 AM [place] Roles A [facility]   1/27/2022 11:00 AM Gian Roles A [facility]   3/21/2022  2:10 PM Palma Alanis MD [id number]       Visit Approval Visit # Therapist initials Date A NS / Cx < 24 hr >24 hr Cx Comments    1 SHAISTA 1/5/22 [x]  [] [] Initial evaluation       [] [] []        [] [] []        [] [] []        [] [] []        [] [] []        [] [] []        [] [] []        [] [] []        [] [] []        [] [] []        [] [] []        [] [] []        [] [] []        [] [] []        [] [] []        [] [] []        [] [] []

## 2022-01-05 NOTE — THERAPY EVALUATION
Leila Hale  : 1950  [place] / Product Type: Medicare /  [address]  at [facility]  [address], [address] [facility], [address]  [phone]   [phone]        OUTPATIENT PHYSICAL THERAPY:Initial Assessment 2022    ICD-10: Treatment Diagnosis:   M25.511 Pain in Right Shoulder  M25.611 Stiffness in Right Shoulder  M62.81 Muscle Weakness Generalized  Z47.89 Orthopedic Aftercare    Precautions/Allergies:   Patient has no known allergies. Fall Risk Score: 2 (? 5 = High Risk)  MD Orders: Eval and Treat MEDICAL/REFERRING DIAGNOSIS:  R shoulder replacement   DATE OF ONSET: 12/3/21  REFERRING PHYSICIAN: Nancy Payne MD  RETURN PHYSICIAN APPOINTMENT: 22     ASSESSMENT:  Mr. Taniak Smith has attended 1 physical therapy session including the initial evaluation. Pt is s/p R TSA. Pt presents with decreased ROM, decreased strength and increased pain affecting participation in ADLs and functional mobility at this time. Recommending skilled PT: manual therapeutic techniques (as appropriate), therapeutic exercises and activities, balance interventions, and a comprehensive home exercise program to address the current impairments, as listed above. Leila Hale will benefit from skilled PT (medically necessary) to address above deficits affecting participation in basic ADLs and overall functional tolerance. PROBLEM LIST (Impacting functional limitations):  1. Decreased Strength  2. Decreased ADL/Functional Activities  3. Decreased Transfer Abilities  4. Decreased Ambulation Ability/Technique  5. Decreased Balance  6. Increased Pain  7. Decreased Activity Tolerance  8. Decreased Flexibility/Joint Mobility  9. Decreased [place] with Home Exercise Program INTERVENTIONS PLANNED:  1. Balance Exercise  2. Cold  3. Cryotherapy  4. Electrical Stimulation  5. Family Education  6.  Gait Training  7. Heat  8. Home Exercise Program (HEP)  9. Manual Therapy  10. Neuromuscular Re-education/Strengthening  11. Range of Motion (ROM)  12. Therapeutic Activites  13. Therapeutic Exercise/Strengthening  14. Transcutaneous Electrical Nerve Stimulation (TENS)  15. Transfer Training   TREATMENT PLAN:  TREATMENT PLAN:  Effective Dates: 1/5/2022 TO 4/5/2022 (90 days). Frequency/Duration: 2 times a week for 90 Days  GOALS: (Goals have been discussed and agreed upon with patient.)  Short Term Time Frame: 4 weeks  1. PT will be compliant with HEP. 2. Pt will decrease worst pain to 3/10 or less in order to return to dressing ADLs without difficulty  3. Pt will reduce DASH to 20 or less in order to return to personal hygiene ADLs  4. Pt will improve gross RUE strength to 3/5 or greater in order to reach overhead  5. Pt will demonstrate PROM R shoulder Flexion and Abduction of 120 or greater  GOALS: (Goals have been discussed and agreed upon with patient.)  Discharge Goals: Time Frame: 12 weeks  1. PT will be independent with HEP. 2. Pt will decrease worst pain to 1/10 or less in order to return to dressing ADLs without difficulty  3. Pt will reduce DASH to 10 or less in order to return to personal hygiene ADLs  4. Pt will improve gross RUE strength to 4+/5 or greater in order to reach overhead  5. Pt will demonstrate active R shoulder Flexion and Abduction of 140 or greater    Rehabilitation Potential For Stated Goals: Good    Outcome Measure: Tool Used: Disabilities of the Arm, Shoulder and Hand (DASH) Questionnaire - Quick Version  Score:  Initial: 25/55  Most Recent: X/55 (Date: -- )   Interpretation of Score: The DASH is designed to measure the activities of daily living in person's with upper extremity dysfunction or pain. Each section is scored on a 1-5 scale, 5 representing the greatest disability. The scores of each section are added together for a total score of 55.         Medical Necessity: · Skilled intervention continues to be required due to decreased strength, ROM, balance, and functional mobility. Reason for Services/Other Comments:  · Patient continues to require skilled intervention due to decreased strength, balance, and ROM with increased pain affecting pt functional mobility. ·   Regarding Missy Johnson's therapy, I certify that the treatment plan above will be carried out by a therapist or under their direction. Thank you for this referral,  Betty Cardona, PT, DPT     Referring Physician Signature: Allen Cates MD              Date                    The information in this section was collected on 1/5/22 (except where otherwise noted). HISTORY:   History of Present Injury/Illness (Reason for Referral):  Pt is s/p R TSA. Pt reports having a significant amount of swelling. Still with swelling/edema present. Pt reports he has been doing HEP. Pt goals are to return to higher level activities like fly fishing    CC/Primary Concern: Return to full ROM and strength R UE            Treatment Side: Right      Past Medical History/Comorbidities:   Mr. Adam Golden  has a past medical history of Arthritis (07/06/2015), Back pain, DDD (degenerative disc disease), lumbar, Dry eyes, Enlarged prostate, GERD (gastroesophageal reflux disease), History of basal cell cancer, History of squamous cell carcinoma, IBS (irritable bowel syndrome), Insomnia (07/06/2015), Left inguinal hernia, MVP (mitral valve prolapse), Onychomycosis (7/6/2015), RBBB (7/6/2015), Right wrist pain (7/6/2015), Scoliosis, and Spondylosis of lumbar region without myelopathy or radiculopathy. Mr. Adam Golden  has a past surgical history that includes neurological procedure unlisted (2007); hx colonoscopy; hx orthopaedic; hx shoulder arthroscopy (Right); hx orthopaedic (Left, 06/2020); hx orthopaedic (Right, 08/2020); hx cataract removal; and hx carpal tunnel release.   Social History/Living Environment:  Lives with wiife, independent with all mobility at baseline    Pain/Symptom Location: post op pain- generalized    Worst Pain: 6/10  Current Pain: 1/10    Aggravating Factors: any use of R UE    Occupation: Retired cardiologist    Prior Level of Function/Work/Activity:  History of chronic LBP and R foot pain due to OA- despite this lives a very active lifestyle enjoying many outdoor activities- hunting, fly fishing, etc      Patient Goals: Gain full ROM, strength and return to recreational activities    Current Medications:       Current Outpatient Medications:     loratadine-pseudoephedrine (Claritin-D 12 Hour) 5-120 mg per tablet, Take 1 Tablet by mouth two (2) times a day., Disp: , Rfl:     oxymetazoline HCl (AFRIN NASAL SPRAY NA), by Nasal route., Disp: , Rfl:     temazepam (RESTORIL) 15 mg capsule, Take 1 Capsule by mouth nightly as needed for Sleep. Max Daily Amount: 15 mg. Indications: difficulty sleeping (Patient not taking: Reported on 12/6/2021), Disp: 30 Capsule, Rfl: 0    ipratropium (Atrovent) 21 mcg (0.03 %) nasal spray, 2 Sprays by Both Nostrils route every twelve (12) hours as needed for Rhinitis., Disp: 30 mL, Rfl: 5    zolpidem (AMBIEN) 10 mg tablet, Take 1 Tablet by mouth nightly as needed for Sleep. Max Daily Amount: 10 mg., Disp: 90 Tablet, Rfl: 1    terbinafine HCL (LAMISIL) 250 mg tablet, Take 250 mg by mouth daily. , Disp: , Rfl:     linaCLOtide (Linzess) 145 mcg cap capsule, Take 1 Capsule by mouth Daily (before breakfast). , Disp: 30 Capsule, Rfl: 5    fluocinonide (LIDEX) 0.05 % topical gel, Apply  to affected area two (2) times a day., Disp: , Rfl:     meloxicam (Mobic) 15 mg tablet, Take 1 Tablet by mouth daily. , Disp: 90 Tablet, Rfl: 3    tadalafiL (CIALIS) 5 mg tablet, Take 1 Tablet by mouth daily.  (Patient not taking: Reported on 12/6/2021), Disp: 90 Tablet, Rfl: 3    tretinoin (RETIN-A) 0.05 % topical cream, APPLY TO AFFECTED AREA AT BEDTIME, Disp: , Rfl:     Myrbetriq 50 mg ER tablet, Take 1 Tab by mouth daily. , Disp: 30 Tab, Rfl: 12    tamsulosin (FLOMAX) 0.4 mg capsule, Take 2 Caps by mouth daily. (Patient taking differently: Take 0.4 mg by mouth nightly.), Disp: 180 Cap, Rfl: 3    dicyclomine (BentyL) 20 mg tablet, Take 1 Tab by mouth every six (6) hours. , Disp: 120 Tab, Rfl: 5    lidocaine (LIDODERM) 5 %, by TransDERmal route daily as needed. Apply patch to the affected area for 12 hours a day and remove for 12 hours a day. , Disp: , Rfl:     RESTASIS 0.05 % ophthalmic emulsion, INSTILL 1 DROP IN BOTH EYES TWICE DAILY, Disp: , Rfl: 12    omeprazole (PRILOSEC) 20 mg capsule, Take 20 mg by mouth daily as needed. , Disp: , Rfl:    Date Last Reviewed:  1/5/2022       Ambulatory/Rehab Services H2 Model Falls Risk Assessment    Risk Factors:       (1)  Gender [Male] Ability to Rise from Chair:       (1)  Pushes up, successful in one attempt    Falls Prevention Plan:       No modifications necessary   Total: (5 or greater = High Risk): 2    ©2010 [facility]. All Rights Reserved. [facility] States Patent #[id number].  Federal Law prohibits the replication, distribution or use without written permission from [facility] CON[place] MD        Number of Personal Factors/Comorbidities that affect the Plan of Care: 3+: HIGH COMPLEXITY   EXAMINATION:   Inspection        Post Op/Wound: Healed                Additional Comments:   ________________________________________________________________________________________________  Observation:          Posture: Rounded shoulders, forward head            Edema: Increased throughout entirety of UE into hand               Addition Comments:     ________________________________________________________________________________________________  Range of Motion            Upper:  Joint: Passive Active Active    Right (Degrees) Right (Degrees) Left Active (Degrees)   Shoulder Flexion 80 degrees NT per protocol 140 degrees   Shoulder Extension  NT per protocol 62 degrees   Shoulder Adduction  NT per protocol 125 degrees    Shoulder Abduction 60 degrees NT per protocol    Shoulder Internal Rotation (Neutral Position) NT per protocol     Shoulder External Rotation  (Neutral Position) NT per protocol     Functional IR  NT per protocol T11   Functional ER  NT per protocol T2         Additional Comments:   ________________________________________________________________________________________________  Strength          Upper Extremity    Joint:      RIGHT LEFT   Shoulder Flexion 2/5 4+/5   Shoulder Extension 2/5 5/5   Shoulder Abduction 2/5 4+/5   Shoulder Internal Rotation 2/5 4/5   Shoulder External Rotation 2/5 3+/5   Elbow Flexion 3/5 5/5   Elbow Extension 3/5 5/5   ________________________________________________________________________________________________  Neruo[facility]        C/O Radicular Symptoms: No      Additional Comments:   ___________________________________________________________________________________________________________________________________________________________  Palpation: Lateral deltoid  Joint mobilization: Capsular hypomobility post op       Body Structures Involved:  1. Nerves  2. Bones  3. Joints  4. Muscles  5. Ligaments Body Functions Affected:  1. Sensory/Pain  2. Neuromusculoskeletal  3. Movement Related Activities and Participation Affected:  1. General Tasks and Demands  2. Mobility  3. Self Care  4. Domestic Life  5. Interpersonal Interactions and Relationships  6.  Community, Social and [place]   Number of elements (examined above) that affect the Plan of Care: 4+: HIGH COMPLEXITY   CLINICAL PRESENTATION:   Presentation: Evolving clinical presentation with changing clinical characteristics: MODERATE COMPLEXITY   CLINICAL DECISION MAKING:      Use of outcome tool(s) and clinical judgement create a POC that gives a: Clear prediction of patient's progress: LOW COMPLEXITY

## 2022-01-07 ENCOUNTER — HOSPITAL ENCOUNTER (OUTPATIENT)
Dept: PHYSICAL THERAPY | Age: 72
Discharge: HOME | End: 2022-01-07
Payer: MEDICARE

## 2022-01-07 PROCEDURE — 97110 THERAPEUTIC EXERCISES: CPT

## 2022-01-07 PROCEDURE — 97140 MANUAL THERAPY 1/> REGIONS: CPT

## 2022-01-07 NOTE — PROGRESS NOTES
Matthew Roach  : 1950  [place] / Product Type: Medicare /  [address]  at [facility]  [address], [address] [facility], [address]  [phone]   [phone]      OUTPATIENT PHYSICAL THERAPY: Daily Treatment Note 2022  Visit Count:  2    ICD-10: Treatment Diagnosis:   M25.511 Pain in Right Shoulder  M25.611 Stiffness in Right Shoulder  M62.81 Muscle Weakness Generalized  Z47.89 Orthopedic Aftercare    Precautions/Allergies:   Patient has no known allergies. Follow Protocol  -Limit AROM to : Flexion (80 degrees), Extension (20 degrees), Abduction (80 degrees, IR (full), ER (to neutral only)  -Sling may be off  -No subscapularis stretch or strengthening until 6 weeks post op date    TSA after 6 weeks  - All active and passive ROM ok  -Resistance in all arcs OK  TREATMENT PLAN:  Effective Dates: 2022 TO 2022 (90 days). Frequency/Duration: 2 times a week for 90 Days        PRE-TREATMENT SYMPTOMS/COMPLAINTS:  Pt reports mild soreness today. Most problems with edema but improving. MEDICATIONS REVIEWED:  2022   TREATMENT:   (In addition to Assessment/Re-Assessment sessions the following treatments were rendered)    THERAPEUTIC EXERCISE: (15 minutes):  Exercises per grid below to improve mobility, strength and balance. Required minimal visual and verbal cues to promote proper body alignment and promote proper body posture. Progressed resistance, range and complexity of movement as indicated.      Date:  2022 Date:   Date:     Activity/Exercise Parameters Parameters Parameters   Scapular retraction 10 X 2     Shoulder shrugs 10 X 2     Shoulder isometrics- all directions 10 X 2 each direction     Pulley      Rhythmic stabilization                     MANUAL THERAPY: (25 minutes): Joint mobilization, Soft tissue mobilization and Manipulation was utilized and necessary because of the patient's restricted joint motion, painful spasm, loss of articular motion and restricted motion of soft tissue. +PROM Flexion and abduction to 80 degrees per protocol  +STM to lateral deltoid, biceps  +Scar massage    (Used abbreviations: MET - muscle energy technique; PNF - proprioceptive neuromuscular facilitation; NMR - neuromuscular re-education; AP - anterior to posterior; PA - posterior to anterior)    MODALITIES: (0 minutes):      none      TREATMENT/SESSION ASSESSMENT:  Pt is 5 weeks post op today. Limited by protocol at this time. Tolerated exercises well today. Education: on objective findings and HEP    RECOMMENDATIONS/INTENT FOR NEXT TREATMENT SESSION: \"Next visit will focus on advancements to more challenging activities\".     PAIN: Initial: 1/10 Post Session:  1/10     [facility] Portal    Total Treatment Billable Duration:  PT Patient Time In/Time Out  Time In: 1340  Time Out: 1420  Elio Keys, PT, DPT    Future Appointments   Date Time Provider Elisabeth Chauhan   1/11/2022 11:00 AM Jonathon Shove A [facility] SFD   1/13/2022 11:00 AM Jonathon Shove A [facility] SFD   1/18/2022 11:00 AM Jonathon Shove A [facility] SFD   1/20/2022 11:00 AM Jonathon Shove A [facility] SFD   1/25/2022 11:00 AM Jonathon Shove A [facility] SFD   1/27/2022 11:00 AM Jonathon Shove A [facility] SFD   3/21/2022  2:10 PM Miranda Zelaya MD [id number] PGU       Visit Approval Visit # Therapist initials Date A NS / Cx < 24 hr >24 hr Cx Comments    1 SHAISTA 1/5/22 [x]  [] [] Initial evaluation    2 SHAISTA 1/7/22 [x] [] []        [] [] []        [] [] []        [] [] []        [] [] []        [] [] []        [] [] []        [] [] []        [] [] []        [] [] []        [] [] []        [] [] []        [] [] []        [] [] []        [] [] []        [] [] []        [] [] []

## 2022-01-11 ENCOUNTER — HOSPITAL ENCOUNTER (OUTPATIENT)
Dept: PHYSICAL THERAPY | Age: 72
Discharge: HOME | End: 2022-01-11
Payer: MEDICARE

## 2022-01-11 PROCEDURE — 97110 THERAPEUTIC EXERCISES: CPT

## 2022-01-11 PROCEDURE — 97140 MANUAL THERAPY 1/> REGIONS: CPT

## 2022-01-11 NOTE — PROGRESS NOTES
Nanci Ovalleskey  : 1950  [place] / Product Type: Medicare /  [address] at [facility]  [address], [address] [facility], [place] [address] [facility]  [phone]   [phone]      OUTPATIENT PHYSICAL THERAPY: Daily Treatment Note 2022  Visit Count:  3    ICD-10: Treatment Diagnosis:   M25.511 Pain in Right Shoulder  M25.611 Stiffness in Right Shoulder  M62.81 Muscle Weakness Generalized  Z47.89 Orthopedic Aftercare    Precautions/Allergies:   Patient has no known allergies. Follow Protocol  -Limit AROM to : Flexion (80 degrees), Extension (20 degrees), Abduction (80 degrees, IR (full), ER (to neutral only)  -Sling may be off  -No subscapularis stretch or strengthening until 6 weeks post op date    TSA after 6 weeks  - All active and passive ROM ok  -Resistance in all arcs OK  TREATMENT PLAN:  Effective Dates: 2022 TO 2022 (90 days). Frequency/Duration: 2 times a week for 90 Days        PRE-TREATMENT SYMPTOMS/COMPLAINTS:  Pt reports mild soreness today. MEDICATIONS REVIEWED:  2022   TREATMENT:   (In addition to Assessment/Re-Assessment sessions the following treatments were rendered)    THERAPEUTIC EXERCISE: (15 minutes):  Exercises per grid below to improve mobility, strength and balance. Required minimal visual and verbal cues to promote proper body alignment and promote proper body posture. Progressed resistance, range and complexity of movement as indicated.      Date:  2022 Date:  22 Date:     Activity/Exercise Parameters Parameters Parameters   Scapular retraction 10 X 2 15 X 2    Shoulder shrugs 10 X 2     Shoulder isometrics- all directions 10 X 2 each direction 15 X 2 each direction    P[place]ley      Rhythmic stabilization       Scapular depression  15 X 2            MANUAL THERAPY: (23 minutes): Joint mobilization, Soft tissue mobilization and Manipulation was utilized and necessary because of the patient's restricted joint motion, painful spasm, loss of articular motion and restricted motion of soft tissue. +PROM Flexion and abduction to 80 degrees per protocol  +STM to lateral deltoid, biceps  +Scar massage    (Used abbreviations: MET - muscle energy technique; PNF - proprioceptive neuromuscular facilitation; NMR - neuromuscular re-education; AP - anterior to posterior; PA - posterior to anterior)    MODALITIES: (0 minutes):      none      TREATMENT/SESSION ASSESSMENT:  Pt is 5 weeks post op today. Limited by protocol at this time. Tolerated exercises well today. Added scapular depression for postural work. Education: on objective findings and HEP    RECOMMENDATIONS/INTENT FOR NEXT TREATMENT SESSION: \"Next visit will focus on advancements to more challenging activities\".     PAIN: Initial: 1/10 Post Session:  1/10     [facility]    Total Treatment Billable Duration:     Elio Keys, PT, DPT    Future Appointments   Date Time Provider Elisabeth Chauhan   1/11/2022 11:00 AM Jonathon Shove A [facility] SFD   1/13/2022 11:00 AM Jonathon Shove A [facility] SFD   1/18/2022 11:00 AM Jonathon Shove A [facility] SFD   1/20/2022 11:00 AM Jonathon Shove A [facility] SFD   1/25/2022 11:00 AM Jonathon Shove A [facility] SFD   1/27/2022 11:00 AM Jonathon Shove A [facility] SFD   3/21/2022  2:10 PM Miranda Zelaya MD [id number] PGU       Visit Approval Visit # Therapist initials Date A NS / Cx < 24 hr >24 hr Cx Comments    1 SHAISTA 1/5/22 [x]  [] [] Initial evaluation    2 SHAISTA 1/7/22 [x] [] []     3 SHAISTA 1/11/22 [x] [] []        [] [] []        [] [] []        [] [] []        [] [] []        [] [] []        [] [] []        [] [] []        [] [] []        [] [] []        [] [] []        [] [] []        [] [] []        [] [] []        [] [] []        [] [] []

## 2022-01-13 ENCOUNTER — HOSPITAL ENCOUNTER (OUTPATIENT)
Dept: PHYSICAL THERAPY | Age: 72
Discharge: HOME | End: 2022-01-13
Payer: MEDICARE

## 2022-01-13 PROCEDURE — 97140 MANUAL THERAPY 1/> REGIONS: CPT

## 2022-01-13 PROCEDURE — 97110 THERAPEUTIC EXERCISES: CPT

## 2022-01-13 NOTE — PROGRESS NOTES
Robert Estimable  : 1950  [place] / Product Type: Medicare /  [address]  at [facility]  [address], [address] [facility], [place] [address] [address] [facility]  [phone]   [phone]      OUTPATIENT PHYSICAL THERAPY: Daily Treatment Note 2022  Visit Count:  4    ICD-10: Treatment Diagnosis:   M25.511 Pain in Right Shoulder  M25.611 Stiffness in Right Shoulder  M62.81 Muscle Weakness Generalized  Z47.89 Orthopedic Aftercare    Precautions/Allergies:   Patient has no known allergies. Follow Protocol  -Limit AROM to : Flexion (80 degrees), Extension (20 degrees), Abduction (80 degrees, IR (full), ER (to neutral only)  -Sling may be off  -No subscapularis stretch or strengthening until 6 weeks post op date    TSA after 6 weeks (22)  - All active and passive ROM ok  -Resistance in all arcs OK  TREATMENT PLAN:  Effective Dates: 2022 TO 2022 (90 days). Frequency/Duration: 2 times a week for 90 Days        PRE-TREATMENT SYMPTOMS/COMPLAINTS:  Pt reports minimal to no pain at shoulder today    MEDICATIONS REVIEWED:  2022   TREATMENT:   (In addition to Assessment/Re-Assessment sessions the following treatments were rendered)    THERAPEUTIC EXERCISE: (15 minutes):  Exercises per grid below to improve mobility, strength and balance. Required minimal visual and verbal cues to promote proper body alignment and promote proper body posture. Progressed resistance, range and complexity of movement as indicated.      Date:  2022 Date:  22 Date:  22   Activity/Exercise Parameters Parameters Parameters   Scapular retraction 10 X 2 15 X 2 15 X 2   Shoulder shrugs 10 X 2     Shoulder isometrics- all directions 10 X 2 each direction 15 X 2 each direction 15 X 2 each direction   P[place]ley      Rhythmic stabilization       Scapular depression  15 X 2 15 X 2           MANUAL THERAPY: (25 minutes): Joint mobilization, Soft tissue mobilization and Manipulation was utilized and necessary because of the patient's restricted joint motion, painful spasm, loss of articular motion and restricted motion of soft tissue. +PROM Flexion and abduction to 80 degrees per protocol  +STM to lateral deltoid, biceps  +Scar massage    (Used abbreviations: MET - muscle energy technique; PNF - proprioceptive neuromuscular facilitation; NMR - neuromuscular re-education; AP - anterior to posterior; PA - posterior to anterior)    MODALITIES: (0 minutes):      none      TREATMENT/SESSION ASSESSMENT:  Pt will be 6 weeks post op 1/14/22. Will progress to next step of protocol next visit. Education: on objective findings and HEP    RECOMMENDATIONS/INTENT FOR NEXT TREATMENT SESSION: \"Next visit will focus on advancements to more challenging activities\".     PAIN: Initial: 1/10 Post Session:  1/10     [facility] Portal    Total Treatment Billable Duration:  PT Patient Time In/Time Out  Time In: 1105  Time Out: 56  Hossein Mccallum PT, DPT    Future Appointments   Date Time Provider Elisabeth Chauhan   1/18/2022  3:15 PM [facility]   1/20/2022 11:00 AM Katcici Guy A [facility] SFD   1/25/2022 11:00 AM Kathrming Ink A [facility] SFD   1/27/2022 11:00 AM Kathrming Ink A [facility] SFD   3/21/2022  2:10 PM Milena Oliver MD [id number] PGU       Visit Approval Visit # Therapist initials Date A NS / Cx < 24 hr >24 hr Cx Comments    1 SHAISTA 1/5/22 [x]  [] [] Initial evaluation    2 SHAISTA 1/7/22 [x] [] []     3 SHAISTA 1/11/22 [x] [] []     4 SHAISTA 1/13/22 [x] [] []        [] [] []        [] [] []        [] [] []        [] [] []        [] [] []        [] [] []        [] [] []        [] [] []        [] [] []        [] [] []        [] [] []        [] [] []        [] [] []        [] [] []

## 2022-01-18 ENCOUNTER — HOSPITAL ENCOUNTER (OUTPATIENT)
Dept: PHYSICAL THERAPY | Age: 72
Discharge: HOME | End: 2022-01-18
Payer: MEDICARE

## 2022-01-18 PROCEDURE — 97140 MANUAL THERAPY 1/> REGIONS: CPT

## 2022-01-18 PROCEDURE — 97110 THERAPEUTIC EXERCISES: CPT

## 2022-01-18 NOTE — PROGRESS NOTES
Catina Cisneros  : 1950  [place] / Product Type: Medicare /  [address]  at [facility]  [address] [facility], [place] [address] [address] [facility]  [phone]   [phone]      OUTPATIENT PHYSICAL THERAPY: Daily Treatment Note 2022  Visit Count:  5    ICD-10: Treatment Diagnosis:   M25.511 Pain in Right Shoulder  M25.611 Stiffness in Right Shoulder  M62.81 Muscle Weakness Generalized  Z47.89 Orthopedic Aftercare    Precautions/Allergies:   Patient has no known allergies. Follow Protocol  -Limit AROM to : Flexion (80 degrees), Extension (20 degrees), Abduction (80 degrees, IR (full), ER (to neutral only)  -Sling may be off  -No subscapularis stretch or strengthening until 6 weeks post op date    TSA after 6 weeks (22)  - All active and passive ROM ok  -Resistance in all arcs OK  TREATMENT PLAN:  Effective Dates: 2022 TO 2022 (90 days). Frequency/Duration: 2 times a week for 90 Days        PRE-TREATMENT SYMPTOMS/COMPLAINTS:  Pt reports doing well. Each day he feels better. He reports the doctor said he was doing well. MEDICATIONS REVIEWED:  2022   TREATMENT:   (In addition to Assessment/Re-Assessment sessions the following treatments were rendered)    THERAPEUTIC EXERCISE: (30 minutes):  Exercises per grid below to improve mobility, strength and balance. Required minimal visual and verbal cues to promote proper body alignment and promote proper body posture. Progressed resistance, range and complexity of movement as indicated.      Date:  2022 Date:  22 Date:  22 Date  10-   Activity/Exercise Parameters Parameters Parameters    Scapular retraction 10 X 2 15 X 2 15 X 2 X 10    Shoulder shrugs 10 X 2      Shoulder isometrics- all directions 10 X 2 each direction 15 X 2 each direction 15 X 2 each direction    [place]       Rhythmic stabilization        Scapular depression  15 X 2 15 X 2    Shoulder flex    Supine with wand   X 10 to about 115 degrees   Shoulder abduction     Supine with wand to R side to tolerance    Shoulder ER    Supine with wand - x 10 R to tolerance                           MANUAL THERAPY: (10 minutes): Joint mobilization, Soft tissue mobilization and Manipulation was utilized and necessary because of the patient's restricted joint motion, painful spasm, loss of articular motion and restricted motion of soft tissue. +PROM Flexion and abduction to 85 degrees   +STM to lateral deltoid, biceps  +Scar massage - NOT TODAY     (Used abbreviations: MET - muscle energy technique; PNF - proprioceptive neuromuscular facilitation; NMR - neuromuscular re-education; AP - anterior to posterior; PA - posterior to anterior)    MODALITIES: (0 minutes):      none      TREATMENT/SESSION ASSESSMENT:  Patient educated and started wand exercises with L arm doing most of the work on the new exercises listed in grid. Stayed within protocol. He was given handout on 1-. Education: on objective findings and HEP    RECOMMENDATIONS/INTENT FOR NEXT TREATMENT SESSION: \"Next visit will focus on advancements to more challenging activities\".     PAIN: Initial: 1/10 Post Session:  1/10     [facility] Portal    Total Treatment Billable Duration: 42 minutes   PT Patient Time In/Time Out  Time In: 7532  Time Out: Jorge Kendrick 74, PTA    Future Appointments   Date Time Provider Elisabeth Chauhan   1/20/2022 11:00 AM [facility]   1/25/2022 11:00 AM Jeffy Nissaing A [facility] SFD   1/27/2022 11:00 AM Jeffy Azaring A [facility] SFD   3/21/2022  2:10 PM Adan Merchant MD [id number] PGU       Visit Approval Visit # Therapist initials Date A NS / Cx < 24 hr >24 hr Cx Comments    1 SHAISTA 1/5/22 [x]  [] [] Initial evaluation    2 SHAISTA 1/7/22 [x] [] []     3 SHAISTA 1/11/22 [x] [] []     4 SHAISTA 1/13/22 [x] [] []     5 PDE 1- [x] [] []        [] [] []        [] [] []        [] [] []        [] [] []        [] [] []        [] [] []        [] [] []        [] [] []        [] [] []        [] [] []        [] [] []        [] [] []        [] [] []

## 2022-01-20 ENCOUNTER — HOSPITAL ENCOUNTER (OUTPATIENT)
Dept: PHYSICAL THERAPY | Age: 72
Discharge: HOME | End: 2022-01-20
Payer: MEDICARE

## 2022-01-20 PROCEDURE — 97140 MANUAL THERAPY 1/> REGIONS: CPT

## 2022-01-20 PROCEDURE — 97110 THERAPEUTIC EXERCISES: CPT

## 2022-01-20 NOTE — PROGRESS NOTES
Olayinka Pickard  : 1950  [place] / Product Type: Medicare /  [address]  at [facility]  [address], [address] [facility], [address]  [phone]   [phone]      OUTPATIENT PHYSICAL THERAPY: Daily Treatment Note 2022  Visit Count:  6    ICD-10: Treatment Diagnosis:   M25.511 Pain in Right Shoulder  M25.611 Stiffness in Right Shoulder  M62.81 Muscle Weakness Generalized  Z47.89 Orthopedic Aftercare    Precautions/Allergies:   Patient has no known allergies. Follow Protocol  -Limit AROM to : Flexion (80 degrees), Extension (20 degrees), Abduction (80 degrees, IR (full), ER (to neutral only)  -Sling may be off  -No subscapularis stretch or strengthening until 6 weeks post op date    TSA after 6 weeks (22)  - All active and passive ROM ok  -Resistance in all arcs OK  TREATMENT PLAN:  Effective Dates: 2022 TO 2022 (90 days). Frequency/Duration: 2 times a week for 90 Days        PRE-TREATMENT SYMPTOMS/COMPLAINTS:  Pt reports doing well. Each day he feels better. He reports the doctor said he was doing well. MEDICATIONS REVIEWED:  2022   TREATMENT:   (In addition to Assessment/Re-Assessment sessions the following treatments were rendered)    THERAPEUTIC EXERCISE: (25 minutes):  Exercises per grid below to improve mobility, strength and balance. Required minimal visual and verbal cues to promote proper body alignment and promote proper body posture. Progressed resistance, range and complexity of movement as indicated.      Date:  2022 Date:  22 Date:  22 Date  10- Date:  22   Activity/Exercise Parameters Parameters Parameters     Rhythmic stabilization         Shoulder flex (supine)    Supine with wand   X 10 to about 115 degrees Supine wand 10 X    Shoulder abduction     Supine with wand to R side to tolerance     Shoulder ER    Supine with wand - x 10 R to tolerance  Supine wand 10 X    S/l shoulder abduction     10 X AA   S/l ER     10 X AA   Supine protraction     10 X A   Prone row     10 X 2 A   Prone extension     10 X AA   Prone horizontal abduction     10 X AA   Prone Y        TB rows/extension     GTB 10 x 2 each   TB IR/ER     GTB ER 10 X 2, YTB IR 10 X 2   Ball rolls on wall          MANUAL THERAPY: (15 minutes): Joint mobilization, Soft tissue mobilization and Manipulation was utilized and necessary because of the patient's restricted joint motion, painful spasm, loss of articular motion and restricted motion of soft tissue. +PROM R shoulder- all directions to pt tolerance  +STM to lateral deltoid, biceps  +Scar massage      (Used abbreviations: MET - muscle energy technique; PNF - proprioceptive neuromuscular facilitation; NMR - neuromuscular re-education; AP - anterior to posterior; PA - posterior to anterior)    MODALITIES: (0 minutes):      none      TREATMENT/SESSION ASSESSMENT:  Pt tolerate addition and progression of exercises fair today requiring AA with some due to increased pain. Education: on objective findings and HEP    RECOMMENDATIONS/INTENT FOR NEXT TREATMENT SESSION: \"Next visit will focus on advancements to more challenging activities\".     PAIN: Initial: 1/10 Post Session:  1/10     [facility] Portal    Total Treatment Billable Duration:   PT Patient Time In/Time Out  Time In: 1100  Time Out: [address]    Future Appointments   Date Time Provider Elisabeth Chauhan   1/25/2022  9:30 AM [facility]   1/27/2022 11:00 AM Levi BAKER [facility]   3/21/2022  2:10 PM Mando Matias MD [id number] PGU       Visit Approval Visit # Therapist initials Date A NS / Cx < 24 hr >24 hr Cx Comments    1 SHAISTA 1/5/22 [x]  [] [] Initial evaluation    2 SHAISTA 1/7/22 [x] [] []     3 SHAISTA 1/11/22 [x] [] []     4 SHAISTA 1/13/22 [x] [] []     5 PDE 1- [x] [] []     6 SHAISTA 1/20/22 [x] [] []        [] [] []        [] [] []        [] [] []        [] [] []        [] [] []        [] [] []        [] [] []        [] [] []        [] [] []        [] [] []        [] [] []        [] [] []

## 2022-01-25 ENCOUNTER — HOSPITAL ENCOUNTER (OUTPATIENT)
Dept: PHYSICAL THERAPY | Age: 72
Discharge: HOME | End: 2022-01-25
Payer: MEDICARE

## 2022-01-25 PROCEDURE — 97140 MANUAL THERAPY 1/> REGIONS: CPT

## 2022-01-25 PROCEDURE — 97110 THERAPEUTIC EXERCISES: CPT

## 2022-01-25 NOTE — PROGRESS NOTES
Rhianna Chavira  : 1950  [place] / Product Type: Medicare /  [address]  at [facility]  [address], [address] [facility], [address]  [phone]   [phone]      OUTPATIENT PHYSICAL THERAPY: Daily Treatment Note 2022  Visit Count:  7    ICD-10: Treatment Diagnosis:   M25.511 Pain in Right Shoulder  M25.611 Stiffness in Right Shoulder  M62.81 Muscle Weakness Generalized  Z47.89 Orthopedic Aftercare    Precautions/Allergies:   Patient has no known allergies. Follow Protocol  -Limit AROM to : Flexion (80 degrees), Extension (20 degrees), Abduction (80 degrees, IR (full), ER (to neutral only)  -Sling may be off  -No subscapularis stretch or strengthening until 6 weeks post op date    TSA after 6 weeks (22)  - All active and passive ROM ok  -Resistance in all arcs OK  TREATMENT PLAN:  Effective Dates: 2022 TO 2022 (90 days). Frequency/Duration: 2 times a week for 90 Days        PRE-TREATMENT SYMPTOMS/COMPLAINTS:  Pt reports very mild soreness after last session. MEDICATIONS REVIEWED:  2022   TREATMENT:   (In addition to Assessment/Re-Assessment sessions the following treatments were rendered)    THERAPEUTIC EXERCISE: (25 minutes):  Exercises per grid below to improve mobility, strength and balance. Required minimal visual and verbal cues to promote proper body alignment and promote proper body posture. Progressed resistance, range and complexity of movement as indicated.      Date:  2022 Date:  22 Date:  22 Date  10- Date:  22 Date:  22   Activity/Exercise Parameters Parameters Parameters      Rhythmic stabilization          Shoulder flex (supine)    Supine with wand   X 10 to about 115 degrees Supine wand 10 X  Supine wand 15 X   Shoulder abduction     Supine with wand to R side to tolerance   Wand 15 X   Shoulder ER    Supine with wand - x 10 R to tolerance  Supine wand 10 X  Wand 15 X   S/l shoulder abduction     10 X AA 10 X AA   S/l ER     10 X AA 10 X 2 AA   Supine protraction     10 X A 10 X 2    Prone row     10 X 2 A 10 X 2 A   Prone extension     10 X AA 10 X AA   Prone horizontal abduction     10 X AA 10 X AA   Prone Y         TB rows/extension     GTB 10 x 2 each GTB 10 X 2 each   TB IR/ER     GTB ER 10 X 2, YTB IR 10 X 2 YTB ER 10 X 2, GTB IR 10 X 2   Ball rolls on wall           MANUAL THERAPY: (15 minutes): Joint mobilization, Soft tissue mobilization and Manipulation was utilized and necessary because of the patient's restricted joint motion, painful spasm, loss of articular motion and restricted motion of soft tissue. +PROM R shoulder- all directions to pt tolerance  +STM to lateral deltoid  +Scar massage      (Used abbreviations: MET - muscle energy technique; PNF - proprioceptive neuromuscular facilitation; NMR - neuromuscular re-education; AP - anterior to posterior; PA - posterior to anterior)    MODALITIES: (0 minutes):      none      TREATMENT/SESSION ASSESSMENT:  Pt tolerated progression of exercises well. Still requiring AA with exercises as seen above due to increased pain with performing actively. Provided pt with handout of updated exercises today. Education: on objective findings and HEP    RECOMMENDATIONS/INTENT FOR NEXT TREATMENT SESSION: \"Next visit will focus on advancements to more challenging activities\".     PAIN: Initial: 1/10 Post Session:  1/10     [facility] Portal    Total Treatment Billable Duration:   PT Patient Time In/Time Out  Time In: 0930  Time Out: Andriy 48    Future Appointments   Date Time Provider Elisabeth Chauhan   1/27/2022 11:00 AM Kaci Erwin [facility] SFD   2/1/2022 11:00 AM Deronda Solid A [facility] SFD   2/3/2022 11:00 AM Deronda Solid A [facility] SFD   2/7/2022 11:00 AM Deronda Solid A [facility] SFD   2/9/2022  2:30 PM Deronda Solid A [facility] S[facility]   3/21/2022  2:10 PM Jearl Cogan, MD [id number] PGU       Visit Approval Visit # Therapist initials Date A NS / Cx < 24 hr >24 hr Cx Comments    1 SHAISTA 1/5/22 [x]  [] [] Initial evaluation    2 KAI 1/7/22 [x] [] []     3 SHAISTA 1/11/22 [x] [] []     4 SHAISTA 1/13/22 [x] [] []     5 PDE 1- [x] [] []     6 SHAISTA 1/20/22 [x] [] []     7 SHAISTA 1/25/22 [x] [] []        [] [] []        [] [] []        [] [] []        [] [] []        [] [] []        [] [] []        [] [] []        [] [] []        [] [] []        [] [] []        [] [] []

## 2022-01-27 ENCOUNTER — HOSPITAL ENCOUNTER (OUTPATIENT)
Dept: PHYSICAL THERAPY | Age: 72
Discharge: HOME | End: 2022-01-27
Payer: MEDICARE

## 2022-01-27 PROCEDURE — 97110 THERAPEUTIC EXERCISES: CPT

## 2022-01-27 PROCEDURE — 97140 MANUAL THERAPY 1/> REGIONS: CPT

## 2022-01-27 NOTE — PROGRESS NOTES
Clifton Eubanks  : 1950  [place] / Product Type: Medicare /  [address] at [address] [facility] [address], [address], [facility], [address] [facility]  [phone]   [phone]      OUTPATIENT PHYSICAL THERAPY: Daily Treatment Note 2022  Visit Count:  8    ICD-10: Treatment Diagnosis:   M25.511 Pain in Right Shoulder  M25.611 Stiffness in Right Shoulder  M62.81 Muscle Weakness Generalized  Z47.89 Orthopedic Aftercare    Precautions/Allergies:   Patient has no known allergies. Follow Protocol  -Limit AROM to : Flexion (80 degrees), Extension (20 degrees), Abduction (80 degrees, IR (full), ER (to neutral only)  -Sling may be off  -No subscapularis stretch or strengthening until 6 weeks post op date    TSA after 6 weeks (22)  - All active and passive ROM ok  -Resistance in all arcs OK  TREATMENT PLAN:  Effective Dates: 2022 TO 2022 (90 days). Frequency/Duration: 2 times a week for 90 Days        PRE-TREATMENT SYMPTOMS/COMPLAINTS:  Pt reports more soreness after last visit    MEDICATIONS REVIEWED:  2022   TREATMENT:   (In addition to Assessment/Re-Assessment sessions the following treatments were rendered)    THERAPEUTIC EXERCISE: (30 minutes):  Exercises per grid below to improve mobility, strength and balance. Required minimal visual and verbal cues to promote proper body alignment and promote proper body posture. Progressed resistance, range and complexity of movement as indicated.      Date:  2022 Date:  22 Date:  22 Date  10- Date:  22 Date:  22 Date:  22   Activity/Exercise Parameters Parameters Parameters       Rhythmic stabilization           Shoulder flex (supine)    Supine with wand   X 10 to about 115 degrees Supine wand 10 X  Supine wand 15 X Supine wand 10 X 2   Shoulder abduction     Supine with wand to R side to tolerance   Wand 15 X Supine wand 10 X 2 Shoulder ER    Supine with wand - x 10 R to tolerance  Supine wand 10 X  Wand 15 X Wand 10 X 2 supine   S/l shoulder abduction     10 X AA 10 X AA 10 X AA   S/l ER     10 X AA 10 X 2 AA 10 X 2    Supine protraction     10 X A 10 X 2     Prone row     10 X 2 A 10 X 2 A 10 X 2 A   Prone extension     10 X AA 10 X AA 10 X AA   Prone horizontal abduction     10 X AA 10 X AA 10 X AA   Prone Y          TB rows/extension     GTB 10 x 2 each GTB 10 X 2 each GTB 10 X 2 each   TB IR/ER     GTB ER 10 X 2, YTB IR 10 X 2 YTB ER 10 X 2, GTB IR 10 X 2 GTB 10 X 2 each   Ball rolls on wall            MANUAL THERAPY: (15 minutes): Joint mobilization, Soft tissue mobilization and Manipulation was utilized and necessary because of the patient's restricted joint motion, painful spasm, loss of articular motion and restricted motion of soft tissue. +PROM R shoulder- all directions to pt tolerance  +STM to lateral deltoid  +Scar massage      (Used abbreviations: MET - muscle energy technique; PNF - proprioceptive neuromuscular facilitation; NMR - neuromuscular re-education; AP - anterior to posterior; PA - posterior to anterior)    MODALITIES: (0 minutes):      none      TREATMENT/SESSION ASSESSMENT:  Pt tolerated progression of exercises well today. Pt requiring VC and TC for technique to prevent compensations. Pt with most TTP along lateral deltoid region. Education: on objective findings and HEP    RECOMMENDATIONS/INTENT FOR NEXT TREATMENT SESSION: \"Next visit will focus on advancements to more challenging activities\".     PAIN: Initial: 1/10 Post Session:  1/10     [facility] Portal    Total Treatment Billable Duration:   PT Patient Time In/Time Out  Time In: 1100  Time Out: 1000 [facility] Appointments   Date Time Provider Elisabeth Chauhan   2/1/2022 11:00 AM [facility]   2/3/2022 11:00 AM Lamberto BAKER [facility] SFD   2/7/2022 11:00 AM Lamberto BAKER [facility] SFD   2/9/2022  2:30 PM Lamberto BAKER [facility] SFD   3/21/2022  2:10 PM Wil Randle MD [id number] PGU       Visit Approval Visit # Therapist initials Date A NS / Cx < 24 hr >24 hr Cx Comments    1 SHAISTA 1/5/22 [x]  [] [] Initial evaluation    2 SHAISTA 1/7/22 [x] [] []     3 SHAISTA 1/11/22 [x] [] []     4 SHAISTA 1/13/22 [x] [] []     5 PDE 1- [x] [] []     6 SHAISTA 1/20/22 [x] [] []     7 SHAISTA 1/25/22 [x] [] []     8 SHAISTA 1/27/22 [x] [] []        [] [] []        [] [] []        [] [] []        [] [] []        [] [] []        [] [] []        [] [] []        [] [] []        [] [] []        [] [] []

## 2022-02-01 ENCOUNTER — HOSPITAL ENCOUNTER (OUTPATIENT)
Dept: PHYSICAL THERAPY | Age: 72
Discharge: HOME | End: 2022-02-01
Payer: MEDICARE

## 2022-02-01 PROCEDURE — 97140 MANUAL THERAPY 1/> REGIONS: CPT

## 2022-02-01 PROCEDURE — 97110 THERAPEUTIC EXERCISES: CPT

## 2022-02-01 NOTE — PROGRESS NOTES
Zelda Prajapati  : 1950  [place] / Product Type: Medicare /  [address]  at [facility]  [address], [address] [facility], [address]  [phone]   [phone]      OUTPATIENT PHYSICAL THERAPY: Daily Treatment Note 2022  Visit Count:  9    ICD-10: Treatment Diagnosis:   M25.511 Pain in Right Shoulder  M25.611 Stiffness in Right Shoulder  M62.81 Muscle Weakness Generalized  Z47.89 Orthopedic Aftercare    Precautions/Allergies:   Patient has no known allergies. Follow Protocol  -Limit AROM to : Flexion (80 degrees), Extension (20 degrees), Abduction (80 degrees, IR (full), ER (to neutral only)  -Sling may be off  -No subscapularis stretch or strengthening until 6 weeks post op date    TSA after 6 weeks (22)  - All active and passive ROM ok  -Resistance in all arcs OK  TREATMENT PLAN:  Effective Dates: 2022 TO 2022 (90 days). Frequency/Duration: 2 times a week for 90 Days        PRE-TREATMENT SYMPTOMS/COMPLAINTS:  Pt reports continued pain mostly at lateral deltoid region. Some increased soreness today. MEDICATIONS REVIEWED:  2022   TREATMENT:   (In addition to Assessment/Re-Assessment sessions the following treatments were rendered)    THERAPEUTIC EXERCISE: (25 minutes):  Exercises per grid below to improve mobility, strength and balance. Required minimal visual and verbal cues to promote proper body alignment and promote proper body posture. Progressed resistance, range and complexity of movement as indicated.      Date:  2022 Date:  22 Date:  22 Date  10- Date:  22 Date:  22 Date:  22 Date:  22   Activity/Exercise Parameters Parameters Parameters        Rhythmic stabilization            Shoulder flex (supine)    Supine with wand   X 10 to about 115 degrees Supine wand 10 X  Supine wand 15 X Supine wand 10 X 2 Supine wand 10 X 2   Shoulder abduction Supine with wand to R side to tolerance   Wand 15 X Supine wand 10 X 2    Shoulder ER    Supine with wand - x 10 R to tolerance  Supine wand 10 X  Wand 15 X Wand 10 X 2 supine    S/l shoulder abduction     10 X AA 10 X AA 10 X AA 10 X AA   S/l ER     10 X AA 10 X 2 AA 10 X 2  10 X 2   Supine protraction     10 X A 10 X 2   10 X 2   Prone row     10 X 2 A 10 X 2 A 10 X 2 A 10 X 2 A   Prone extension     10 X AA 10 X AA 10 X AA 10 X AA   Prone horizontal abduction     10 X AA 10 X AA 10 X AA 10 X AA   Prone Y           TB rows/extension     GTB 10 x 2 each GTB 10 X 2 each GTB 10 X 2 each GTB 10 X 2 each   TB IR/ER     GTB ER 10 X 2, YTB IR 10 X 2 YTB ER 10 X 2, GTB IR 10 X 2 GTB 10 X 2 each GTB 10 X 2 each   Doorway stretch        15 seconds 3 X     MANUAL THERAPY: (15 minutes): Joint mobilization, Soft tissue mobilization and Manipulation was utilized and necessary because of the patient's restricted joint motion, painful spasm, loss of articular motion and restricted motion of soft tissue. +PROM R shoulder- all directions to pt tolerance  +STM to lateral deltoid  +Scar massage      (Used abbreviations: MET - muscle energy technique; PNF - proprioceptive neuromuscular facilitation; NMR - neuromuscular re-education; AP - anterior to posterior; PA - posterior to anterior)    MODALITIES: (0 minutes):      none      TREATMENT/SESSION ASSESSMENT:  Pt continuing to require AA with above exercises due to increased pain at deltoid with AROM. Added doorway stretch this session. Pt demonstrating improvements in PROM in all directions with no pain or discomfort. Education: on objective findings and HEP    RECOMMENDATIONS/INTENT FOR NEXT TREATMENT SESSION: \"Next visit will focus on advancements to more challenging activities\".     PAIN: Initial: 1/10 Post Session:  1/10     [employer] Portal    Total Treatment Billable Duration:   PT Patient Time In/Time Out  Time In: 1100  Time Out: 1140  Maribel Dyson, PT, DPT    Future Appointments   Date Time Provider Elisabeth Chauhan   2/3/2022 11:00 AM Laura Gamez [facility] [place]   2/7/2022 11:00 AM Latisha BAKER [facility] SFD   2/9/2022  2:30 PM Latisha BAKER [facility] SFD   3/21/2022  2:10 PM Abigail Raza MD [id number] PGU       Visit Approval Visit # Therapist initials Date A NS / Cx < 24 hr >24 hr Cx Comments    1 SHAISTA 1/5/22 [x]  [] [] Initial evaluation    2 SHAISTA 1/7/22 [x] [] []     3 SHAISTA 1/11/22 [x] [] []     4 SHAISTA 1/13/22 [x] [] []     5 PDE 1- [x] [] []     6 SHAISTA 1/20/22 [x] [] []     7 SHAISTA 1/25/22 [x] [] []     8 SHAISTA 1/27/22 [x] [] []     9 SHAISTA 2/1/22 [x] [] [] PN due next visit       [] [] []        [] [] []        [] [] []        [] [] []        [] [] []        [] [] []        [] [] []        [] [] []        [] [] []

## 2022-02-03 ENCOUNTER — HOSPITAL ENCOUNTER (OUTPATIENT)
Dept: PHYSICAL THERAPY | Age: 72
Discharge: HOME | End: 2022-02-03
Payer: MEDICARE

## 2022-02-03 PROCEDURE — 97140 MANUAL THERAPY 1/> REGIONS: CPT

## 2022-02-03 PROCEDURE — 97110 THERAPEUTIC EXERCISES: CPT

## 2022-02-03 NOTE — PROGRESS NOTES
Areli Wayne  : 1950  [place] / Product Type: Medicare /  [address] at [address] [facility] 68, 101 [facility], [address]  [phone]   [phone]      OUTPATIENT PHYSICAL THERAPY: Daily Treatment Note 2/3/2022  Visit Count:  10    ICD-10: Treatment Diagnosis:   M25.511 Pain in Right Shoulder  M25.611 Stiffness in Right Shoulder  M62.81 Muscle Weakness Generalized  Z47.89 Orthopedic Aftercare    Precautions/Allergies:   Patient has no known allergies. Follow Protocol  -Limit AROM to : Flexion (80 degrees), Extension (20 degrees), Abduction (80 degrees, IR (full), ER (to neutral only)  -Sling may be off  -No subscapularis stretch or strengthening until 6 weeks post op date    TSA after 6 weeks (22)  - All active and passive ROM ok  -Resistance in all arcs OK  TREATMENT PLAN:  Effective Dates: 2022 TO 2022 (90 days). Frequency/Duration: 2 times a week for 90 Days        PRE-TREATMENT SYMPTOMS/COMPLAINTS:  Pt reports continued pain mostly at lateral deltoid region. More increased soreness today. MEDICATIONS REVIEWED:  2/3/2022   TREATMENT:   (In addition to Assessment/Re-Assessment sessions the following treatments were rendered)    THERAPEUTIC EXERCISE: (25 minutes):  Exercises per grid below to improve mobility, strength and balance. Required minimal visual and verbal cues to promote proper body alignment and promote proper body posture. Progressed resistance, range and complexity of movement as indicated.      Date:  2022 Date:  22 Date:  22 Date  10- Date:  22 Date:  22 Date:  22 Date:  22 Date:  2/3/22   Activity/Exercise Parameters Parameters Parameters         Rhythmic stabilization             Shoulder flex (supine)    Supine with wand   X 10 to about 115 degrees Supine wand 10 X  Supine wand 15 X Supine wand 10 X 2 Supine wand 10 X 2 Supine wand 10 X 1   Shoulder abduction     Supine with wand to R side to tolerance   Wand 15 X Supine wand 10 X 2     Shoulder ER    Supine with wand - x 10 R to tolerance  Supine wand 10 X  Wand 15 X Wand 10 X 2 supine     S/l shoulder abduction     10 X AA 10 X AA 10 X AA 10 X AA 10 X AA   S/l ER     10 X AA 10 X 2 AA 10 X 2  10 X 2 10 X    Supine protraction     10 X A 10 X 2   10 X 2    Prone row     10 X 2 A 10 X 2 A 10 X 2 A 10 X 2 A 10 X 2 A   Prone extension     10 X AA 10 X AA 10 X AA 10 X AA 10 X AA   Prone horizontal abduction     10 X AA 10 X AA 10 X AA 10 X AA 10 X AA   Prone Y            TB rows/extension     GTB 10 x 2 each GTB 10 X 2 each GTB 10 X 2 each GTB 10 X 2 each GTB 10 X 2 each   TB IR/ER     GTB ER 10 X 2, YTB IR 10 X 2 YTB ER 10 X 2, GTB IR 10 X 2 GTB 10 X 2 each GTB 10 X 2 each GTB 10 X 2 each   Doorway stretch        15 seconds 3 X      MANUAL THERAPY: (15 minutes): Joint mobilization, Soft tissue mobilization and Manipulation was utilized and necessary because of the patient's restricted joint motion, painful spasm, loss of articular motion and restricted motion of soft tissue. +PROM R shoulder- all directions to pt tolerance  +STM to lateral deltoid  +Scar massage      (Used abbreviations: MET - muscle energy technique; PNF - proprioceptive neuromuscular facilitation; NMR - neuromuscular re-education; AP - anterior to posterior; PA - posterior to anterior)    MODALITIES: (0 minutes):      none      TREATMENT/SESSION ASSESSMENT:  Pt is making good steady progress towards goal at this time. Backed off on repetitions this session due to increased pain/soreness at lateral deltoid. Education: on objective findings and HEP    RECOMMENDATIONS/INTENT FOR NEXT TREATMENT SESSION: \"Next visit will focus on advancements to more challenging activities\".     PAIN: Initial: 1/10 Post Session:  3/10     [employer] Portal    Total Treatment Billable Duration:   PT Patient Time In/Time Out  Time In: 1100  Time Out: [address], PT, DPT    Future Appointments   Date Time Provider Elisabeth Chauhan   2/7/2022 11:00 AM Therman Shone [facility]   2/9/2022  2:30 PM Therman Shone [facility]   3/21/2022  2:10 PM Alyson Fernandez MD [id number]       Visit Approval Visit # Therapist initials Date A NS / Cx < 24 hr >24 hr Cx Comments    1 SHAISTA 1/5/22 [x]  [] [] Initial evaluation    2 SHAISTA 1/7/22 [x] [] []     3 SHAISTA 1/11/22 [x] [] []     4 SHAISTA 1/13/22 [x] [] []     5 PDE 1- [x] [] []     6 SHAISTA 1/20/22 [x] [] []     7 SHAISTA 1/25/22 [x] [] []     8 SHAISTA 1/27/22 [x] [] []     9 SHAISTA 2/1/22 [x] [] [] PN due next visit    10  SHAISTA 2/3/22 [x] [] [] PN today       [] [] []        [] [] []        [] [] []        [] [] []        [] [] []        [] [] []        [] [] []        [] [] []

## 2022-02-03 NOTE — THERAPY EVALUATION
Areli Wayne  : 1950  [place] / Product Type: Medicare /  [address]  at [address] [facility] 68, 101 [facility], [address]  [phone]   [phone]        OUTPATIENT PHYSICAL THERAPY:Progress Report 2/3/2022    ICD-10: Treatment Diagnosis:   M25.511 Pain in Right Shoulder  M25.611 Stiffness in Right Shoulder  M62.81 Muscle Weakness Generalized  Z47.89 Orthopedic Aftercare    Precautions/Allergies:   Patient has no known allergies. Fall Risk Score: 2 (? 5 = High Risk)  MD Orders: Eval and Treat MEDICAL/REFERRING DIAGNOSIS:  R shoulder replacement   DATE OF ONSET: 12/3/21  REFERRING PHYSICIAN: Britt Atwood MD  RETURN PHYSICIAN APPOINTMENT: 22   Progress Report:  Pt has attended 10 PT sessions to date. Pt has met 3/5 STG and 0/5 LTG at this time. Pt with 4 point improvement in DASH score. Pt demonstrating improvements in PROM, AROM, strength, and pain. Pt limited by increased pain at lateral deltoid tendon with possibly some tendonosis. Pt is making good steady progress towards remaining goals and will continue to benefit from skilled PT interventions at this time. ASSESSMENT:  Mr. Julissa Garcia has attended 1 physical therapy session including the initial evaluation. Pt is s/p R TSA. Pt presents with decreased ROM, decreased strength and increased pain affecting participation in ADLs and functional mobility at this time. Recommending skilled PT: manual therapeutic techniques (as appropriate), therapeutic exercises and activities, balance interventions, and a comprehensive home exercise program to address the current impairments, as listed above. Areli Wayne will benefit from skilled PT (medically necessary) to address above deficits affecting participation in basic ADLs and overall functional tolerance. PROBLEM LIST (Impacting functional limitations):  1.  Decreased Strength  2. Decreased ADL/Functional Activities  3. Decreased Transfer Abilities  4. Decreased Ambulation Ability/Technique  5. Decreased Balance  6. Increased Pain  7. Decreased Activity Tolerance  8. Decreased Flexibility/Joint Mobility  9. Decreased [place] with Home Exercise Program INTERVENTIONS PLANNED:  1. Balance Exercise  2. Cold  3. Cryotherapy  4. Electrical Stimulation  5. Family Education  6. Gait Training  7. Heat  8. Home Exercise Program (HEP)  9. Manual Therapy  10. Neuromuscular Re-education/Strengthening  11. Range of Motion (ROM)  12. Therapeutic Activites  13. Therapeutic Exercise/Strengthening  14. Transcutaneous Electrical Nerve Stimulation (TENS)  15. Transfer Training   TREATMENT PLAN:  TREATMENT PLAN:  Effective Dates: 1/5/2022 TO 4/5/2022 (90 days). Frequency/Duration: 2 times a week for 90 Days  GOALS: (Goals have been discussed and agreed upon with patient.)  Short Term Time Frame: 4 weeks  1. PT will be compliant with HEP. Goal met 2/3/22  2. Pt will decrease worst pain to 3/10 or less in order to return to dressing ADLs without difficulty Goal met 2/3/22 (3/10)  3. Pt will reduce DASH to 20 or less in order to return to personal hygiene ADLs Progressing 2/3/22  4. Pt will improve gross RUE strength to 3/5 or greater in order to reach overhead Progressing 2/3/22  5. Pt will demonstrate PROM R shoulder Flexion and Abduction of 120 or greater Goal met 2/3/22  GOALS: (Goals have been discussed and agreed upon with patient.)  Discharge Goals: Time Frame: 12 weeks  1. PT will be independent with HEP. Progressing 2/3/22  2. Pt will decrease worst pain to 1/10 or less in order to return to dressing ADLs without difficulty Progressing 2/3/22  3. Pt will reduce DASH to 10 or less in order to return to personal hygiene ADLs Progressing 2/3/22  4. Pt will improve gross RUE strength to 4+/5 or greater in order to reach overhead Progressing 2/3/22  5.  Pt will demonstrate active R shoulder Flexion and Abduction of 125 or greater Progressing 2/3/22    Rehabilitation Potential For Stated Goals: Good    Outcome Measure: Tool Used: Disabilities of the Arm, Shoulder and Hand (DASH) Questionnaire - Quick Version  Score:  Initial: 25/55  Most Recent: 21/55 (Date: 2/3/22 )   Interpretation of Score: The DASH is designed to measure the activities of daily living in person's with upper extremity dysfunction or pain. Each section is scored on a 1-5 scale, 5 representing the greatest disability. The scores of each section are added together for a total score of 55. Medical Necessity:   · Skilled intervention continues to be required due to decreased strength, ROM, balance, and functional mobility. Reason for Services/Other Comments:  · Patient continues to require skilled intervention due to decreased strength, balance, and ROM with increased pain affecting pt functional mobility. ·   Regarding Tomasz Johnson's therapy, I certify that the treatment plan above will be carried out by a therapist or under their direction. Thank you for this referral,  Flynn Ravi, PT, DPT     Referring Physician Signature: Freida Toribio MD             The information in this section was collected on 1/5/22 (except where otherwise noted). HISTORY:   History of Present Injury/Illness (Reason for Referral):  Pt is s/p R TSA. Pt reports having a significant amount of swelling. Still with swelling/edema present. Pt reports he has been doing HEP.  Pt goals are to return to higher level activities like fly fishing    CC/Primary Concern: Return to full ROM and strength R UE            Treatment Side: Right      Past Medical History/Comorbidities:   Mr. Yusef Fields  has a past medical history of Arthritis (07/06/2015), Back pain, DDD (degenerative disc disease), lumbar, Dry eyes, Enlarged prostate, GERD (gastroesophageal reflux disease), History of basal cell cancer, History of squamous cell carcinoma, IBS (irritable bowel syndrome), Insomnia (07/06/2015), Left inguinal hernia, MVP (mitral valve prolapse), Onychomycosis (7/6/2015), RBBB (7/6/2015), Right wrist pain (7/6/2015), Scoliosis, and Spondylosis of lumbar region without myelopathy or radiculopathy. Mr. Freeman Pabon  has a past surgical history that includes neurological procedure unlisted (2007); hx colonoscopy; hx orthopaedic; hx shoulder arthroscopy (Right); hx orthopaedic (Left, 06/2020); hx orthopaedic (Right, 08/2020); hx cataract removal; and hx carpal tunnel release. Social History/Living Environment:  Lives with wiife, independent with all mobility at baseline    Pain/Symptom Location: post op pain- generalized    Worst Pain: 6/10  Current Pain: 1/10    Aggravating Factors: any use of R UE    Occupation: Retired cardiologist    Prior Level of Function/Work/Activity:  History of chronic LBP and R foot pain due to OA- despite this lives a very active lifestyle enjoying many outdoor activities- hunting, fly fishing, etc      Patient Goals: Gain full ROM, strength and return to recreational activities    Current Medications:       Current Outpatient Medications:     traMADoL (ULTRAM) 50 mg tablet, Take 1 Tablet by mouth every six (6) hours as needed for Pain for up to 30 days. Max Daily Amount: 200 mg., Disp: 120 Tablet, Rfl: 1    loratadine-pseudoephedrine (Claritin-D 12 Hour) 5-120 mg per tablet, Take 1 Tablet by mouth two (2) times a day., Disp: , Rfl:     oxymetazoline HCl (AFRIN NASAL SPRAY NA), by Nasal route., Disp: , Rfl:     temazepam (RESTORIL) 15 mg capsule, Take 1 Capsule by mouth nightly as needed for Sleep. Max Daily Amount: 15 mg.  Indications: difficulty sleeping (Patient not taking: Reported on 12/6/2021), Disp: 30 Capsule, Rfl: 0    ipratropium (Atrovent) 21 mcg (0.03 %) nasal spray, 2 Sprays by Both Nostrils route every twelve (12) hours as needed for Rhinitis., Disp: 30 mL, Rfl: 5    zolpidem (AMBIEN) 10 mg tablet, Take 1 Tablet by mouth nightly as needed for Sleep. Max Daily Amount: 10 mg., Disp: 90 Tablet, Rfl: 1    terbinafine HCL (LAMISIL) 250 mg tablet, Take 250 mg by mouth daily. , Disp: , Rfl:     linaCLOtide (Linzess) 145 mcg cap capsule, Take 1 Capsule by mouth Daily (before breakfast). , Disp: 30 Capsule, Rfl: 5    fluocinonide (LIDEX) 0.05 % topical gel, Apply  to affected area two (2) times a day., Disp: , Rfl:     meloxicam (Mobic) 15 mg tablet, Take 1 Tablet by mouth daily. , Disp: 90 Tablet, Rfl: 3    tadalafiL (CIALIS) 5 mg tablet, Take 1 Tablet by mouth daily. (Patient not taking: Reported on 12/6/2021), Disp: 90 Tablet, Rfl: 3    tretinoin (RETIN-A) 0.05 % topical cream, APPLY TO AFFECTED AREA AT BEDTIME, Disp: , Rfl:     Myrbetriq 50 mg ER tablet, Take 1 Tab by mouth daily. , Disp: 30 Tab, Rfl: 12    tamsulosin (FLOMAX) 0.4 mg capsule, Take 2 Caps by mouth daily. (Patient taking differently: Take 0.4 mg by mouth nightly.), Disp: 180 Cap, Rfl: 3    dicyclomine (BentyL) 20 mg tablet, Take 1 Tab by mouth every six (6) hours. , Disp: 120 Tab, Rfl: 5    lidocaine (LIDODERM) 5 %, by TransDERmal route daily as needed. Apply patch to the affected area for 12 hours a day and remove for 12 hours a day. , Disp: , Rfl:     RESTASIS 0.05 % ophthalmic emulsion, INSTILL 1 DROP IN BOTH EYES TWICE DAILY, Disp: , Rfl: 12    omeprazole (PRILOSEC) 20 mg capsule, Take 20 mg by mouth daily as needed. , Disp: , Rfl:    Date Last Reviewed:  2/3/2022       Ambulatory/Rehab Services H2 Model Falls Risk Assessment    Risk Factors:       (1)  Gender [Male] Ability to Rise from Chair:       (1)  Pushes up, successful in one attempt    Falls Prevention Plan:       No modifications necessary   Total: (5 or greater = High Risk): 2    ©2010 [facility]. All Rights Reserved. [facility] Patent #[id number].  Federal Law prohibits the replication, distribution or use without written permission from [facility] of eyefactive of Personal Factors/Comorbidities that affect the Plan of Care: 3+: HIGH COMPLEXITY   EXAMINATION:   Inspection        Post Op/Wound: Healed                Additional Comments:   ________________________________________________________________________________________________  Observation:          Posture: Rounded shoulders, forward head            Edema: Increased throughout entirety of UE into hand               Addition Comments:     ________________________________________________________________________________________________  Range of Motion            Upper:  Joint: Passive Active Active Passive Right 2/3/22 Active Right 2/3/22    Right (Degrees) Right (Degrees) Left Active (Degrees)     Shoulder Flexion 80 degrees NT per protocol 140 degrees 130 degrees 115 degrees   Shoulder Extension  NT per protocol 62 degrees  60 degrees   Shoulder Adduction  NT per protocol 125 degrees   WFL    Shoulder Abduction 60 degrees NT per protocol  170 degrees 115 degrees    Shoulder Internal Rotation (Neutral Position) NT per protocol       Shoulder External Rotation  (Neutral Position) NT per protocol       Functional IR  NT per protocol T11 45 degrees R hip   Functional ER  NT per protocol T2 70 degrees C7         Additional Comments:   ________________________________________________________________________________________________  Strength          Upper Extremity    Joint:   Right 2/3/22    RIGHT LEFT    Shoulder Flexion 2/5 4+/5 2+/5   Shoulder Extension 2/5 5/5 3/5   Shoulder Abduction 2/5 4+/5 2/5   Shoulder Internal Rotation 2/5 4/5 2+/5   Shoulder External Rotation 2/5 3+/5 3/5   Elbow Flexion 3/5 5/5 4/5   Elbow Extension 3/5 5/5 4/5   ________________________________________________________________________________________________  [facility]        C/O Radicular Symptoms: No      Additional Comments: ___________________________________________________________________________________________________________________________________________________________  Palpation: Lateral deltoid  Joint mobilization: Capsular hypomobility post op       Body Structures Involved:  1. Nerves  2. Bones  3. Joints  4. Muscles  5. Ligaments Body Functions Affected:  1. Sensory/Pain  2. Neuromusculoskeletal  3. Movement Related Activities and Participation Affected:  1. General Tasks and Demands  2. Mobility  3. Self Care  4. Domestic Life  5. Interpersonal Interactions and Relationships  6.  Community, Social and [place]   Number of elements (examined above) that affect the Plan of Care: 4+: HIGH COMPLEXITY   CLINICAL PRESENTATION:   Presentation: Evolving clinical presentation with changing clinical characteristics: MODERATE COMPLEXITY   CLINICAL DECISION MAKING:      Use of outcome tool(s) and clinical judgement create a POC that gives a: Clear prediction of patient's progress: LOW COMPLEXITY

## 2022-02-07 ENCOUNTER — HOSPITAL ENCOUNTER (OUTPATIENT)
Dept: PHYSICAL THERAPY | Age: 72
Discharge: HOME | End: 2022-02-07
Payer: MEDICARE

## 2022-02-07 PROCEDURE — 97110 THERAPEUTIC EXERCISES: CPT

## 2022-02-07 PROCEDURE — 97140 MANUAL THERAPY 1/> REGIONS: CPT

## 2022-02-07 NOTE — PROGRESS NOTES
Maxime Sample  : 1950  [place] / Product Type: Medicare /  [address]  at [facility]  [address] \[facility]\"", [address]  [phone]   [phone]      OUTPATIENT PHYSICAL THERAPY: Daily Treatment Note 2022  Visit Count:  11    ICD-10: Treatment Diagnosis:   M25.511 Pain in Right Shoulder  M25.611 Stiffness in Right Shoulder  M62.81 Muscle Weakness Generalized  Z47.89 Orthopedic Aftercare    Precautions/Allergies:   Patient has no known allergies. Follow Protocol  -Limit AROM to : Flexion (80 degrees), Extension (20 degrees), Abduction (80 degrees, IR (full), ER (to neutral only)  -Sling may be off  -No subscapularis stretch or strengthening until 6 weeks post op date    TSA after 6 weeks (22)  - All active and passive ROM ok  -Resistance in all arcs OK  TREATMENT PLAN:  Effective Dates: 2022 TO 2022 (90 days). Frequency/Duration: 2 times a week for 90 Days        PRE-TREATMENT SYMPTOMS/COMPLAINTS:  Pt reports mild soreness today. MEDICATIONS REVIEWED:  2022   TREATMENT:   (In addition to Assessment/Re-Assessment sessions the following treatments were rendered)    THERAPEUTIC EXERCISE: (25 minutes):  Exercises per grid below to improve mobility, strength and balance. Required minimal visual and verbal cues to promote proper body alignment and promote proper body posture. Progressed resistance, range and complexity of movement as indicated.      Date:  22 Date:  22 Date:  22 Date:  22 Date:  2/3/22 Date:  22   Activity/Exercise         Rhythmic stabilization          Shoulder flex (supine) Supine wand 10 X  Supine wand 15 X Supine wand 10 X 2 Supine wand 10 X 2 Supine wand 10 X 1 Supine wand 10 X 1   Shoulder abduction   Wand 15 X Supine wand 10 X 2      Shoulder ER Supine wand 10 X  Wand 15 X Wand 10 X 2 supine      S/l shoulder abduction 10 X AA 10 X AA 10 X AA 10 X AA 10 X AA 10 X AA   S/l ER 10 X AA 10 X 2 AA 10 X 2  10 X 2 10 X  10 X A   Supine protraction 10 X A 10 X 2   10 X 2  10 X 2   Prone row 10 X 2 A 10 X 2 A 10 X 2 A 10 X 2 A 10 X 2 A 10 X 2A   Prone extension 10 X AA 10 X AA 10 X AA 10 X AA 10 X AA 10 X AA   Prone horizontal abduction 10 X AA 10 X AA 10 X AA 10 X AA 10 X AA 10 X AA   Sleeper stretch      30 seconds 3 X   TB rows/extension GTB 10 x 2 each GTB 10 X 2 each GTB 10 X 2 each GTB 10 X 2 each GTB 10 X 2 each GTB 10 X 2 each   TB IR/ER GTB ER 10 X 2, YTB IR 10 X 2 YTB ER 10 X 2, GTB IR 10 X 2 GTB 10 X 2 each GTB 10 X 2 each GTB 10 X 2 each GTB 10 X 2 each   Doorway stretch    15 seconds 3 X  30 seconds 3 X     MANUAL THERAPY: (15 minutes): Joint mobilization, Soft tissue mobilization and Manipulation was utilized and necessary because of the patient's restricted joint motion, painful spasm, loss of articular motion and restricted motion of soft tissue. +PROM R shoulder- all directions to pt tolerance  +STM to lateral deltoid  +Scar massage      (Used abbreviations: MET - muscle energy technique; PNF - proprioceptive neuromuscular facilitation; NMR - neuromuscular re-education; AP - anterior to posterior; PA - posterior to anterior)    MODALITIES: (0 minutes):      none      TREATMENT/SESSION ASSESSMENT:  Pt continuing to require AA with exercises as noted above to limit pain/discomfort. Pt continues with most discomfort around lateral deltoid, possibly subscap insertion    Education: on objective findings and HEP    RECOMMENDATIONS/INTENT FOR NEXT TREATMENT SESSION: \"Next visit will focus on advancements to more challenging activities\".     PAIN: Initial: 1/10 Post Session:  2/10     [email or web address] Portal    Total Treatment Billable Duration:   PT Patient Time In/Time Out  Time In: 1100  Time Out: 0  Darryle Parents, PT, DPT    Future Appointments   Date Time Provider Elisabeth Chauhan   2/9/2022  2:30 PM [facility]   2/15/2022 11:00 AM Nyra Diss A [facility]   2/17/2022 11:00 AM Nyra Diss A [facility]   2/22/2022 11:00 AM Nyra Diss A [facility]   2/24/2022 11:00 AM Nyra Diss A [facility]   3/21/2022  2:10 PM Tai Harris MD [id number] [facility]       Visit Approval Visit # Therapist initials Date A NS / Cx < 24 hr >24 hr Cx Comments    1 SHAISTA 1/5/22 [x]  [] [] Initial evaluation    2 SHAISTA 1/7/22 [x] [] []     3 SHAISTA 1/11/22 [x] [] []     4 SHAISTA 1/13/22 [x] [] []     5 PDE 1- [x] [] []     6 SHAISTA 1/20/22 [x] [] []     7 SHAISTA 1/25/22 [x] [] []     8 SHAISTA 1/27/22 [x] [] []     9 SHAISTA 2/1/22 [x] [] [] PN due next visit    10  SHAISTA 2/3/22 [x] [] [] PN today    11 SHAISTA 2/7/22 [x] [] []        [] [] []        [] [] []        [] [] []        [] [] []        [] [] []        [] [] []        [] [] []

## 2022-02-09 ENCOUNTER — HOSPITAL ENCOUNTER (OUTPATIENT)
Dept: PHYSICAL THERAPY | Age: 72
Discharge: HOME | End: 2022-02-09
Payer: MEDICARE

## 2022-02-09 PROCEDURE — 97140 MANUAL THERAPY 1/> REGIONS: CPT

## 2022-02-09 PROCEDURE — 97110 THERAPEUTIC EXERCISES: CPT

## 2022-02-09 NOTE — PROGRESS NOTES
I am accessing Mr. Johnson's chart as a part of our department's internal chart auditing process. I certify that Mr. Travis is, or was, a patient in our department.   Thank you,  Naveen Knowles  2/9/2022

## 2022-02-09 NOTE — PROGRESS NOTES
Saadia Lo  : 1950  [place] / Product Type: Medicare /  [address]  at [facility]  [address] [facility], [place] [address] [address] [facility]  [phone]   [phone]      OUTPATIENT PHYSICAL THERAPY: Daily Treatment Note 2022  Visit Count:  12    ICD-10: Treatment Diagnosis:   M25.511 Pain in Right Shoulder  M25.611 Stiffness in Right Shoulder  M62.81 Muscle Weakness Generalized  Z47.89 Orthopedic Aftercare    Precautions/Allergies:   Patient has no known allergies. Follow Protocol  -Limit AROM to : Flexion (80 degrees), Extension (20 degrees), Abduction (80 degrees, IR (full), ER (to neutral only)  -Sling may be off  -No subscapularis stretch or strengthening until 6 weeks post op date    TSA after 6 weeks (22)  - All active and passive ROM ok  -Resistance in all arcs OK  TREATMENT PLAN:  Effective Dates: 2022 TO 2022 (90 days). Frequency/Duration: 2 times a week for 90 Days        PRE-TREATMENT SYMPTOMS/COMPLAINTS:  Pt reports mild soreness. Did exercises yesterday. MEDICATIONS REVIEWED:  2022   TREATMENT:   (In addition to Assessment/Re-Assessment sessions the following treatments were rendered)    THERAPEUTIC EXERCISE: (25 minutes):  Exercises per grid below to improve mobility, strength and balance. Required minimal visual and verbal cues to promote proper body alignment and promote proper body posture. Progressed resistance, range and complexity of movement as indicated.      Date:  22 Date:  22 Date:  22 Date:  22 Date:  2/3/22 Date:  22 Date:  22   Activity/Exercise          Rhythmic stabilization           Shoulder flex (supine) Supine wand 10 X  Supine wand 15 X Supine wand 10 X 2 Supine wand 10 X 2 Supine wand 10 X 1 Supine wand 10 X 1 Supine wand 10 X 2    Shoulder abduction   Wand 15 X Supine wand 10 X 2       Shoulder ER Supine wand 10 X  Wand 15 X Wand 10 X 2 supine       S/l shoulder abduction 10 X AA 10 X AA 10 X AA 10 X AA 10 X AA 10 X AA 10 X AA   S/l ER 10 X AA 10 X 2 AA 10 X 2  10 X 2 10 X  10 X A 10 X 2 A   Supine protraction 10 X A 10 X 2   10 X 2  10 X 2 10 X 2 A   Prone row 10 X 2 A 10 X 2 A 10 X 2 A 10 X 2 A 10 X 2 A 10 X 2A #2 10 X 1   Prone extension 10 X AA 10 X AA 10 X AA 10 X AA 10 X AA 10 X AA 10 X AA   Prone horizontal abduction 10 X AA 10 X AA 10 X AA 10 X AA 10 X AA 10 X AA 10 X AA   Sleeper stretch      30 seconds 3 X 30 seconds 3 X   TB rows/extension GTB 10 x 2 each GTB 10 X 2 each GTB 10 X 2 each GTB 10 X 2 each GTB 10 X 2 each GTB 10 X 2 each BTB 10 X 2 (row); GTB 10 X 2   TB IR/ER GTB ER 10 X 2, YTB IR 10 X 2 YTB ER 10 X 2, GTB IR 10 X 2 GTB 10 X 2 each GTB 10 X 2 each GTB 10 X 2 each GTB 10 X 2 each GTB 10 X 2   Doorway stretch    15 seconds 3 X  30 seconds 3 X 15 seconds 3 X     MANUAL THERAPY: (18 minutes): Joint mobilization, Soft tissue mobilization and Manipulation was utilized and necessary because of the patient's restricted joint motion, painful spasm, loss of articular motion and restricted motion of soft tissue. +PROM R shoulder- all directions to pt tolerance  +STM to lateral deltoid, subscapularis  +Scar massage      (Used abbreviations: MET - muscle energy technique; PNF - proprioceptive neuromuscular facilitation; NMR - neuromuscular re-education; AP - anterior to posterior; PA - posterior to anterior)    MODALITIES: (0 minutes):      none      TREATMENT/SESSION ASSESSMENT:  Improved tolerance for exercises this session. Some TTP along subscapularis and lateral deltoid again. Pt improving ROM. Almost return to full PROm in all directions with exception of IR. Education: on objective findings and HEP    RECOMMENDATIONS/INTENT FOR NEXT TREATMENT SESSION: \"Next visit will focus on advancements to more challenging activities\".     PAIN: Initial: 1/10 Post Session:  2/10     [facility] Portal    Total Treatment Billable Duration:   PT Patient Time In/Time Out  Time In: 1430  Time Out: 1513  Elio Keys, PT, DPT    Future Appointments   Date Time Provider Elisabeth Rodriguezi   2/15/2022 11:00 AM [facility]   2/17/2022 11:00 AM Jonathon Shove [facility]   2/21/2022 11:00 AM Jonathon Shove [facility]   2/24/2022 11:00 AM Jonathon Shove [facility]   3/21/2022  2:10 PM Miranda Zelaya MD [id number]       Visit Approval Visit # Therapist initials Date A NS / Cx < 24 hr >24 hr Cx Comments    1 SHAISTA 1/5/22 [x]  [] [] Initial evaluation    2 SHAISTA 1/7/22 [x] [] []     3 SHAISTA 1/11/22 [x] [] []     4 SHAISTA 1/13/22 [x] [] []     5 PDE 1- [x] [] []     6 SHAISTA 1/20/22 [x] [] []     7 SHAISTA 1/25/22 [x] [] []     8 SHAISTA 1/27/22 [x] [] []     9 SHAISTA 2/1/22 [x] [] [] PN due next visit    10  SHAISTA 2/3/22 [x] [] [] PN today    11 SHAISTA 2/7/22 [x] [] [] 1    12 SHAISTA 2/9/22 [x] [] [] 2       [] [] []        [] [] []        [] [] []        [] [] []        [] [] []        [] [] []

## 2022-02-15 ENCOUNTER — HOSPITAL ENCOUNTER (OUTPATIENT)
Dept: PHYSICAL THERAPY | Age: 72
Discharge: HOME | End: 2022-02-15
Payer: MEDICARE

## 2022-02-15 PROBLEM — J30.0 VASOMOTOR RHINITIS: Status: ACTIVE | Noted: 2018-07-06

## 2022-02-15 PROBLEM — R09.81 NASAL CONGESTION: Status: ACTIVE | Noted: 2022-02-15

## 2022-02-15 PROBLEM — J01.90 ACUTE SINUSITIS: Status: ACTIVE | Noted: 2022-02-15

## 2022-02-15 PROCEDURE — 97110 THERAPEUTIC EXERCISES: CPT

## 2022-02-15 PROCEDURE — 97140 MANUAL THERAPY 1/> REGIONS: CPT

## 2022-02-15 NOTE — PROGRESS NOTES
Gisela Arttae  : 1950  [place] / Product Type: Medicare /  [address]  at [facility]  [address] [facility], [address]  [phone]   [phone]      OUTPATIENT PHYSICAL THERAPY: Daily Treatment Note 2/15/2022  Visit Count:  13    ICD-10: Treatment Diagnosis:   M25.511 Pain in Right Shoulder  M25.611 Stiffness in Right Shoulder  M62.81 Muscle Weakness Generalized  Z47.89 Orthopedic Aftercare    Precautions/Allergies:   Patient has no known allergies. Follow Protocol  -Limit AROM to : Flexion (80 degrees), Extension (20 degrees), Abduction (80 degrees, IR (full), ER (to neutral only)  -Sling may be off  -No subscapularis stretch or strengthening until 6 weeks post op date    TSA after 6 weeks (22)  - All active and passive ROM ok  -Resistance in all arcs OK  TREATMENT PLAN:  Effective Dates: 2022 TO 2022 (90 days). Frequency/Duration: 2 times a week for 90 Days        PRE-TREATMENT SYMPTOMS/COMPLAINTS:  Pt reports continued mild soreness. Does have referral to add L hip to treatment. MEDICATIONS REVIEWED:  2/15/2022   TREATMENT:   (In addition to Assessment/Re-Assessment sessions the following treatments were rendered)    THERAPEUTIC EXERCISE: (25 minutes):  Exercises per grid below to improve mobility, strength and balance. Required minimal visual and verbal cues to promote proper body alignment and promote proper body posture. Progressed resistance, range and complexity of movement as indicated.      Date:  22 Date:  22 Date:  22 Date:  22 Date:  2/3/22 Date:  22 Date:  22 Date:  2/15/22   Activity/Exercise           Rhythmic stabilization            Shoulder flex (supine) Supine wand 10 X  Supine wand 15 X Supine wand 10 X 2 Supine wand 10 X 2 Supine wand 10 X 1 Supine wand 10 X 1 Supine wand 10 X 2  #2 supine wand 10 X 2   Shoulder abduction   Wand 15 X Supine wand 10 X 2        Shoulder ER Supine wand 10 X  Wand 15 X Wand 10 X 2 supine        S/l shoulder abduction 10 X AA 10 X AA 10 X AA 10 X AA 10 X AA 10 X AA 10 X AA 10 X AA   S/l ER 10 X AA 10 X 2 AA 10 X 2  10 X 2 10 X  10 X A 10 X 2 A #1 10 X 2   Supine protraction 10 X A 10 X 2   10 X 2  10 X 2 10 X 2 A #1 10 X 2   Prone row 10 X 2 A 10 X 2 A 10 X 2 A 10 X 2 A 10 X 2 A 10 X 2A #2 10 X 1 #2 10 X 2   Prone extension 10 X AA 10 X AA 10 X AA 10 X AA 10 X AA 10 X AA 10 X AA 10 X AA   Prone horizontal abduction 10 X AA 10 X AA 10 X AA 10 X AA 10 X AA 10 X AA 10 X AA 10 X AA   Sleeper stretch      30 seconds 3 X 30 seconds 3 X    TB rows/extension GTB 10 x 2 each GTB 10 X 2 each GTB 10 X 2 each GTB 10 X 2 each GTB 10 X 2 each GTB 10 X 2 each BTB 10 X 2 (row); GTB 10 X 2 BTB 10 x 2 (row); GTB 10 X 2   TB IR/ER GTB ER 10 X 2, YTB IR 10 X 2 YTB ER 10 X 2, GTB IR 10 X 2 GTB 10 X 2 each GTB 10 X 2 each GTB 10 X 2 each GTB 10 X 2 each GTB 10 X 2 GTB 10 X 2   Doorway stretch    15 seconds 3 X  30 seconds 3 X 15 seconds 3 X      MANUAL THERAPY: (20 minutes): Joint mobilization, Soft tissue mobilization and Manipulation was utilized and necessary because of the patient's restricted joint motion, painful spasm, loss of articular motion and restricted motion of soft tissue. +PROM R shoulder- all directions to pt tolerance  +STM to lateral deltoid, subscapularis  +Scar massage      (Used abbreviations: MET - muscle energy technique; PNF - proprioceptive neuromuscular facilitation; NMR - neuromuscular re-education; AP - anterior to posterior; PA - posterior to anterior)    MODALITIES: (0 minutes):      none      TREATMENT/SESSION ASSESSMENT:  AROM continues to be limited more by lateral deltoid pain with mm contraction. PROM is looking good. Added L hip to treatment and POC with referral from Dr. Michele . Pt reporting MRI showing tear at glute med.     Education: on objective findings and HEP    RECOMMENDATIONS/INTENT FOR NEXT TREATMENT SESSION: \"Next visit will focus on advancements to more challenging activities\".     PAIN: Initial: 1/10 Post Session:  2/10     [facility] Portal    Total Treatment Billable Duration:   PT Patient Time In/Time Out  Time In: 1100  Time Out: 1145  Webster Lesch, PT, DPT    Future Appointments   Date Time Provider [place]   2/16/2022  8:00 AM Rolando Folds [facility]   2/17/2022 11:00 AM Rolando Folds [facility]   2/21/2022 11:00 AM Rolando Folds [facility]   2/24/2022 11:00 AM Rolando Folds [facility]   2/25/2022 10:15 AM Rolando Folds [facility]   3/21/2022  2:10 PM Arik Najera MD [id number]   3/28/2022  9:15 AM Mark Rojo MD [facility] POA       Visit Approval Visit # Therapist initials Date A NS / Cx < 24 hr >24 hr Cx Comments    1 SHAISTA 1/5/22 [x]  [] [] Initial evaluation    2 SHAISTA 1/7/22 [x] [] []     3 SHAISTA 1/11/22 [x] [] []     4 SHAISTA 1/13/22 [x] [] []     5 PDE 1- [x] [] []     6 SHAISTA 1/20/22 [x] [] []     7 SHAISTA 1/25/22 [x] [] []     8 SHAISTA 1/27/22 [x] [] []     9 SHAISTA 2/1/22 [x] [] [] PN due next visit    10  SHAISTA 2/3/22 [x] [] [] PN today    11 SHAISTA 2/7/22 [x] [] [] 1    12 SHAISTA 2/9/22 [x] [] [] 2    13 SHAISTA 2/15/22 [x] [] [] 3       [] [] []        [] [] []        [] [] []        [] [] []        [] [] []

## 2022-02-15 NOTE — THERAPY RECERTIFICATION
Saadia Lo  : 1950  [place] / [place] / Product Type: Medicare /  [address]  at [address] [facility] [address], [place] [address] [address] [facility]  [phone]   [phone]        OUTPATIENT PHYSICAL THERAPY:Re-evaluation 2/15/2022    ICD-10: Treatment Diagnosis:   M25.511 Pain in Right Shoulder  M25.611 Stiffness in Right Shoulder  M62.81 Muscle Weakness Generalized  Z47.89 Orthopedic Aftercare  M25.552 Pain in Left hip  M25.652 Stiffness of Left hip, not elsewhere classified    Precautions/Allergies:   Patient has no known allergies. Fall Risk Score: 2 (? 5 = High Risk)  MD Orders: Eval and Treat MEDICAL/REFERRING DIAGNOSIS:  R shoulder replacement   DATE OF ONSET: 12/3/21  REFERRING PHYSICIAN: Hema Cortes MD, Carole Obando MD  RETURN PHYSICIAN APPOINTMENT: 22   Re-Evaluation: Pt has attended 13 PT sessions to date. Pt has been experiencing L hip pain for several weeks now and now has referral for treatment by Heather Curry. Will modify POC to 3X per week to continue treating s/p TSA 2X per week and L hip pain for 1X per week. Pt presents with decreased L hip ROM, decreased strength and increased pain affecting participation in ADLs and functional mobility at this time. Pt will benefit from PT interventions to address these deficits. Progress Report:  Pt has attended 10 PT sessions to date. Pt has met 3/5 STG and 0/5 LTG at this time. Pt with 4 point improvement in DASH score. Pt demonstrating improvements in PROM, AROM, strength, and pain. Pt limited by increased pain at lateral deltoid tendon with possibly some tendonosis. Pt is making good steady progress towards remaining goals and will continue to benefit from skilled PT interventions at this time. ASSESSMENT:  Mr. Kaci Espitia has attended 1 physical therapy session including the initial evaluation. Pt is s/p R TSA.  Pt presents with decreased ROM, decreased strength and increased pain affecting participation in ADLs and functional mobility at this time. Recommending skilled PT: manual therapeutic techniques (as appropriate), therapeutic exercises and activities, balance interventions, and a comprehensive home exercise program to address the current impairments, as listed above. Saadia Lo will benefit from skilled PT (medically necessary) to address above deficits affecting participation in basic ADLs and overall functional tolerance. PROBLEM LIST (Impacting functional limitations):  1. Decreased Strength  2. Decreased ADL/Functional Activities  3. Decreased Transfer Abilities  4. Decreased Ambulation Ability/Technique  5. Decreased Balance  6. Increased Pain  7. Decreased Activity Tolerance  8. Decreased Flexibility/Joint Mobility  9. Decreased [place] with Home Exercise Program INTERVENTIONS PLANNED:  1. Balance Exercise  2. Cold  3. Cryotherapy  4. Electrical Stimulation  5. Family Education  6. Gait Training  7. Heat  8. Home Exercise Program (HEP)  9. Manual Therapy  10. Neuromuscular Re-education/Strengthening  11. Range of Motion (ROM)  12. Therapeutic Activites  13. Therapeutic Exercise/Strengthening  14. Transcutaneous Electrical Nerve Stimulation (TENS)  15. Transfer Training   TREATMENT PLAN:  TREATMENT PLAN:  Effective Dates: 1/5/2022 TO 4/5/2022 (90 days). Frequency/Duration: 3 times a week for 90 Days  GOALS: (Goals have been discussed and agreed upon with patient.)  Short Term Time Frame: 4 weeks  1. PT will be compliant with HEP. Goal met 2/3/22  2. Pt will decrease worst pain to 3/10 or less in order to return to dressing ADLs without difficulty Goal met 2/3/22 (3/10)  3. Pt will reduce DASH to 20 or less in order to return to personal hygiene ADLs Progressing 2/3/22  4. Pt will improve gross RUE strength to 3/5 or greater in order to reach overhead Progressing 2/3/22  5.  Pt will demonstrate PROM R shoulder Flexion and Abduction of 120 or greater Goal met 2/3/22  GOALS: (Goals have been discussed and agreed upon with patient.)  Discharge Goals: Time Frame: 12 weeks  1. PT will be independent with HEP. Progressing 2/3/22  2. Pt will decrease worst pain to 1/10 or less in order to return to dressing ADLs without difficulty Progressing 2/3/22  3. Pt will reduce DASH to 10 or less in order to return to personal hygiene ADLs Progressing 2/3/22  4. Pt will improve gross RUE strength to 4+/5 or greater in order to reach overhead Progressing 2/3/22  5. Pt will demonstrate active R shoulder Flexion and Abduction of 125 or greater Progressing 2/3/22    (Left Hip)  GOALS: (Goals have been discussed and agreed upon with patient.)  Discharge Goals: Time Frame: 8 weeks  16. Eros King will be independent with HEP. 17. Pt will navigate 4 steps with no pain in order to enter home  18. Pt will improve hip strength to 5/5 in order to improve walking tolerance  19. Pt will c/o worst pain of 2/10 or less in order to decrease antalgic gait  20. Pt will improve LEFS to 20/80 in order to return ambulating for 1 mile or more for Recreational activity            Rehabilitation Potential For Stated Goals: Fair    Outcome Measure: Tool Used: Disabilities of the Arm, Shoulder and Hand (DASH) Questionnaire - Quick Version  Score:  Initial: 25/55  Most Recent: 21/55 (Date: 2/3/22 )   Interpretation of Score: The DASH is designed to measure the activities of daily living in person's with upper extremity dysfunction or pain. Each section is scored on a 1-5 scale, 5 representing the greatest disability. The scores of each section are added together for a total score of 55. Tool Used: Lower Extremity Functional Scale  Score:  Initial: 34/80  Most Recent: /80         Medical Necessity:   · Skilled intervention continues to be required due to decreased strength, ROM, balance, and functional mobility.   Reason for Services/Other Comments:  · Patient continues to require skilled intervention due to decreased strength, balance, and ROM with increased pain affecting pt functional mobility. ·   Regarding Anushka Johnson's therapy, I certify that the treatment plan above will be carried out by a therapist or under their direction. Thank you for this referral,  Karime Lamb, PT, DPT     Referring Physician Signature: Juaquin Wade MD ___________________________________________ (s/p R TSA)  Referring Physician Signature: Orville Beckwith MD _____________________________________________ (L hip pain)            The information in this section was collected on 1/5/22 (except where otherwise noted). HISTORY:   History of Present Injury/Illness (Reason for Referral):  Pt is s/p R TSA. Pt reports having a significant amount of swelling. Still with swelling/edema present. Pt reports he has been doing HEP. Pt goals are to return to higher level activities like fly fishing    2/16/22: Pt now arrives with c/o L hip pain. Pt was walking dog in the snow several weeks ago and has L hip pain ever since. Pt with recent MRI showing MRI scan of his left hip which does show some high-grade partial tearing of the gluteus medius and minimus with minimal retraction. There is some atrophy in the muscle as well. Degenerative labral tearing and degenerative chondromalacia intra-articularly per referring physician note. MD wanting to treat this conservatively and recommending PT at this time for 6-8 weeks.  Difficulty going for floor to standing, increased pain with walking, difficulty going up steps (anything that involves pushing off of L LE)    CC/Primary Concern: Return to full ROM and strength R UE            Treatment Side: Right      Past Medical History/Comorbidities:   Mr. Samantha Mcbride  has a past medical history of Arthritis (07/06/2015), Back pain, DDD (degenerative disc disease), lumbar, Dry eyes, Enlarged prostate, GERD (gastroesophageal reflux disease), History of basal cell cancer, History of squamous cell carcinoma, IBS (irritable bowel syndrome), Insomnia (07/06/2015), Left inguinal hernia, MVP (mitral valve prolapse), Onychomycosis (7/6/2015), RBBB (7/6/2015), Right wrist pain (7/6/2015), Scoliosis, and Spondylosis of lumbar region without myelopathy or radiculopathy. Mr. Holly Sr  has a past surgical history that includes neurological procedure unlisted (2007); hx colonoscopy; hx orthopaedic; hx shoulder arthroscopy (Right); hx orthopaedic (Left, 06/2020); hx orthopaedic (Right, 08/2020); hx cataract removal; and hx carpal tunnel release. Social History/Living Environment:  Lives with wiife, independent with all mobility at baseline    Pain/Symptom Location: post op pain- generalized    Worst Pain: 6/10 (shoulder), 7/10 (L hip)  Current Pain: 1/10 (shoulder), 4/10 (L hip)    Aggravating Factors: any use of R UE    Occupation: Retired cardiologist    Prior Level of Function/Work/Activity:  History of chronic LBP and R foot pain due to OA- despite this lives a very active lifestyle enjoying many outdoor activities- hunting, fly fishing, etc      Patient Goals: Gain full ROM, strength and return to recreational activities    Current Medications:       Current Outpatient Medications:     traMADoL (ULTRAM) 50 mg tablet, Take 50 mg by mouth every six (6) hours as needed for Pain., Disp: , Rfl:     loratadine-pseudoephedrine (Claritin-D 12 Hour) 5-120 mg per tablet, Take 1 Tablet by mouth two (2) times a day., Disp: , Rfl:     oxymetazoline HCl (AFRIN NASAL SPRAY NA), by Nasal route., Disp: , Rfl:     temazepam (RESTORIL) 15 mg capsule, Take 1 Capsule by mouth nightly as needed for Sleep. Max Daily Amount: 15 mg.  Indications: difficulty sleeping (Patient not taking: Reported on 12/6/2021), Disp: 30 Capsule, Rfl: 0    ipratropium (Atrovent) 21 mcg (0.03 %) nasal spray, 2 Sprays by Both Nostrils route every twelve (12) hours as needed for Rhinitis., Disp: 30 mL, Rfl: 5    zolpidem (AMBIEN) 10 mg tablet, Take 1 Tablet by mouth nightly as needed for Sleep. Max Daily Amount: 10 mg., Disp: 90 Tablet, Rfl: 1    terbinafine HCL (LAMISIL) 250 mg tablet, Take 250 mg by mouth daily. , Disp: , Rfl:     linaCLOtide (Linzess) 145 mcg cap capsule, Take 1 Capsule by mouth Daily (before breakfast). , Disp: 30 Capsule, Rfl: 5    fluocinonide (LIDEX) 0.05 % topical gel, Apply  to affected area two (2) times a day., Disp: , Rfl:     meloxicam (Mobic) 15 mg tablet, Take 1 Tablet by mouth daily. , Disp: 90 Tablet, Rfl: 3    tadalafiL (CIALIS) 5 mg tablet, Take 1 Tablet by mouth daily. (Patient not taking: Reported on 12/6/2021), Disp: 90 Tablet, Rfl: 3    tretinoin (RETIN-A) 0.05 % topical cream, APPLY TO AFFECTED AREA AT BEDTIME, Disp: , Rfl:     Myrbetriq 50 mg ER tablet, Take 1 Tab by mouth daily. , Disp: 30 Tab, Rfl: 12    tamsulosin (FLOMAX) 0.4 mg capsule, Take 2 Caps by mouth daily. (Patient taking differently: Take 0.4 mg by mouth nightly.), Disp: 180 Cap, Rfl: 3    dicyclomine (BentyL) 20 mg tablet, Take 1 Tab by mouth every six (6) hours. , Disp: 120 Tab, Rfl: 5    lidocaine (LIDODERM) 5 %, by TransDERmal route daily as needed. Apply patch to the affected area for 12 hours a day and remove for 12 hours a day. , Disp: , Rfl:     RESTASIS 0.05 % ophthalmic emulsion, INSTILL 1 DROP IN BOTH EYES TWICE DAILY, Disp: , Rfl: 12    omeprazole (PRILOSEC) 20 mg capsule, Take 20 mg by mouth daily as needed. , Disp: , Rfl:    Date Last Reviewed:  2/16/2022       Ambulatory/Rehab Services H2 Model Falls Risk Assessment    Risk Factors:       (1)  Gender [Male] Ability to Rise from Chair:       (1)  Pushes up, successful in one attempt    Falls Prevention Plan:       No modifications necessary   Total: (5 or greater = High Risk): 2    ©2010 [employer]. All Rights Reserved. [facility] Patent #[id number].  Federal Law prohibits the replication, distribution or use without written permission from [facility] [email or web address]        Number of Personal Factors/Comorbidities that affect the Plan of Care: 3+: HIGH COMPLEXITY   EXAMINATION:   Inspection        Post Op/Wound: Healed                Additional Comments:   ________________________________________________________________________________________________  Observation:          Posture: Rounded shoulders, forward head            Edema: Increased throughout entirety of UE into hand               Addition Comments:     ________________________________________________________________________________________________  Range of Motion            Upper:  Joint: Passive Active Active Passive Right 2/3/22 Active Right 2/3/22    Right (Degrees) Right (Degrees) Left Active (Degrees)     Shoulder Flexion 80 degrees NT per protocol 140 degrees 130 degrees 115 degrees   Shoulder Extension  NT per protocol 62 degrees  60 degrees   Shoulder Adduction  NT per protocol 125 degrees   WFL    Shoulder Abduction 60 degrees NT per protocol  170 degrees 115 degrees    Shoulder Internal Rotation (Neutral Position) NT per protocol       Shoulder External Rotation  (Neutral Position) NT per protocol       Functional IR  NT per protocol T11 45 degrees R hip   Functional ER  NT per protocol T2 70 degrees C7     Lower  Joint: Passive Passive Active Active    Right (Degrees) Left (Degrees) Right (Degrees) Left (Degrees)   Hip Flexion       Hip Extension       Hip Adduction       Hip Abduction       Hip Internal Rotation        Hip External Rotation   30 degrees 20 degrees (discomfort)   Knee Flexion   35 degrees 20 degrees (discomfort)   Knee Extension         ________________________________________________________________________________________________  Strength          Upper Extremity    Joint:   Right 2/3/22    RIGHT LEFT    Shoulder Flexion 2/5 4+/5 2+/5   Shoulder Extension 2/5 5/5 3/5   Shoulder Abduction 2/5 4+/5 2/5   Shoulder Internal Rotation 2/5 4/5 2+/5 Shoulder External Rotation 2/5 3+/5 3/5   Elbow Flexion 3/5 5/5 4/5   Elbow Extension 3/5 5/5 4/5     Lower  Joint:      RIGHT LEFT   Hip Flexion 5/5 3/5 (p)   Hip Extension NT/5 NT/5   Hip Internal Rotation 5/5 3/5 (p)   Hip External Rotation 5/5 3/5 (p)   Hip Abduction 4+/5 3-/5 (p)   Hip Adduction NT/5 NT/5   Knee Flexion 5/5 5/5   Knee Extension 5/5 4/5 (discomfort)     _____________________________________________________________________________________________  [facility]        C/O Radicular Symptoms: No      Additional Comments:   ___________________________________________________________________________________________________________________________________________________________  Palpation: Lateral deltoid; L glute med, max, proximal ITB  Joint mobilization: Capsular hypomobility post op; not assessed at hip this session       Body Structures Involved:  1. Nerves  2. Bones  3. Joints  4. Muscles  5. Ligaments Body Functions Affected:  1. Sensory/Pain  2. Neuromusculoskeletal  3. Movement Related Activities and Participation Affected:  1. General Tasks and Demands  2. Mobility  3. Self Care  4. Domestic Life  5. Interpersonal Interactions and Relationships  6.  Community, Social and [place]   Number of elements (examined above) that affect the Plan of Care: 4+: HIGH COMPLEXITY   CLINICAL PRESENTATION:   Presentation: Evolving clinical presentation with changing clinical characteristics: MODERATE COMPLEXITY   CLINICAL DECISION MAKING:      Use of outcome tool(s) and clinical judgement create a POC that gives a: Clear prediction of patient's progress: LOW COMPLEXITY

## 2022-02-16 ENCOUNTER — HOSPITAL ENCOUNTER (OUTPATIENT)
Dept: PHYSICAL THERAPY | Age: 72
Discharge: HOME | End: 2022-02-16
Payer: MEDICARE

## 2022-02-16 PROCEDURE — 97140 MANUAL THERAPY 1/> REGIONS: CPT

## 2022-02-16 PROCEDURE — 97110 THERAPEUTIC EXERCISES: CPT

## 2022-02-16 PROCEDURE — 97164 PT RE-EVAL EST PLAN CARE: CPT

## 2022-02-16 NOTE — PROGRESS NOTES
Diego Lock  : 1950  [place] / [place] / Product Type: Medicare /  [address]  at [facility]  [address] [facility], [address]  [phone]   [phone]      OUTPATIENT PHYSICAL THERAPY: Daily Treatment Note 2022  Visit Count:  14    ICD-10: Treatment Diagnosis:   M25.511 Pain in Right Shoulder  M25.611 Stiffness in Right Shoulder  M62.81 Muscle Weakness Generalized  Z47.89 Orthopedic Aftercare  M25.552 Pain in Left hip  M25.652 Stiffness of Left hip, not elsewhere classified    Precautions/Allergies:   Patient has no known allergies. Follow Protocol  -Limit AROM to : Flexion (80 degrees), Extension (20 degrees), Abduction (80 degrees, IR (full), ER (to neutral only)  -Sling may be off  -No subscapularis stretch or strengthening until 6 weeks post op date    TSA after 6 weeks (22)  - All active and passive ROM ok  -Resistance in all arcs OK  TREATMENT PLAN:  Effective Dates: 2022 TO 2022 (90 days). Frequency/Duration: 3 times a week for 90 Days        PRE-TREATMENT SYMPTOMS/COMPLAINTS:  Added L hip pain to POC and treatment plan    MEDICATIONS REVIEWED:  2022   TREATMENT:   (In addition to Assessment/Re-Assessment sessions the following treatments were rendered)    THERAPEUTIC EXERCISE: (8 minutes):  Exercises per grid below to improve mobility, strength and balance. Required minimal visual and verbal cues to promote proper body alignment and promote proper body posture. Progressed resistance, range and complexity of movement as indicated.      Date:  22 Date:  22 Date:  22 Date:  22 Date:  2/3/22 Date:  22 Date:  22 Date:  2/15/22 Date:  22   Activity/Exercise            Rhythmic stabilization             Shoulder flex (supine) Supine wand 10 X  Supine wand 15 X Supine wand 10 X 2 Supine wand 10 X 2 Supine wand 10 X 1 Supine wand 10 X 1 Supine wand 10 X 2  #2 supine wand 10 X 2    Shoulder abduction   Wand 15 X Supine wand 10 X 2         Shoulder ER Supine wand 10 X  Wand 15 X Wand 10 X 2 supine         S/l shoulder abduction 10 X AA 10 X AA 10 X AA 10 X AA 10 X AA 10 X AA 10 X AA 10 X AA    S/l ER 10 X AA 10 X 2 AA 10 X 2  10 X 2 10 X  10 X A 10 X 2 A #1 10 X 2    Supine protraction 10 X A 10 X 2   10 X 2  10 X 2 10 X 2 A #1 10 X 2    Prone row 10 X 2 A 10 X 2 A 10 X 2 A 10 X 2 A 10 X 2 A 10 X 2A #2 10 X 1 #2 10 X 2    Prone extension 10 X AA 10 X AA 10 X AA 10 X AA 10 X AA 10 X AA 10 X AA 10 X AA    Prone horizontal abduction 10 X AA 10 X AA 10 X AA 10 X AA 10 X AA 10 X AA 10 X AA 10 X AA    Sleeper stretch      30 seconds 3 X 30 seconds 3 X     TB rows/extension GTB 10 x 2 each GTB 10 X 2 each GTB 10 X 2 each GTB 10 X 2 each GTB 10 X 2 each GTB 10 X 2 each BTB 10 X 2 (row); GTB 10 X 2 BTB 10 x 2 (row); GTB 10 X 2    TB IR/ER GTB ER 10 X 2, YTB IR 10 X 2 YTB ER 10 X 2, GTB IR 10 X 2 GTB 10 X 2 each GTB 10 X 2 each GTB 10 X 2 each GTB 10 X 2 each GTB 10 X 2 GTB 10 X 2    Doorway stretch    15 seconds 3 X  30 seconds 3 X 15 seconds 3 X     Seated piriformis stretch         HEP   Seated glute stretch         HEP     MANUAL THERAPY: (15 minutes): Joint mobilization, Soft tissue mobilization and Manipulation was utilized and necessary because of the patient's restricted joint motion, painful spasm, loss of articular motion and restricted motion of soft tissue.    Not today 2/16/22  +PROM R shoulder- all directions to pt tolerance  +STM to lateral deltoid, subscapularis  +Scar massage      Today: 2/16/22  +manual stretching L ITB, glute, piriformis  +STM to glute med    (Used abbreviations: MET - muscle energy technique; PNF - proprioceptive neuromuscular facilitation; NMR - neuromuscular re-education; AP - anterior to posterior; PA - posterior to anterior)    MODALITIES: (0 minutes):      none      TREATMENT/SESSION ASSESSMENT:  Added L hip pain to POC this session. Education: on objective findings and HEP    RECOMMENDATIONS/INTENT FOR NEXT TREATMENT SESSION: \"Next visit will focus on advancements to more challenging activities\".     PAIN: Initial: 4/10 Post Session:  4/10     MedBridge Portal    Total Treatment Billable Duration:   PT Patient Time In/Time Out  Time In: 0800  Time Out: 0840  Elio Keys, PT, DPT    Future Appointments   Date Time Provider Elisabeth Rodriguezi   2/17/2022 11:00 AM Jonathon Shove A [facility]   2/21/2022 11:00 AM Jonathon Shove A [facility]   2/24/2022 11:00 AM Jonathon Shove A [facility]   2/25/2022 10:15 AM Jonathon Shove A [facility]   3/21/2022  2:10 PM Miranda Zelaya MD [id number]   3/28/2022  9:15 AM Lidia Ware MD [facility] POA       Visit Approval Visit # Therapist initials Date A NS / Cx < 24 hr >24 hr Cx Comments    1 SHAISTA 1/5/22 [x]  [] [] Initial evaluation    2 SHAISTA 1/7/22 [x] [] []     3 SHAISTA 1/11/22 [x] [] []     4 SHAISTA 1/13/22 [x] [] []     5 PDE 1- [x] [] []     6 SHAISTA 1/20/22 [x] [] []     7 SHAISTA 1/25/22 [x] [] []     8 SHAISTA 1/27/22 [x] [] []     9 SHAISTA 2/1/22 [x] [] [] PN due next visit    10  SHAISTA 2/3/22 [x] [] [] PN today    11 SHAISTA 2/7/22 [x] [] [] 1    12 SHAISTA 2/9/22 [x] [] [] 2    13 SHAISTA 2/15/22 [x] [] [] 3    14 SHAISTA 9/34/23 [x] [] [] Recert/Reeval- added L hip pain to [address] and treatment plan       [] [] []        [] [] []        [] [] []        [] [] []

## 2022-02-17 ENCOUNTER — HOSPITAL ENCOUNTER (OUTPATIENT)
Dept: PHYSICAL THERAPY | Age: 72
Discharge: HOME | End: 2022-02-17
Payer: MEDICARE

## 2022-02-17 PROCEDURE — 97140 MANUAL THERAPY 1/> REGIONS: CPT

## 2022-02-17 PROCEDURE — 97110 THERAPEUTIC EXERCISES: CPT

## 2022-02-17 NOTE — PROGRESS NOTES
Michelle Elena  : 1950  [place] / Product Type: Medicare /  [address] at [address] [facility] [address], [address] [facility], [place] [address] [address] [facility]  [phone]   [phone]      OUTPATIENT PHYSICAL THERAPY: Daily Treatment Note 2022  Visit Count:  15    ICD-10: Treatment Diagnosis:   M25.511 Pain in Right Shoulder  M25.611 Stiffness in Right Shoulder  M62.81 Muscle Weakness Generalized  Z47.89 Orthopedic Aftercare  M25.552 Pain in Left hip  M25.652 Stiffness of Left hip, not elsewhere classified    Precautions/Allergies:   Patient has no known allergies. Follow Protocol  -Limit AROM to : Flexion (80 degrees), Extension (20 degrees), Abduction (80 degrees, IR (full), ER (to neutral only)  -Sling may be off  -No subscapularis stretch or strengthening until 6 weeks post op date    TSA after 6 weeks (22)  - All active and passive ROM ok  -Resistance in all arcs OK  TREATMENT PLAN:  Effective Dates: 2022 TO 2022 (90 days). Frequency/Duration: 3 times a week for 90 Days        PRE-TREATMENT SYMPTOMS/COMPLAINTS:  Pt reports his shoulder was a little sore after last session. MEDICATIONS REVIEWED:  2022   TREATMENT:   (In addition to Assessment/Re-Assessment sessions the following treatments were rendered)    THERAPEUTIC EXERCISE: (25 minutes):  Exercises per grid below to improve mobility, strength and balance. Required minimal visual and verbal cues to promote proper body alignment and promote proper body posture. Progressed resistance, range and complexity of movement as indicated.      Date:  22 Date:  22 Date:  22 Date:  22 Date:  2/3/22 Date:  22 Date:  22 Date:  2/15/22 Date:  22 Date:  22   Activity/Exercise             Rhythmic stabilization              Shoulder flex (supine) Supine wand 10 X  Supine wand 15 X Supine wand 10 X 2 Supine wand 10 X 2 Supine wand 10 X 1 Supine wand 10 X 1 Supine wand 10 X 2  #2 supine wand 10 X 2  #2 supine wand 10 X 2   Shoulder abduction   Wand 15 X Supine wand 10 X 2          Shoulder ER Supine wand 10 X  Wand 15 X Wand 10 X 2 supine          S/l shoulder abduction 10 X AA 10 X AA 10 X AA 10 X AA 10 X AA 10 X AA 10 X AA 10 X AA  10 X AA   S/l ER 10 X AA 10 X 2 AA 10 X 2  10 X 2 10 X  10 X A 10 X 2 A #1 10 X 2  #1 10 X 2   Supine protraction 10 X A 10 X 2   10 X 2  10 X 2 10 X 2 A #1 10 X 2  #1 10 X 2   Prone row 10 X 2 A 10 X 2 A 10 X 2 A 10 X 2 A 10 X 2 A 10 X 2A #2 10 X 1 #2 10 X 2  #2 10 X 2   Prone extension 10 X AA 10 X AA 10 X AA 10 X AA 10 X AA 10 X AA 10 X AA 10 X AA  10 X AA   Prone horizontal abduction 10 X AA 10 X AA 10 X AA 10 X AA 10 X AA 10 X AA 10 X AA 10 X AA  10 X AA   Sleeper stretch      30 seconds 3 X 30 seconds 3 X   30 seconds 3 X   TB rows/extension GTB 10 x 2 each GTB 10 X 2 each GTB 10 X 2 each GTB 10 X 2 each GTB 10 X 2 each GTB 10 X 2 each BTB 10 X 2 (row); GTB 10 X 2 BTB 10 x 2 (row); GTB 10 X 2  BTB 10 x 2 each   TB IR/ER GTB ER 10 X 2, YTB IR 10 X 2 YTB ER 10 X 2, GTB IR 10 X 2 GTB 10 X 2 each GTB 10 X 2 each GTB 10 X 2 each GTB 10 X 2 each GTB 10 X 2 GTB 10 X 2  GTB 10 X 2 each   Doorway stretch    15 seconds 3 X  30 seconds 3 X 15 seconds 3 X   15 seconds 3 X   Seated piriformis stretch         HEP    Seated glute stretch         HEP      MANUAL THERAPY: (15 minutes): Joint mobilization, Soft tissue mobilization and Manipulation was utilized and necessary because of the patient's restricted joint motion, painful spasm, loss of articular motion and restricted motion of soft tissue.    Today 2/17/22  +PROM R shoulder- all directions to pt tolerance  +STM to lateral deltoid, subscapularis    Not today.  +manual stretching L ITB, glute, piriformis  +STM to glute med    (Used abbreviations: MET - muscle energy technique; PNF - proprioceptive neuromuscular facilitation; NMR - neuromuscular re-education; AP - anterior to posterior; PA - posterior to anterior)    MODALITIES: (0 minutes):      none      TREATMENT/SESSION ASSESSMENT:  Pt continues with pain/soreness at lateral deltoid. Tolerated exercises fair- still requiring AA with exercises above due to pain. Education: on objective findings and HEP    RECOMMENDATIONS/INTENT FOR NEXT TREATMENT SESSION: \"Next visit will focus on advancements to more challenging activities\".     PAIN: Initial: 2/10 (shoulder) Post Session:  3/10 (shoulder soreness)     MedBridge Portal    Total Treatment Billable Duration:   PT Patient Time In/Time Out  Time In: 1100  Time Out: 1144  Webster Lesch, PT, DPT    Future Appointments   Date Time Provider Elisabeth Gissel   2/21/2022 11:00 AM Rolando Folds A [facility]   2/24/2022 11:00 AM Rolando Folds A [facility]   2/25/2022 10:15 AM Rolando Folds A [facility]   3/1/2022 11:00 AM Rolando Folds A [facility]   3/3/2022 11:00 AM Rolando Folds A [facility]   3/4/2022 11:00 AM Rolando Folds A [facility]   3/21/2022  2:10 PM Arik Najera MD [id number] PGU   3/28/2022  9:15 AM Mark Rojo MD [facility] POA       Visit Approval Visit # Therapist initials Date A NS / Cx < 24 hr >24 hr Cx Comments    1 SHAISTA 1/5/22 [x]  [] [] Initial evaluation    2 SHAISTA 1/7/22 [x] [] []     3 SHAISTA 1/11/22 [x] [] []     4 SHAISTA 1/13/22 [x] [] []     5 PDE 1- [x] [] []     6 SHAISTA 1/20/22 [x] [] []     7 SHAISTA 1/25/22 [x] [] []     8 SHAISTA 1/27/22 [x] [] []     9 SHAISTA 2/1/22 [x] [] [] PN due next visit    10  SHAISTA 2/3/22 [x] [] [] PN today    11 SHAISTA 2/7/22 [x] [] [] 1    12 SHAISTA 2/9/22 [x] [] [] 2    13 SHAISTA 2/15/22 [x] [] [] 3    14 SHAISTA 1/73/51 [x] [] [] Recert/Reeval- added L hip pain to [address] and treatment plan    15 SHAISTA 2/17/22 [x] [] [] 4       [] [] []        [] [] []        [] [] []

## 2022-02-21 ENCOUNTER — HOSPITAL ENCOUNTER (OUTPATIENT)
Dept: PHYSICAL THERAPY | Age: 72
Discharge: HOME | End: 2022-02-21
Payer: MEDICARE

## 2022-02-21 PROCEDURE — 97140 MANUAL THERAPY 1/> REGIONS: CPT

## 2022-02-21 PROCEDURE — 97110 THERAPEUTIC EXERCISES: CPT

## 2022-02-21 NOTE — PROGRESS NOTES
Jennifer Adhikari  : 1950  [place] / Product Type: Medicare /  [address]  at [facility]  [address], [address] [facility], [address]  [phone]   [phone]      OUTPATIENT PHYSICAL THERAPY: Daily Treatment Note 2022  Visit Count:  16    ICD-10: Treatment Diagnosis:   M25.511 Pain in Right Shoulder  M25.611 Stiffness in Right Shoulder  M62.81 Muscle Weakness Generalized  Z47.89 Orthopedic Aftercare  M25.552 Pain in Left hip  M25.652 Stiffness of Left hip, not elsewhere classified    Precautions/Allergies:   Patient has no known allergies. Follow Protocol  -Limit AROM to : Flexion (80 degrees), Extension (20 degrees), Abduction (80 degrees, IR (full), ER (to neutral only)  -Sling may be off  -No subscapularis stretch or strengthening until 6 weeks post op date    TSA after 6 weeks (22)  - All active and passive ROM ok  -Resistance in all arcs OK  TREATMENT PLAN:  Effective Dates: 2022 TO 2022 (90 days). Frequency/Duration: 3 times a week for 90 Days        PRE-TREATMENT SYMPTOMS/COMPLAINTS:  Pt reports his shoulder seems to be more sore today. He states he has been trying to do his exercises every day. MEDICATIONS REVIEWED:  2022   TREATMENT:   (In addition to Assessment/Re-Assessment sessions the following treatments were rendered)    THERAPEUTIC EXERCISE: (25 minutes):  Exercises per grid below to improve mobility, strength and balance. Required minimal visual and verbal cues to promote proper body alignment and promote proper body posture. Progressed resistance, range and complexity of movement as indicated.      Date:  22 Date:  22 Date:  22 Date:  22 Date:  2/3/22 Date:  22 Date:  22 Date:  2/15/22 Date:  22 Date:  22 Date:  22   Activity/Exercise              Rhythmic stabilization               Shoulder flex (supine) Supine wand 10 X Supine wand 15 X Supine wand 10 X 2 Supine wand 10 X 2 Supine wand 10 X 1 Supine wand 10 X 1 Supine wand 10 X 2  #2 supine wand 10 X 2  #2 supine wand 10 X 2 #2 supine wand 10 X 2   Shoulder abduction   Wand 15 X Supine wand 10 X 2           Shoulder ER Supine wand 10 X  Wand 15 X Wand 10 X 2 supine           S/l shoulder abduction 10 X AA 10 X AA 10 X AA 10 X AA 10 X AA 10 X AA 10 X AA 10 X AA  10 X AA 10 X AA   S/l ER 10 X AA 10 X 2 AA 10 X 2  10 X 2 10 X  10 X A 10 X 2 A #1 10 X 2  #1 10 X 2 #1 10 X 2   Supine protraction 10 X A 10 X 2   10 X 2  10 X 2 10 X 2 A #1 10 X 2  #1 10 X 2 #1 10 X 2   Prone row 10 X 2 A 10 X 2 A 10 X 2 A 10 X 2 A 10 X 2 A 10 X 2A #2 10 X 1 #2 10 X 2  #2 10 X 2 #2 10 X 2   Prone extension 10 X AA 10 X AA 10 X AA 10 X AA 10 X AA 10 X AA 10 X AA 10 X AA  10 X AA 10 X AA   Prone horizontal abduction 10 X AA 10 X AA 10 X AA 10 X AA 10 X AA 10 X AA 10 X AA 10 X AA  10 X AA 10 X AA   Sleeper stretch      30 seconds 3 X 30 seconds 3 X   30 seconds 3 X 30 seconds 3 X    TB rows/extension GTB 10 x 2 each GTB 10 X 2 each GTB 10 X 2 each GTB 10 X 2 each GTB 10 X 2 each GTB 10 X 2 each BTB 10 X 2 (row); GTB 10 X 2 BTB 10 x 2 (row); GTB 10 X 2  BTB 10 x 2 each BTB 10 X 2 (row); GTB 10 X 2 (ext)   TB IR/ER GTB ER 10 X 2, YTB IR 10 X 2 YTB ER 10 X 2, GTB IR 10 X 2 GTB 10 X 2 each GTB 10 X 2 each GTB 10 X 2 each GTB 10 X 2 each GTB 10 X 2 GTB 10 X 2  GTB 10 X 2 each GTB 10 X 2 each   Doorway stretch    15 seconds 3 X  30 seconds 3 X 15 seconds 3 X   15 seconds 3 X 15 seconds 3 X    Seated piriformis stretch         HEP     Seated glute stretch         HEP       MANUAL THERAPY: (15 minutes): Joint mobilization, Soft tissue mobilization and Manipulation was utilized and necessary because of the patient's restricted joint motion, painful spasm, loss of articular motion and restricted motion of soft tissue.    Today 2/17/22  +PROM R shoulder- all directions to pt tolerance  +STM to lateral deltoid, subscapularis    Not today.  +manual stretching L ITB, glute, piriformis  +STM to glute med    (Used abbreviations: MET - muscle energy technique; PNF - proprioceptive neuromuscular facilitation; NMR - neuromuscular re-education; AP - anterior to posterior; PA - posterior to anterior)    MODALITIES: (10 minutes):      *  Cold Pack Therapy in order to provide analgesia and reduce inflammation and edema. TREATMENT/SESSION ASSESSMENT: PT recommended days of rest between sessions. Pt verbalized understanding. Modified exercises as need to reduce discomfort today. Education: on objective findings and HEP    RECOMMENDATIONS/INTENT FOR NEXT TREATMENT SESSION: \"Next visit will focus on advancements to more challenging activities\".     PAIN: Initial: 2/10 (shoulder) Post Session:  3-4/10 (shoulder soreness)     [facility] Portal    Total Treatment Billable Duration:   PT Patient Time In/Time Out  Time In: 1100  Time Out: [address] [facility], PT, DPT    Future Appointments   Date Time Provider Elisabeth Chauhan   2/24/2022 11:00 AM Moise Leep A [facility]   2/25/2022 10:15 AM Moise Leep A [facility]   3/1/2022 11:00 AM Moise Leep A [facility]   3/3/2022 11:00 AM Moise Leep A [facility]   3/4/2022 11:00 AM Moise Leep A [facility]   3/21/2022  2:10 PM Luis Manuel Collins MD [id number] PGU   3/28/2022  9:15 AM Ulysses Gutta, MD [facility] POA       Visit Approval Visit # Therapist initials Date A NS / Cx < 24 hr >24 hr Cx Comments    1 SHAISTA 1/5/22 [x]  [] [] Initial evaluation    2 SHAISTA 1/7/22 [x] [] []     3 SHAISTA 1/11/22 [x] [] []     4 SHAISTA 1/13/22 [x] [] []     5 PDE 1- [x] [] []     6 SHAISTA 1/20/22 [x] [] []     7 SHAISTA 1/25/22 [x] [] []     8 SHAISTA 1/27/22 [x] [] []     9 SHAISTA 2/1/22 [x] [] [] PN due next visit    10  SHAISTA 2/3/22 [x] [] [] PN today    11 SHAISTA 2/7/22 [x] [] [] 1    12 SHAISTA 2/9/22 [x] [] [] 2    13 SHAISTA 2/15/22 [x] [] [] 3    14 SHAISTA 2/97/32 [x] [] [] Recert/Reeval- added L hip pain to [address] and treatment plan    15 [facility] 2/17/22 [x] [] [] 4    16 [facility] 2/21/22 [x] [] [] 5       [] [] []        [] [] []

## 2022-02-22 ENCOUNTER — APPOINTMENT (OUTPATIENT)
Dept: PHYSICAL THERAPY | Age: 72
End: 2022-02-22
Payer: MEDICARE

## 2022-02-24 ENCOUNTER — HOSPITAL ENCOUNTER (OUTPATIENT)
Dept: PHYSICAL THERAPY | Age: 72
Discharge: HOME | End: 2022-02-24
Payer: MEDICARE

## 2022-02-24 PROCEDURE — 97140 MANUAL THERAPY 1/> REGIONS: CPT

## 2022-02-24 PROCEDURE — 97110 THERAPEUTIC EXERCISES: CPT

## 2022-02-24 NOTE — PROGRESS NOTES
Lucretia Gramajo  : 1950  [place] / Product Type: Medicare /  [address]  at [facility]  [address] [facility], [place] [address] [address] [facility]  [phone]   [phone]      OUTPATIENT PHYSICAL THERAPY: Daily Treatment Note 2022  Visit Count:  17    ICD-10: Treatment Diagnosis:   M25.511 Pain in Right Shoulder  M25.611 Stiffness in Right Shoulder  M62.81 Muscle Weakness Generalized  Z47.89 Orthopedic Aftercare  M25.552 Pain in Left hip  M25.652 Stiffness of Left hip, not elsewhere classified    Precautions/Allergies:   Patient has no known allergies. Follow Protocol  -Limit AROM to : Flexion (80 degrees), Extension (20 degrees), Abduction (80 degrees, IR (full), ER (to neutral only)  -Sling may be off  -No subscapularis stretch or strengthening until 6 weeks post op date    TSA after 6 weeks (22)  - All active and passive ROM ok  -Resistance in all arcs OK  TREATMENT PLAN:  Effective Dates: 2022 TO 2022 (90 days). Frequency/Duration: 3 times a week for 90 Days        PRE-TREATMENT SYMPTOMS/COMPLAINTS:  Pt reports his shoulder continues with soreness. MEDICATIONS REVIEWED:  2022   TREATMENT:   (In addition to Assessment/Re-Assessment sessions the following treatments were rendered)    THERAPEUTIC EXERCISE: (23 minutes):  Exercises per grid below to improve mobility, strength and balance. Required minimal visual and verbal cues to promote proper body alignment and promote proper body posture. Progressed resistance, range and complexity of movement as indicated.      Date:  22 Date:  22 Date:  2/15/22 Date:  22 Date:  22 Date:  22 Date:  22   Activity/Exercise          Rhythmic stabilization           Shoulder flex (supine) Supine wand 10 X 1 Supine wand 10 X 2  #2 supine wand 10 X 2  #2 supine wand 10 X 2 #2 supine wand 10 X 2    Shoulder abduction           Shoulder ER S/l shoulder abduction 10 X AA 10 X AA 10 X AA  10 X AA 10 X AA 10 X AA   S/l ER 10 X A 10 X 2 A #1 10 X 2  #1 10 X 2 #1 10 X 2 10  X 2   Supine protraction 10 X 2 10 X 2 A #1 10 X 2  #1 10 X 2 #1 10 X 2 10 X 2   Prone row 10 X 2A #2 10 X 1 #2 10 X 2  #2 10 X 2 #2 10 X 2 #3 10 X 2   Prone extension 10 X AA 10 X AA 10 X AA  10 X AA 10 X AA 10 X 2 A   Prone horizontal abduction 10 X AA 10 X AA 10 X AA  10 X AA 10 X AA 10 X AA   Sleeper stretch 30 seconds 3 X 30 seconds 3 X   30 seconds 3 X 30 seconds 3 X  30 seconds 3 X    TB rows/extension GTB 10 X 2 each BTB 10 X 2 (row); GTB 10 X 2 BTB 10 x 2 (row); GTB 10 X 2  BTB 10 x 2 each BTB 10 X 2 (row); GTB 10 X 2 (ext) BTB 10 x 2 (row); BTB 10 X 2 (extension)   TB IR/ER GTB 10 X 2 each GTB 10 X 2 GTB 10 X 2  GTB 10 X 2 each GTB 10 X 2 each GTB 10 X 2 each   Doorway stretch 30 seconds 3 X 15 seconds 3 X   15 seconds 3 X 15 seconds 3 X  15 seconds 3 X   Seated piriformis stretch    HEP      Seated glute stretch    HEP        MANUAL THERAPY: (15 minutes): Joint mobilization, Soft tissue mobilization and Manipulation was utilized and necessary because of the patient's restricted joint motion, painful spasm, loss of articular motion and restricted motion of soft tissue. Today 2/24/22  +PROM R shoulder- all directions to pt tolerance  +STM to lateral deltoid, subscapularis    Not today.  +manual stretching L ITB, glute, piriformis  +STM to glute med    (Used abbreviations: MET - muscle energy technique; PNF - proprioceptive neuromuscular facilitation; NMR - neuromuscular re-education; AP - anterior to posterior; PA - posterior to anterior)    MODALITIES: (0 minutes):      *  Cold Pack Therapy in order to provide analgesia and reduce inflammation and edema. TREATMENT/SESSION ASSESSMENT: Able to progress with prone exercises today but still having increased pain with s/l shoulder abduction at lateral deltoid.  Making slow steady progress    Education: on objective findings and HEP    RECOMMENDATIONS/INTENT FOR NEXT TREATMENT SESSION: \"Next visit will focus on advancements to more challenging activities\".     PAIN: Initial: 2/10 (shoulder) Post Session:  3/10 (shoulder soreness)     MedBridge Portal    Total Treatment Billable Duration:   PT Patient Time In/Time Out  Time In: 1100  Time Out: 1138  Aspen Hinds, PT, DPT    Future Appointments   Date Time Provider [place]   2/25/2022 10:15 AM Ben Balder A [facility]   3/1/2022 11:00 AM Ben Balder A [facility]   3/3/2022 11:00 AM Ben Balder A [facility]   3/4/2022 11:00 AM Ben Balder A [facility]   3/8/2022  9:30 AM Ben Balder A [facility]   3/10/2022  9:30 AM Ben Balder A [facility]   3/11/2022 11:00 AM Ben Balder A [facility]   3/21/2022  2:10 PM Linda Abad MD [id number] PGU   3/28/2022  9:15 AM Li Vanegas MD [facility] POA       Visit Approval Visit # Therapist initials Date A NS / Cx < 24 hr >24 hr Cx Comments    1 SHAISTA 1/5/22 [x]  [] [] Initial evaluation    2 SHAISTA 1/7/22 [x] [] []     3 SHAISTA 1/11/22 [x] [] []     4 SHAISTA 1/13/22 [x] [] []     5 PDE 1- [x] [] []     6 SHAISTA 1/20/22 [x] [] []     7 SHAISTA 1/25/22 [x] [] []     8 SHAISTA 1/27/22 [x] [] []     9 SHAISTA 2/1/22 [x] [] [] PN due next visit    10  SHAISTA 2/3/22 [x] [] [] PN today    11 SHAISTA 2/7/22 [x] [] [] 1    12 SHAISTA 2/9/22 [x] [] [] 2    13 SHAISTA 2/15/22 [x] [] [] 3    14 SHAISTA 9/21/00 [x] [] [] Recert/Reeval- added L hip pain to [address] and treatment plan    15 SHAISTA 2/17/22 [x] [] [] 4    16 SHAISTA 2/21/22 [x] [] [] 5    17 SHAISTA 2/24/22 [x] [] [] 6       [] [] []

## 2022-02-25 ENCOUNTER — HOSPITAL ENCOUNTER (OUTPATIENT)
Dept: PHYSICAL THERAPY | Age: 72
Discharge: HOME | End: 2022-02-25
Payer: MEDICARE

## 2022-02-25 PROCEDURE — 97110 THERAPEUTIC EXERCISES: CPT

## 2022-02-25 PROCEDURE — 97140 MANUAL THERAPY 1/> REGIONS: CPT

## 2022-02-25 NOTE — PROGRESS NOTES
Katie Winter  : 1950  [place] / Product Type: Medicare /  [address]  at [facility]  [address], [address] [facility], [address]  [phone]   [phone]      OUTPATIENT PHYSICAL THERAPY: Daily Treatment Note 2022  Visit Count:  18    ICD-10: Treatment Diagnosis:   M25.511 Pain in Right Shoulder  M25.611 Stiffness in Right Shoulder  M62.81 Muscle Weakness Generalized  Z47.89 Orthopedic Aftercare  M25.552 Pain in Left hip  M25.652 Stiffness of Left hip, not elsewhere classified    Precautions/Allergies:   Patient has no known allergies. Follow Protocol  -Limit AROM to : Flexion (80 degrees), Extension (20 degrees), Abduction (80 degrees, IR (full), ER (to neutral only)  -Sling may be off  -No subscapularis stretch or strengthening until 6 weeks post op date    TSA after 6 weeks (22)  - All active and passive ROM ok  -Resistance in all arcs OK  TREATMENT PLAN:  Effective Dates: 2022 TO 2022 (90 days). Frequency/Duration: 3 times a week for 90 Days        PRE-TREATMENT SYMPTOMS/COMPLAINTS:  Pt did try dry needling at hip region (daughter in law is also a PT) Reporting this did seem to help some. Doing gentle stretching at home as well. MEDICATIONS REVIEWED:  2022   TREATMENT:   (In addition to Assessment/Re-Assessment sessions the following treatments were rendered)    THERAPEUTIC EXERCISE: (15 minutes):  Exercises per grid below to improve mobility, strength and balance. Required minimal visual and verbal cues to promote proper body alignment and promote proper body posture. Progressed resistance, range and complexity of movement as indicated.      Date:  22 Date:  22 Date:  2/15/22 Date:  22 Date:  22 Date:  22 Date:  22 Date:  22   Activity/Exercise           Rhythmic stabilization            Shoulder flex (supine) Supine wand 10 X 1 Supine wand 10 X 2  #2 supine wand 10 X 2  #2 supine wand 10 X 2 #2 supine wand 10 X 2     Shoulder abduction            Shoulder ER           S/l shoulder abduction 10 X AA 10 X AA 10 X AA  10 X AA 10 X AA 10 X AA    S/l ER 10 X A 10 X 2 A #1 10 X 2  #1 10 X 2 #1 10 X 2 10  X 2    Supine protraction 10 X 2 10 X 2 A #1 10 X 2  #1 10 X 2 #1 10 X 2 10 X 2    Prone row 10 X 2A #2 10 X 1 #2 10 X 2  #2 10 X 2 #2 10 X 2 #3 10 X 2    Prone extension 10 X AA 10 X AA 10 X AA  10 X AA 10 X AA 10 X 2 A    Prone horizontal abduction 10 X AA 10 X AA 10 X AA  10 X AA 10 X AA 10 X AA    Sleeper stretch 30 seconds 3 X 30 seconds 3 X   30 seconds 3 X 30 seconds 3 X  30 seconds 3 X     TB rows/extension GTB 10 X 2 each BTB 10 X 2 (row); GTB 10 X 2 BTB 10 x 2 (row); GTB 10 X 2  BTB 10 x 2 each BTB 10 X 2 (row); GTB 10 X 2 (ext) BTB 10 x 2 (row); BTB 10 X 2 (extension)    TB IR/ER GTB 10 X 2 each GTB 10 X 2 GTB 10 X 2  GTB 10 X 2 each GTB 10 X 2 each GTB 10 X 2 each    Doorway stretch 30 seconds 3 X 15 seconds 3 X   15 seconds 3 X 15 seconds 3 X  15 seconds 3 X    Seated piriformis stretch    HEP       Seated glute stretch    HEP       clamshell        10 X 2 (s/l)   Reverse clamshell        10 X 2   Hip abduction        10 X 2   bridges        10 X 2   ASLR        10 X 2   Standing hip hike on 6\" step        10 X 2     MANUAL THERAPY: (23 minutes): Joint mobilization, Soft tissue mobilization and Manipulation was utilized and necessary because of the patient's restricted joint motion, painful spasm, loss of articular motion and restricted motion of soft tissue.    Not today  +PROM R shoulder- all directions to pt tolerance  +STM to lateral deltoid, subscapularis    2/25/22  +manual stretching L ITB, glute, piriformis, hip flexor  +STM to glute med, ITB    (Used abbreviations: MET - muscle energy technique; PNF - proprioceptive neuromuscular facilitation; NMR - neuromuscular re-education; AP - anterior to posterior; PA - posterior to anterior)    MODALITIES: (0 minutes):      *  Cold Pack Therapy in order to provide analgesia and reduce inflammation and edema. TREATMENT/SESSION ASSESSMENT: Treatment to L hip today- Pt with most TTP along posterior aspect of great trochanter and glute med today. Added hip strengthening and ROM exercises today. Pt tolerated well with slight discomfort reported. Will assess next visit if need to pull back on exercises if exacerbated pain. Recommended ice at home following. Pt verbalize understanding. Education: on objective findings and HEP    RECOMMENDATIONS/INTENT FOR NEXT TREATMENT SESSION: \"Next visit will focus on advancements to more challenging activities\".     PAIN: Initial: 1-2/10  Post Session:  1-2/10      [facility] Portal    Total Treatment Billable Duration:   PT Patient Time In/Time Out  Time In: 1015  Time Out: [address], PT, DPT    Future Appointments   Date Time Provider Elisabeth Chauhan   3/1/2022 11:00 AM Jeffy Azaring A [facility]   3/3/2022 11:00 AM Jeffy Azaring A [facility]   3/4/2022 11:00 AM Jeffy Stalling A [facility]   3/8/2022  9:30 AM Jeffy Stalling A [facility]   3/10/2022  9:30 AM Jeffy Stalling A [facility]   3/11/2022 11:00 AM Jeffy Stalling A [facility]   3/21/2022  2:10 PM Aadn Merchant MD [id number] PGU   3/28/2022  9:15 AM Jaye Tim MD [facility] POA       Visit Approval Visit # Therapist initials Date A NS / Cx < 24 hr >24 hr Cx Comments    1 SHAISTA 1/5/22 [x]  [] [] Initial evaluation    2 SHAISTA 1/7/22 [x] [] []     3 SHAISTA 1/11/22 [x] [] []     4 SHAISTA 1/13/22 [x] [] []     5 PDE 1- [x] [] []     6 SHAISTA 1/20/22 [x] [] []     7 SHAISTA 1/25/22 [x] [] []     8 SHAISTA 1/27/22 [x] [] []     9 SHAISTA 2/1/22 [x] [] [] PN due next visit    10  SHAISTA 2/3/22 [x] [] [] PN today    11 SHAISTA 2/7/22 [x] [] [] 1    12 SHAISTA 2/9/22 [x] [] [] 2    13 SHAISTA 2/15/22 [x] [] [] 3    14 SHAISTA 2/96/34 [x] [] [] Recert/Reeval- added L hip pain to [address] and treatment plan    15 SHAISTA 2/17/22 [x] [] [] 4    16 SHAISTA 2/21/22 [x] [] [] 5    17 SHAISTA 2/24/22 [x] [] [] 6    18 SHAISTA 2/25/22 [x] [] [] 7

## 2022-03-01 ENCOUNTER — HOSPITAL ENCOUNTER (OUTPATIENT)
Dept: PHYSICAL THERAPY | Age: 72
Discharge: HOME | End: 2022-03-01
Payer: MEDICARE

## 2022-03-01 PROCEDURE — 97140 MANUAL THERAPY 1/> REGIONS: CPT

## 2022-03-01 PROCEDURE — 97110 THERAPEUTIC EXERCISES: CPT

## 2022-03-01 NOTE — PROGRESS NOTES
Maxime Sample  : 1950  [place] / Product Type: Medicare /  [address]  at [facility]  [address], [address] [facility], [address]  [phone]   [phone]      OUTPATIENT PHYSICAL THERAPY: Daily Treatment Note 3/1/2022  Visit Count:  19    ICD-10: Treatment Diagnosis:   M25.511 Pain in Right Shoulder  M25.611 Stiffness in Right Shoulder  M62.81 Muscle Weakness Generalized  Z47.89 Orthopedic Aftercare  M25.552 Pain in Left hip  M25.652 Stiffness of Left hip, not elsewhere classified    Precautions/Allergies:   Patient has no known allergies. Follow Protocol  -Limit AROM to : Flexion (80 degrees), Extension (20 degrees), Abduction (80 degrees, IR (full), ER (to neutral only)  -Sling may be off  -No subscapularis stretch or strengthening until 6 weeks post op date    TSA after 6 weeks (22)  - All active and passive ROM ok  -Resistance in all arcs OK  TREATMENT PLAN:  Effective Dates: 2022 TO 2022 (90 days). Frequency/Duration: 3 times a week for 90 Days        PRE-TREATMENT SYMPTOMS/COMPLAINTS:  Pt reports his shoulder is pretty sore today. Feeling like he may have over done it on Saturday. MEDICATIONS REVIEWED:  3/1/2022   TREATMENT:   (In addition to Assessment/Re-Assessment sessions the following treatments were rendered)    THERAPEUTIC EXERCISE: (23 minutes):  Exercises per grid below to improve mobility, strength and balance. Required minimal visual and verbal cues to promote proper body alignment and promote proper body posture. Progressed resistance, range and complexity of movement as indicated.      Date:  22 Date:  22 Date:  2/15/22 Date:  22 Date:  22 Date:  22 Date:  22 Date:  22 Date:  3/1/22   Activity/Exercise            Rhythmic stabilization             Shoulder flex (supine) Supine wand 10 X 1 Supine wand 10 X 2  #2 supine wand 10 X 2  #2 supine wand 10 X 2 #2 supine wand 10 X 2   #2 supine wand 10 X 2   Shoulder abduction             Shoulder ER            S/l shoulder abduction 10 X AA 10 X AA 10 X AA  10 X AA 10 X AA 10 X AA  hold   S/l ER 10 X A 10 X 2 A #1 10 X 2  #1 10 X 2 #1 10 X 2 10  X 2  10 X 2   Supine protraction 10 X 2 10 X 2 A #1 10 X 2  #1 10 X 2 #1 10 X 2 10 X 2     Prone row 10 X 2A #2 10 X 1 #2 10 X 2  #2 10 X 2 #2 10 X 2 #3 10 X 2     Prone extension 10 X AA 10 X AA 10 X AA  10 X AA 10 X AA 10 X 2 A     Prone horizontal abduction 10 X AA 10 X AA 10 X AA  10 X AA 10 X AA 10 X AA  hold   Sleeper stretch 30 seconds 3 X 30 seconds 3 X   30 seconds 3 X 30 seconds 3 X  30 seconds 3 X   30 seconds 3 X    TB rows/extension GTB 10 X 2 each BTB 10 X 2 (row); GTB 10 X 2 BTB 10 x 2 (row); GTB 10 X 2  BTB 10 x 2 each BTB 10 X 2 (row); GTB 10 X 2 (ext) BTB 10 x 2 (row); BTB 10 X 2 (extension)  BTB 10 X 2 (row); RTB 10 X 2 (extension)   TB IR/ER GTB 10 X 2 each GTB 10 X 2 GTB 10 X 2  GTB 10 X 2 each GTB 10 X 2 each GTB 10 X 2 each  RTB 10 X 2 each   Doorway stretch 30 seconds 3 X 15 seconds 3 X   15 seconds 3 X 15 seconds 3 X  15 seconds 3 X     Seated piriformis stretch    HEP        Seated glute stretch    HEP        clamshell        10 X 2 (s/l)    Reverse clamshell        10 X 2    Hip abduction        10 X 2    bridges        10 X 2    ASLR        10 X 2    Standing hip hike on 6\" step        10 X 2      MANUAL THERAPY: (15 minutes): Joint mobilization, Soft tissue mobilization and Manipulation was utilized and necessary because of the patient's restricted joint motion, painful spasm, loss of articular motion and restricted motion of soft tissue.    3/1/22  +PROM R shoulder- all directions to pt tolerance  +STM to lateral deltoid, subscapularis    Not today  +manual stretching L ITB, glute, piriformis, hip flexor  +STM to glute med, ITB    (Used abbreviations: MET - muscle energy technique; PNF - proprioceptive neuromuscular facilitation; NMR - neuromuscular re-education; AP - anterior to posterior; PA - posterior to anterior)    MODALITIES: (0 minutes):      *  Cold Pack Therapy in order to provide analgesia and reduce inflammation and edema. TREATMENT/SESSION ASSESSMENT: Held on exercises that increased pain at R shoulder today. Pt with TTP along all attachment sites at [facility] head this session. Modified exercises by reducing resistance or holding on weight    Education: on objective findings and HEP    RECOMMENDATIONS/INTENT FOR NEXT TREATMENT SESSION: \"Next visit will focus on advancements to more challenging activities\".     PAIN: Initial: 4-5/10  Post Session:  4-5/10      MedBridge Portal    Total Treatment Billable Duration:   PT Patient Time In/Time Out  Time In: 1100  Time Out: 1138  Webster Lesch, PT, DPT    Future Appointments   Date Time Provider Elisabeth Chauhan   3/3/2022 11:00 AM Rolando Folds A [facility]   3/4/2022 11:00 AM Rolando Folds A [facility]   3/8/2022  9:30 AM Rolando Folds A [facility]   3/10/2022  9:30 AM Rolando Folds A [facility]   3/11/2022 11:00 AM Rolando Folds A [facility]   3/21/2022  2:10 PM Arik Najera MD [id number] PGU   3/28/2022  9:15 AM Mark Rojo MD [facility] POA       Visit Approval Visit # Therapist initials Date A NS / Cx < 24 hr >24 hr Cx Comments    1 SHAISTA 1/5/22 [x]  [] [] Initial evaluation    2 SHAISTA 1/7/22 [x] [] []     3 SHAISTA 1/11/22 [x] [] []     4 SHAISTA 1/13/22 [x] [] []     5 PDE 1- [x] [] []     6 SHAISTA 1/20/22 [x] [] []     7 SHAISTA 1/25/22 [x] [] []     8 SHAISTA 1/27/22 [x] [] []     9 SHAISTA 2/1/22 [x] [] [] PN due next visit    10  SHAISTA 2/3/22 [x] [] [] PN today    11 SHAISTA 2/7/22 [x] [] [] 1    12 SHAISTA 2/9/22 [x] [] [] 2    13 SHAISTA 2/15/22 [x] [] [] 3    14 SHAISTA 8/57/82 [x] [] [] Recert/Reeval- added L hip pain to [address] and treatment plan    15 SHAISTA 2/17/22 [x] [] [] 4    16 SHAISTA 2/21/22 [x] [] [] 5    17 SHAISTA 2/24/22 [x] [] [] 6    18 SHIASTA 2/25/22 [x] [] [] 7    19 SHAISTA 3/1/22 x   8

## 2022-03-03 ENCOUNTER — HOSPITAL ENCOUNTER (OUTPATIENT)
Dept: PHYSICAL THERAPY | Age: 72
Discharge: HOME | End: 2022-03-03
Payer: MEDICARE

## 2022-03-03 PROCEDURE — 97140 MANUAL THERAPY 1/> REGIONS: CPT

## 2022-03-03 PROCEDURE — 97110 THERAPEUTIC EXERCISES: CPT

## 2022-03-03 NOTE — PROGRESS NOTES
Matthew Roach  : 1950  [place] / Product Type: Medicare /  [address]  at [facility]  [address], [address] [facility], [address]  [phone]   [phone]      OUTPATIENT PHYSICAL THERAPY: Daily Treatment Note 3/3/2022  Visit Count:  20    ICD-10: Treatment Diagnosis:   M25.511 Pain in Right Shoulder  M25.611 Stiffness in Right Shoulder  M62.81 Muscle Weakness Generalized  Z47.89 Orthopedic Aftercare  M25.552 Pain in Left hip  M25.652 Stiffness of Left hip, not elsewhere classified    Precautions/Allergies:   Patient has no known allergies. Follow Protocol  -Limit AROM to : Flexion (80 degrees), Extension (20 degrees), Abduction (80 degrees, IR (full), ER (to neutral only)  -Sling may be off  -No subscapularis stretch or strengthening until 6 weeks post op date    TSA after 6 weeks (22)  - All active and passive ROM ok  -Resistance in all arcs OK  TREATMENT PLAN:  Effective Dates: 2022 TO 2022 (90 days). Frequency/Duration: 3 times a week for 90 Days        PRE-TREATMENT SYMPTOMS/COMPLAINTS:  Pt reports his hip is a little sore from the dry needling still. MEDICATIONS REVIEWED:  3/3/2022   TREATMENT:   (In addition to Assessment/Re-Assessment sessions the following treatments were rendered)    THERAPEUTIC EXERCISE: (25 minutes):  Exercises per grid below to improve mobility, strength and balance. Required minimal visual and verbal cues to promote proper body alignment and promote proper body posture. Progressed resistance, range and complexity of movement as indicated.      Date:  22 Date:  22 Date:  2/15/22 Date:  22 Date:  22 Date:  22 Date:  22 Date:  22 Date:  3/1/22 Date:  3/3/22   Activity/Exercise             Rhythmic stabilization              Shoulder flex (supine) Supine wand 10 X 1 Supine wand 10 X 2  #2 supine wand 10 X 2  #2 supine wand 10 X 2 #2 supine wand 10 X 2   #2 supine wand 10 X 2    Shoulder abduction              Shoulder ER             S/l shoulder abduction 10 X AA 10 X AA 10 X AA  10 X AA 10 X AA 10 X AA  hold    S/l ER 10 X A 10 X 2 A #1 10 X 2  #1 10 X 2 #1 10 X 2 10  X 2  10 X 2    Supine protraction 10 X 2 10 X 2 A #1 10 X 2  #1 10 X 2 #1 10 X 2 10 X 2      Prone row 10 X 2A #2 10 X 1 #2 10 X 2  #2 10 X 2 #2 10 X 2 #3 10 X 2      Prone extension 10 X AA 10 X AA 10 X AA  10 X AA 10 X AA 10 X 2 A      Prone horizontal abduction 10 X AA 10 X AA 10 X AA  10 X AA 10 X AA 10 X AA  hold    Sleeper stretch 30 seconds 3 X 30 seconds 3 X   30 seconds 3 X 30 seconds 3 X  30 seconds 3 X   30 seconds 3 X     TB rows/extension GTB 10 X 2 each BTB 10 X 2 (row); GTB 10 X 2 BTB 10 x 2 (row); GTB 10 X 2  BTB 10 x 2 each BTB 10 X 2 (row); GTB 10 X 2 (ext) BTB 10 x 2 (row); BTB 10 X 2 (extension)  BTB 10 X 2 (row); RTB 10 X 2 (extension)    TB IR/ER GTB 10 X 2 each GTB 10 X 2 GTB 10 X 2  GTB 10 X 2 each GTB 10 X 2 each GTB 10 X 2 each  RTB 10 X 2 each    Doorway stretch 30 seconds 3 X 15 seconds 3 X   15 seconds 3 X 15 seconds 3 X  15 seconds 3 X      Seated piriformis stretch    HEP         Seated glute stretch    HEP         clamshell        10 X 2 (s/l)  RTB 10 X 2 s/l;    Reverse clamshell        10 X 2  #2 10 X 2   Hip abduction        10 X 2  #2 10 X 2   bridges        10 X 2  15 X 2   ASLR        10 X 2  15 X 2   Standing hip hike on 6\" step        10 X 2  10 X 2   6\" lateral step ups          10 X 2   TB supine hip ER          BTB 10 X 2                  MANUAL THERAPY: (15 minutes): Joint mobilization, Soft tissue mobilization and Manipulation was utilized and necessary because of the patient's restricted joint motion, painful spasm, loss of articular motion and restricted motion of soft tissue.    Not today  +PROM R shoulder- all directions to pt tolerance  +STM to lateral deltoid, subscapularis    3/3/22  +manual stretching L ITB, glute, piriformis, hip flexor  +STM to glute med, ITB    (Used abbreviations: MET - muscle energy technique; PNF - proprioceptive neuromuscular facilitation; NMR - neuromuscular re-education; AP - anterior to posterior; PA - posterior to anterior)    MODALITIES: (0 minutes):      *  Cold Pack Therapy in order to provide analgesia and reduce inflammation and edema. TREATMENT/SESSION ASSESSMENT: Pt tolerated progression of exercises well requiring VC and TC to prevent compensations. Pt with TTP along glute med with increased tone along L piriformis. Education: on objective findings and HEP    RECOMMENDATIONS/INTENT FOR NEXT TREATMENT SESSION: \"Next visit will focus on advancements to more challenging activities\".     PAIN: Initial: 2/10  (L hip) Post Session:  2-3/10 L Hip     MedBridge Portal    Total Treatment Billable Duration:   PT Patient Time In/Time Out  Time In: 1100  Time Out: 100 [facility], PT, DPT    Future Appointments   Date Time Provider Elisabeth Rodriguezi   3/4/2022 11:00 AM Deronda Solid A [facility]   3/8/2022  9:30 AM Deronda Solid A [facility]   3/10/2022  9:30 AM Deronda Solid A [facility]   3/11/2022 11:00 AM Deronda Solid A [facility]   3/21/2022  2:10 PM Jearl Cogan, MD [id number] PGU   3/28/2022  9:15 AM Marty Caruso MD [facility] POA       Visit Approval Visit # Therapist initials Date A NS / Cx < 24 hr >24 hr Cx Comments    1 SHAISTA 1/5/22 [x]  [] [] Initial evaluation    2 SHAISTA 1/7/22 [x] [] []     3 SHAISTA 1/11/22 [x] [] []     4 SHAISTA 1/13/22 [x] [] []     5 PDE 1- [x] [] []     6 SHAISTA 1/20/22 [x] [] []     7 SHAISTA 1/25/22 [x] [] []     8 SHAISTA 1/27/22 [x] [] []     9 SHAISTA 2/1/22 [x] [] [] PN due next visit    10  SHAISTA 2/3/22 [x] [] [] PN today    11 SHAISTA 2/7/22 [x] [] [] 1    12 SHAISTA 2/9/22 [x] [] [] 2    13 SHAISTA 2/15/22 [x] [] [] 3    14 SHAISTA 0/31/78 [x] [] [] Recert/Reeval- added L hip pain to [address] and treatment plan    15 SHAISTA 2/17/22 [x] [] [] 4    16 SHAISTA 2/21/22 [x] [] [] 5    17 SHAISTA 2/24/22 [x] [] [] 6    18 SHAISTA 2/25/22 [x] [] [] 7    19 SHAISTA 3/1/22 x   8    20 SHAISTA 3/2/22 x   9, PN due next visit

## 2022-03-04 ENCOUNTER — HOSPITAL ENCOUNTER (OUTPATIENT)
Dept: PHYSICAL THERAPY | Age: 72
Discharge: HOME | End: 2022-03-04
Payer: MEDICARE

## 2022-03-04 PROCEDURE — 97110 THERAPEUTIC EXERCISES: CPT

## 2022-03-04 PROCEDURE — 97140 MANUAL THERAPY 1/> REGIONS: CPT

## 2022-03-04 NOTE — PROGRESS NOTES
Zelda Prajapati  : 1950  [place] / Product Type: Medicare /  [address]  at [facility]  [address], [address] [facility]", [place], [address] [facility]  [phone]   [phone]      OUTPATIENT PHYSICAL THERAPY: Daily Treatment Note 3/4/2022  Visit Count:  21    ICD-10: Treatment Diagnosis:   M25.511 Pain in Right Shoulder  M25.611 Stiffness in Right Shoulder  M62.81 Muscle Weakness Generalized  Z47.89 Orthopedic Aftercare  M25.552 Pain in Left hip  M25.652 Stiffness of Left hip, not elsewhere classified    Precautions/Allergies:   Patient has no known allergies. Follow Protocol  -Limit AROM to : Flexion (80 degrees), Extension (20 degrees), Abduction (80 degrees, IR (full), ER (to neutral only)  -Sling may be off  -No subscapularis stretch or strengthening until 6 weeks post op date    TSA after 6 weeks (22)  - All active and passive ROM ok  -Resistance in all arcs OK  TREATMENT PLAN:  Effective Dates: 2022 TO 2022 (90 days). Frequency/Duration: 3 times a week for 90 Days        PRE-TREATMENT SYMPTOMS/COMPLAINTS:  Pt reports his shoulder is feeling some better. Still sore but improving. MEDICATIONS REVIEWED:  3/4/2022   TREATMENT:   (In addition to Assessment/Re-Assessment sessions the following treatments were rendered)    THERAPEUTIC EXERCISE: (25 minutes):  Exercises per grid below to improve mobility, strength and balance. Required minimal visual and verbal cues to promote proper body alignment and promote proper body posture. Progressed resistance, range and complexity of movement as indicated.      Date:  22 Date:  22 Date:  2/15/22 Date:  22 Date:  22 Date:  22 Date:  22 Date:  22 Date:  3/1/22 Date:  3/3/22 Date:  3/4/22   Activity/Exercise              Rhythmic stabilization               Shoulder flex (supine) Supine wand 10 X 1 Supine wand 10 X 2  #2 supine wand 10 X 2 #2 supine wand 10 X 2 #2 supine wand 10 X 2   #2 supine wand 10 X 2  #2 supine wand 10 X 2   Shoulder abduction               Shoulder ER              S/l shoulder abduction 10 X AA 10 X AA 10 X AA  10 X AA 10 X AA 10 X AA  hold  hold   S/l ER 10 X A 10 X 2 A #1 10 X 2  #1 10 X 2 #1 10 X 2 10  X 2  10 X 2  10 X 2   Supine protraction 10 X 2 10 X 2 A #1 10 X 2  #1 10 X 2 #1 10 X 2 10 X 2       Prone row 10 X 2A #2 10 X 1 #2 10 X 2  #2 10 X 2 #2 10 X 2 #3 10 X 2       Prone extension 10 X AA 10 X AA 10 X AA  10 X AA 10 X AA 10 X 2 A       Prone horizontal abduction 10 X AA 10 X AA 10 X AA  10 X AA 10 X AA 10 X AA  hold  hold   Sleeper stretch 30 seconds 3 X 30 seconds 3 X   30 seconds 3 X 30 seconds 3 X  30 seconds 3 X   30 seconds 3 X   30 seconds 3 X    TB rows/extension GTB 10 X 2 each BTB 10 X 2 (row); GTB 10 X 2 BTB 10 x 2 (row); GTB 10 X 2  BTB 10 x 2 each BTB 10 X 2 (row); GTB 10 X 2 (ext) BTB 10 x 2 (row); BTB 10 X 2 (extension)  BTB 10 X 2 (row); RTB 10 X 2 (extension)  BTB 10 x 2 (row), RTB 10 X 2 (ext)   TB IR/ER GTB 10 X 2 each GTB 10 X 2 GTB 10 X 2  GTB 10 X 2 each GTB 10 X 2 each GTB 10 X 2 each  RTB 10 X 2 each  RTB 10 X 2   Doorway stretch 30 seconds 3 X 15 seconds 3 X   15 seconds 3 X 15 seconds 3 X  15 seconds 3 X       Seated piriformis stretch    HEP          Seated glute stretch    HEP          clamshell        10 X 2 (s/l)  RTB 10 X 2 s/l;     Reverse clamshell        10 X 2  #2 10 X 2    Hip abduction        10 X 2  #2 10 X 2    bridges        10 X 2  15 X 2    ASLR        10 X 2  15 X 2    Standing hip hike on 6\" step        10 X 2  10 X 2    6\" lateral step ups          10 X 2    TB supine hip ER          BTB 10 X 2                    MANUAL THERAPY: (15 minutes): Joint mobilization, Soft tissue mobilization and Manipulation was utilized and necessary because of the patient's restricted joint motion, painful spasm, loss of articular motion and restricted motion of soft tissue.    3/4/22  +PROM R shoulder- all directions to pt tolerance  +STM to lateral deltoid, subscapularis    Not today  +manual stretching L ITB, glute, piriformis, hip flexor  +STM to glute med, ITB    (Used abbreviations: MET - muscle energy technique; PNF - proprioceptive neuromuscular facilitation; NMR - neuromuscular re-education; AP - anterior to posterior; PA - posterior to anterior)    MODALITIES: (15 minutes):      *  Cold Pack Therapy in order to provide analgesia and reduce inflammation and edema. TREATMENT/SESSION ASSESSMENT: Pt is making good steady progress. Pain is most limiting factor. Improving AROM, PROM, and strength. Pt appears to be developing some tendonitis of RTC mm    Education: on objective findings and HEP    RECOMMENDATIONS/INTENT FOR NEXT TREATMENT SESSION: \"Next visit will focus on advancements to more challenging activities\".     PAIN: Initial: 2-3/10   Post Session:  2-3/10      [facility] Portal    Total Treatment Billable Duration:   PT Patient Time In/Time Out  Time In: 1100  Time Out: Seth Kayla Parham 1, PT, DPT    Future Appointments   Date Time Provider Elisabeth Chauhan   3/8/2022  9:30 AM Megan BAKER [facility]   3/10/2022  9:30 AM Megan BAKER [facility]   3/11/2022 11:00 AM Megan BAKER [facility]   3/21/2022  2:10 PM John Canchola MD [id number] PGU   3/28/2022  9:15 AM Callie Pittman MD [facility] POA       Visit Approval Visit # Therapist initials Date A NS / Cx < 24 hr >24 hr Cx Comments    1 SHAISTA 1/5/22 [x]  [] [] Initial evaluation    2 SHAISTA 1/7/22 [x] [] []     3 SHAISTA 1/11/22 [x] [] []     4 SHAISTA 1/13/22 [x] [] []     5 PDE 1- [x] [] []     6 SHAISTA 1/20/22 [x] [] []     7 SHAISTA 1/25/22 [x] [] []     8 SHAISTA 1/27/22 [x] [] []     9 SHAISTA 2/1/22 [x] [] [] PN due next visit    10  SHAISTA 2/3/22 [x] [] [] PN today    11 SHAISTA 2/7/22 [x] [] [] 1    12 SHAISTA 2/9/22 [x] [] [] 2    13 SHAISTA 2/15/22 [x] [] [] 3    14 SHAISTA 8/93/26 [x] [] [] Recert/Reeval- added L hip pain to POC and treatment plan 15 KAI 2/17/22 [x] [] [] 4    16 KAI 2/21/22 [x] [] [] 5    17 KAI 2/24/22 [x] [] [] 6    18 KAI 2/25/22 [x] [] [] 7    19 SHAISTA 3/1/22 x   8    20 SHAISTA 3/2/22 x   9, PN due next visit    21 SHAISTA 3/4/22 x   PN today

## 2022-03-04 NOTE — THERAPY EVALUATION
Perez Garrison  : 1950  [place] / Product Type: Medicare /  [address]  at [facility]  [address] [facility], [address]  [phone]   [phone]        OUTPATIENT PHYSICAL THERAPY:Progress Report 3/4/2022    ICD-10: Treatment Diagnosis:   M25.511 Pain in Right Shoulder  M25.611 Stiffness in Right Shoulder  M62.81 Muscle Weakness Generalized  Z47.89 Orthopedic Aftercare  M25.552 Pain in Left hip  M25.652 Stiffness of Left hip, not elsewhere classified    Precautions/Allergies:   Patient has no known allergies. Fall Risk Score: 2 (? 5 = High Risk)  MD Orders: Eval and Treat MEDICAL/REFERRING DIAGNOSIS:  R shoulder replacement   DATE OF ONSET: 12/3/21  REFERRING PHYSICIAN: Akin Metcalf MD, Cece Bello MD  RETURN PHYSICIAN APPOINTMENT: 22   Progress Report:  Pt has been seen for a total of 21 sessions to date. Pt is making good steady progress. Pt demonstrating improvements in AROM, PROM, and strength. Mostly limited by pain at this time affecting return to functional mobility, ADLs, and recreational activities. Pt will continue to benefit from skilled PT interventions at this time. Re-Evaluation: Pt has attended 13 PT sessions to date. Pt has been experiencing L hip pain for several weeks now and now has referral for treatment by Chanell Jones. Will modify POC to 3X per week to continue treating s/p TSA 2X per week and L hip pain for 1X per week. Pt presents with decreased L hip ROM, decreased strength and increased pain affecting participation in ADLs and functional mobility at this time. Pt will benefit from PT interventions to address these deficits. Progress Report:  Pt has attended 10 PT sessions to date. Pt has met 3/5 STG and 0/5 LTG at this time. Pt with 4 point improvement in DASH score. Pt demonstrating improvements in PROM, AROM, strength, and pain.  Pt limited by increased pain at lateral deltoid tendon with possibly some tendonosis. Pt is making good steady progress towards remaining goals and will continue to benefit from skilled PT interventions at this time. ASSESSMENT:  Mr. Holly Sr has attended 1 physical therapy session including the initial evaluation. Pt is s/p R TSA. Pt presents with decreased ROM, decreased strength and increased pain affecting participation in ADLs and functional mobility at this time. Recommending skilled PT: manual therapeutic techniques (as appropriate), therapeutic exercises and activities, balance interventions, and a comprehensive home exercise program to address the current impairments, as listed above. Aggie Jones will benefit from skilled PT (medically necessary) to address above deficits affecting participation in basic ADLs and overall functional tolerance. PROBLEM LIST (Impacting functional limitations):  1. Decreased Strength  2. Decreased ADL/Functional Activities  3. Decreased Transfer Abilities  4. Decreased Ambulation Ability/Technique  5. Decreased Balance  6. Increased Pain  7. Decreased Activity Tolerance  8. Decreased Flexibility/Joint Mobility  9. Decreased [place] with Home Exercise Program INTERVENTIONS PLANNED:  1. Balance Exercise  2. Cold  3. Cryotherapy  4. Electrical Stimulation  5. Family Education  6. Gait Training  7. Heat  8. Home Exercise Program (HEP)  9. Manual Therapy  10. Neuromuscular Re-education/Strengthening  11. Range of Motion (ROM)  12. Therapeutic Activites  13. Therapeutic Exercise/Strengthening  14. Transcutaneous Electrical Nerve Stimulation (TENS)  15. Transfer Training   TREATMENT PLAN:  TREATMENT PLAN:  Effective Dates: 1/5/2022 TO 4/5/2022 (90 days). Frequency/Duration: 3 times a week for 90 Days  GOALS: (Goals have been discussed and agreed upon with patient.)  Short Term Time Frame: 4 weeks  1. PT will be compliant with HEP. Goal met 2/3/22  2.  Pt will decrease worst pain to 3/10 or less in order to return to dressing ADLs without difficulty Goal met 2/3/22 (3/10)  3. Pt will reduce DASH to 20 or less in order to return to personal hygiene ADLs Progressing 3/4/22  4. Pt will improve gross RUE strength to 3/5 or greater in order to reach overhead Partially met with exception of ER 3/4/22  5. Pt will demonstrate PROM R shoulder Flexion and Abduction of 120 or greater Goal met 2/3/22  GOALS: (Goals have been discussed and agreed upon with patient.)  Discharge Goals: Time Frame: 12 weeks  1. PT will be independent with HEP. Progressing 3/4/22  2. Pt will decrease worst pain to 1/10 or less in order to return to dressing ADLs without difficulty Progressing 3/4/22  3. Pt will reduce DASH to 10 or less in order to return to personal hygiene ADLs Progressing 3/4/22  4. Pt will improve gross RUE strength to 4+/5 or greater in order to reach overhead Progressing 3/4/22  5. Pt will demonstrate active R shoulder Flexion and Abduction of 125 or greater Goal met 3/4/22    (Left Hip)  GOALS: (Goals have been discussed and agreed upon with patient.)  Discharge Goals: Time Frame: 8 weeks  16. Aggie Jones will be independent with HEP. 17. Pt will navigate 4 steps with no pain in order to enter home  18. Pt will improve hip strength to 5/5 in order to improve walking tolerance  19. Pt will c/o worst pain of 2/10 or less in order to decrease antalgic gait  20. Pt will improve LEFS to 20/80 in order to return ambulating for 1 mile or more for Recreational activity            Rehabilitation Potential For Stated Goals: Fair    Outcome Measure: Tool Used: Disabilities of the Arm, Shoulder and Hand (DASH) Questionnaire - Quick Version  Score:  Initial: 25/55  Most Recent: 21/55 (Date: 2/3/22 ) Most Recent: 22/55 (Date: 3/4/22)   Interpretation of Score: The DASH is designed to measure the activities of daily living in person's with upper extremity dysfunction or pain.   Each section is scored on a 1-5 scale, 5 representing the greatest disability. The scores of each section are added together for a total score of 55. Tool Used: Lower Extremity Functional Scale  Score:  Initial: 34/80  Most Recent: /80         Medical Necessity:   · Skilled intervention continues to be required due to decreased strength, ROM, balance, and functional mobility. Reason for Services/Other Comments:  · Patient continues to require skilled intervention due to decreased strength, balance, and ROM with increased pain affecting pt functional mobility. ·   Regarding Savannah Johnson's therapy, I certify that the treatment plan above will be carried out by a therapist or under their direction. Thank you for this referral,  Lucila Beasley, PT, DPT     Referring Physician Signature: John Miller MD   Referring Physician Signature: Issac Nunn MD             The information in this section was collected on 1/5/22 (except where otherwise noted). HISTORY:   History of Present Injury/Illness (Reason for Referral):  Pt is s/p R TSA. Pt reports having a significant amount of swelling. Still with swelling/edema present. Pt reports he has been doing HEP. Pt goals are to return to higher level activities like fly fishing    2/16/22: Pt now arrives with c/o L hip pain. Pt was walking dog in the snow several weeks ago and has L hip pain ever since. Pt with recent MRI showing MRI scan of his left hip which does show some high-grade partial tearing of the gluteus medius and minimus with minimal retraction. There is some atrophy in the muscle as well. Degenerative labral tearing and degenerative chondromalacia intra-articularly per referring physician note. MD wanting to treat this conservatively and recommending PT at this time for 6-8 weeks.  Difficulty going for floor to standing, increased pain with walking, difficulty going up steps (anything that involves pushing off of L LE)    CC/Primary Concern: Return to full ROM and strength R UE            Treatment Side: Right      Past Medical History/Comorbidities:   Mr. Travis  has a past medical history of Arthritis (07/06/2015), Back pain, DDD (degenerative disc disease), lumbar, Dry eyes, Enlarged prostate, GERD (gastroesophageal reflux disease), History of basal cell cancer, History of squamous cell carcinoma, IBS (irritable bowel syndrome), Insomnia (07/06/2015), Left inguinal hernia, MVP (mitral valve prolapse), Onychomycosis (7/6/2015), RBBB (7/6/2015), Right wrist pain (7/6/2015), Scoliosis, and Spondylosis of lumbar region without myelopathy or radiculopathy. Mr. Travis  has a past surgical history that includes neurological procedure unlisted (2007); hx colonoscopy; hx orthopaedic; hx shoulder arthroscopy (Right); hx orthopaedic (Left, 06/2020); hx orthopaedic (Right, 08/2020); hx cataract removal; and hx carpal tunnel release. Social History/Living Environment:  Lives with wiife, independent with all mobility at baseline    Pain/Symptom Location: post op pain- generalized    Worst Pain: 6/10 (shoulder), 7/10 (L hip)  Current Pain: 1/10 (shoulder), 4/10 (L hip)    Aggravating Factors: any use of R UE    Occupation: Retired cardiologist    Prior Level of Function/Work/Activity:  History of chronic LBP and R foot pain due to OA- despite this lives a very active lifestyle enjoying many outdoor activities- hunting, fly fishing, etc      Patient Goals: Gain full ROM, strength and return to recreational activities    Current Medications:       Current Outpatient Medications:     zolpidem (AMBIEN) 10 mg tablet, Take 1 Tablet by mouth nightly as needed for Sleep. Max Daily Amount: 10 mg., Disp: 90 Tablet, Rfl: 1    linaCLOtide (Linzess) 145 mcg cap capsule, Take 1 Capsule by mouth Daily (before breakfast). , Disp: 30 Capsule, Rfl: 5    cetirizine (ZyrTEC) 10 mg tablet, 1 tablet, Disp: , Rfl:     traMADoL (ULTRAM) 50 mg tablet, Take 50 mg by mouth every six (6) hours as needed for Pain., Disp: , Rfl:     loratadine-pseudoephedrine (Claritin-D 12 Hour) 5-120 mg per tablet, Take 1 Tablet by mouth two (2) times a day., Disp: , Rfl:     oxymetazoline HCl (AFRIN NASAL SPRAY NA), by Nasal route., Disp: , Rfl:     temazepam (RESTORIL) 15 mg capsule, Take 1 Capsule by mouth nightly as needed for Sleep. Max Daily Amount: 15 mg. Indications: difficulty sleeping (Patient not taking: Reported on 12/6/2021), Disp: 30 Capsule, Rfl: 0    ipratropium (Atrovent) 21 mcg (0.03 %) nasal spray, 2 Sprays by Both Nostrils route every twelve (12) hours as needed for Rhinitis., Disp: 30 mL, Rfl: 5    terbinafine HCL (LAMISIL) 250 mg tablet, Take 250 mg by mouth daily. , Disp: , Rfl:     fluocinonide (LIDEX) 0.05 % topical gel, Apply  to affected area two (2) times a day., Disp: , Rfl:     meloxicam (Mobic) 15 mg tablet, Take 1 Tablet by mouth daily. , Disp: 90 Tablet, Rfl: 3    tadalafiL (CIALIS) 5 mg tablet, Take 1 Tablet by mouth daily. (Patient not taking: Reported on 12/6/2021), Disp: 90 Tablet, Rfl: 3    tretinoin (RETIN-A) 0.05 % topical cream, APPLY TO AFFECTED AREA AT BEDTIME, Disp: , Rfl:     Myrbetriq 50 mg ER tablet, Take 1 Tab by mouth daily. , Disp: 30 Tab, Rfl: 12    tamsulosin (FLOMAX) 0.4 mg capsule, Take 2 Caps by mouth daily. (Patient taking differently: Take 0.4 mg by mouth nightly.), Disp: 180 Cap, Rfl: 3    dicyclomine (BentyL) 20 mg tablet, Take 1 Tab by mouth every six (6) hours. , Disp: 120 Tab, Rfl: 5    lidocaine (LIDODERM) 5 %, by TransDERmal route daily as needed. Apply patch to the affected area for 12 hours a day and remove for 12 hours a day. , Disp: , Rfl:     RESTASIS 0.05 % ophthalmic emulsion, INSTILL 1 DROP IN BOTH EYES TWICE DAILY, Disp: , Rfl: 12    omeprazole (PRILOSEC) 20 mg capsule, Take 20 mg by mouth daily as needed. , Disp: , Rfl:    Date Last Reviewed:  3/4/2022       Ambulatory/Rehab Services H2 Model Falls Risk Assessment    Risk Factors:       (1) Gender [Male] Ability to Rise from Chair:       (1)  Pushes up, successful in one attempt    Falls Prevention Plan:       No modifications necessary   Total: (5 or greater = High Risk): 2    ©2010 [facility] [place] States Patent #[id number].  Federal Law prohibits the replication, distribution or use without written permission from [employer] [place]        Number of Personal Factors/Comorbidities that affect the Plan of Care: 3+: HIGH COMPLEXITY   EXAMINATION:   Inspection        Post Op/Wound: Healed                Additional Comments:   ________________________________________________________________________________________________  Observation:          Posture: Rounded shoulders, forward head            Edema: Increased throughout entirety of UE into hand               Addition Comments:     ________________________________________________________________________________________________  Range of Motion            Upper:  Joint: Passive Active Active Passive Right 2/3/22 Active Right 2/3/22 Passive Right 3/4/22 Active Right 3/4/22    Right (Degrees) Right (Degrees) Left Active (Degrees)       Shoulder Flexion 80 degrees NT per protocol 140 degrees 130 degrees 115 degrees  126 degrees    Shoulder Extension  NT per protocol 62 degrees  60 degrees  65 degrees   Shoulder Adduction  NT per protocol 125 degrees   WFL      Shoulder Abduction 60 degrees NT per protocol  170 degrees 115 degrees   128 degrees    Shoulder Internal Rotation (Neutral Position) NT per protocol         Shoulder External Rotation  (Neutral Position) NT per protocol         Functional IR  NT per protocol T11 45 degrees R hip  T12    Functional ER  NT per protocol T2 70 degrees C7  C7     Lower  Joint: Passive Passive Active Active    Right (Degrees) Left (Degrees) Right (Degrees) Left (Degrees)   Hip Flexion       Hip Extension       Hip Adduction       Hip Abduction       Hip Internal Rotation        Hip External Rotation   30 degrees 20 degrees (discomfort)   Knee Flexion   35 degrees 20 degrees (discomfort)   Knee Extension         ________________________________________________________________________________________________  Strength          Upper Extremity    Joint:   Right 2/3/22 Right 3/4/22    RIGHT LEFT     Shoulder Flexion 2/5 4+/5 2+/5 4/5 (pain)   Shoulder Extension 2/5 5/5 3/5 5/5 (pain)   Shoulder Abduction 2/5 4+/5 2/5 3/5 (pain)    Shoulder Internal Rotation 2/5 4/5 2+/5 3+/5 (pain)   Shoulder External Rotation 2/5 3+/5 3/5 3/5 (pain)   Elbow Flexion 3/5 5/5 4/5 5/5   Elbow Extension 3/5 5/5 4/5 5/5     Lower  Joint:      RIGHT LEFT   Hip Flexion 5/5 3/5 (p)   Hip Extension NT/5 NT/5   Hip Internal Rotation 5/5 3/5 (p)   Hip External Rotation 5/5 3/5 (p)   Hip Abduction 4+/5 3-/5 (p)   Hip Adduction NT/5 NT/5   Knee Flexion 5/5 5/5   Knee Extension 5/5 4/5 (discomfort)     _____________________________________________________________________________________________  [facility]        C/O Radicular Symptoms: No      Additional Comments:   ___________________________________________________________________________________________________________________________________________________________  Palpation: Lateral deltoid; L glute med, max, proximal ITB  Joint mobilization: Capsular hypomobility post op; not assessed at hip this session       Body Structures Involved:  1. Nerves  2. Bones  3. Joints  4. Muscles  5. Ligaments Body Functions Affected:  1. Sensory/Pain  2. Neuromusculoskeletal  3. Movement Related Activities and Participation Affected:  1. General Tasks and Demands  2. Mobility  3. Self Care  4. Domestic Life  5. Interpersonal Interactions and Relationships  6.  Community, Social and [place]   Number of elements (examined above) that affect the Plan of Care: 4+: HIGH COMPLEXITY   CLINICAL PRESENTATION:   Presentation: Evolving clinical presentation with changing clinical characteristics: MODERATE COMPLEXITY   CLINICAL DECISION MAKING:      Use of outcome tool(s) and clinical judgement create a POC that gives a: Clear prediction of patient's progress: LOW COMPLEXITY

## 2022-03-08 ENCOUNTER — HOSPITAL ENCOUNTER (OUTPATIENT)
Dept: PHYSICAL THERAPY | Age: 72
Discharge: HOME | End: 2022-03-08
Payer: MEDICARE

## 2022-03-08 PROCEDURE — 97140 MANUAL THERAPY 1/> REGIONS: CPT

## 2022-03-08 PROCEDURE — 97110 THERAPEUTIC EXERCISES: CPT

## 2022-03-08 NOTE — PROGRESS NOTES
Dwain Harrell  : 1950  [place] / Product Type: Medicare /  [address]  at [facility]  [address], [address] hospitals, [place] [address] [address] [facility]  [phone]   [phone]      OUTPATIENT PHYSICAL THERAPY: Daily Treatment Note 3/8/2022  Visit Count:  22    ICD-10: Treatment Diagnosis:   M25.511 Pain in Right Shoulder  M25.611 Stiffness in Right Shoulder  M62.81 Muscle Weakness Generalized  Z47.89 Orthopedic Aftercare  M25.552 Pain in Left hip  M25.652 Stiffness of Left hip, not elsewhere classified    Precautions/Allergies:   Patient has no known allergies. Follow Protocol  -Limit AROM to : Flexion (80 degrees), Extension (20 degrees), Abduction (80 degrees, IR (full), ER (to neutral only)  -Sling may be off  -No subscapularis stretch or strengthening until 6 weeks post op date    TSA after 6 weeks (22)  - All active and passive ROM ok  -Resistance in all arcs OK  TREATMENT PLAN:  Effective Dates: 2022 TO 2022 (90 days). Frequency/Duration: 3 times a week for 90 Days        PRE-TREATMENT SYMPTOMS/COMPLAINTS:  Pt reports his shoulder is feeling some better. Still sore but improving. MEDICATIONS REVIEWED:  3/8/2022   TREATMENT:   (In addition to Assessment/Re-Assessment sessions the following treatments were rendered)    THERAPEUTIC EXERCISE: (25 minutes):  Exercises per grid below to improve mobility, strength and balance. Required minimal visual and verbal cues to promote proper body alignment and promote proper body posture. Progressed resistance, range and complexity of movement as indicated.      Date:  3/1/22 Date:  3/3/22 Date:  3/4/22 Date:  3/8/22   Activity/Exercise       Rhythmic stabilization        Shoulder flex (supine) #2 supine wand 10 X 2  #2 supine wand 10 X 2 #2 supine wand 10 X 2   S/l shoulder abduction hold  hold hold   S/l ER 10 X 2  10 X 2 10 X 2   Supine protraction    10 X 2   Prone row Prone extension       Prone horizontal abduction hold  hold Hold    Sleeper stretch 30 seconds 3 X   30 seconds 3 X  30 second s3 X   TB rows/extension BTB 10 X 2 (row); RTB 10 X 2 (extension)  BTB 10 x 2 (row), RTB 10 X 2 (ext) BTB 10 X 2 (row), RTB 10 X 2 (ext)   TB IR/ER RTB 10 X 2 each  RTB 10 X 2 RTB 10 X 2   Horizontal adduction stretch    15 seconds 3 X   Stability ball rolls on wall    30 seconds CW, CCW   Doorway stretch    15 seconds 3 X   Seated piriformis stretch       Seated glute stretch       clamshell  RTB 10 X 2 s/l;      Reverse clamshell  #2 10 X 2     Hip abduction  #2 10 X 2     bridges  15 X 2     ASLR  15 X 2     Standing hip hike on 6\" step  10 X 2     6\" lateral step ups  10 X 2     TB supine hip ER  BTB 10 X 2              MANUAL THERAPY: (15 minutes): Joint mobilization, Soft tissue mobilization and Manipulation was utilized and necessary because of the patient's restricted joint motion, painful spasm, loss of articular motion and restricted motion of soft tissue. 3/8/22  +PROM R shoulder- all directions to pt tolerance  +STM to lateral deltoid, subscapularis    Not today  +manual stretching L ITB, glute, piriformis, hip flexor  +STM to glute med, ITB    (Used abbreviations: MET - muscle energy technique; PNF - proprioceptive neuromuscular facilitation; NMR - neuromuscular re-education; AP - anterior to posterior; PA - posterior to anterior)    MODALITIES: (15 minutes):      *  Cold Pack Therapy in order to provide analgesia and reduce inflammation and edema. TREATMENT/SESSION ASSESSMENT: Continued to hold on exercises that are more aggravating and painful to allow for tendon healing. Education: on objective findings and HEP    RECOMMENDATIONS/INTENT FOR NEXT TREATMENT SESSION: \"Next visit will focus on advancements to more challenging activities\".     PAIN: Initial: 2/10   Post Session:  3/10      [facility] Portal    Total Treatment Billable Duration:   PT Patient Time In/Time Out  Time In: 0930  Time Out: 1  Shavon Holliday, PT, DPT    Future Appointments   Date Time Provider Elisabeth Chauhan   3/10/2022  9:30 AM Maureen Morning [facility]   3/11/2022 11:00 AM Maureen Morning A [facility]   3/14/2022 10:15 AM Maureen Morning A [facility]   3/16/2022 10:15 AM Maureen Morning A [facility]   3/18/2022 11:00 AM Maureen Morning A [facility]   3/21/2022 10:15 AM Maureen Morning A [facility]   3/21/2022  2:10 PM Karla Huntley MD [id number]   3/23/2022 10:15 AM Maureen Morning A [facility]   3/24/2022  2:30 PM Maureen Morning A [facility]   3/28/2022  9:15 AM Zohreh Bentley MD [facility] POA       Visit Approval Visit # Therapist initials Date A NS / Cx < 24 hr >24 hr Cx Comments    1 SHAISTA 1/5/22 [x]  [] [] Initial evaluation    2 SHAISTA 1/7/22 [x] [] []     3 SHAISTA 1/11/22 [x] [] []     4 SHAISTA 1/13/22 [x] [] []     5 PDE 1- [x] [] []     6 SHAISTA 1/20/22 [x] [] []     7 SHAISTA 1/25/22 [x] [] []     8 SHAISTA 1/27/22 [x] [] []     9 SHAISTA 2/1/22 [x] [] [] PN due next visit    10  SHAISTA 2/3/22 [x] [] [] PN today    11 SHAISTA 2/7/22 [x] [] [] 1    12 SHAISTA 2/9/22 [x] [] [] 2    13 SHAISTA 2/15/22 [x] [] [] 3    14 SHAISTA 4/00/48 [x] [] [] Recert/Reeval- added L hip pain to [address] and treatment plan    15 SHAISTA 2/17/22 [x] [] [] 4    16 SHAISTA 2/21/22 [x] [] [] 5    17 SHAISTA 2/24/22 [x] [] [] 6    18 KAI 2/25/22 [x] [] [] 7    19 SHAISTA 3/1/22 x   8    20 SHAISTA 3/2/22 x   9, PN due next visit    21 SHAISTA 3/4/22 x   PN today    22 SHAISTA 3/8/22 x   1

## 2022-03-10 ENCOUNTER — HOSPITAL ENCOUNTER (OUTPATIENT)
Dept: PHYSICAL THERAPY | Age: 72
Discharge: HOME | End: 2022-03-10
Payer: MEDICARE

## 2022-03-10 PROCEDURE — 97140 MANUAL THERAPY 1/> REGIONS: CPT

## 2022-03-10 PROCEDURE — 97110 THERAPEUTIC EXERCISES: CPT

## 2022-03-10 NOTE — PROGRESS NOTES
Chasidy Garcia  : 1950  [place] / Product Type: Medicare /  [address]  at [facility]  [address], [address] [facility], [address]  [phone]   [phone]      OUTPATIENT PHYSICAL THERAPY: Daily Treatment Note 3/10/2022  Visit Count:  23    ICD-10: Treatment Diagnosis:   M25.511 Pain in Right Shoulder  M25.611 Stiffness in Right Shoulder  M62.81 Muscle Weakness Generalized  Z47.89 Orthopedic Aftercare  M25.552 Pain in Left hip  M25.652 Stiffness of Left hip, not elsewhere classified    Precautions/Allergies:   Patient has no known allergies. Follow Protocol  -Limit AROM to : Flexion (80 degrees), Extension (20 degrees), Abduction (80 degrees, IR (full), ER (to neutral only)  -Sling may be off  -No subscapularis stretch or strengthening until 6 weeks post op date    TSA after 6 weeks (22)  - All active and passive ROM ok  -Resistance in all arcs OK  TREATMENT PLAN:  Effective Dates: 2022 TO 2022 (90 days). Frequency/Duration: 3 times a week for 90 Days        PRE-TREATMENT SYMPTOMS/COMPLAINTS:  Pt reports being sore at L hip following DN. Taking two weeks off that to see if finding benefit. MEDICATIONS REVIEWED:  3/10/2022   TREATMENT:   (In addition to Assessment/Re-Assessment sessions the following treatments were rendered)    THERAPEUTIC EXERCISE: (30 minutes):  Exercises per grid below to improve mobility, strength and balance. Required minimal visual and verbal cues to promote proper body alignment and promote proper body posture. Progressed resistance, range and complexity of movement as indicated.      Date:  3/1/22 Date:  3/3/22 Date:  3/4/22 Date:  3/8/22 Date:  3/10/22   Activity/Exercise        Rhythmic stabilization         Shoulder flex (supine) #2 supine wand 10 X 2  #2 supine wand 10 X 2 #2 supine wand 10 X 2    S/l shoulder abduction hold  hold hold    S/l ER 10 X 2  10 X 2 10 X 2    Supine protraction    10 X 2    Prone row        Prone extension        Prone horizontal abduction hold  hold Hold     Sleeper stretch 30 seconds 3 X   30 seconds 3 X  30 second s3 X    TB rows/extension BTB 10 X 2 (row); RTB 10 X 2 (extension)  BTB 10 x 2 (row), RTB 10 X 2 (ext) BTB 10 X 2 (row), RTB 10 X 2 (ext)    TB IR/ER RTB 10 X 2 each  RTB 10 X 2 RTB 10 X 2    Horizontal adduction stretch    15 seconds 3 X    Stability ball rolls on wall    30 seconds CW, CCW    Doorway stretch    15 seconds 3 X    Seated piriformis stretch        Seated glute stretch        clamshell  RTB 10 X 2 s/l;    GTB 10 X 2 in s/l   Reverse clamshell  #2 10 X 2   #2 10 X 2   Hip abduction  #2 10 X 2   #2 10 X 2   bridges  15 X 2   15 X 2   ASLR  15 X 2   #2 10 X 2   Standing hip hike on 6\" step  10 X 2   10 X 2   6\" lateral step ups  10 X 2   10 X 2   TB supine hip ER  BTB 10 X 2   GTB 10 X 2   Walking side steps     GTB 20 ft x 3     MANUAL THERAPY: (15 minutes): Joint mobilization, Soft tissue mobilization and Manipulation was utilized and necessary because of the patient's restricted joint motion, painful spasm, loss of articular motion and restricted motion of soft tissue. +PROM R shoulder- all directions to pt tolerance  +STM to lateral deltoid, subscapularis    3/10/22  +manual stretching L ITB, glute, piriformis, hip flexor  +STM to glute med, ITB    (Used abbreviations: MET - muscle energy technique; PNF - proprioceptive neuromuscular facilitation; NMR - neuromuscular re-education; AP - anterior to posterior; PA - posterior to anterior)    MODALITIES: (0 minutes):      *  Cold Pack Therapy in order to provide analgesia and reduce inflammation and edema. TREATMENT/SESSION ASSESSMENT: Tolerated progression of hip exercises well. Provided HEP and TB for home exercises. No increased pain with any exercises.     Education: on objective findings and HEP    RECOMMENDATIONS/INTENT FOR NEXT TREATMENT SESSION: \"Next visit will focus on advancements to more challenging activities\".     PAIN: Initial: 2/10  (L hip ) Post Session:  2/10      MedBridge Portal    Total Treatment Billable Duration:   PT Patient Time In/Time Out  Time In: 0930  Time Out: 1015  Aspen Hinds, PT, DPT    Future Appointments   Date Time Provider Elisabeth Gissel   3/11/2022 11:00 AM Ben Balder A [facility] SFD   3/14/2022 10:15 AM Ben Balder A [facility] SFD   3/16/2022 10:15 AM Ben Balder A [facility]PT SFD   3/18/2022 11:00 AM Ben Balder A [facility]PT SFD   3/21/2022 10:15 AM Ben Balder A [facility]PT SFD   3/21/2022  2:10 PM Linda Abad MD [id number] PGU   3/23/2022  8:00 AM Ben Balder A SFDORPT SFD   3/24/2022  2:30 PM Ben Balder A SFDORPT SFD   3/28/2022  9:15 AM Li Vanegas MD [facility] POA       Visit Approval Visit # Therapist initials Date A NS / Cx < 24 hr >24 hr Cx Comments    1 SHAISTA 1/5/22 [x]  [] [] Initial evaluation    2 SHAISTA 1/7/22 [x] [] []     3 SHAISTA 1/11/22 [x] [] []     4 SHAISTA 1/13/22 [x] [] []     5 PDE 1- [x] [] []     6 SHAISTA 1/20/22 [x] [] []     7 SHAISTA 1/25/22 [x] [] []     8 SHAISTA 1/27/22 [x] [] []     9 SHAISTA 2/1/22 [x] [] [] PN due next visit    10  SHAISTA 2/3/22 [x] [] [] PN today    11 SHAISTA 2/7/22 [x] [] [] 1    12 SHAISTA 2/9/22 [x] [] [] 2    13 SHAISTA 2/15/22 [x] [] [] 3    14 SHAISTA 9/02/94 [x] [] [] Recert/Reeval- added L hip pain to [address] and treatment plan    15 SHAISTA 2/17/22 [x] [] [] 4    16 SHAISTA 2/21/22 [x] [] [] 5    17 SHAISTA 2/24/22 [x] [] [] 6    18 SHAISTA 2/25/22 [x] [] [] 7    19 SHAISTA 3/1/22 x   8    20 SHAISTA 3/2/22 x   9, PN due next visit    21 SHAISTA 3/4/22 x   PN today    22 SHAISTA 3/8/22 x   1    23 SHAISTA 3/10/22 x   2

## 2022-03-11 ENCOUNTER — HOSPITAL ENCOUNTER (OUTPATIENT)
Dept: PHYSICAL THERAPY | Age: 72
Discharge: HOME | End: 2022-03-11
Payer: MEDICARE

## 2022-03-11 PROCEDURE — 97016 VASOPNEUMATIC DEVICE THERAPY: CPT

## 2022-03-11 PROCEDURE — 97110 THERAPEUTIC EXERCISES: CPT

## 2022-03-11 PROCEDURE — 97140 MANUAL THERAPY 1/> REGIONS: CPT

## 2022-03-11 NOTE — PROGRESS NOTES
Jon Whaley  : 1950  [place] / Product Type: Medicare /  [address]  at [facility]  [address], [address] [facility], [address]  [phone]   [phone]      OUTPATIENT PHYSICAL THERAPY: Daily Treatment Note 3/11/2022  Visit Count:  24    ICD-10: Treatment Diagnosis:   M25.511 Pain in Right Shoulder  M25.611 Stiffness in Right Shoulder  M62.81 Muscle Weakness Generalized  Z47.89 Orthopedic Aftercare  M25.552 Pain in Left hip  M25.652 Stiffness of Left hip, not elsewhere classified    Precautions/Allergies:   Patient has no known allergies. Follow Protocol  -Limit AROM to : Flexion (80 degrees), Extension (20 degrees), Abduction (80 degrees, IR (full), ER (to neutral only)  -Sling may be off  -No subscapularis stretch or strengthening until 6 weeks post op date    TSA after 6 weeks (22)  - All active and passive ROM ok  -Resistance in all arcs OK  TREATMENT PLAN:  Effective Dates: 2022 TO 2022 (90 days). Frequency/Duration: 3 times a week for 90 Days        PRE-TREATMENT SYMPTOMS/COMPLAINTS:  Pt reports continued soreness at R shoulder  MEDICATIONS REVIEWED:  3/11/2022   TREATMENT:   (In addition to Assessment/Re-Assessment sessions the following treatments were rendered)    THERAPEUTIC EXERCISE: (25 minutes):  Exercises per grid below to improve mobility, strength and balance. Required minimal visual and verbal cues to promote proper body alignment and promote proper body posture. Progressed resistance, range and complexity of movement as indicated.      Date:  3/1/22 Date:  3/3/22 Date:  3/4/22 Date:  3/8/22 Date:  3/10/22 Date:  3/11/22   Activity/Exercise         Rhythmic stabilization          Shoulder flex (supine) #2 supine wand 10 X 2  #2 supine wand 10 X 2 #2 supine wand 10 X 2  #2 supine wand 10 X 2   S/l shoulder abduction hold  hold hold     S/l ER 10 X 2  10 X 2 10 X 2  10 X 2 Supine protraction    10 X 2  10 X 2   Prone row         Prone extension         Prone horizontal abduction hold  hold Hold      Sleeper stretch 30 seconds 3 X   30 seconds 3 X  30 second s3 X     TB rows/extension BTB 10 X 2 (row); RTB 10 X 2 (extension)  BTB 10 x 2 (row), RTB 10 X 2 (ext) BTB 10 X 2 (row), RTB 10 X 2 (ext)  BTB 10 X 2 (row), RTB 10 x 2 (ext)   TB IR/ER RTB 10 X 2 each  RTB 10 X 2 RTB 10 X 2  RTB 10 x 2 each   Horizontal adduction stretch    15 seconds 3 X  15 seconds 3 X    Stability ball rolls on wall    30 seconds CW, CCW     Doorway stretch    15 seconds 3 X     Seated piriformis stretch         Seated glute stretch         clamshell  RTB 10 X 2 s/l;    GTB 10 X 2 in s/l    Reverse clamshell  #2 10 X 2   #2 10 X 2    Hip abduction  #2 10 X 2   #2 10 X 2    bridges  15 X 2   15 X 2    ASLR  15 X 2   #2 10 X 2    Standing hip hike on 6\" step  10 X 2   10 X 2    6\" lateral step ups  10 X 2   10 X 2    TB supine hip ER  BTB 10 X 2   GTB 10 X 2    Walking side steps     GTB 20 ft x 3      MANUAL THERAPY: (15 minutes): Joint mobilization, Soft tissue mobilization and Manipulation was utilized and necessary because of the patient's restricted joint motion, painful spasm, loss of articular motion and restricted motion of soft tissue. 3/11/22  +PROM R shoulder- all directions to pt tolerance  +STM to lateral deltoid, subscapularis    +manual stretching L ITB, glute, piriformis, hip flexor  +STM to glute med, ITB    (Used abbreviations: MET - muscle energy technique; PNF - proprioceptive neuromuscular facilitation; NMR - neuromuscular re-education; AP - anterior to posterior; PA - posterior to anterior)    MODALITIES: (15 minutes):      vasopneumatic device to reduce pain, inflammation, and edema med pressure 34 degrees      TREATMENT/SESSION ASSESSMENT: Pt continues with TTP along all RTC mm tendons at [facility]. Maintaining repetitions and exercises to avoid further exacerbation of tendons.   Education: on objective findings and HEP    RECOMMENDATIONS/INTENT FOR NEXT TREATMENT SESSION: \"Next visit will focus on advancements to more challenging activities\".     PAIN: Initial: 2/10  (L hip ) Post Session:  2/10      MedBridge Portal    Total Treatment Billable Duration:   PT Patient Time In/Time Out  Time In: 1100  Time Out: Seth ProcJaime Parham 1, PT, DPT    Future Appointments   Date Time Provider [place]   3/14/2022 10:15 AM Neha Nevaeh A [facility]PT SFD   3/16/2022 10:15 AM Neha Nevaeh A [facility]PT SFD   3/18/2022 11:00 AM Neha Nevaeh A [facility]PT SFD   3/21/2022 10:15 AM Neha Nevaeh A [facility]PT SFD   3/21/2022  2:10 PM Vanessa Greer MD [id number] PGU   3/23/2022  8:00 AM Neha Nevaeh A [facility]FDORPT SFD   3/24/2022  2:30 PM Neha Nevaeh A [facility]PT SFD   3/28/2022  9:15 AM Jaxson Saeed MD [facility] POA       Visit Approval Visit # Therapist initials Date A NS / Cx < 24 hr >24 hr Cx Comments    1 SHAISTA 1/5/22 [x]  [] [] Initial evaluation    2 SHAISTA 1/7/22 [x] [] []     3 SHAISTA 1/11/22 [x] [] []     4 SHAISTA 1/13/22 [x] [] []     5 PDE 1- [x] [] []     6 SHAISTA 1/20/22 [x] [] []     7 SHAISTA 1/25/22 [x] [] []     8 SHAISTA 1/27/22 [x] [] []     9 SHAISTA 2/1/22 [x] [] [] PN due next visit    10  SHAISTA 2/3/22 [x] [] [] PN today    11 SHAISTA 2/7/22 [x] [] [] 1    12 SHAISTA 2/9/22 [x] [] [] 2    13 SHAISTA 2/15/22 [x] [] [] 3    14 SHAISTA 8/76/15 [x] [] [] Recert/Reeval- added L hip pain to [address] and treatment plan    15 SHAISTA 2/17/22 [x] [] [] 4    16 SHAISTA 2/21/22 [x] [] [] 5    17 SHAISTA 2/24/22 [x] [] [] 6    18 SHAISTA 2/25/22 [x] [] [] 7    19 SHAISTA 3/1/22 x   8    20 SHAISTA 3/2/22 x   9, PN due next visit    21 SHAISTA 3/4/22 x   PN today    22 SHAISTA 3/8/22 x   1    23 SHAISTA 3/10/22 x   2    24 SHAISTA 3/11/22 x   3

## 2022-03-14 ENCOUNTER — HOSPITAL ENCOUNTER (OUTPATIENT)
Dept: PHYSICAL THERAPY | Age: 72
Discharge: HOME | End: 2022-03-14
Payer: MEDICARE

## 2022-03-14 PROCEDURE — 97110 THERAPEUTIC EXERCISES: CPT

## 2022-03-14 PROCEDURE — 97140 MANUAL THERAPY 1/> REGIONS: CPT

## 2022-03-14 PROCEDURE — 97016 VASOPNEUMATIC DEVICE THERAPY: CPT

## 2022-03-14 NOTE — PROGRESS NOTES
Leila Hale  : 1950  [place] / Product Type: Medicare /  [address]  at [facility]  [address], [address] [facility], [address]  [phone]   [phone]      OUTPATIENT PHYSICAL THERAPY: Daily Treatment Note 3/14/2022  Visit Count:  25    ICD-10: Treatment Diagnosis:   M25.511 Pain in Right Shoulder  M25.611 Stiffness in Right Shoulder  M62.81 Muscle Weakness Generalized  Z47.89 Orthopedic Aftercare  M25.552 Pain in Left hip  M25.652 Stiffness of Left hip, not elsewhere classified    Precautions/Allergies:   Patient has no known allergies. Follow Protocol  -Limit AROM to : Flexion (80 degrees), Extension (20 degrees), Abduction (80 degrees, IR (full), ER (to neutral only)  -Sling may be off  -No subscapularis stretch or strengthening until 6 weeks post op date    TSA after 6 weeks (22)  - All active and passive ROM ok  -Resistance in all arcs OK  TREATMENT PLAN:  Effective Dates: 2022 TO 2022 (90 days). Frequency/Duration: 3 times a week for 90 Days        PRE-TREATMENT SYMPTOMS/COMPLAINTS:  Pt reports continued soreness at R shoulder but feeling better with resting it over the weekend. MEDICATIONS REVIEWED:  3/14/2022   TREATMENT:   (In addition to Assessment/Re-Assessment sessions the following treatments were rendered)    THERAPEUTIC EXERCISE: (25 minutes):  Exercises per grid below to improve mobility, strength and balance. Required minimal visual and verbal cues to promote proper body alignment and promote proper body posture. Progressed resistance, range and complexity of movement as indicated.      Date:  3/1/22 Date:  3/3/22 Date:  3/4/22 Date:  3/8/22 Date:  3/10/22 Date:  3/11/22 Date:  3/14/22   Activity/Exercise          Rhythmic stabilization           Shoulder flex (supine) #2 supine wand 10 X 2  #2 supine wand 10 X 2 #2 supine wand 10 X 2  #2 supine wand 10 X 2 #2 supine wand 10 X 2   S/l shoulder abduction hold  hold hold   Continue to hold due to pain. S/l ER 10 X 2  10 X 2 10 X 2  10 X 2 10 X 2   Supine protraction    10 X 2  10 X 2 10 X 2   Prone row          Prone extension          Prone horizontal abduction hold  hold Hold       Sleeper stretch 30 seconds 3 X   30 seconds 3 X  30 second s3 X   manual   TB rows/extension BTB 10 X 2 (row); RTB 10 X 2 (extension)  BTB 10 x 2 (row), RTB 10 X 2 (ext) BTB 10 X 2 (row), RTB 10 X 2 (ext)  BTB 10 X 2 (row), RTB 10 x 2 (ext) BTB 10 x 2(row), RTB 10 X 2 (ext)   TB IR/ER RTB 10 X 2 each  RTB 10 X 2 RTB 10 X 2  RTB 10 x 2 each RTB 10 x 2 each   Horizontal adduction stretch    15 seconds 3 X  15 seconds 3 X  15 seconds 3 X    Stability ball rolls on wall    30 seconds CW, CCW      Doorway stretch    15 seconds 3 X      Seated piriformis stretch          Seated glute stretch          clamshell  RTB 10 X 2 s/l;    GTB 10 X 2 in s/l     Reverse clamshell  #2 10 X 2   #2 10 X 2     Hip abduction  #2 10 X 2   #2 10 X 2     bridges  15 X 2   15 X 2     ASLR  15 X 2   #2 10 X 2     Standing hip hike on 6\" step  10 X 2   10 X 2     6\" lateral step ups  10 X 2   10 X 2     TB supine hip ER  BTB 10 X 2   GTB 10 X 2     Walking side steps     GTB 20 ft x 3       MANUAL THERAPY: (15 minutes): Joint mobilization, Soft tissue mobilization and Manipulation was utilized and necessary because of the patient's restricted joint motion, painful spasm, loss of articular motion and restricted motion of soft tissue.    3/14/22  +PROM R shoulder- all directions to pt tolerance  +STM to lateral deltoid, subscapularis    +manual stretching L ITB, glute, piriformis, hip flexor  +STM to glute med, ITB    (Used abbreviations: MET - muscle energy technique; PNF - proprioceptive neuromuscular facilitation; NMR - neuromuscular re-education; AP - anterior to posterior; PA - posterior to anterior)    MODALITIES: (15 minutes):      vasopneumatic device to reduce pain, inflammation, and edema med pressure 34 degrees      TREATMENT/SESSION ASSESSMENT: Pt continues with TTP along all RTC mm tendons at [facility]. Maintaining repetitions and exercises to avoid further exacerbation of tendons again this session. Education: on objective findings and HEP    RECOMMENDATIONS/INTENT FOR NEXT TREATMENT SESSION: \"Next visit will focus on advancements to more challenging activities\".     PAIN: Initial: 2/10  shoulder Post Session:  2/10      MedBridge Portal    Total Treatment Billable Duration:   PT Patient Time In/Time Out  Time In: 1015  Time Out: 0  Kaleb Scanlon, PT, DPT    Future Appointments   Date Time Provider [place]   3/16/2022 10:15 AM Latisha Shove A [facility]PT SFD   3/18/2022 11:00 AM Latisha Shove A [facility]PT SFD   3/21/2022 10:15 AM Latisha Shove A [facility]PT SFD   3/21/2022  2:10 PM Abigail Raza MD [id number] PGU   3/23/2022  8:00 AM Latisha Shove A [facility]PT SFD   3/24/2022  2:30 PM Latisha Shove A S[facility]PT SFD   3/28/2022  9:15 AM Meryle Im, MD [facility] POA       Visit Approval Visit # Therapist initials Date A NS / Cx < 24 hr >24 hr Cx Comments    1 SHAISTA 1/5/22 [x]  [] [] Initial evaluation    2 SHAISTA 1/7/22 [x] [] []     3 SHAISTA 1/11/22 [x] [] []     4 SHAISTA 1/13/22 [x] [] []     5 PDE 1- [x] [] []     6 SHAISTA 1/20/22 [x] [] []     7 SHAISTA 1/25/22 [x] [] []     8 SHAISTA 1/27/22 [x] [] []     9 SHAISTA 2/1/22 [x] [] [] PN due next visit    10  SHAISTA 2/3/22 [x] [] [] PN today    11 SHAISTA 2/7/22 [x] [] [] 1    12 SHAISTA 2/9/22 [x] [] [] 2    13 SHAISTA 2/15/22 [x] [] [] 3    14 SHAISTA 1/27/25 [x] [] [] Recert/Reeval- added L hip pain to [address] and treatment plan    15 SHAISTA 2/17/22 [x] [] [] 4    16 SHAISTA 2/21/22 [x] [] [] 5    17 SHAISTA 2/24/22 [x] [] [] 6    18 SHAISTA 2/25/22 [x] [] [] 7    19 SHAISTA 3/1/22 x   8    20 SHAISTA 3/2/22 x   9, PN due next visit    21 SHAISTA 3/4/22 x   PN today    22 SHAISTA 3/8/22 x   1    23 SHAISTA 3/10/22 x   2    24 SHAISTA 3/11/22 x   3    25 SHAISTA 3/14/22 x   4

## 2022-03-16 ENCOUNTER — HOSPITAL ENCOUNTER (OUTPATIENT)
Dept: PHYSICAL THERAPY | Age: 72
Discharge: HOME | End: 2022-03-16
Payer: MEDICARE

## 2022-03-16 PROCEDURE — 97140 MANUAL THERAPY 1/> REGIONS: CPT

## 2022-03-16 PROCEDURE — 97110 THERAPEUTIC EXERCISES: CPT

## 2022-03-16 NOTE — PROGRESS NOTES
Katie Winter  : 1950  [place] / Product Type: Medicare /  [address]  at [facility]  [address], [address] [facility], [address]  [phone]   [phone]      OUTPATIENT PHYSICAL THERAPY: Daily Treatment Note 3/16/2022  Visit Count:  26    ICD-10: Treatment Diagnosis:   M25.511 Pain in Right Shoulder  M25.611 Stiffness in Right Shoulder  M62.81 Muscle Weakness Generalized  Z47.89 Orthopedic Aftercare  M25.552 Pain in Left hip  M25.652 Stiffness of Left hip, not elsewhere classified    Precautions/Allergies:   Patient has no known allergies. Follow Protocol  -Limit AROM to : Flexion (80 degrees), Extension (20 degrees), Abduction (80 degrees, IR (full), ER (to neutral only)  -Sling may be off  -No subscapularis stretch or strengthening until 6 weeks post op date    TSA after 6 weeks (22)  - All active and passive ROM ok  -Resistance in all arcs OK  TREATMENT PLAN:  Effective Dates: 2022 TO 2022 (90 days). Frequency/Duration: 3 times a week for 90 Days        PRE-TREATMENT SYMPTOMS/COMPLAINTS:  Pt reports hip is feeling some better. General walking doesn't seem   MEDICATIONS REVIEWED:  3/16/2022   TREATMENT:   (In addition to Assessment/Re-Assessment sessions the following treatments were rendered)    THERAPEUTIC EXERCISE: (25 minutes):  Exercises per grid below to improve mobility, strength and balance. Required minimal visual and verbal cues to promote proper body alignment and promote proper body posture. Progressed resistance, range and complexity of movement as indicated.      Date:  3/1/22 Date:  3/3/22 Date:  3/4/22 Date:  3/8/22 Date:  3/10/22 Date:  3/11/22 Date:  3/14/22 Date:  3/16/22   Activity/Exercise           Rhythmic stabilization            Shoulder flex (supine) #2 supine wand 10 X 2  #2 supine wand 10 X 2 #2 supine wand 10 X 2  #2 supine wand 10 X 2 #2 supine wand 10 X 2 S/l shoulder abduction hold  hold hold   Continue to hold due to pain. S/l ER 10 X 2  10 X 2 10 X 2  10 X 2 10 X 2    Supine protraction    10 X 2  10 X 2 10 X 2    Prone row           Prone extension           Prone horizontal abduction hold  hold Hold        Sleeper stretch 30 seconds 3 X   30 seconds 3 X  30 second s3 X   manual    TB rows/extension BTB 10 X 2 (row); RTB 10 X 2 (extension)  BTB 10 x 2 (row), RTB 10 X 2 (ext) BTB 10 X 2 (row), RTB 10 X 2 (ext)  BTB 10 X 2 (row), RTB 10 x 2 (ext) BTB 10 x 2(row), RTB 10 X 2 (ext)    TB IR/ER RTB 10 X 2 each  RTB 10 X 2 RTB 10 X 2  RTB 10 x 2 each RTB 10 x 2 each    Horizontal adduction stretch    15 seconds 3 X  15 seconds 3 X  15 seconds 3 X     Stability ball rolls on wall    30 seconds CW, CCW       Doorway stretch    15 seconds 3 X       Seated piriformis stretch           Seated glute stretch           clamshell  RTB 10 X 2 s/l;    GTB 10 X 2 in s/l   GTB 15 X 2 in s/l    Reverse clamshell  #2 10 X 2   #2 10 X 2   #2 10 X 2   Hip abduction  #2 10 X 2   #2 10 X 2   Standing: #3 10 X 2   bridges  15 X 2   15 X 2   15 X 2   ASLR  15 X 2   #2 10 X 2   #2 15 X 2   Standing hip hike on 6\" step  10 X 2   10 X 2   10 X 2   6\" lateral step ups  10 X 2   10 X 2   10 X 2   TB supine hip ER  BTB 10 X 2   GTB 10 X 2   GTB 15 X 2   Walking side steps     GTB 20 ft x 3   GTB 30 ft x 2     MANUAL THERAPY: (15 minutes): Joint mobilization, Soft tissue mobilization and Manipulation was utilized and necessary because of the patient's restricted joint motion, painful spasm, loss of articular motion and restricted motion of soft tissue.    +PROM R shoulder- all directions to pt tolerance  +STM to lateral deltoid, subscapularis  3/16/22  +manual stretching L ITB, glute, piriformis, hip flexor  +STM to glute med, ITB    (Used abbreviations: MET - muscle energy technique; PNF - proprioceptive neuromuscular facilitation; NMR - neuromuscular re-education; AP - anterior to posterior; PA - posterior to anterior)    MODALITIES: (15 minutes):      vasopneumatic device to reduce pain, inflammation, and edema med pressure 34 degrees      TREATMENT/SESSION ASSESSMENT: L hip pain overall seems to be improving. Tolerated progression of exercises well with no increased pain during. Will assess next visit if able to continue progressing. Education: on objective findings and HEP    RECOMMENDATIONS/INTENT FOR NEXT TREATMENT SESSION: \"Next visit will focus on advancements to more challenging activities\".     PAIN: Initial: 1-2/10  L hip Post Session:  1-2/10      MedBridge Portal    Total Treatment Billable Duration:   PT Patient Time In/Time Out  Time In: 1015  Time Out: [address], PT, DPT    Future Appointments   Date Time Provider Elisabeth Chauhan   3/18/2022 11:00 AM [facility]   3/21/2022 10:15 AM [facility] A SFDORPT D   3/21/2022  2:10 PM Adan Merchant MD [id number] PGU   3/23/2022  8:00 AM [facility] A SFDORPT D   3/24/2022  2:30 PM [facility] A SFDORPT D   3/28/2022  9:15 AM Jaye Tim MD [facility] POA       Visit Approval Visit # Therapist initials Date A NS / Cx < 24 hr >24 hr Cx Comments    1 SHAISTA 1/5/22 [x]  [] [] Initial evaluation    2 SHAISTA 1/7/22 [x] [] []     3 SHAISTA 1/11/22 [x] [] []     4 SHAISTA 1/13/22 [x] [] []     5 PDE 1- [x] [] []     6 SHAISTA 1/20/22 [x] [] []     7 SHAISTA 1/25/22 [x] [] []     8 SHAISTA 1/27/22 [x] [] []     9 SHAISTA 2/1/22 [x] [] [] PN due next visit    10  SHAISTA 2/3/22 [x] [] [] PN today    11 SHAISTA 2/7/22 [x] [] [] 1    12 SHAISTA 2/9/22 [x] [] [] 2    13 SHAISTA 2/15/22 [x] [] [] 3    14 SHAISTA 2/90/53 [x] [] [] Recert/Reeval- added L hip pain to [address] and treatment plan    15 SHAISTA 2/17/22 [x] [] [] 4    16 SHAISTA 2/21/22 [x] [] [] 5    17 SHAISTA 2/24/22 [x] [] [] 6    18 SHAISTA 2/25/22 [x] [] [] 7    19 SHAISTA 3/1/22 x   8    20 SHAISTA 3/2/22 x   9, PN due next visit    21 SHAISTA 3/4/22 x   PN today    22 SHAISTA 3/8/22 x   1    23 SHAISTA 3/10/22 x   2    24 SHAISTA 3/11/22 x   3    25 SHAISTA 3/14/22 x   4    26 SHAISTA 3/16/22 x   5

## 2022-03-18 ENCOUNTER — HOSPITAL ENCOUNTER (OUTPATIENT)
Dept: PHYSICAL THERAPY | Age: 72
Discharge: HOME | End: 2022-03-18
Payer: MEDICARE

## 2022-03-18 PROBLEM — R20.2 PARESTHESIA: Status: ACTIVE | Noted: 2020-03-19

## 2022-03-18 PROBLEM — H04.123 INSUFFICIENCY OF TEAR FILM OF BOTH EYES: Status: ACTIVE | Noted: 2018-02-08

## 2022-03-18 PROBLEM — R53.1 WEAKNESS: Status: ACTIVE | Noted: 2020-03-19

## 2022-03-18 PROCEDURE — 97140 MANUAL THERAPY 1/> REGIONS: CPT

## 2022-03-18 PROCEDURE — 97110 THERAPEUTIC EXERCISES: CPT

## 2022-03-18 PROCEDURE — 97016 VASOPNEUMATIC DEVICE THERAPY: CPT

## 2022-03-18 NOTE — PROGRESS NOTES
Ricardo Manning  : 1950  [place] / Product Type: Medicare /  [address]  at [facility]  [facility], [address] [place] [address]  [phone]   [phone]      OUTPATIENT PHYSICAL THERAPY: Daily Treatment Note 3/18/2022  Visit Count:  27    ICD-10: Treatment Diagnosis:   M25.511 Pain in Right Shoulder  M25.611 Stiffness in Right Shoulder  M62.81 Muscle Weakness Generalized  Z47.89 Orthopedic Aftercare  M25.552 Pain in Left hip  M25.652 Stiffness of Left hip, not elsewhere classified    Precautions/Allergies:   Patient has no known allergies. Follow Protocol  -Limit AROM to : Flexion (80 degrees), Extension (20 degrees), Abduction (80 degrees, IR (full), ER (to neutral only)  -Sling may be off  -No subscapularis stretch or strengthening until 6 weeks post op date    TSA after 6 weeks (22)  - All active and passive ROM ok  -Resistance in all arcs OK  TREATMENT PLAN:  Effective Dates: 2022 TO 2022 (90 days). Frequency/Duration: 3 times a week for 90 Days        PRE-TREATMENT SYMPTOMS/COMPLAINTS:  Pt reports he over stretched his hip yesterday. Is going to back off working on it this weekend. Had follow up with surgeon and doesn't think its tendonitis but more so just minimal use of mm over time and now irritated with use post op. MEDICATIONS REVIEWED:  3/18/2022   TREATMENT:   (In addition to Assessment/Re-Assessment sessions the following treatments were rendered)    THERAPEUTIC EXERCISE: (25 minutes):  Exercises per grid below to improve mobility, strength and balance. Required minimal visual and verbal cues to promote proper body alignment and promote proper body posture. Progressed resistance, range and complexity of movement as indicated.      Date:  3/1/22 Date:  3/3/22 Date:  3/4/22 Date:  3/8/22 Date:  3/10/22 Date:  3/11/22 Date:  3/14/22 Date:  3/16/22 Date:  3/18/22   Activity/Exercise Rhythmic stabilization             Shoulder flex (supine) #2 supine wand 10 X 2  #2 supine wand 10 X 2 #2 supine wand 10 X 2  #2 supine wand 10 X 2 #2 supine wand 10 X 2  #3 supine wand 10 X 2   S/l shoulder abduction hold  hold hold   Continue to hold due to pain. 10 X AA   S/l ER 10 X 2  10 X 2 10 X 2  10 X 2 10 X 2  #1 10 X 2   Supine protraction    10 X 2  10 X 2 10 X 2  #1 10 X 2   Prone row         Bent #3 10 X 2   Prone extension         Bent #1 10 X 2   Prone horizontal abduction hold  hold Hold      Bent 10 X 2   Sleeper stretch 30 seconds 3 X   30 seconds 3 X  30 second s3 X   manual     TB rows/extension BTB 10 X 2 (row); RTB 10 X 2 (extension)  BTB 10 x 2 (row), RTB 10 X 2 (ext) BTB 10 X 2 (row), RTB 10 X 2 (ext)  BTB 10 X 2 (row), RTB 10 x 2 (ext) BTB 10 x 2(row), RTB 10 X 2 (ext)  BTB 10 x 2 (row) RTB 10  X2 (ext)   TB IR/ER RTB 10 X 2 each  RTB 10 X 2 RTB 10 X 2  RTB 10 x 2 each RTB 10 x 2 each  RTB 10 X 2 each   Horizontal adduction stretch    15 seconds 3 X  15 seconds 3 X  15 seconds 3 X      Stability ball rolls on wall    30 seconds CW, CCW        Doorway stretch    15 seconds 3 X        Seated piriformis stretch            Seated glute stretch            clamshell  RTB 10 X 2 s/l;    GTB 10 X 2 in s/l   GTB 15 X 2 in s/l     Reverse clamshell  #2 10 X 2   #2 10 X 2   #2 10 X 2    Hip abduction  #2 10 X 2   #2 10 X 2   Standing: #3 10 X 2    bridges  15 X 2   15 X 2   15 X 2    ASLR  15 X 2   #2 10 X 2   #2 15 X 2    Standing hip hike on 6\" step  10 X 2   10 X 2   10 X 2    6\" lateral step ups  10 X 2   10 X 2   10 X 2    TB supine hip ER  BTB 10 X 2   GTB 10 X 2   GTB 15 X 2    Walking side steps     GTB 20 ft x 3   GTB 30 ft x 2      MANUAL THERAPY: (15 minutes): Joint mobilization, Soft tissue mobilization and Manipulation was utilized and necessary because of the patient's restricted joint motion, painful spasm, loss of articular motion and restricted motion of soft tissue. 3/18/22  +PROM R shoulder- all directions to pt tolerance  +STM to lateral deltoid, subscapularis    +manual stretching L ITB, glute, piriformis, hip flexor  +STM to glute med, ITB    (Used abbreviations: MET - muscle energy technique; PNF - proprioceptive neuromuscular facilitation; NMR - neuromuscular re-education; AP - anterior to posterior; PA - posterior to anterior)    MODALITIES: (15 minutes):      vasopneumatic device to reduce pain, inflammation, and edema med pressure 34 degrees      TREATMENT/SESSION ASSESSMENT: Resumed most exercises with modifications if necessary. More soreness following session with progression. Education: on objective findings and HEP    RECOMMENDATIONS/INTENT FOR NEXT TREATMENT SESSION: \"Next visit will focus on advancements to more challenging activities\".     PAIN: Initial: 1/10  R shoulder Post Session:  2-3/10      [facility] Portal    Total Treatment Billable Duration:   PT Patient Time In/Time Out  Time In: 1100  Time Out: 1200  Lilliana Wu PT, DPT    Future Appointments   Date Time Provider [facility]   3/21/2022 10:15 AM Neha BAKER SFDORPT SFD   3/21/2022  2:10 PM Vanessa Greer MD [id number] PGU   3/23/2022  8:00 AM Neha BAKER SFDORPT SFD   3/24/2022  2:30 PM Neha BAKER SFDORPT SFD   3/28/2022  9:15 AM Jaxson Saeed MD [facility] POA       Visit Approval Visit # Therapist initials Date A NS / Cx < 24 hr >24 hr Cx Comments    1 SHAISTA 1/5/22 [x]  [] [] Initial evaluation    2 SHAISTA 1/7/22 [x] [] []     3 SHAISTA 1/11/22 [x] [] []     4 SHAISTA 1/13/22 [x] [] []     5 PDE 1- [x] [] []     6 SHAISTA 1/20/22 [x] [] []     7 SHAISTA 1/25/22 [x] [] []     8 SHAISTA 1/27/22 [x] [] []     9 SHAISTA 2/1/22 [x] [] [] PN due next visit    10  SHAISTA 2/3/22 [x] [] [] PN today    11 SHAISTA 2/7/22 [x] [] [] 1    12 SHAISTA 2/9/22 [x] [] [] 2    13 SHAISTA 2/15/22 [x] [] [] 3    14 SHAISTA 9/35/06 [x] [] [] Recert/Reeval- added L hip pain to [address] and treatment plan    15 SHAISTA 2/17/22 [x] [] [] 4    16 SHAISTA 2/21/22 [x] [] [] 5    17 SHAISTA 2/24/22 [x] [] [] 6    18 SHAISTA 2/25/22 [x] [] [] 7    19 SHAISTA 3/1/22 x   8    20 SHAISTA 3/2/22 x   9, PN due next visit    21 SHAISTA 3/4/22 x   PN today    22 SHAISTA 3/8/22 x   1    23 SHAISTA 3/10/22 x   2    24 SHAISTA 3/11/22 x   3    25 SHAISTA 3/14/22 x   4   **KX** 26 SHAISTA 3/16/22 x   5    27 SHAISTA 3/18/22 x   6

## 2022-03-19 PROBLEM — E87.1 HYPONATREMIA: Status: ACTIVE | Noted: 2017-06-30

## 2022-03-19 PROBLEM — N52.1 ERECTILE DISORDER DUE TO MEDICAL CONDITION IN MALE PATIENT: Status: ACTIVE | Noted: 2021-07-08

## 2022-03-19 PROBLEM — J34.2 DEVIATED SEPTUM: Status: ACTIVE | Noted: 2021-02-24

## 2022-03-19 PROBLEM — R09.81 NASAL CONGESTION: Status: ACTIVE | Noted: 2022-02-15

## 2022-03-19 PROBLEM — R29.3 POSTURAL IMBALANCE: Status: ACTIVE | Noted: 2020-03-19

## 2022-03-19 PROBLEM — J30.0 VASOMOTOR RHINITIS: Status: ACTIVE | Noted: 2018-07-06

## 2022-03-19 PROBLEM — G62.9 NEUROPATHY: Status: ACTIVE | Noted: 2020-03-19

## 2022-03-19 PROBLEM — J01.90 ACUTE SINUSITIS: Status: ACTIVE | Noted: 2022-02-15

## 2022-03-19 PROBLEM — N42.9 ABNORMAL PROSTATE ON PHYSICAL EXAMINATION: Status: ACTIVE | Noted: 2018-07-06

## 2022-03-19 PROBLEM — R79.89 LOW TSH LEVEL: Status: ACTIVE | Noted: 2017-06-30

## 2022-03-19 PROBLEM — C44.90 NONMELANOMA SKIN CANCER: Status: ACTIVE | Noted: 2018-07-06

## 2022-03-20 PROBLEM — K40.90 LEFT INGUINAL HERNIA: Status: ACTIVE | Noted: 2018-02-14

## 2022-03-20 PROBLEM — J32.0 CHRONIC MAXILLARY SINUSITIS: Status: ACTIVE | Noted: 2021-02-24

## 2022-03-20 PROBLEM — B35.1 ONYCHOMYCOSIS OF TOENAIL: Status: ACTIVE | Noted: 2017-06-30

## 2022-03-20 PROBLEM — M19.011 OSTEOARTHRITIS OF RIGHT SHOULDER REGION: Status: ACTIVE | Noted: 2021-12-03

## 2022-03-21 ENCOUNTER — HOSPITAL ENCOUNTER (OUTPATIENT)
Dept: PHYSICAL THERAPY | Age: 72
Discharge: HOME | End: 2022-03-21
Payer: MEDICARE

## 2022-03-21 PROCEDURE — 97110 THERAPEUTIC EXERCISES: CPT

## 2022-03-21 PROCEDURE — 97016 VASOPNEUMATIC DEVICE THERAPY: CPT

## 2022-03-21 PROCEDURE — 97140 MANUAL THERAPY 1/> REGIONS: CPT

## 2022-03-21 NOTE — PROGRESS NOTES
Aggie Jones  : 1950  [place] / Product Type: Medicare /  [address]  at [facility]  [address], [address] [facility], [address]  [phone]   [phone]      OUTPATIENT PHYSICAL THERAPY: Daily Treatment Note 3/21/2022  Visit Count:  28    ICD-10: Treatment Diagnosis:   M25.511 Pain in Right Shoulder  M25.611 Stiffness in Right Shoulder  M62.81 Muscle Weakness Generalized  Z47.89 Orthopedic Aftercare  M25.552 Pain in Left hip  M25.652 Stiffness of Left hip, not elsewhere classified    Precautions/Allergies:   Patient has no known allergies. Follow Protocol  -Limit AROM to : Flexion (80 degrees), Extension (20 degrees), Abduction (80 degrees, IR (full), ER (to neutral only)  -Sling may be off  -No subscapularis stretch or strengthening until 6 weeks post op date    TSA after 6 weeks (22)  - All active and passive ROM ok  -Resistance in all arcs OK  TREATMENT PLAN:  Effective Dates: 2022 TO 2022 (90 days). Frequency/Duration: 3 times a week for 90 Days        PRE-TREATMENT SYMPTOMS/COMPLAINTS:  Pt reports slight soreness at shoulder but not as bad as it has been. MEDICATIONS REVIEWED:  3/21/2022   TREATMENT:   (In addition to Assessment/Re-Assessment sessions the following treatments were rendered)    THERAPEUTIC EXERCISE: (25 minutes):  Exercises per grid below to improve mobility, strength and balance. Required minimal visual and verbal cues to promote proper body alignment and promote proper body posture. Progressed resistance, range and complexity of movement as indicated.      Date:  3/1/22 Date:  3/3/22 Date:  3/4/22 Date:  3/8/22 Date:  3/10/22 Date:  3/11/22 Date:  3/14/22 Date:  3/16/22 Date:  3/18/22 Date:  3/21/22   Activity/Exercise             Rhythmic stabilization              Shoulder flex (supine) #2 supine wand 10 X 2  #2 supine wand 10 X 2 #2 supine wand 10 X 2  #2 supine wand 10 X 2 #2 supine wand 10 X 2  #3 supine wand 10 X 2 #3 supine wand 10 X 2   S/l shoulder abduction hold  hold hold   Continue to hold due to pain.   10 X AA 10 X AA   S/l ER 10 X 2  10 X 2 10 X 2  10 X 2 10 X 2  #1 10 X 2 #2 10 X 2   Supine protraction    10 X 2  10 X 2 10 X 2  #1 10 X 2 #2 10 X 2   Prone row         Bent #3 10 X 2 Bent #4 10 X 2   Prone extension         Bent #1 10 X 2 Bent #2 10 X 2   Prone horizontal abduction hold  hold Hold      Bent 10 X 2 Bent 10 X 2   Sleeper stretch 30 seconds 3 X   30 seconds 3 X  30 second s3 X   manual      TB rows/extension BTB 10 X 2 (row); RTB 10 X 2 (extension)  BTB 10 x 2 (row), RTB 10 X 2 (ext) BTB 10 X 2 (row), RTB 10 X 2 (ext)  BTB 10 X 2 (row), RTB 10 x 2 (ext) BTB 10 x 2(row), RTB 10 X 2 (ext)  BTB 10 x 2 (row) RTB 10  X2 (ext) BTB 10 X 2 (row), GTB 10 X2 (ext)   TB IR/ER RTB 10 X 2 each  RTB 10 X 2 RTB 10 X 2  RTB 10 x 2 each RTB 10 x 2 each  RTB 10 X 2 each GTB 10  X 2 each   Horizontal adduction stretch    15 seconds 3 X  15 seconds 3 X  15 seconds 3 X       Stability ball rolls on wall    30 seconds CW, CCW      30 X CW, CCW   Doorway stretch    15 seconds 3 X         Seated piriformis stretch             Seated glute stretch             clamshell  RTB 10 X 2 s/l;    GTB 10 X 2 in s/l   GTB 15 X 2 in s/l      Reverse clamshell  #2 10 X 2   #2 10 X 2   #2 10 X 2     Hip abduction  #2 10 X 2   #2 10 X 2   Standing: #3 10 X 2     bridges  15 X 2   15 X 2   15 X 2     ASLR  15 X 2   #2 10 X 2   #2 15 X 2     Standing hip hike on 6\" step  10 X 2   10 X 2   10 X 2     6\" lateral step ups  10 X 2   10 X 2   10 X 2     TB supine hip ER  BTB 10 X 2   GTB 10 X 2   GTB 15 X 2     Walking side steps     GTB 20 ft x 3   GTB 30 ft x 2       MANUAL THERAPY: (15 minutes): Joint mobilization, Soft tissue mobilization and Manipulation was utilized and necessary because of the patient's restricted joint motion, painful spasm, loss of articular motion and restricted motion of soft tissue. 3/18/22  +PROM R shoulder- all directions to pt tolerance  +STM to lateral deltoid, subscapularis    +manual stretching L ITB, glute, piriformis, hip flexor  +STM to glute med, ITB    (Used abbreviations: MET - muscle energy technique; PNF - proprioceptive neuromuscular facilitation; NMR - neuromuscular re-education; AP - anterior to posterior; PA - posterior to anterior)    MODALITIES: (15 minutes):      vasopneumatic device to reduce pain, inflammation, and edema med pressure 34 degrees      TREATMENT/SESSION ASSESSMENT: Pt continues with most pain and difficulty with s/l shoulder abduction. Able to progress with other exercises as seen above with only slight discomfort. Education: on objective findings and HEP    RECOMMENDATIONS/INTENT FOR NEXT TREATMENT SESSION: \"Next visit will focus on advancements to more challenging activities\".     PAIN: Initial: 1/10  R shoulder Post Session:  3/10      [facility] Portal    Total Treatment Billable Duration:   PT Patient Time In/Time Out  Time In: 1015  Time Out: Karl Power 1160, PT, DPT    Future Appointments   Date Time Provider Elisabeth Chauhan   3/21/2022  2:10 PM Kiki Byrne MD [id number] PGU   3/23/2022  8:00 AM Ruthellen Dines A SFDORPT SFD   3/24/2022  2:30 PM Ruthellen Dines A SFDORPT SFD   3/28/2022  9:15 AM Madai Santiago MD SSA POAI POA   3/29/2022  1:00 PM Ruthellen Dines A SFDORPT SFD   3/31/2022  1:00 PM Ruthellen Dines A SFDORPT SFD   4/1/2022 10:15 AM Ruthellen Dines A SFDORPT SFD   4/4/2022 10:15 AM Ruthellen Dines A SFDORPT SFD   4/6/2022 10:15 AM Ruthellen Dines A SFDORPT SFD   4/7/2022 10:15 AM Ruthellen Dines A SFDORPT SFD       Visit Approval Visit # Therapist initials Date A NS / Cx < 24 hr >24 hr Cx Comments    1 SHAISTA 1/5/22 [x]  [] [] Initial evaluation    2 SHAISTA 1/7/22 [x] [] []     3 KAI 1/11/22 [x] [] []     4 KAI 1/13/22 [x] [] []     5 [facility] 1- [x] [] []     6 KAI 1/20/22 [x] [] []     7 KAI 1/25/22 [x] [] []     8 KAI 1/27/22 [x] [] []     9 SHAISTA 2/1/22 [x] [] [] PN due next visit    10  SHAISTA 2/3/22 [x] [] [] PN today    11 SHAISTA 2/7/22 [x] [] [] 1    12 SHAISTA 2/9/22 [x] [] [] 2    13 SHAISTA 2/15/22 [x] [] [] 3    14 SHAISTA 7/08/13 [x] [] [] Recert/Reeval- added L hip pain to POC and treatment plan    15 SHAISTA 2/17/22 [x] [] [] 4    16 SHAISTA 2/21/22 [x] [] [] 5    17 SHAISTA 2/24/22 [x] [] [] 6    18 SHAISTA 2/25/22 [x] [] [] 7    19 SHAISTA 3/1/22 x   8    20 SHAISTA 3/2/22 x   9, PN due next visit    21 SHAISTA 3/4/22 x   PN today    22 SHAISTA 3/8/22 x   1    23 SHAISTA 3/10/22 x   2    24 SHAISTA 3/11/22 x   3    25 SHAISTA 3/14/22 x   4   **KX** 26 SHAISTA 3/16/22 x   5    27 SHAISTA 3/18/22 x   6    28 SHAISTA 3/21/22 x   7

## 2022-03-23 ENCOUNTER — HOSPITAL ENCOUNTER (OUTPATIENT)
Dept: PHYSICAL THERAPY | Age: 72
Discharge: HOME | End: 2022-03-23
Payer: MEDICARE

## 2022-03-23 ENCOUNTER — APPOINTMENT (OUTPATIENT)
Dept: PHYSICAL THERAPY | Age: 72
End: 2022-03-23
Payer: MEDICARE

## 2022-03-23 PROCEDURE — 97016 VASOPNEUMATIC DEVICE THERAPY: CPT

## 2022-03-23 PROCEDURE — 97140 MANUAL THERAPY 1/> REGIONS: CPT

## 2022-03-23 PROCEDURE — 97110 THERAPEUTIC EXERCISES: CPT

## 2022-03-23 NOTE — PROGRESS NOTES
Leila Hale  : 1950  [place] / Product Type: Medicare /  [address]  at [facility]  [address], [address] [facility], [address]  [phone]   [phone]      OUTPATIENT PHYSICAL THERAPY: Daily Treatment Note 3/23/2022  Visit Count:  29    ICD-10: Treatment Diagnosis:   M25.511 Pain in Right Shoulder  M25.611 Stiffness in Right Shoulder  M62.81 Muscle Weakness Generalized  Z47.89 Orthopedic Aftercare  M25.552 Pain in Left hip  M25.652 Stiffness of Left hip, not elsewhere classified    Precautions/Allergies:   Patient has no known allergies. Follow Protocol  -Limit AROM to : Flexion (80 degrees), Extension (20 degrees), Abduction (80 degrees, IR (full), ER (to neutral only)  -Sling may be off  -No subscapularis stretch or strengthening until 6 weeks post op date    TSA after 6 weeks (22)  - All active and passive ROM ok  -Resistance in all arcs OK  TREATMENT PLAN:  Effective Dates: 2022 TO 2022 (90 days). Frequency/Duration: 3 times a week for 90 Days        PRE-TREATMENT SYMPTOMS/COMPLAINTS:  Pt reports soreness after last visit but the ice really seems to calm it down and make it more tolerable. MEDICATIONS REVIEWED:  3/23/2022   TREATMENT:   (In addition to Assessment/Re-Assessment sessions the following treatments were rendered)    THERAPEUTIC EXERCISE: (25 minutes):  Exercises per grid below to improve mobility, strength and balance. Required minimal visual and verbal cues to promote proper body alignment and promote proper body posture. Progressed resistance, range and complexity of movement as indicated.      Date:  3/1/22 Date:  3/3/22 Date:  3/4/22 Date:  3/8/22 Date:  3/10/22 Date:  3/11/22 Date:  3/14/22 Date:  3/16/22 Date:  3/18/22 Date:  3/21/22 Date:  3/23/22   Activity/Exercise              Rhythmic stabilization               Shoulder flex (supine) #2 supine wand 10 X 2  #2 supine wand 10 X 2 #2 supine wand 10 X 2  #2 supine wand 10 X 2 #2 supine wand 10 X 2  #3 supine wand 10 X 2 #3 supine wand 10 X 2 #4 supine wand 10 X 2   S/l shoulder abduction hold  hold hold   Continue to hold due to pain.   10 X AA 10 X AA 10 X AA   S/l ER 10 X 2  10 X 2 10 X 2  10 X 2 10 X 2  #1 10 X 2 #2 10 X 2 #2 10 X 2   Supine protraction    10 X 2  10 X 2 10 X 2  #1 10 X 2 #2 10 X 2 #3 10 X 2   Prone row         Bent #3 10 X 2 Bent #4 10 X 2 Bent #4 10 X 2   Prone extension         Bent #1 10 X 2 Bent #2 10 X 2 Bent #2 10 X 2   Prone horizontal abduction hold  hold Hold      Bent 10 X 2 Bent 10 X 2 Bent 10 X   Sleeper stretch 30 seconds 3 X   30 seconds 3 X  30 second s3 X   manual       TB rows/extension BTB 10 X 2 (row); RTB 10 X 2 (extension)  BTB 10 x 2 (row), RTB 10 X 2 (ext) BTB 10 X 2 (row), RTB 10 X 2 (ext)  BTB 10 X 2 (row), RTB 10 x 2 (ext) BTB 10 x 2(row), RTB 10 X 2 (ext)  BTB 10 x 2 (row) RTB 10  X2 (ext) BTB 10 X 2 (row), GTB 10 X2 (ext)    TB IR/ER RTB 10 X 2 each  RTB 10 X 2 RTB 10 X 2  RTB 10 x 2 each RTB 10 x 2 each  RTB 10 X 2 each GTB 10  X 2 each    Towel STAR on wall           3X   Horizontal adduction stretch    15 seconds 3 X  15 seconds 3 X  15 seconds 3 X        Stability ball rolls on wall    30 seconds CW, CCW      30 X CW, CCW 30 X CW, CCW   Doorway stretch    15 seconds 3 X          Seated piriformis stretch              Seated glute stretch              clamshell  RTB 10 X 2 s/l;    GTB 10 X 2 in s/l   GTB 15 X 2 in s/l       Reverse clamshell  #2 10 X 2   #2 10 X 2   #2 10 X 2      Hip abduction  #2 10 X 2   #2 10 X 2   Standing: #3 10 X 2      bridges  15 X 2   15 X 2   15 X 2      ASLR  15 X 2   #2 10 X 2   #2 15 X 2      Standing hip hike on 6\" step  10 X 2   10 X 2   10 X 2      6\" lateral step ups  10 X 2   10 X 2   10 X 2      TB supine hip ER  BTB 10 X 2   GTB 10 X 2   GTB 15 X 2      Walking side steps     GTB 20 ft x 3   GTB 30 ft x 2        MANUAL THERAPY: (15 minutes): Joint mobilization, Soft tissue mobilization and Manipulation was utilized and necessary because of the patient's restricted joint motion, painful spasm, loss of articular motion and restricted motion of soft tissue. 3/23/22  +PROM R shoulder- all directions to pt tolerance  +STM to lateral deltoid, subscapularis    +manual stretching L ITB, glute, piriformis, hip flexor  +STM to glute med, ITB    (Used abbreviations: MET - muscle energy technique; PNF - proprioceptive neuromuscular facilitation; NMR - neuromuscular re-education; AP - anterior to posterior; PA - posterior to anterior)    MODALITIES: (15 minutes):      vasopneumatic device to reduce pain, inflammation, and edema med pressure 34 degrees      TREATMENT/SESSION ASSESSMENT: Continues with TTP along all RTC tendon insertion onto humeral head. Able to tolerate progression of exercises well and addition of wall STAR exercise. Education: on objective findings and HEP    RECOMMENDATIONS/INTENT FOR NEXT TREATMENT SESSION: \"Next visit will focus on advancements to more challenging activities\".     PAIN: Initial: 1/10  R shoulder Post Session:  2-3/10      [facility] Portal    Total Treatment Billable Duration:   PT Patient Time In/Time Out  Time In: 0800  Time Out: 0855  Maria L Salazar, PT, DPT    Future Appointments   Date Time Provider Elisabeth Chauhan   3/24/2022  8:15 AM [id number] BLOOD DRAW [id number] PGU   3/24/2022  2:30 PM Tatiana Dooley SFDORPT SFD   3/28/2022  9:15 AM Winnie Coyle MD SSA POAI POA   3/29/2022  1:00 PM Shreya Pacer A SFDORPT SFD   3/31/2022  1:00 PM Shreya Pacer A SFDORPT SFD   4/1/2022 10:15 AM Shreya Pacer A SFDORPT SFD   4/4/2022 10:15 AM Shreya Pacer A SFDORPT SFD   4/6/2022 10:15 AM Shreya Pacer A SFDORPT SFD   4/7/2022 10:15 AM Shreya Pacer A SFDORPT SFD   3/27/2023  2:10 PM John Jerome MD [id number] PGU       Visit Approval Visit # Therapist initials Date A NS / Cx < 24 hr >24 hr Cx Comments    1 SHAISTA 1/5/22 [x] [] [] Initial evaluation    2 SHAISTA 1/7/22 [x] [] []     3 SHAISTA 1/11/22 [x] [] []     4 SHAISTA 1/13/22 [x] [] []     5 PDE 1- [x] [] []     6 SHAISTA 1/20/22 [x] [] []     7 SHAISTA 1/25/22 [x] [] []     8 SHAISTA 1/27/22 [x] [] []     9 SHAISTA 2/1/22 [x] [] [] PN due next visit    10  SHAISTA 2/3/22 [x] [] [] PN today    11 SHAISTA 2/7/22 [x] [] [] 1    12 SHAISTA 2/9/22 [x] [] [] 2    13 SHAISTA 2/15/22 [x] [] [] 3    14 SHAISTA 1/38/48 [x] [] [] Recert/Reeval- added L hip pain to POC and treatment plan    15 SHAISTA 2/17/22 [x] [] [] 4    16 SHAISTA 2/21/22 [x] [] [] 5    17 SHAISTA 2/24/22 [x] [] [] 6    18 SHAISTA 2/25/22 [x] [] [] 7    19 SHAISTA 3/1/22 x   8    20 SHAISTA 3/2/22 x   9, PN due next visit    21 SHAISTA 3/4/22 x   PN today    22 SHAISTA 3/8/22 x   1    23 SHAISTA 3/10/22 x   2    24 SHAISTA 3/11/22 x   3    25 SHAISTA 3/14/22 x   4   **KX** 26 SHAISTA 3/16/22 x   5    27 SHAISTA 3/18/22 x   6    28 SHAISTA 3/21/22 x   7    29 SHAISTA 3/23/22 x   8

## 2022-03-24 ENCOUNTER — HOSPITAL ENCOUNTER (OUTPATIENT)
Dept: PHYSICAL THERAPY | Age: 72
Discharge: HOME | End: 2022-03-24
Payer: MEDICARE

## 2022-03-24 PROCEDURE — 97140 MANUAL THERAPY 1/> REGIONS: CPT

## 2022-03-24 PROCEDURE — 97110 THERAPEUTIC EXERCISES: CPT

## 2022-03-24 NOTE — PROGRESS NOTES
Leila Hale  : 1950  [place] / Product Type: Medicare /  [address]  at [facility]  [address] [facility], [address]  [phone]   [phone]      OUTPATIENT PHYSICAL THERAPY: Daily Treatment Note 3/24/2022  Visit Count:  30    ICD-10: Treatment Diagnosis:   M25.511 Pain in Right Shoulder  M25.611 Stiffness in Right Shoulder  M62.81 Muscle Weakness Generalized  Z47.89 Orthopedic Aftercare  M25.552 Pain in Left hip  M25.652 Stiffness of Left hip, not elsewhere classified    Precautions/Allergies:   Patient has no known allergies. Follow Protocol  -Limit AROM to : Flexion (80 degrees), Extension (20 degrees), Abduction (80 degrees, IR (full), ER (to neutral only)  -Sling may be off  -No subscapularis stretch or strengthening until 6 weeks post op date    TSA after 6 weeks (22)  - All active and passive ROM ok  -Resistance in all arcs OK  TREATMENT PLAN:  Effective Dates: 2022 TO 2022 (90 days). Frequency/Duration: 3 times a week for 90 Days        PRE-TREATMENT SYMPTOMS/COMPLAINTS:  Pt reports his hip is still sore from over doing the stretch still from last week. MEDICATIONS REVIEWED:  3/24/2022   TREATMENT:   (In addition to Assessment/Re-Assessment sessions the following treatments were rendered)    THERAPEUTIC EXERCISE: (15 minutes):  Exercises per grid below to improve mobility, strength and balance. Required minimal visual and verbal cues to promote proper body alignment and promote proper body posture. Progressed resistance, range and complexity of movement as indicated.      Date:  3/14/22 Date:  3/16/22 Date:  3/18/22 Date:  3/21/22 Date:  3/23/22 Date:  3/24/22   Activity/Exercise         Rhythmic stabilization          Shoulder flex (supine) #2 supine wand 10 X 2  #3 supine wand 10 X 2 #3 supine wand 10 X 2 #4 supine wand 10 X 2    S/l shoulder abduction Continue to hold due to pain. 10 X AA 10 X AA 10 X AA    S/l ER 10 X 2  #1 10 X 2 #2 10 X 2 #2 10 X 2    Supine protraction 10 X 2  #1 10 X 2 #2 10 X 2 #3 10 X 2    Prone row   Bent #3 10 X 2 Bent #4 10 X 2 Bent #4 10 X 2    Prone extension   Bent #1 10 X 2 Bent #2 10 X 2 Bent #2 10 X 2    Prone horizontal abduction   Bent 10 X 2 Bent 10 X 2 Bent 10 X    Sleeper stretch manual        TB rows/extension BTB 10 x 2(row), RTB 10 X 2 (ext)  BTB 10 x 2 (row) RTB 10  X2 (ext) BTB 10 X 2 (row), GTB 10 X2 (ext)     TB IR/ER RTB 10 x 2 each  RTB 10 X 2 each GTB 10  X 2 each     Towel STAR on wall     3X    Horizontal adduction stretch 15 seconds 3 X         Stability ball rolls on wall    30 X CW, CCW 30 X CW, CCW    Doorway stretch         Seated piriformis stretch         Seated glute stretch         clamshell  GTB 15 X 2 in s/l     10 X 2 - no resistance   Reverse clamshell  #2 10 X 2    Hold   Hip abduction  Standing: #3 10 X 2    10 X 2   bridges  15 X 2    10 X 2   ASLR  #2 15 X 2    10 X 2   Standing hip hike on 6\" step  10 X 2    10 X 2   6\" lateral step ups  10 X 2       TB supine hip ER  GTB 15 X 2    10 X 2   Walking side steps  GTB 30 ft x 2    30 ft x 2 no resistance     MANUAL THERAPY: (23 minutes): Joint mobilization, Soft tissue mobilization and Manipulation was utilized and necessary because of the patient's restricted joint motion, painful spasm, loss of articular motion and restricted motion of soft tissue.    +PROM R shoulder- all directions to pt tolerance  +STM to lateral deltoid, subscapularis    3/24/22  +manual stretching L ITB, glute, piriformis, hip flexor  +STM to glute med, ITB    (Used abbreviations: MET - muscle energy technique; PNF - proprioceptive neuromuscular facilitation; NMR - neuromuscular re-education; AP - anterior to posterior; PA - posterior to anterior)    MODALITIES: (0 minutes):      vasopneumatic device to reduce pain, inflammation, and edema med pressure 34 degrees      TREATMENT/SESSION ASSESSMENT: Held on resistance with all hip exercises today due to increased pain. Reduced repetitions at needed and seen above. Education: on objective findings and HEP    RECOMMENDATIONS/INTENT FOR NEXT TREATMENT SESSION: \"Next visit will focus on advancements to more challenging activities\".     PAIN: Initial: 3-4/10  L hip  Post Session:  3-4/10      MedBridge Portal    Total Treatment Billable Duration:   PT Patient Time In/Time Out  Time In: 1430  Time Out: 1508  Hossein Mccallum PT, DPT    Future Appointments   Date Time Provider Elisabeth Chauhan   3/28/2022  9:15 AM Chalino Ray MD [facility] POA   3/29/2022  1:00 PM Kathrynn Ink A [facility] SFD   3/31/2022  1:00 PM Kathrynn Ink A [facility] SFD   4/1/2022 10:15 AM Kathrynn Ink A [facility] SFD   4/4/2022 10:15 AM Kathrynn Ink A [facility] SFD   4/6/2022 10:15 AM Kathrynn Ink A [facility] SFD   4/7/2022 10:15 AM Kathrynn Ink A [facility] SFD   3/27/2023  2:10 PM Milena Oliver MD [id number] [facility]U       Visit Approval Visit # Therapist initials Date A NS / Cx < 24 hr >24 hr Cx Comments    1 SHAISTA 1/5/22 [x]  [] [] Initial evaluation    2 SHAISTA 1/7/22 [x] [] []     3 SHAISTA 1/11/22 [x] [] []     4 SHAISTA 1/13/22 [x] [] []     5 PDE 1- [x] [] []     6 SHAISTA 1/20/22 [x] [] []     7 SHAISTA 1/25/22 [x] [] []     8 SHAISTA 1/27/22 [x] [] []     9 SHAISTA 2/1/22 [x] [] [] PN due next visit    10  SHAISTA 2/3/22 [x] [] [] PN today    11 SHAISTA 2/7/22 [x] [] [] 1    12 SHAISTA 2/9/22 [x] [] [] 2    13 SHAISTA 2/15/22 [x] [] [] 3    14 SHAISTA 8/04/72 [x] [] [] Recert/Reeval- added L hip pain to [address] and treatment plan    15 SHAISTA 2/17/22 [x] [] [] 4    16 SHAISTA 2/21/22 [x] [] [] 5    17 SHAISTA 2/24/22 [x] [] [] 6    18 SHAISTA 2/25/22 [x] [] [] 7    19 SHAISTA 3/1/22 x   8    20 SHAISTA 3/2/22 x   9, PN due next visit    21 SHAITSA 3/4/22 x   PN today    22 SHAISTA 3/8/22 x   1    23 SHAISTA 3/10/22 x   2    24 SHAISTA 3/11/22 x   3    25 SHAISTA 3/14/22 x   4   **KX** 26 SHAISTA 3/16/22 x   5    27 SHAISTA 3/18/22 x   6    28 SHAISTA 3/21/22 x   7    29 SHAISTA 3/23/22 x   8    30 SHAISTA 3/24/22 x   9, PN due next visit

## 2022-03-29 ENCOUNTER — HOSPITAL ENCOUNTER (OUTPATIENT)
Dept: PHYSICAL THERAPY | Age: 72
Discharge: HOME | End: 2022-03-29
Payer: MEDICARE

## 2022-03-29 PROCEDURE — 97140 MANUAL THERAPY 1/> REGIONS: CPT

## 2022-03-29 PROCEDURE — 97016 VASOPNEUMATIC DEVICE THERAPY: CPT

## 2022-03-29 PROCEDURE — 97110 THERAPEUTIC EXERCISES: CPT

## 2022-03-29 NOTE — PROGRESS NOTES
Ashanti Kim  : 1950  [place] / Product Type: Medicare /  [facility] at [facility]  [address], [address], [address]  [phone]   [phone]      OUTPATIENT PHYSICAL THERAPY: Daily Treatment Note 3/29/2022  Visit Count:  31    ICD-10: Treatment Diagnosis:   M25.511 Pain in Right Shoulder  M25.611 Stiffness in Right Shoulder  M62.81 Muscle Weakness Generalized  Z47.89 Orthopedic Aftercare  M25.552 Pain in Left hip  M25.652 Stiffness of Left hip, not elsewhere classified    Precautions/Allergies:   Patient has no known allergies. Follow Protocol  -Limit AROM to : Flexion (80 degrees), Extension (20 degrees), Abduction (80 degrees, IR (full), ER (to neutral only)  -Sling may be off  -No subscapularis stretch or strengthening until 6 weeks post op date    TSA after 6 weeks (22)  - All active and passive ROM ok  -Resistance in all arcs OK  TREATMENT PLAN:  Effective Dates: 3/29/2022 TO 2022 (60 days). Frequency/Duration: 3 times a week for 60 Days        PRE-TREATMENT SYMPTOMS/COMPLAINTS:  Pt reports his hip is still pretty aggravated. Shoulder is slowly getting better with less pain but still having more difficulty with strength. MEDICATIONS REVIEWED:  3/29/2022   TREATMENT:   (In addition to Assessment/Re-Assessment sessions the following treatments were rendered)    THERAPEUTIC EXERCISE: (25 minutes):  Exercises per grid below to improve mobility, strength and balance. Required minimal visual and verbal cues to promote proper body alignment and promote proper body posture. Progressed resistance, range and complexity of movement as indicated.      Date:  3/14/22 Date:  3/16/22 Date:  3/18/22 Date:  3/21/22 Date:  3/23/22 Date:  3/24/22 Date:  3/29/22   Activity/Exercise          Rhythmic stabilization           Shoulder flex (supine) #2 supine wand 10 X 2  #3 supine wand 10 X 2 #3 supine wand 10 X 2 #4 supine wand 10 X 2  #4 supine wand 10 X 2   S/l shoulder abduction Continue to hold due to pain. 10 X AA 10 X AA 10 X AA     S/l ER 10 X 2  #1 10 X 2 #2 10 X 2 #2 10 X 2  #3 10 X 2   Supine protraction 10 X 2  #1 10 X 2 #2 10 X 2 #3 10 X 2  #3 10 X 2   Prone row   Bent #3 10 X 2 Bent #4 10 X 2 Bent #4 10 X 2  Bent #5 10 X 2   Prone extension   Bent #1 10 X 2 Bent #2 10 X 2 Bent #2 10 X 2  Bent #3 10 X 2   Prone horizontal abduction   Bent 10 X 2 Bent 10 X 2 Bent 10 X  Bent 10 X 2   Sleeper stretch manual         TB rows/extension BTB 10 x 2(row), RTB 10 X 2 (ext)  BTB 10 x 2 (row) RTB 10  X2 (ext) BTB 10 X 2 (row), GTB 10 X2 (ext)      TB IR/ER RTB 10 x 2 each  RTB 10 X 2 each GTB 10  X 2 each      Towel STAR on wall     3X  3X   Horizontal adduction stretch 15 seconds 3 X          Stability ball rolls on wall    30 X CW, CCW 30 X CW, CCW     Doorway stretch          Seated piriformis stretch          Seated glute stretch          clamshell  GTB 15 X 2 in s/l     10 X 2 - no resistance    Reverse clamshell  #2 10 X 2    Hold    Hip abduction  Standing: #3 10 X 2    10 X 2    bridges  15 X 2    10 X 2    ASLR  #2 15 X 2    10 X 2    Standing hip hike on 6\" step  10 X 2    10 X 2    6\" lateral step ups  10 X 2        TB supine hip ER  GTB 15 X 2    10 X 2    Walking side steps  GTB 30 ft x 2    30 ft x 2 no resistance      MANUAL THERAPY: (15 minutes): Joint mobilization, Soft tissue mobilization and Manipulation was utilized and necessary because of the patient's restricted joint motion, painful spasm, loss of articular motion and restricted motion of soft tissue.    3/29/22  +PROM R shoulder- all directions to pt tolerance  +STM to lateral deltoid, subscapularis    +manual stretching L ITB, glute, piriformis, hip flexor  +STM to glute med, ITB    (Used abbreviations: MET - muscle energy technique; PNF - proprioceptive neuromuscular facilitation; NMR - neuromuscular re-education; AP - anterior to posterior; PA - posterior to anterior)    MODALITIES: (15 minutes):      vasopneumatic device to reduce pain, inflammation, and edema med pressure 34 degrees      TREATMENT/SESSION ASSESSMENT: Pt is making slow but steady progress towards goals. Updated POC and extended for another 60 days. Anticipate treatment to shoulder for another month but L hip may take longer to address. Education: on objective findings and HEP    RECOMMENDATIONS/INTENT FOR NEXT TREATMENT SESSION: \"Next visit will focus on advancements to more challenging activities\".     PAIN: Initial: 3-4 (R shoulder) Post Session:  3-4/10      MedBridge Portal    Total Treatment Billable Duration:   PT Patient Time In/Time Out  Time In: 1300  Time Out: 1355  Jluis Hanson, PT, DPT    Future Appointments   Date Time Provider Elisabeth Chauhan   3[facility]31[facility]2022  [facility]   4/1/2022  1:00 PM Peterson Shivers A [facility]PT SFD   4/4/2022 10:15 AM Peterson Shivers A [facility]PT SFD   4/6/2022 10:15 AM Peterson Shivers A [facility] SFD   4/7/2022 10:15 AM Peterson Shivers A [facility]PT SFD   5/31/2022  8:00 AM Olinda Collins MD POAG POA   3/27/2023  2:10 PM Aquiles Posada MD [id number] PGU       Visit Approval Visit # Therapist initials Date A NS / Cx < 24 hr >24 hr Cx Comments    1 SHAISTA 1/5/22 [x]  [] [] Initial evaluation    2 SHAISTA 1/7/22 [x] [] []     3 SHAISTA 1/11/22 [x] [] []     4 SHAISTA 1/13/22 [x] [] []     5 PDE 1- [x] [] []     6 SHAISTA 1/20/22 [x] [] []     7 SHAISTA 1/25/22 [x] [] []     8 SHAISTA 1/27/22 [x] [] []     9 SHAISTA 2/1/22 [x] [] [] PN due next visit    10  SHAISTA 2/3/22 [x] [] [] PN today    11 SHAISTA 2/7/22 [x] [] [] 1    12 SHAISTA 2/9/22 [x] [] [] 2    13 SHAISTA 2/15/22 [x] [] [] 3    14 SHAISTA 9/31/13 [x] [] [] Recert/Reeval- added L hip pain to [address] and treatment plan    15 SHAISTA 2/17/22 [x] [] [] 4    16 SHAISTA 2/21/22 [x] [] [] 5    17 SHAISTA 2/24/22 [x] [] [] 6    18 SHAISTA 2/25/22 [x] [] [] 7    19 SHAISTA 3/1/22 x   8    20 SHAISTA 3/2/22 x   9, PN due next visit    21 KAI 3/4/22 x   PN today    22 SHAISTA 3/8/22 x   1    23 SHAISTA 3/10/22 x   2    24 SHAISTA 3/11/22 x   3    25 SHAISTA 3/14/22 x   4   **KX** 26 SHAISTA 3/16/22 x   5    27 SHAISTA 3/18/22 x   6    28 SHAISTA 3/21/22 x   7    29 SHAISTA 3/23/22 x   8    30 SHAISTA 3/24/22 x   9, PN due next visit    31 SHAISTA 3/29/22 x   10, PN today

## 2022-03-29 NOTE — THERAPY EVALUATION
Alanis Lee  : 1950  [place] / Product Type: Medicare /  [address]  [facility], [address]  [phone]   [phone]        OUTPATIENT PHYSICAL THERAPY:Progress Report 3/29/2022    ICD-10: Treatment Diagnosis:   M25.511 Pain in Right Shoulder  M25.611 Stiffness in Right Shoulder  M62.81 Muscle Weakness Generalized  Z47.89 Orthopedic Aftercare  M25.552 Pain in Left hip  M25.652 Stiffness of Left hip, not elsewhere classified    Precautions/Allergies:   Patient has no known allergies. Fall Risk Score: 2 (? 5 = High Risk)  MD Orders: Eval and Treat MEDICAL/REFERRING DIAGNOSIS:  R shoulder replacement   DATE OF ONSET: 12/3/21  REFERRING PHYSICIAN: Malcom Chaidez MD, Bety Flores MD  RETURN PHYSICIAN APPOINTMENT: 22   Progress Report:  Pt has been seen for a total of 31 PT sessions to date. Pt continues with POC addressing s/p R TSA 2x per week and L hip pain secondary to partial mm tear 1X per week. Pt unfortunately having a set back with increased L hip pain with overstretching. Prior to this, pt was having noticeable improvements in L hip pain. Pt has been make slow but steady progress towards shoulder goals with improvements in AROM now focusing more on strengthening. Pt will continue to benefit from skilled PT interventions. Updated POC and extended out for another 60 days. Progress Report:  Pt has been seen for a total of 21 sessions to date. Pt is making good steady progress. Pt demonstrating improvements in AROM, PROM, and strength. Mostly limited by pain at this time affecting return to functional mobility, ADLs, and recreational activities. Pt will continue to benefit from skilled PT interventions at this time. Re-Evaluation: Pt has attended 13 PT sessions to date.  Pt has been experiencing L hip pain for several weeks now and now has referral for treatment by Damien Nation. Will modify POC to 3X per week to continue treating s/p TSA 2X per week and L hip pain for 1X per week. Pt presents with decreased L hip ROM, decreased strength and increased pain affecting participation in ADLs and functional mobility at this time. Pt will benefit from PT interventions to address these deficits. Progress Report:  Pt has attended 10 PT sessions to date. Pt has met 3/5 STG and 0/5 LTG at this time. Pt with 4 point improvement in DASH score. Pt demonstrating improvements in PROM, AROM, strength, and pain. Pt limited by increased pain at lateral deltoid tendon with possibly some tendonosis. Pt is making good steady progress towards remaining goals and will continue to benefit from skilled PT interventions at this time. ASSESSMENT:  Mr. Avril Gomez has attended 1 physical therapy session including the initial evaluation. Pt is s/p R TSA. Pt presents with decreased ROM, decreased strength and increased pain affecting participation in ADLs and functional mobility at this time. Recommending skilled PT: manual therapeutic techniques (as appropriate), therapeutic exercises and activities, balance interventions, and a comprehensive home exercise program to address the current impairments, as listed above. Brando Kirkpatrick will benefit from skilled PT (medically necessary) to address above deficits affecting participation in basic ADLs and overall functional tolerance. PROBLEM LIST (Impacting functional limitations):  1. Decreased Strength  2. Decreased ADL/Functional Activities  3. Decreased Transfer Abilities  4. Decreased Ambulation Ability/Technique  5. Decreased Balance  6. Increased Pain  7. Decreased Activity Tolerance  8. Decreased Flexibility/Joint Mobility  9. Decreased [place] with Home Exercise Program INTERVENTIONS PLANNED:  1. Balance Exercise  2. Cold  3. Cryotherapy  4. Electrical Stimulation  5. Family Education  6. Gait Training  7. Heat  8.  Home Exercise Program (HEP)  9. Manual Therapy  10. Neuromuscular Re-education/Strengthening  11. Range of Motion (ROM)  12. Therapeutic Activites  13. Therapeutic Exercise/Strengthening  14. Transcutaneous Electrical Nerve Stimulation (TENS)  15. Transfer Training   TREATMENT PLAN:  TREATMENT PLAN:  Effective Dates: 3/29/2022 TO 5/29/2022 (60 days). Frequency/Duration: 3 times a week for 60 Days  GOALS: (Goals have been discussed and agreed upon with patient.)  Short Term Time Frame: 4 weeks  1. PT will be compliant with HEP. Goal met 2/3/22  2. Pt will decrease worst pain to 3/10 or less in order to return to dressing ADLs without difficulty Goal met 2/3/22 (3/10)  3. Pt will reduce DASH to 20 or less in order to return to personal hygiene ADLs Goal met 3/29/22  4. Pt will improve gross RUE strength to 3/5 or greater in order to reach overhead Goal met 3/29/22  5. Pt will demonstrate PROM R shoulder Flexion and Abduction of 120 or greater Goal met 2/3/22  GOALS: (Goals have been discussed and agreed upon with patient.)  Discharge Goals: Time Frame: 12 weeks  1. PT will be independent with HEP. Progressing 3/29/22  2. Pt will decrease worst pain to 1/10 or less in order to return to dressing ADLs without difficulty Progressing 3/29/22   3. Pt will reduce DASH to 10 or less in order to return to personal hygiene ADLs Progressing 3/29/22  4. Pt will improve gross RUE strength to 4+/5 or greater in order to reach overhead Progressing 3/29/22  5. Pt will demonstrate active R shoulder Flexion and Abduction of 125 or greater Goal met 3/4/22    (Left Hip)  GOALS: (Goals have been discussed and agreed upon with patient.)  Discharge Goals: Time Frame: 8 weeks  16. Alanis Lee will be independent with HEP. Progressing 3/29/22  17. Pt will navigate 4 steps with no pain in order to enter home Progressing 3/29/22  18. Pt will improve hip strength to 5/5 in order to improve walking tolerance Progressing 3/29/22  19.  Pt will c/o worst pain of 2/10 or less in order to decrease antalgic gait Progressing 3/29/22 (6/10)  20. Pt will improve LEFS to 55/80 in order to return ambulating for 1 mile or more for Recreational activity (corrected 3/29/22) Progressing 3/29/22            Rehabilitation Potential For Stated Goals: Fair    Outcome Measure: Tool Used: Disabilities of the Arm, Shoulder and Hand (DASH) Questionnaire - Quick Version  Score:  Initial: 25/55  Most Recent: 21/55 (Date: 2/3/22 ) Most Recent: 22/55 (Date: 3/4/22) Most Recent: /55 (Date: 3/29/22)   Interpretation of Score: The DASH is designed to measure the activities of daily living in person's with upper extremity dysfunction or pain. Each section is scored on a 1-5 scale, 5 representing the greatest disability. The scores of each section are added together for a total score of 55. Tool Used: Lower Extremity Functional Scale  Score:  Initial: 34/80  Most Recent: 40/80         Medical Necessity:   · Skilled intervention continues to be required due to decreased strength, ROM, balance, and functional mobility. Reason for Services/Other Comments:  · Patient continues to require skilled intervention due to decreased strength, balance, and ROM with increased pain affecting pt functional mobility. ·   Regarding Cassie Johnson's therapy, I certify that the treatment plan above will be carried out by a therapist or under their direction. Thank you for this referral,  Karsten Perry, PT, DPT     Referring Physician Signature: Malena Wills MD ___________________________________  Referring Physician Signature: Lilly Hwang MD ____________________________________            The information in this section was collected on 1/5/22 (except where otherwise noted). HISTORY:   History of Present Injury/Illness (Reason for Referral):  Pt is s/p R TSA. Pt reports having a significant amount of swelling. Still with swelling/edema present.  Pt reports he has been doing HEP. Pt goals are to return to higher level activities like fly fishing    2/16/22: Pt now arrives with c/o L hip pain. Pt was walking dog in the snow several weeks ago and has L hip pain ever since. Pt with recent MRI showing MRI scan of his left hip which does show some high-grade partial tearing of the gluteus medius and minimus with minimal retraction. There is some atrophy in the muscle as well. Degenerative labral tearing and degenerative chondromalacia intra-articularly per referring physician note. MD wanting to treat this conservatively and recommending PT at this time for 6-8 weeks. Difficulty going for floor to standing, increased pain with walking, difficulty going up steps (anything that involves pushing off of L LE)    CC/Primary Concern: Return to full ROM and strength R UE            Treatment Side: Right      Past Medical History/Comorbidities:   Mr. Poonam Cole  has a past medical history of Arthritis (07/06/2015), Back pain, DDD (degenerative disc disease), lumbar, Dry eyes, Enlarged prostate, GERD (gastroesophageal reflux disease), History of basal cell cancer, History of squamous cell carcinoma, IBS (irritable bowel syndrome), Insomnia (07/06/2015), Left inguinal hernia, MVP (mitral valve prolapse), Onychomycosis (7/6/2015), RBBB (7/6/2015), Right wrist pain (7/6/2015), Scoliosis, and Spondylosis of lumbar region without myelopathy or radiculopathy. Mr. Poonam Cole  has a past surgical history that includes neurological procedure unlisted (2007); hx colonoscopy; hx orthopaedic; hx shoulder arthroscopy (Right); hx orthopaedic (Left, 06/2020); hx orthopaedic (Right, 08/2020); hx cataract removal; and hx carpal tunnel release.   Social History/Living Environment:  Lives with wiife, independent with all mobility at baseline    Pain/Symptom Location: post op pain- generalized    Worst Pain: 6/10 (shoulder), 7/10 (L hip)  Current Pain: 1/10 (shoulder), 4/10 (L hip)    Aggravating Factors: any use of R UE    Occupation: Retired cardiologist    Prior Level of Function/Work/Activity:  History of chronic LBP and R foot pain due to OA- despite this lives a very active lifestyle enjoying many outdoor activities- hunting, fly fishing, etc      Patient Goals: Gain full ROM, strength and return to recreational activities    Current Medications:       Current Outpatient Medications:     sildenafil citrate (Viagra) 100 mg tablet, Take 1 Tablet by mouth as needed for Erectile Dysfunction. , Disp: 6 Tablet, Rfl: 5    traMADoL (ULTRAM) 50 mg tablet, Take 1 Tablet by mouth every six (6) hours as needed for Pain for up to 30 days. Max Daily Amount: 200 mg., Disp: 120 Tablet, Rfl: 0    tamsulosin (FLOMAX) 0.4 mg capsule, Take 2 Capsules by mouth daily. , Disp: 180 Capsule, Rfl: 3    Myrbetriq 50 mg ER tablet, Take 1 Tablet by mouth daily. , Disp: 90 Tablet, Rfl: 3    sildenafil citrate (Viagra) 100 mg tablet, Take 1 Tablet by mouth as needed for Erectile Dysfunction. , Disp: 8 Tablet, Rfl: 12    zolpidem (AMBIEN) 10 mg tablet, Take 1 Tablet by mouth nightly as needed for Sleep. Max Daily Amount: 10 mg., Disp: 90 Tablet, Rfl: 1    linaCLOtide (Linzess) 145 mcg cap capsule, Take 1 Capsule by mouth Daily (before breakfast). , Disp: 30 Capsule, Rfl: 5    cetirizine (ZyrTEC) 10 mg tablet, 1 tablet, Disp: , Rfl:     loratadine-pseudoephedrine (Claritin-D 12 Hour) 5-120 mg per tablet, Take 1 Tablet by mouth two (2) times a day., Disp: , Rfl:     oxymetazoline HCl (AFRIN NASAL SPRAY NA), by Nasal route., Disp: , Rfl:     temazepam (RESTORIL) 15 mg capsule, Take 1 Capsule by mouth nightly as needed for Sleep. Max Daily Amount: 15 mg. Indications: difficulty sleeping, Disp: 30 Capsule, Rfl: 0    ipratropium (Atrovent) 21 mcg (0.03 %) nasal spray, 2 Sprays by Both Nostrils route every twelve (12) hours as needed for Rhinitis., Disp: 30 mL, Rfl: 5    terbinafine HCL (LAMISIL) 250 mg tablet, Take 250 mg by mouth daily. , Disp: , Rfl:     fluocinonide (LIDEX) 0.05 % topical gel, Apply  to affected area two (2) times a day., Disp: , Rfl:     meloxicam (Mobic) 15 mg tablet, Take 1 Tablet by mouth daily. , Disp: 90 Tablet, Rfl: 3    tadalafiL (CIALIS) 5 mg tablet, Take 1 Tablet by mouth daily. , Disp: 90 Tablet, Rfl: 3    tretinoin (RETIN-A) 0.05 % topical cream, APPLY TO AFFECTED AREA AT BEDTIME, Disp: , Rfl:     dicyclomine (BentyL) 20 mg tablet, Take 1 Tab by mouth every six (6) hours. , Disp: 120 Tab, Rfl: 5    lidocaine (LIDODERM) 5 %, by TransDERmal route daily as needed. Apply patch to the affected area for 12 hours a day and remove for 12 hours a day. , Disp: , Rfl:     RESTASIS 0.05 % ophthalmic emulsion, INSTILL 1 DROP IN BOTH EYES TWICE DAILY, Disp: , Rfl: 12    omeprazole (PRILOSEC) 20 mg capsule, Take 20 mg by mouth daily as needed. , Disp: , Rfl:    Date Last Reviewed:  3/29/2022       Ambulatory/Rehab Services H2 Model Falls Risk Assessment    Risk Factors:       (1)  Gender [Male] Ability to Rise from Chair:       (1)  Pushes up, successful in one attempt    Falls Prevention Plan:       No modifications necessary   Total: (5 or greater = High Risk): 2    ©2010 [facility] [employer]. All Rights Reserved. [facility] States Patent #0,[id number].  Federal Law prohibits the replication, distribution or use without written permission from [facility]        Number of Personal Factors/Comorbidities that affect the Plan of Care: 3+: HIGH COMPLEXITY   EXAMINATION:   Inspection        Post Op/Wound: Healed                Additional Comments:   ________________________________________________________________________________________________  Observation:          Posture: Rounded shoulders, forward head            Edema: Increased throughout entirety of UE into hand               Addition Comments:     ________________________________________________________________________________________________  Range of Motion Upper:  Joint: Passive Active Active Passive Right 2/3/22 Active Right 2/3/22 Active Right 3/4/22 Active 3/29/22    Right (Degrees) Right (Degrees) Left Active (Degrees)       Shoulder Flexion 80 degrees NT per protocol 140 degrees 130 degrees 115 degrees 126 degrees  130 degrees    Shoulder Extension  NT per protocol 62 degrees  60 degrees 65 degrees 80 degrees   Shoulder Adduction  NT per protocol 125 degrees   WFL      Shoulder Abduction 60 degrees NT per protocol  170 degrees 115 degrees  128 degrees  128 degrees   Shoulder Internal Rotation (Neutral Position) NT per protocol         Shoulder External Rotation  (Neutral Position) NT per protocol         Functional IR  NT per protocol T11 45 degrees R hip T12  T12    Functional ER  NT per protocol T2 70 degrees C7 C7 C7     Lower  Joint: Active Active Active (Left)    Right (Degrees) Left (Degrees)    Hip Flexion      Hip Extension      Hip Adduction      Hip Abduction            Hip Internal rotation 30 degrees 20 degrees (discomfort) 13 degrees (pain)   Hip External rotation 35 degrees 20 degrees (discomfort) 30 degrees (discomfort)   Knee Extension        ________________________________________________________________________________________________  Strength          Upper Extremity    Joint:   Right 2/3/22 Right 3/4/22 Right 3/29/22    RIGHT LEFT      Shoulder Flexion 2/5 4+/5 2+/5 4/5 (pain) 4+-5/5 (pain)   Shoulder Extension 2/5 5/5 3/5 5/5 (pain) 5/5 (slight pain)   Shoulder Abduction 2/5 4+/5 2/5 3/5 (pain)  3/5 (pain)    Shoulder Internal Rotation 2/5 4/5 2+/5 3+/5 (pain) 4/5 (slight pain)   Shoulder External Rotation 2/5 3+/5 3/5 3/5 (pain) 3+/5 (slight pain)   Elbow Flexion 3/5 5/5 4/5 5/5    Elbow Extension 3/5 5/5 4/5 5/5      Lower  Joint:   Left 3/29/22    RIGHT LEFT    Hip Flexion 5/5 3/5 (p) 5/5 (pf)   Hip Extension NT/5 NT/5    Hip Internal Rotation 5/5 3/5 (p) 3-/5 (p)   Hip External Rotation 5/5 3/5 (p) 4-4+5 (pf)   Hip Abduction 4+/5 3-/5 (p) 4/5 (p)   Hip Adduction NT/5 NT/5    Knee Flexion 5/5 5/5    Knee Extension 5/5 4/5 (discomfort) 5/5 (pf)     _____________________________________________________________________________________________  [facility]        C/O Radicular Symptoms: No      Additional Comments:   ___________________________________________________________________________________________________________________________________________________________  Palpation: Lateral deltoid; L glute med, max, proximal ITB  Joint mobilization: Capsular hypomobility post op; not assessed at hip this session       Body Structures Involved:  1. Nerves  2. Bones  3. Joints  4. Muscles  5. Ligaments Body Functions Affected:  1. Sensory/Pain  2. Neuromusculoskeletal  3. Movement Related Activities and Participation Affected:  1. General Tasks and Demands  2. Mobility  3. Self Care  4. Domestic Life  5. Interpersonal Interactions and Relationships  6.  Community, Social and [place]   Number of elements (examined above) that affect the Plan of Care: 4+: HIGH COMPLEXITY   CLINICAL PRESENTATION:   Presentation: Evolving clinical presentation with changing clinical characteristics: MODERATE COMPLEXITY   CLINICAL DECISION MAKING:      Use of outcome tool(s) and clinical judgement create a POC that gives a: Clear prediction of patient's progress: LOW COMPLEXITY

## 2022-03-31 ENCOUNTER — HOSPITAL ENCOUNTER (OUTPATIENT)
Dept: PHYSICAL THERAPY | Age: 72
Discharge: HOME | End: 2022-03-31
Payer: MEDICARE

## 2022-03-31 PROCEDURE — 97140 MANUAL THERAPY 1/> REGIONS: CPT

## 2022-03-31 PROCEDURE — 97016 VASOPNEUMATIC DEVICE THERAPY: CPT

## 2022-03-31 PROCEDURE — 97110 THERAPEUTIC EXERCISES: CPT

## 2022-03-31 NOTE — PROGRESS NOTES
Poppy Chase  : 1950  [place] / Product Type: Medicare /  [address]  at [facility]  [address], [address] [facility], [address]  [phone]   [phone]      OUTPATIENT PHYSICAL THERAPY: Daily Treatment Note 3/31/2022  Visit Count:  32    ICD-10: Treatment Diagnosis:   M25.511 Pain in Right Shoulder  M25.611 Stiffness in Right Shoulder  M62.81 Muscle Weakness Generalized  Z47.89 Orthopedic Aftercare  M25.552 Pain in Left hip  M25.652 Stiffness of Left hip, not elsewhere classified    Precautions/Allergies:   Patient has no known allergies. Follow Protocol  -Limit AROM to : Flexion (80 degrees), Extension (20 degrees), Abduction (80 degrees, IR (full), ER (to neutral only)  -Sling may be off  -No subscapularis stretch or strengthening until 6 weeks post op date    TSA after 6 weeks (22)  - All active and passive ROM ok  -Resistance in all arcs OK  TREATMENT PLAN:  Effective Dates: 3/29/2022 TO 2022 (60 days). Frequency/Duration: 3 times a week for 60 Days        PRE-TREATMENT SYMPTOMS/COMPLAINTS:  Pt reports continued soreness at R shoulder but not too bad. Nothing significant with progression of weights last visit. MEDICATIONS REVIEWED:  3/31/2022   TREATMENT:   (In addition to Assessment/Re-Assessment sessions the following treatments were rendered)    THERAPEUTIC EXERCISE: (25 minutes):  Exercises per grid below to improve mobility, strength and balance. Required minimal visual and verbal cues to promote proper body alignment and promote proper body posture. Progressed resistance, range and complexity of movement as indicated.      Date:  3/14/22 Date:  3/16/22 Date:  3/18/22 Date:  3/21/22 Date:  3/23/22 Date:  3/24/22 Date:  3/29/22 Date:  3/31/22   Activity/Exercise           Rhythmic stabilization            Shoulder flex (supine) #2 supine wand 10 X 2  #3 supine wand 10 X 2 #3 supine wand 10 X 2 #4 supine wand 10 X 2  #4 supine wand 10 X 2 #5 supine wand 10 X 2   S/l shoulder abduction Continue to hold due to pain. 10 X AA 10 X AA 10 X AA      S/l ER 10 X 2  #1 10 X 2 #2 10 X 2 #2 10 X 2  #3 10 X 2 #4 10 X 2   Supine protraction 10 X 2  #1 10 X 2 #2 10 X 2 #3 10 X 2  #3 10 X 2 #4 10 X 2   Prone row   Bent #3 10 X 2 Bent #4 10 X 2 Bent #4 10 X 2  Bent #5 10 X 2 Bent #5 10 X 2   Prone extension   Bent #1 10 X 2 Bent #2 10 X 2 Bent #2 10 X 2  Bent #3 10 X 2 Bent #3 10 X 2   Prone horizontal abduction   Bent 10 X 2 Bent 10 X 2 Bent 10 X  Bent 10 X 2 Bent 10 X 2   Sleeper stretch manual          TB rows/extension BTB 10 x 2(row), RTB 10 X 2 (ext)  BTB 10 x 2 (row) RTB 10  X2 (ext) BTB 10 X 2 (row), GTB 10 X2 (ext)       TB IR/ER RTB 10 x 2 each  RTB 10 X 2 each GTB 10  X 2 each       Towel STAR on wall     3X  3X 3X   Horizontal adduction stretch 15 seconds 3 X           AROM shoulder flexion, scaption        5X each   Stability ball rolls on wall    30 X CW, CCW 30 X CW, CCW   30 X CW, CCW X 2   Doorway stretch           Seated piriformis stretch           Seated glute stretch           clamshell  GTB 15 X 2 in s/l     10 X 2 - no resistance     Reverse clamshell  #2 10 X 2    Hold     Hip abduction  Standing: #3 10 X 2    10 X 2     bridges  15 X 2    10 X 2     ASLR  #2 15 X 2    10 X 2     Standing hip hike on 6\" step  10 X 2    10 X 2     6\" lateral step ups  10 X 2         TB supine hip ER  GTB 15 X 2    10 X 2     Walking side steps  GTB 30 ft x 2    30 ft x 2 no resistance       MANUAL THERAPY: (15 minutes): Joint mobilization, Soft tissue mobilization and Manipulation was utilized and necessary because of the patient's restricted joint motion, painful spasm, loss of articular motion and restricted motion of soft tissue.    3/31/22  +PROM R shoulder- all directions to pt tolerance  +STM to lateral deltoid, subscapularis    +manual stretching L ITB, glute, piriformis, hip flexor  +STM to glute med, ITB    (Used abbreviations: MET - muscle energy technique; PNF - proprioceptive neuromuscular facilitation; NMR - neuromuscular re-education; AP - anterior to posterior; PA - posterior to anterior)    MODALITIES: (15 minutes):      vasopneumatic device to reduce pain, inflammation, and edema med pressure 34 degrees      TREATMENT/SESSION ASSESSMENT: Pt tolerated progression of exercises well. Some discomfort reported. Continues with TTP along RTC attachment sites on [address]  Education: on objective findings and HEP    RECOMMENDATIONS/INTENT FOR NEXT TREATMENT SESSION: \"Next visit will focus on advancements to more challenging activities\".     PAIN: Initial: 2-3 (R shoulder) Post Session:  2-3/10      [employer] Portal    Total Treatment Billable Duration:   PT Patient Time In/Time Out  Time In: 1300  Time Out: 1355  Joe Sanchez, PT, DPT    Future Appointments   Date Time Provider Elisabeth Chauhan   4/1/2022  1:00 PM Cindy [facility]   4/4/2022 10:15 AM Jodelle Aj A [facility]   4/6/2022 10:15 AM Jodelle Aj A [facility]   4/7/2022 10:15 AM Jodelle Aj A [facility]   4/11/2022  9:30 AM Jodelle Aj A [facility]   4/13/2022 10:15 AM Jodelle Aj A [facility]   4/14/2022  9:30 AM Jodelle Aj A [facility]   4/18/2022  1:00 PM Jodelle Aj A [facility]   4/20/2022 10:15 AM Jodelle Aj A [facility]   4/21/2022  9:30 AM Jodelle Aj A [facility]   4/25/2022  9:30 AM Jodelle Aj A [facility]   4/27/2022 10:15 AM Jodelle Aj A [facility]   4/28/2022  9:30 AM Jodelle Aj A [facility]   5/31/2022  8:00 AM Susan Fuchs MD [facility] POA   3/27/2023  2:10 PM Vi Pearl MD [id number] PGU       Visit Approval Visit # Therapist initials Date A NS / Cx < 24 hr >24 hr Cx Comments    1 SHAISTA 1/5/22 [x]  [] [] Initial evaluation    2 SHAISTA 1/7/22 [x] [] []     3 SHAISTA 1/11/22 [x] [] []     4 SHAISTA 1/13/22 [x] [] []     5 PDE 1- [x] [] []     6 SHAISTA 1/20/22 [x] [] []     7 SHAISTA 1/25/22 [x] [] []     8 SHAISTA 1/27/22 [x] [] []     9 SHAISTA 2/1/22 [x] [] [] PN due next visit    10  SHAISTA 2/3/22 [x] [] [] PN today    11 SHAISTA 2/7/22 [x] [] [] 1    12 SHAISTA 2/9/22 [x] [] [] 2    13 SHAISTA 2/15/22 [x] [] [] 3    14 SHAISTA 4/63/60 [x] [] [] Recert/Reeval- added L hip pain to POC and treatment plan    15 SHAISTA 2/17/22 [x] [] [] 4    16 SHAISTA 2/21/22 [x] [] [] 5    17 SHAISTA 2/24/22 [x] [] [] 6    18 SHAISTA 2/25/22 [x] [] [] 7    19 SHAISTA 3/1/22 x   8    20 SHAISTA 3/2/22 x   9, PN due next visit    21 SHAISTA 3/4/22 x   PN today    22 SHAISTA 3/8/22 x   1    23 SHAISTA 3/10/22 x   2    24 SHAISTA 3/11/22 x   3    25 SHAISTA 3/14/22 x   4   **KX** 26 SHAISTA 3/16/22 x   5    27 SHAISTA 3/18/22 x   6    28 SHAISTA 3/21/22 x   7    29 SHAISTA 3/23/22 x   8    30 SHAISTA 3/24/22 x   9, PN due next visit    31 SHAISTA 3/29/22 x   10, PN today    32 SHAISTA 3/31/22 x   1

## 2022-04-01 ENCOUNTER — HOSPITAL ENCOUNTER (OUTPATIENT)
Dept: PHYSICAL THERAPY | Age: 72
Discharge: HOME | End: 2022-04-01
Payer: MEDICARE

## 2022-04-01 PROCEDURE — 97140 MANUAL THERAPY 1/> REGIONS: CPT

## 2022-04-01 PROCEDURE — 97110 THERAPEUTIC EXERCISES: CPT

## 2022-04-01 NOTE — PROGRESS NOTES
Maninder Jones  : 1950  [place] / Product Type: Medicare /  [address]  at [facility]  [address], [address] [facility], [address]  [phone]   [phone]      OUTPATIENT PHYSICAL THERAPY: Daily Treatment Note 2022  Visit Count:  33    ICD-10: Treatment Diagnosis:   M25.511 Pain in Right Shoulder  M25.611 Stiffness in Right Shoulder  M62.81 Muscle Weakness Generalized  Z47.89 Orthopedic Aftercare  M25.552 Pain in Left hip  M25.652 Stiffness of Left hip, not elsewhere classified    Precautions/Allergies:   Patient has no known allergies. Follow Protocol  -Limit AROM to : Flexion (80 degrees), Extension (20 degrees), Abduction (80 degrees, IR (full), ER (to neutral only)  -Sling may be off  -No subscapularis stretch or strengthening until 6 weeks post op date    TSA after 6 weeks (22)  - All active and passive ROM ok  -Resistance in all arcs OK  TREATMENT PLAN:  Effective Dates: 3/29/2022 TO 2022 (60 days). Frequency/Duration: 3 times a week for 60 Days        PRE-TREATMENT SYMPTOMS/COMPLAINTS:  Pt reports feeling like hip is getting a little better. MEDICATIONS REVIEWED:  2022   TREATMENT:   (In addition to Assessment/Re-Assessment sessions the following treatments were rendered)    THERAPEUTIC EXERCISE: (23 minutes):  Exercises per grid below to improve mobility, strength and balance. Required minimal visual and verbal cues to promote proper body alignment and promote proper body posture. Progressed resistance, range and complexity of movement as indicated.      Date:  3/14/22 Date:  3/16/22 Date:  3/18/22 Date:  3/21/22 Date:  3/23/22 Date:  3/24/22 Date:  3/29/22 Date:  3/31/22 Date:  22   Activity/Exercise            Rhythmic stabilization             Shoulder flex (supine) #2 supine wand 10 X 2  #3 supine wand 10 X 2 #3 supine wand 10 X 2 #4 supine wand 10 X 2  #4 supine wand 10 X 2 #5 supine wand 10 X 2    S/l shoulder abduction Continue to hold due to pain. 10 X AA 10 X AA 10 X AA       S/l ER 10 X 2  #1 10 X 2 #2 10 X 2 #2 10 X 2  #3 10 X 2 #4 10 X 2    Supine protraction 10 X 2  #1 10 X 2 #2 10 X 2 #3 10 X 2  #3 10 X 2 #4 10 X 2    Prone row   Bent #3 10 X 2 Bent #4 10 X 2 Bent #4 10 X 2  Bent #5 10 X 2 Bent #5 10 X 2    Prone extension   Bent #1 10 X 2 Bent #2 10 X 2 Bent #2 10 X 2  Bent #3 10 X 2 Bent #3 10 X 2    Prone horizontal abduction   Bent 10 X 2 Bent 10 X 2 Bent 10 X  Bent 10 X 2 Bent 10 X 2    Sleeper stretch manual           TB rows/extension BTB 10 x 2(row), RTB 10 X 2 (ext)  BTB 10 x 2 (row) RTB 10  X2 (ext) BTB 10 X 2 (row), GTB 10 X2 (ext)        TB IR/ER RTB 10 x 2 each  RTB 10 X 2 each GTB 10  X 2 each        Towel STAR on wall     3X  3X 3X    Horizontal adduction stretch 15 seconds 3 X            AROM shoulder flexion, scaption        5X each    Stability ball rolls on wall    30 X CW, CCW 30 X CW, CCW   30 X CW, CCW X 2    Doorway stretch            Seated piriformis stretch            Seated glute stretch            clamshell  GTB 15 X 2 in s/l     10 X 2 - no resistance   10 X 2    Reverse clamshell  #2 10 X 2    Hold   Hold   Hip abduction  Standing: #3 10 X 2    10 X 2   Standing 15 X 2   bridges  15 X 2    10 X 2   15 X 2   ASLR  #2 15 X 2    10 X 2   15 x 2    Standing hip hike on 6\" step  10 X 2    10 X 2   10 X 2   6\" lateral step ups  10 X 2          TB supine hip ER  GTB 15 X 2    10 X 2   BTB 10 X 2- limited range to keep painfree   Walking side steps  GTB 30 ft x 2    30 ft x 2 no resistance   30 ft x 2 no resistance     MANUAL THERAPY: (15 minutes): Joint mobilization, Soft tissue mobilization and Manipulation was utilized and necessary because of the patient's restricted joint motion, painful spasm, loss of articular motion and restricted motion of soft tissue.      +PROM R shoulder- all directions to pt tolerance  +STM to lateral deltoid, subscapularis  4/1/22  +manual stretching L ITB, glute, piriformis, hip flexor  +STM to glute med, ITB    (Used abbreviations: MET - muscle energy technique; PNF - proprioceptive neuromuscular facilitation; NMR - neuromuscular re-education; AP - anterior to posterior; PA - posterior to anterior)    MODALITIES: (15 minutes):      *  Cold Pack Therapy in order to provide analgesia and reduce inflammation and edema. vasopneumatic device to reduce pain, inflammation, and edema med pressure 34 degrees (not today for hip 4/1/22)      TREATMENT/SESSION ASSESSMENT: Pt tolerated exercises well today. Still held on most resistance since L hip flare up is finally calming down after 2 weeks as to not further cause irritation. Education: on objective findings and HEP    RECOMMENDATIONS/INTENT FOR NEXT TREATMENT SESSION: \"Next visit will focus on advancements to more challenging activities\".     PAIN: Initial: 2/10 L hip  Post Session:  2/10      MedBridge Portal    Total Treatment Billable Duration:   PT Patient Time In/Time Out  Time In: 1300  Time Out: 1353  Jluis Hanson PT, DPT    Future Appointments   Date Time Provider Elisabeth Chauhan   4/4/2022 10:15 AM Marcmario Nicholass [facility] D   4/6/2022 10:15 AM Peterson Shivers A [facility] SFD   4/7/2022 10:15 AM Peterson Shivers A [facility]PT SFD   4/11/2022  9:30 AM Peterson Shivers A [facility]PT SFD   4/13/2022 10:15 AM Peterson Shivers A [facility]PT SFD   4/14/2022  9:30 AM Peterson Shivers A [facility]PT SFD   4/18/2022  1:00 PM Peterson Shivers A [facility]PT SFD   4/20/2022 10:15 AM Peterson Shivers A [facility]PT SFD   4/21/2022  9:30 AM Peterson Shivers A [facility]PT SFD   4/25/2022  9:30 AM Peterson Shivers A [facility]PT SFD   4/27/2022 10:15 AM Peterson Shivers A [facility]PT SFD   4/28/2022  9:30 AM Peterson Shivers A [facility]PT SFD   5/31/2022  8:00 AM Olinda Collins MD POAG POA   3/27/2023  2:10 PM Aquiles Posada MD [id number] PGU       Visit Approval Visit # Therapist initials Date A NS / Cx < 24 hr >24 hr Cx Comments    1 SHAISTA 1/5/22 [x]  [] [] Initial evaluation    2 SHAISTA 1/7/22 [x] [] []     3 SHAISTA 1/11/22 [x] [] []     4 SHAISTA 1/13/22 [x] [] []     5 PDE 1- [x] [] []     6 SHAISTA 1/20/22 [x] [] []     7 SHAISTA 1/25/22 [x] [] []     8 SHAISTA 1/27/22 [x] [] []     9 SHAISTA 2/1/22 [x] [] [] PN due next visit    10  SHAISTA 2/3/22 [x] [] [] PN today    11 SHAISTA 2/7/22 [x] [] [] 1    12 SHAISTA 2/9/22 [x] [] [] 2    13 SHAISTA 2/15/22 [x] [] [] 3    14 SHAISTA 3/17/56 [x] [] [] Recert/Reeval- added L hip pain to POC and treatment plan    15 SHAISTA 2/17/22 [x] [] [] 4    16 SHAISTA 2/21/22 [x] [] [] 5    17 SHAISTA 2/24/22 [x] [] [] 6    18 SHAISTA 2/25/22 [x] [] [] 7    19 SHAISTA 3/1/22 x   8    20 SHAISTA 3/2/22 x   9, PN due next visit    21 SHAISTA 3/4/22 x   PN today    22 SHAISTA 3/8/22 x   1    23 SHAISTA 3/10/22 x   2    24 SHAISTA 3/11/22 x   3    25 SHAISTA 3/14/22 x   4   **KX** 26 SHAISTA 3/16/22 x   5    27 SHAISTA 3/18/22 x   6    28 SHAISTA 3/21/22 x   7    29 SHAISTA 3/23/22 x   8    30 SHAISTA 3/24/22 x   9, PN due next visit    31 SHAISTA 3/29/22 x   10, PN today    32 SHAISTA 3/31/22 x   1    33 SHAISTA 4/1/22 x   2

## 2022-04-04 ENCOUNTER — HOSPITAL ENCOUNTER (OUTPATIENT)
Dept: PHYSICAL THERAPY | Age: 72
Discharge: HOME | End: 2022-04-04
Payer: MEDICARE

## 2022-04-04 PROCEDURE — 97016 VASOPNEUMATIC DEVICE THERAPY: CPT

## 2022-04-04 PROCEDURE — 97140 MANUAL THERAPY 1/> REGIONS: CPT

## 2022-04-04 PROCEDURE — 97110 THERAPEUTIC EXERCISES: CPT

## 2022-04-04 NOTE — PROGRESS NOTES
Brittany   : 1950  [place] / Product Type: Medicare /  [address] at [facility]  [address], [address], [place] [address] [address] [facility]  [phone]   [phone]      OUTPATIENT PHYSICAL THERAPY: Daily Treatment Note 2022  Visit Count:  34    ICD-10: Treatment Diagnosis:   M25.511 Pain in Right Shoulder  M25.611 Stiffness in Right Shoulder  M62.81 Muscle Weakness Generalized  Z47.89 Orthopedic Aftercare  M25.552 Pain in Left hip  M25.652 Stiffness of Left hip, not elsewhere classified    Precautions/Allergies:   Patient has no known allergies. Follow Protocol  -Limit AROM to : Flexion (80 degrees), Extension (20 degrees), Abduction (80 degrees, IR (full), ER (to neutral only)  -Sling may be off  -No subscapularis stretch or strengthening until 6 weeks post op date    TSA after 6 weeks (22)  - All active and passive ROM ok  -Resistance in all arcs OK  TREATMENT PLAN:  Effective Dates: 3/29/2022 TO 2022 (60 days). Frequency/Duration: 3 times a week for 60 Days        PRE-TREATMENT SYMPTOMS/COMPLAINTS:  Pt reports doing some wall slides with the towel yesterday- feeling more soreness today after that. MEDICATIONS REVIEWED:  2022   TREATMENT:   (In addition to Assessment/Re-Assessment sessions the following treatments were rendered)    THERAPEUTIC EXERCISE: (23 minutes):  Exercises per grid below to improve mobility, strength and balance. Required minimal visual and verbal cues to promote proper body alignment and promote proper body posture. Progressed resistance, range and complexity of movement as indicated.      Date:  3/14/22 Date:  3/16/22 Date:  3/18/22 Date:  3/21/22 Date:  3/23/22 Date:  3/24/22 Date:  3/29/22 Date:  3/31/22 Date:  22 Date:  22   Activity/Exercise             Rhythmic stabilization              Shoulder flex (supine) #2 supine wand 10 X 2  #3 supine wand 10 X 2 #3 supine wand 10 X 2 #4 supine wand 10 X 2  #4 supine wand 10 X 2 #5 supine wand 10 X 2  #5 supine wand 10 X 2   S/l shoulder abduction Continue to hold due to pain.   10 X AA 10 X AA 10 X AA        S/l ER 10 X 2  #1 10 X 2 #2 10 X 2 #2 10 X 2  #3 10 X 2 #4 10 X 2  #4 10 X 2   Supine protraction 10 X 2  #1 10 X 2 #2 10 X 2 #3 10 X 2  #3 10 X 2 #4 10 X 2  #5 10 X 2   Prone row   Bent #3 10 X 2 Bent #4 10 X 2 Bent #4 10 X 2  Bent #5 10 X 2 Bent #5 10 X 2  Bent #5 15 X 2   Prone extension   Bent #1 10 X 2 Bent #2 10 X 2 Bent #2 10 X 2  Bent #3 10 X 2 Bent #3 10 X 2  Bent #4 10 X 2   Prone horizontal abduction   Bent 10 X 2 Bent 10 X 2 Bent 10 X  Bent 10 X 2 Bent 10 X 2  Bent 10 X 2    Sleeper stretch manual            TB rows/extension BTB 10 x 2(row), RTB 10 X 2 (ext)  BTB 10 x 2 (row) RTB 10  X2 (ext) BTB 10 X 2 (row), GTB 10 X2 (ext)         TB IR/ER RTB 10 x 2 each  RTB 10 X 2 each GTB 10  X 2 each         Towel STAR on wall     3X  3X 3X  3X   Horizontal adduction stretch 15 seconds 3 X             AROM shoulder flexion, scaption        5X each  10 X 2 each   Stability ball rolls on wall    30 X CW, CCW 30 X CW, CCW   30 X CW, CCW X 2  Body blade 30 seconds each direction   Doorway stretch             Seated piriformis stretch             Seated glute stretch             clamshell  GTB 15 X 2 in s/l     10 X 2 - no resistance   10 X 2     Reverse clamshell  #2 10 X 2    Hold   Hold    Hip abduction  Standing: #3 10 X 2    10 X 2   Standing 15 X 2    bridges  15 X 2    10 X 2   15 X 2    ASLR  #2 15 X 2    10 X 2   15 x 2     Standing hip hike on 6\" step  10 X 2    10 X 2   10 X 2    6\" lateral step ups  10 X 2           TB supine hip ER  GTB 15 X 2    10 X 2   BTB 10 X 2- limited range to keep painfree    Walking side steps  GTB 30 ft x 2    30 ft x 2 no resistance   30 ft x 2 no resistance      MANUAL THERAPY: (15 minutes): Joint mobilization, Soft tissue mobilization and Manipulation was utilized and necessary because of the patient's restricted joint motion, painful spasm, loss of articular motion and restricted motion of soft tissue. 4/4/22  +PROM R shoulder- all directions to pt tolerance  +STM to lateral deltoid, subscapularis    +manual stretching L ITB, glute, piriformis, hip flexor  +STM to glute med, ITB    (Used abbreviations: MET - muscle energy technique; PNF - proprioceptive neuromuscular facilitation; NMR - neuromuscular re-education; AP - anterior to posterior; PA - posterior to anterior)    MODALITIES: (15 minutes):      vasopneumatic device to reduce pain, inflammation, and edema med pressure 34 degrees     TREATMENT/SESSION ASSESSMENT: Pt tolerated progression of exercises well. Increased soreness and mm fatigue by end of session but overall improvements in strength and endurance for activity. Added body blade this session. Education: on objective findings and HEP    RECOMMENDATIONS/INTENT FOR NEXT TREATMENT SESSION: \"Next visit will focus on advancements to more challenging activities\".     PAIN: Initial: 2-3/10 R shoulder Post Session:  3/10      MedBridge Portal    Total Treatment Billable Duration:   PT Patient Time In/Time Out  Time In: 1015  Time Out: Karl Power 1160, PT, DPT    Future Appointments   Date Time Provider Elisabeth Chauhan   4/6/2022 10:15 AM Gloriajean Radha A [facility]PT SFD   4/7/2022 10:15 AM Gloriajean Radha A [facility]PT SFD   4/11/2022  9:30 AM Gloriajean Radha A [facility]PT SFD   4/13/2022 10:15 AM Gloriajean Radha A [facility]PT SFD   4/14/2022  9:30 AM Gloriajean Radha A [facility]PT SFD   4/18/2022  1:00 PM Gloriajean Radha A [facility]PT SFD   4/20/2022 10:15 AM Gloriajean Radha A [facility]FDORPT SFD   4/21/2022  9:30 AM Gloriajean Radha A [facility]PT SFD   4/25/2022  9:30 AM Gloriajean Radha A [facility]PT SFD   4/27/2022 10:15 AM Gloriajean Radha A [facility]PT SFD   4/28/2022  9:30 AM Nicholas BAKER [facility] D   5/31/2022  8:00 AM Aretha Azar MD POAG POA   3/27/2023  2:10 PM Vadim Mack MD [id number] PGU       Visit Approval Visit # Therapist initials Date A NS / Cx < 24 hr >24 hr Cx Comments    1 SHAISTA 1/5/22 [x]  [] [] Initial evaluation    2 SHAISTA 1/7/22 [x] [] []     3 SHAISTA 1/11/22 [x] [] []     4 SHAISTA 1/13/22 [x] [] []     5 PDE 1- [x] [] []     6 SHAISTA 1/20/22 [x] [] []     7 SHAISTA 1/25/22 [x] [] []     8 SHAISTA 1/27/22 [x] [] []     9 SHAISTA 2/1/22 [x] [] [] PN due next visit    10  SHAISTA 2/3/22 [x] [] [] PN today    11 SHAISTA 2/7/22 [x] [] [] 1    12 SHAISTA 2/9/22 [x] [] [] 2    13 SHAISTA 2/15/22 [x] [] [] 3    14 SHAISTA 5/84/01 [x] [] [] Recert/Reeval- added L hip pain to [address] and treatment plan    15 SHAISTA 2/17/22 [x] [] [] 4    16 SHAISTA 2/21/22 [x] [] [] 5    17 SAHISTA 2/24/22 [x] [] [] 6    18 SHAISTA 2/25/22 [x] [] [] 7    19 SHAISTA 3/1/22 x   8    20 SHAISTA 3/2/22 x   9, PN due next visit    21 SHAISTA 3/4/22 x   PN today    22 SHAISTA 3/8/22 x   1    23 SHAISTA 3/10/22 x   2    24 SHAISTA 3/11/22 x   3    25 SHAISTA 3/14/22 x   4   **KX** 26 SHAISTA 3/16/22 x   5    27 SHAISTA 3/18/22 x   6    28 SHAISTA 3/21/22 x   7    29 SHAISTA 3/23/22 x   8    30 SHAISTA 3/24/22 x   9, PN due next visit    31 SHAISTA 3/29/22 x   10, PN today    32 SHAISTA 3/31/22 x   1    33 SHAISTA 4/1/22 x   2    34 SHAISTA 4/4/22 x   3

## 2022-04-06 ENCOUNTER — HOSPITAL ENCOUNTER (OUTPATIENT)
Dept: PHYSICAL THERAPY | Age: 72
Discharge: HOME | End: 2022-04-06
Payer: MEDICARE

## 2022-04-06 PROCEDURE — 97016 VASOPNEUMATIC DEVICE THERAPY: CPT

## 2022-04-06 PROCEDURE — 97110 THERAPEUTIC EXERCISES: CPT

## 2022-04-06 PROCEDURE — 97140 MANUAL THERAPY 1/> REGIONS: CPT

## 2022-04-06 NOTE — PROGRESS NOTES
Yosef Bonilla  : 1950  [place] / Product Type: Medicare /  [address]  at [facility]  [address], [address] [facility], [place] [address] [address] [facility]  [phone]   [phone]      OUTPATIENT PHYSICAL THERAPY: Daily Treatment Note 2022  Visit Count:  35    ICD-10: Treatment Diagnosis:   M25.511 Pain in Right Shoulder  M25.611 Stiffness in Right Shoulder  M62.81 Muscle Weakness Generalized  Z47.89 Orthopedic Aftercare  M25.552 Pain in Left hip  M25.652 Stiffness of Left hip, not elsewhere classified    Precautions/Allergies:   Patient has no known allergies. Follow Protocol  -Limit AROM to : Flexion (80 degrees), Extension (20 degrees), Abduction (80 degrees, IR (full), ER (to neutral only)  -Sling may be off  -No subscapularis stretch or strengthening until 6 weeks post op date    TSA after 6 weeks (22)  - All active and passive ROM ok  -Resistance in all arcs OK  TREATMENT PLAN:  Effective Dates: 3/29/2022 TO 2022 (60 days). Frequency/Duration: 3 times a week for 60 Days        PRE-TREATMENT SYMPTOMS/COMPLAINTS:  Pt reports only slight soreness at shoulder today. MEDICATIONS REVIEWED:  2022   TREATMENT:   (In addition to Assessment/Re-Assessment sessions the following treatments were rendered)    THERAPEUTIC EXERCISE: (25 minutes):  Exercises per grid below to improve mobility, strength and balance. Required minimal visual and verbal cues to promote proper body alignment and promote proper body posture. Progressed resistance, range and complexity of movement as indicated.      Date:  3/24/22 Date:  3/29/22 Date:  3/31/22 Date:  22 Date:  22 Date:  22   Activity/Exercise         Rhythmic stabilization          Shoulder flex (supine)  #4 supine wand 10 X 2 #5 supine wand 10 X 2  #5 supine wand 10 X 2 #5 supine wand 10 X 2   S/l shoulder abduction         S/l ER  #3 10 X 2 #4 10 X 2  #4 10 X 2 #4 10 x 2   Supine protraction  #3 10 X 2 #4 10 X 2  #5 10 X 2 #5 15 X 2   Prone row  Bent #5 10 X 2 Bent #5 10 X 2  Bent #5 15 X 2 Bent #5 15 X 2   Prone extension  Bent #3 10 X 2 Bent #3 10 X 2  Bent #4 10 X 2 Bent #4 15 X 2   Prone horizontal abduction  Bent 10 X 2 Bent 10 X 2  Bent 10 X 2  Bent 10 X 2   Sleeper stretch         TB rows/extension         TB IR/ER         Towel STAR on wall  3X 3X  3X 4X   Horizontal adduction stretch         AROM shoulder flexion, scaption   5X each  10 X 2 each #1 10 X 1 each   Stability ball rolls on wall   30 X CW, CCW X 2  Body blade 30 seconds each direction Body blade 30 seconds each direction   Doorway stretch         Seated piriformis stretch         Seated glute stretch         clamshell 10 X 2 - no resistance   10 X 2      Reverse clamshell Hold   Hold     Hip abduction 10 X 2   Standing 15 X 2     bridges 10 X 2   15 X 2     ASLR 10 X 2   15 x 2      Standing hip hike on 6\" step 10 X 2   10 X 2     6\" lateral step ups         TB supine hip ER 10 X 2   BTB 10 X 2- limited range to keep painfree     Walking side steps 30 ft x 2 no resistance   30 ft x 2 no resistance       MANUAL THERAPY: (15 minutes): Joint mobilization, Soft tissue mobilization and Manipulation was utilized and necessary because of the patient's restricted joint motion, painful spasm, loss of articular motion and restricted motion of soft tissue. 4/6/22  +PROM R shoulder- all directions to pt tolerance  +STM to lateral deltoid, subscapularis    +manual stretching L ITB, glute, piriformis, hip flexor  +STM to glute med, ITB    (Used abbreviations: MET - muscle energy technique; PNF - proprioceptive neuromuscular facilitation; NMR - neuromuscular re-education; AP - anterior to posterior; PA - posterior to anterior)    MODALITIES: (15 minutes):      vasopneumatic device to reduce pain, inflammation, and edema med pressure 34 degrees     TREATMENT/SESSION ASSESSMENT: Pt tolerated progression of exercises well. Increased mm fatigue and soreness by end of session. Education: on objective findings and HEP    RECOMMENDATIONS/INTENT FOR NEXT TREATMENT SESSION: \"Next visit will focus on advancements to more challenging activities\".     PAIN: Initial: 2/10 R shoulder Post Session:  2-3/10      MedBridge Portal    Total Treatment Billable Duration:   PT Patient Time In/Time Out  Time In: 1015  Time Out: Karl Moi Tono 1160, PT, DPT    Future Appointments   Date Time Provider Elisabeth Chauhan   4/7/2022 10:15 AM Sadi Spinner A [facility]PT SFD   4/11/2022  9:30 AM Sadi Spinner A [facility]PT SFD   4/13/2022 10:15 AM Sadi Spinner A [facility]PT SFD   4/14/2022  9:30 AM Sadi Spinner A [facility]PT SFD   4/18/2022  1:00 PM Sadi Spinner A [facility]PT SFD   4/20/2022 10:15 AM Sadi Spinner A SFDORPT SFD   4/21/2022  9:30 AM Sadi Spinner A [facility]FD[facility]PT SFD   4/25/2022  9:30 AM Sadi Spinner A SFDORPT SFD   4/27/2022 10:15 AM Sadi Spinner A [facility]FDORPT SFD   4/28/2022  9:30 AM Sadi Spinner A [facility]FD[facility]PT SFD   5/31/2022  8:00 AM Wilber Astorga MD POAG POA   3/27/2023  2:10 PM Keesha Boggs MD [id number] PGU       Visit Approval Visit # Therapist initials Date A NS / Cx < 24 hr >24 hr Cx Comments    1 SHAISTA 1/5/22 [x]  [] [] Initial evaluation    2 SHAISTA 1/7/22 [x] [] []     3 SHAISTA 1/11/22 [x] [] []     4 SHAISTA 1/13/22 [x] [] []     5 PDE 1- [x] [] []     6 SHAISTA 1/20/22 [x] [] []     7 SHAISTA 1/25/22 [x] [] []     8 SHAISTA 1/27/22 [x] [] []     9 SHAISTA 2/1/22 [x] [] [] PN due next visit    10  SHAISTA 2/3/22 [x] [] [] PN today    11 SHAISTA 2/7/22 [x] [] [] 1    12 SHAISTA 2/9/22 [x] [] [] 2    13 SHAISTA 2/15/22 [x] [] [] 3    14 SHAISTA 6/99/27 [x] [] [] Recert/Reeval- added L hip pain to POC and treatment plan    15 SHAISTA 2/17/22 [x] [] [] 4    16 SHAISTA 2/21/22 [x] [] [] 5    17 SHAISTA 2/24/22 [x] [] [] 6    18 SHAISTA 2/25/22 [x] [] [] 7    19 SHAISTA 3/1/22 x   8    20 SHAISTA 3/2/22 x   9, PN due next visit    21 SHAISTA 3/4/22 x   PN today    22 SHAISTA 3/8/22 x   1    23 SHAISTA 3/10/22 x   2    24 SHAISTA 3/11/22 x 3    25 SHAISTA 3/14/22 x   4   **KX** 26 SHAISTA 3/16/22 x   5    27 SHAISTA 3/18/22 x   6    28 SHAISTA 3/21/22 x   7    29 SHAISTA 3/23/22 x   8    30 SHAISTA 3/24/22 x   9, PN due next visit    31 SHAISTA 3/29/22 x   10, PN today    32 SHAISTA 3/31/22 x   1    33 SHAISTA 4/1/22 x   2    34 SHAISTA 4/4/22 x   3    35 SHAISTA 4/6/22 x   4

## 2022-04-07 ENCOUNTER — HOSPITAL ENCOUNTER (OUTPATIENT)
Dept: PHYSICAL THERAPY | Age: 72
Discharge: HOME | End: 2022-04-07
Payer: MEDICARE

## 2022-04-07 PROCEDURE — 97140 MANUAL THERAPY 1/> REGIONS: CPT

## 2022-04-07 PROCEDURE — 97110 THERAPEUTIC EXERCISES: CPT

## 2022-04-07 NOTE — PROGRESS NOTES
Sergey Marcos  : 1950  [place] / Product Type: Medicare /  [address] [place]  at [facility]  [address], [address] [facility], [address]  [phone]   [phone]      OUTPATIENT PHYSICAL THERAPY: Daily Treatment Note 2022  Visit Count:  36    ICD-10: Treatment Diagnosis:   M25.511 Pain in Right Shoulder  M25.611 Stiffness in Right Shoulder  M62.81 Muscle Weakness Generalized  Z47.89 Orthopedic Aftercare  M25.552 Pain in Left hip  M25.652 Stiffness of Left hip, not elsewhere classified    Precautions/Allergies:   Patient has no known allergies. Follow Protocol  -Limit AROM to : Flexion (80 degrees), Extension (20 degrees), Abduction (80 degrees, IR (full), ER (to neutral only)  -Sling may be off  -No subscapularis stretch or strengthening until 6 weeks post op date    TSA after 6 weeks (22)  - All active and passive ROM ok  -Resistance in all arcs OK  TREATMENT PLAN:  Effective Dates: 3/29/2022 TO 2022 (60 days). Frequency/Duration: 3 times a week for 60 Days        PRE-TREATMENT SYMPTOMS/COMPLAINTS:  Pt reports hip seems to be slowly getting better. Reports it can be very easily irritated so trying to be careful with certain movements  MEDICATIONS REVIEWED:  2022   TREATMENT:   (In addition to Assessment/Re-Assessment sessions the following treatments were rendered)    THERAPEUTIC EXERCISE: (15 minutes):  Exercises per grid below to improve mobility, strength and balance. Required minimal visual and verbal cues to promote proper body alignment and promote proper body posture. Progressed resistance, range and complexity of movement as indicated.      Date:  3/24/22 Date:  3/29/22 Date:  3/31/22 Date:  22 Date:  22 Date:  22 Date:  22   Activity/Exercise          Rhythmic stabilization           Shoulder flex (supine)  #4 supine wand 10 X 2 #5 supine wand 10 X 2  #5 supine wand 10 X 2 #5 supine wand 10 X 2    S/l shoulder abduction          S/l ER  #3 10 X 2 #4 10 X 2  #4 10 X 2 #4 10 x 2    Supine protraction  #3 10 X 2 #4 10 X 2  #5 10 X 2 #5 15 X 2    Prone row  Bent #5 10 X 2 Bent #5 10 X 2  Bent #5 15 X 2 Bent #5 15 X 2    Prone extension  Bent #3 10 X 2 Bent #3 10 X 2  Bent #4 10 X 2 Bent #4 15 X 2    Prone horizontal abduction  Bent 10 X 2 Bent 10 X 2  Bent 10 X 2  Bent 10 X 2    Sleeper stretch          TB rows/extension          TB IR/ER          Towel STAR on wall  3X 3X  3X 4X    Horizontal adduction stretch          AROM shoulder flexion, scaption   5X each  10 X 2 each #1 10 X 1 each    Stability ball rolls on wall   30 X CW, CCW X 2  Body blade 30 seconds each direction Body blade 30 seconds each direction    Doorway stretch          Seated piriformis stretch          Seated glute stretch          clamshell 10 X 2 - no resistance   10 X 2    15 X 2   Reverse clamshell Hold   Hold   hold   Hip abduction 10 X 2   Standing 15 X 2   Standing #2    bridges 10 X 2   15 X 2   15 X 2   ASLR 10 X 2   15 x 2    15 X 2   Standing hip hike on 6\" step 10 X 2   10 X 2      6\" lateral step ups          TB supine hip ER 10 X 2   BTB 10 X 2- limited range to keep painfree   BTB 15 X 2   Walking side steps 30 ft x 2 no resistance   30 ft x 2 no resistance        MANUAL THERAPY: (23 minutes): Joint mobilization, Soft tissue mobilization and Manipulation was utilized and necessary because of the patient's restricted joint motion, painful spasm, loss of articular motion and restricted motion of soft tissue.      +PROM R shoulder- all directions to pt tolerance  +STM to lateral deltoid, subscapularis  4/7/22  +manual stretching L ITB, glute, piriformis, hip flexor  +STM to glute med, ITB    (Used abbreviations: MET - muscle energy technique; PNF - proprioceptive neuromuscular facilitation; NMR - neuromuscular re-education; AP - anterior to posterior; PA - posterior to anterior)    MODALITIES: (10 minutes):      vasopneumatic device to reduce pain, inflammation, and edema med pressure 34 degrees Not today 4/7/22; Ice only to L hip     TREATMENT/SESSION ASSESSMENT: Pt tolerated progression of exercises well. Increased mm fatigue and soreness by end of session. Education: on objective findings and HEP    RECOMMENDATIONS/INTENT FOR NEXT TREATMENT SESSION: \"Next visit will focus on advancements to more challenging activities\".     PAIN: Initial: 2/10 L hip Post Session:  2/10      MedBridge Portal    Total Treatment Billable Duration:   PT Patient Time In/Time Out  Time In: 1015  Time Out: [address], PT, DPT    Future Appointments   Date Time Provider [place]   4/11/2022  9:30 AM Linda Greenville A [facility]   4/13/2022  2:30 PM Linda Greenville A [facility]   4/14/2022  9:30 AM Linda Greenville A [facility] SFD   4/18/2022  1:00 PM Linda Greenville A [facility]FD   4/20/2022 10:15 AM Linda Greenville A [facility] SFD   4/21/2022  9:30 AM Linda Greenville A [facility] SFD   4/25/2022  9:30 AM Linda Greenville A [facility] S[facility]   4/27/2022 10:15 AM Linda Greenville A [facility] SFD   4/28/2022  9:30 AM Linda Greenville A [facility] SFD   5/31/2022  8:00 AM Tevin Vaughn MD [facility] POA   3/27/2023  2:10 PM Yina Ayon MD [id number] PGU       Visit Approval Visit # Therapist initials Date A NS / Cx < 24 hr >24 hr Cx Comments    1 SHAISTA 1/5/22 [x]  [] [] Initial evaluation    2 SHAISTA 1/7/22 [x] [] []     3 SHAISTA 1/11/22 [x] [] []     4 SHAISTA 1/13/22 [x] [] []     5 PDE 1- [x] [] []     6 SHAISTA 1/20/22 [x] [] []     7 SHAISTA 1/25/22 [x] [] []     8 SHAISTA 1/27/22 [x] [] []     9 SHAISTA 2/1/22 [x] [] [] PN due next visit    10  SHAISTA 2/3/22 [x] [] [] PN today    11 SHAISTA 2/7/22 [x] [] [] 1    12 SHAISTA 2/9/22 [x] [] [] 2    13 SHAISTA 2/15/22 [x] [] [] 3    14 SHAISTA 0/58/18 [x] [] [] Recert/Reeval- added L hip pain to [address] and treatment plan    15 SHAISTA 2/17/22 [x] [] [] 4    16 SHAISTA 2/21/22 [x] [] [] 5    17 SHAISTA 2/24/22 [x] [] [] 6    18 SHAISTA 2/25/22 [x] [] [] 7 19 SHAISTA 3/1/22 x   8    20 SHAISTA 3/2/22 x   9, PN due next visit    21 SHAISTA 3/4/22 x   PN today    22 SHAISTA 3/8/22 x   1    23 SHAISTA 3/10/22 x   2    24 SHAISTA 3/11/22 x   3    25 SHAISTA 3/14/22 x   4   **KX** 26 SHAISTA 3/16/22 x   5    27 SHAISTA 3/18/22 x   6    28 SHAISTA 3/21/22 x   7    29 SHAISTA 3/23/22 x   8    30 SHAISTA 3/24/22 x   9, PN due next visit    31 SHAISTA 3/29/22 x   10, PN today    32 SHAISTA 3/31/22 x   1    33 SHAISTA 4/1/22 x   2    34 SHAISTA 4/4/22 x   3    35 SHAISTA 4/6/22 x   4    36 SHAISTA 4/7/22 x   5

## 2022-04-11 ENCOUNTER — HOSPITAL ENCOUNTER (OUTPATIENT)
Dept: PHYSICAL THERAPY | Age: 72
Discharge: HOME | End: 2022-04-11
Payer: MEDICARE

## 2022-04-11 PROCEDURE — 97016 VASOPNEUMATIC DEVICE THERAPY: CPT

## 2022-04-11 PROCEDURE — 97140 MANUAL THERAPY 1/> REGIONS: CPT

## 2022-04-11 PROCEDURE — 97110 THERAPEUTIC EXERCISES: CPT

## 2022-04-13 ENCOUNTER — HOSPITAL ENCOUNTER (OUTPATIENT)
Dept: PHYSICAL THERAPY | Age: 72
Discharge: HOME | End: 2022-04-13
Payer: MEDICARE

## 2022-04-13 PROCEDURE — 97110 THERAPEUTIC EXERCISES: CPT

## 2022-04-13 PROCEDURE — 97140 MANUAL THERAPY 1/> REGIONS: CPT

## 2022-04-13 PROCEDURE — 97016 VASOPNEUMATIC DEVICE THERAPY: CPT

## 2022-04-13 NOTE — PROGRESS NOTES
Brando Kirkpatrick  : 1950  [place] / Product Type: Medicare /  [address]  at [facility]  [address], [address] [facility], [address]  [phone]   [phone]      OUTPATIENT PHYSICAL THERAPY: Daily Treatment Note 2022  Visit Count:  38    ICD-10: Treatment Diagnosis:   M25.511 Pain in Right Shoulder  M25.611 Stiffness in Right Shoulder  M62.81 Muscle Weakness Generalized  Z47.89 Orthopedic Aftercare  M25.552 Pain in Left hip  M25.652 Stiffness of Left hip, not elsewhere classified    Precautions/Allergies:   Patient has no known allergies. Follow Protocol  -Limit AROM to : Flexion (80 degrees), Extension (20 degrees), Abduction (80 degrees, IR (full), ER (to neutral only)  -Sling may be off  -No subscapularis stretch or strengthening until 6 weeks post op date    TSA after 6 weeks (22)  - All active and passive ROM ok  -Resistance in all arcs OK  TREATMENT PLAN:  Effective Dates: 3/29/2022 TO 2022 (60 days). Frequency/Duration: 3 times a week for 60 Days        PRE-TREATMENT SYMPTOMS/COMPLAINTS:  Pt reports shoulder is feeling some better. MEDICATIONS REVIEWED:  2022   TREATMENT:   (In addition to Assessment/Re-Assessment sessions the following treatments were rendered)    THERAPEUTIC EXERCISE: (25 minutes):  Exercises per grid below to improve mobility, strength and balance. Required minimal visual and verbal cues to promote proper body alignment and promote proper body posture. Progressed resistance, range and complexity of movement as indicated.      Date:  3/24/22 Date:  3/29/22 Date:  3/31/22 Date:  22 Date:  22 Date:  22 Date:  22 Date:  22 Date:  22   Activity/Exercise            Rhythmic stabilization             Shoulder flex (supine)  #4 supine wand 10 X 2 #5 supine wand 10 X 2  #5 supine wand 10 X 2 #5 supine wand 10 X 2  #5 supine wand 15 X 2 #5 supine wand 15 X 2   S/l shoulder abduction            S/l ER  #3 10 X 2 #4 10 X 2  #4 10 X 2 #4 10 x 2  #5 10 X 2- starting at neutral in s/l #5 10 X 2   Supine protraction  #3 10 X 2 #4 10 X 2  #5 10 X 2 #5 15 X 2  #5 15 X 2 #5 15 X 2   Prone row  Bent #5 10 X 2 Bent #5 10 X 2  Bent #5 15 X 2 Bent #5 15 X 2  Bent #5 15 X 2 Bent #5 15 X 2   Prone extension  Bent #3 10 X 2 Bent #3 10 X 2  Bent #4 10 X 2 Bent #4 15 X 2  Bent #4 15 X 2 Bent #5 10 X 2   Prone horizontal abduction  Bent 10 X 2 Bent 10 X 2  Bent 10 X 2  Bent 10 X 2  Bent 10 X 2 Bent 10 X 2   Sleeper stretch            TB rows/extension            TB IR/ER            Towel STAR on wall  3X 3X  3X 4X  3X 3X   Horizontal adduction stretch            AROM shoulder flexion, scaption   5X each  10 X 2 each #1 10 X 1 each  #1 10 X 1 each #1 10 X 1 each   Stability ball rolls on wall   30 X CW, CCW X 2  Body blade 30 seconds each direction Body blade 30 seconds each direction  Body blade 30 seconds each direction Body blade 30 seconds each direction   Doorway stretch            Seated piriformis stretch            Seated glute stretch            clamshell 10 X 2 - no resistance   10 X 2    15 X 2     Reverse clamshell Hold   Hold   hold     Hip abduction 10 X 2   Standing 15 X 2   Standing #2      bridges 10 X 2   15 X 2   15 X 2     ASLR 10 X 2   15 x 2    15 X 2     Standing hip hike on 6\" step 10 X 2   10 X 2        6\" lateral step ups            TB supine hip ER 10 X 2   BTB 10 X 2- limited range to keep painfree   BTB 15 X 2     Walking side steps 30 ft x 2 no resistance   30 ft x 2 no resistance          MANUAL THERAPY: (15 minutes): Joint mobilization, Soft tissue mobilization and Manipulation was utilized and necessary because of the patient's restricted joint motion, painful spasm, loss of articular motion and restricted motion of soft tissue.    4/13/22  +PROM R shoulder- all directions to pt tolerance  +STM to lateral deltoid, subscapularis    +manual stretching L ITB, glute, piriformis, hip flexor  +STM to glute med, ITB    (Used abbreviations: MET - muscle energy technique; PNF - proprioceptive neuromuscular facilitation; NMR - neuromuscular re-education; AP - anterior to posterior; PA - posterior to anterior)    MODALITIES: (15 minutes):      vasopneumatic device to reduce pain, inflammation, and edema med pressure 34 degrees     TREATMENT/SESSION ASSESSMENT: Pt tolerated progression of exercises well. Requiring minimal cues for technique. Increased mm soreness by end of session. Education: on objective findings and HEP    RECOMMENDATIONS/INTENT FOR NEXT TREATMENT SESSION: \"Next visit will focus on advancements to more challenging activities\".     PAIN: Initial: 2/10 shoulder Post Session:  3/10 shoulder     MedBridge Portal    Total Treatment Billable Duration:   PT Patient Time In/Time Out  Time In: 1430  Time Out: [address], PT, DPT    Future Appointments   Date Time Provider Elisabeth Chauhan   4/14/2022  9:30 AM Marichuy Becerra A [facility]   4/18/2022  1:00 PM Marichuy Becerra A [facility]   4/20/2022 10:15 AM Marichuy Becerra A [facility]   4/21/2022  9:30 AM Marichuy Becerra A [facility] S[facility]   4/25/2022  9:30 AM Marichuy Becerra A [facility] S[facility]   4/27/2022 10:15 AM Marichuy Becerra A [facility] S[facility]   4/28/2022  9:30 AM Marichuy Becerra A [facility] SFD   5/31/2022  8:00 AM Glenis Borden MD [facility] POA   3/27/2023  2:10 PM Alan Sahu MD [id number] PGU       Visit Approval Visit # Therapist initials Date A NS / Cx < 24 hr >24 hr Cx Comments    1 SHAISTA 1/5/22 [x]  [] [] Initial evaluation    2 SHAISTA 1/7/22 [x] [] []     3 SHAISTA 1/11/22 [x] [] []     4 SHAISTA 1/13/22 [x] [] []     5 PDE 1- [x] [] []     6 SHAISTA 1/20/22 [x] [] []     7 SHAISTA 1/25/22 [x] [] []     8 KAI 1/27/22 [x] [] []     9 KAI 2/1/22 [x] [] [] PN due next visit    10  SHAISTA 2/3/22 [x] [] [] PN today    11 KAI 2/7/22 [x] [] [] 1    12 KAI 2/9/22 [x] [] [] 2    13 SHAISTA 2/15/22 [x] [] [] 3    14 KAI 2/16/22 [x] [] [] Recert/Reeval- added L hip pain to POC and treatment plan    15 SHAISTA 2/17/22 [x] [] [] 4    16 SHAISTA 2/21/22 [x] [] [] 5    17 SHAISTA 2/24/22 [x] [] [] 6    18 SHAISTA 2/25/22 [x] [] [] 7    19 SHAISTA 3/1/22 x   8    20 SHAISTA 3/2/22 x   9, PN due next visit    21 SHAISTA 3/4/22 x   PN today    22 SHAISTA 3/8/22 x   1    23 SHAISTA 3/10/22 x   2    24 SHAISTA 3/11/22 x   3    25 SHAISTA 3/14/22 x   4   **KX** 26 SHAISTA 3/16/22 x   5    27 SHAISTA 3/18/22 x   6    28 SHAISTA 3/21/22 x   7    29 SHAISTA 3/23/22 x   8    30 SHAISTA 3/24/22 x   9, PN due next visit    31 SHAISTA 3/29/22 x   10, PN today    32 SHAISTA 3/31/22 x   1    33 SHAISTA 4/1/22 x   2    34 SHAISTA 4/4/22 x   3    35 SHAISTA 4/6/22 x   4    36 SHAISTA 4/7/22 x   5    37 SHAISTA 4/11/22 x   6    38 SHAISTA 4/13/22 x   7

## 2022-04-14 ENCOUNTER — HOSPITAL ENCOUNTER (OUTPATIENT)
Dept: PHYSICAL THERAPY | Age: 72
Discharge: HOME | End: 2022-04-14
Payer: MEDICARE

## 2022-04-14 PROCEDURE — 97110 THERAPEUTIC EXERCISES: CPT

## 2022-04-14 PROCEDURE — 97140 MANUAL THERAPY 1/> REGIONS: CPT

## 2022-04-14 NOTE — PROGRESS NOTES
Rosa Isela Rosa  : 1950  [place] / Product Type: Medicare /  [address]  at [facility]  [address], [address] [facility], [address]  [phone]   [phone]      OUTPATIENT PHYSICAL THERAPY: Daily Treatment Note 2022  Visit Count:  39    ICD-10: Treatment Diagnosis:   M25.511 Pain in Right Shoulder  M25.611 Stiffness in Right Shoulder  M62.81 Muscle Weakness Generalized  Z47.89 Orthopedic Aftercare  M25.552 Pain in Left hip  M25.652 Stiffness of Left hip, not elsewhere classified    Precautions/Allergies:   Patient has no known allergies. Follow Protocol  -Limit AROM to : Flexion (80 degrees), Extension (20 degrees), Abduction (80 degrees, IR (full), ER (to neutral only)  -Sling may be off  -No subscapularis stretch or strengthening until 6 weeks post op date    TSA after 6 weeks (22)  - All active and passive ROM ok  -Resistance in all arcs OK  TREATMENT PLAN:  Effective Dates: 3/29/2022 TO 2022 (60 days). Frequency/Duration: 3 times a week for 60 Days        PRE-TREATMENT SYMPTOMS/COMPLAINTS:  Pt reports feeling like his hip is improving. Just being careful with certain movements. MEDICATIONS REVIEWED:  2022   TREATMENT:   (In addition to Assessment/Re-Assessment sessions the following treatments were rendered)    THERAPEUTIC EXERCISE: (15 minutes):  Exercises per grid below to improve mobility, strength and balance. Required minimal visual and verbal cues to promote proper body alignment and promote proper body posture. Progressed resistance, range and complexity of movement as indicated.      Date:  3/24/22 Date:  3/29/22 Date:  3/31/22 Date:  22 Date:  22 Date:  22 Date:  22 Date:  22 Date:  22 Date:  22   Activity/Exercise             Rhythmic stabilization              Shoulder flex (supine)  #4 supine wand 10 X 2 #5 supine wand 10 X 2  #5 supine wand 10 X 2 #5 supine wand 10 X 2  #5 supine wand 15 X 2 #5 supine wand 15 X 2    S/l shoulder abduction             S/l ER  #3 10 X 2 #4 10 X 2  #4 10 X 2 #4 10 x 2  #5 10 X 2- starting at neutral in s/l #5 10 X 2    Supine protraction  #3 10 X 2 #4 10 X 2  #5 10 X 2 #5 15 X 2  #5 15 X 2 #5 15 X 2    Prone row  Bent #5 10 X 2 Bent #5 10 X 2  Bent #5 15 X 2 Bent #5 15 X 2  Bent #5 15 X 2 Bent #5 15 X 2    Prone extension  Bent #3 10 X 2 Bent #3 10 X 2  Bent #4 10 X 2 Bent #4 15 X 2  Bent #4 15 X 2 Bent #5 10 X 2    Prone horizontal abduction  Bent 10 X 2 Bent 10 X 2  Bent 10 X 2  Bent 10 X 2  Bent 10 X 2 Bent 10 X 2    Sleeper stretch             TB rows/extension             TB IR/ER             Towel STAR on wall  3X 3X  3X 4X  3X 3X    Horizontal adduction stretch             AROM shoulder flexion, scaption   5X each  10 X 2 each #1 10 X 1 each  #1 10 X 1 each #1 10 X 1 each    Stability ball rolls on wall   30 X CW, CCW X 2  Body blade 30 seconds each direction Body blade 30 seconds each direction  Body blade 30 seconds each direction Body blade 30 seconds each direction    Doorway stretch             Seated piriformis stretch             Seated glute stretch             clamshell 10 X 2 - no resistance   10 X 2    15 X 2   15 X 2   Reverse clamshell Hold   Hold   hold   Hold    Hip abduction 10 X 2   Standing 15 X 2   Standing #2    Standing #3 10 X 2   bridges 10 X 2   15 X 2   15 X 2   15 X 2   ASLR 10 X 2   15 x 2    15 X 2   #2 10 X 2   Standing hip hike on 6\" step 10 X 2   10 X 2      10 X 2   6\" lateral step ups          10 X 2   TB supine hip ER 10 X 2   BTB 10 X 2- limited range to keep painfree   BTB 15 X 2   BTB 15 X 2    Walking side steps 30 ft x 2 no resistance   30 ft x 2 no resistance      GTB 30 ft x 2      MANUAL THERAPY: (25 minutes): Joint mobilization, Soft tissue mobilization and Manipulation was utilized and necessary because of the patient's restricted joint motion, painful spasm, loss of articular motion and restricted motion of soft tissue. +PROM R shoulder- all directions to pt tolerance  +STM to lateral deltoid, subscapularis  4/14/22  +manual stretching L ITB, glute, piriformis, hip flexor  +STM to glute med, ITB    (Used abbreviations: MET - muscle energy technique; PNF - proprioceptive neuromuscular facilitation; NMR - neuromuscular re-education; AP - anterior to posterior; PA - posterior to anterior)    MODALITIES: (15 minutes):      vasopneumatic device to reduce pain, inflammation, and edema med pressure 34 degrees Not today: 4/14/22- ice pack only to L hip region in seated position    TREATMENT/SESSION ASSESSMENT: Pt able to progress with exercises today with minimal to no discomfort at L hip. Continues with TTP along proximal posterior aspect of ITB and glute med attachment site. Education: on objective findings and HEP    RECOMMENDATIONS/INTENT FOR NEXT TREATMENT SESSION: \"Next visit will focus on advancements to more challenging activities\".     PAIN: Initial: 2/10 L hip Post Session:  2-3/10 L hip     MedBridge Portal    Total Treatment Billable Duration:   PT Patient Time In/Time Out  Time In: 0930  Time Out: 1  Connie Gore, PT, DPT    Future Appointments   Date Time Provider Elisabeth Hodgeisti   4/18/2022  1:00 PM [facility]   4/20/2022 10:15 AM Quinten Peppers A [facility] D   4/21/2022  9:30 AM Quinten Peppers A [facility] SFD   4/25/2022  9:30 AM Quinten Peppers A [facility] SFD   4/27/2022 10:15 AM Quinten Peppers A [facility] SFD   4/28/2022  9:30 AM Quinten Peppers A [facility] SFD   5/31/2022  8:00 AM India Brewer MD [facility] POA   3/27/2023  2:10 PM Debbie Akhtar MD [id number] PGU       Visit Approval Visit # Therapist initials Date A NS / Cx < 24 hr >24 hr Cx Comments    1 SHAISTA 1/5/22 [x]  [] [] Initial evaluation    2 SHAISTA 1/7/22 [x] [] []     3 SHAISTA 1/11/22 [x] [] []     4 SHAISTA 1/13/22 [x] [] []     5 PDE 1- [x] [] []     6 SHAISTA 1/20/22 [x] [] []     7 SHAISTA 1/25/22 [x] [] []     8 SHAISTA 1/27/22 [x] [] []     9 SHAISTA 2/1/22 [x] [] [] PN due next visit    10  SHAISTA 2/3/22 [x] [] [] PN today    11 SHAISTA 2/7/22 [x] [] [] 1    12 SHAISTA 2/9/22 [x] [] [] 2    13 SHAISTA 2/15/22 [x] [] [] 3    14 SHAISTA 9/88/42 [x] [] [] Recert/Reeval- added L hip pain to POC and treatment plan    15 SHAISTA 2/17/22 [x] [] [] 4    16 SHAISTA 2/21/22 [x] [] [] 5    17 SHAISTA 2/24/22 [x] [] [] 6    18 SHAISTA 2/25/22 [x] [] [] 7    19 SHAISTA 3/1/22 x   8    20 SHAISTA 3/2/22 x   9, PN due next visit    21 SHAISTA 3/4/22 x   PN today    22 SHAISTA 3/8/22 x   1    23 SHAISTA 3/10/22 x   2    24 SHAISTA 3/11/22 x   3    25 SHAISTA 3/14/22 x   4   **KX** 26 SHAISTA 3/16/22 x   5    27 SHAISTA 3/18/22 x   6    28 SHAISTA 3/21/22 x   7    29 SHAISTA 3/23/22 x   8    30 SHAISTA 3/24/22 x   9, PN due next visit    31 SHAISTA 3/29/22 x   10, PN today    32 SHAISTA 3/31/22 x   1    33 SHAISTA 4/1/22 x   2    34 SHAISTA 4/4/22 x   3    35 SHAISTA 4/6/22 x   4    36 SHAISTA 4/7/22 x   5    37 SHAISTA 4/11/22 x   6    38 SHAISTA 4/13/22 x   7    39 SHAISTA 4/14/22 x   8

## 2022-04-18 ENCOUNTER — HOSPITAL ENCOUNTER (OUTPATIENT)
Dept: PHYSICAL THERAPY | Age: 72
Discharge: HOME | End: 2022-04-18
Payer: MEDICARE

## 2022-04-18 PROCEDURE — 97140 MANUAL THERAPY 1/> REGIONS: CPT

## 2022-04-18 PROCEDURE — 97016 VASOPNEUMATIC DEVICE THERAPY: CPT

## 2022-04-18 PROCEDURE — 97110 THERAPEUTIC EXERCISES: CPT

## 2022-04-20 ENCOUNTER — HOSPITAL ENCOUNTER (OUTPATIENT)
Dept: PHYSICAL THERAPY | Age: 72
Discharge: HOME | End: 2022-04-20
Payer: MEDICARE

## 2022-04-20 PROCEDURE — 97016 VASOPNEUMATIC DEVICE THERAPY: CPT

## 2022-04-20 PROCEDURE — 97140 MANUAL THERAPY 1/> REGIONS: CPT

## 2022-04-20 PROCEDURE — 97110 THERAPEUTIC EXERCISES: CPT

## 2022-04-20 NOTE — PROGRESS NOTES
Karlos Bhandari  : 1950  [place] / [place] / Product Type: Medicare /  [address]  at [facility]  [address] [facility], [address]  [phone]   [phone]      OUTPATIENT PHYSICAL THERAPY: Daily Treatment Note 2022  Visit Count:  41    ICD-10: Treatment Diagnosis:   M25.511 Pain in Right Shoulder  M25.611 Stiffness in Right Shoulder  M62.81 Muscle Weakness Generalized  Z47.89 Orthopedic Aftercare  M25.552 Pain in Left hip  M25.652 Stiffness of Left hip, not elsewhere classified    Precautions/Allergies:   Patient has no known allergies. Follow Protocol  -Limit AROM to : Flexion (80 degrees), Extension (20 degrees), Abduction (80 degrees, IR (full), ER (to neutral only)  -Sling may be off  -No subscapularis stretch or strengthening until 6 weeks post op date    TSA after 6 weeks (22)  - All active and passive ROM ok  -Resistance in all arcs OK  TREATMENT PLAN:  Effective Dates: 3/29/2022 TO 2022 (60 days). Frequency/Duration: 3 times a week for 60 Days        PRE-TREATMENT SYMPTOMS/COMPLAINTS:  Pt reports his shoulder is a little more sore today than it has been. MEDICATIONS REVIEWED:  2022   TREATMENT:   (In addition to Assessment/Re-Assessment sessions the following treatments were rendered)    THERAPEUTIC EXERCISE: (25 minutes):  Exercises per grid below to improve mobility, strength and balance. Required minimal visual and verbal cues to promote proper body alignment and promote proper body posture. Progressed resistance, range and complexity of movement as indicated.      Date:  3/24/22 Date:  3/29/22 Date:  3/31/22 Date:  22 Date:  22 Date:  22 Date:  22 Date:  22 Date:  22 Date:  22 Date:  22 Date:  22   Activity/Exercise               Rhythmic stabilization                Shoulder flex (supine)  #4 supine wand 10 X 2 #5 supine wand 10 X 2 #5 supine wand 10 X 2 #5 supine wand 10 X 2  #5 supine wand 15 X 2 #5 supine wand 15 X 2  #5 supine wand 15 X 2 #5 supine wand 15 X 2   S/l shoulder abduction               S/l ER  #3 10 X 2 #4 10 X 2  #4 10 X 2 #4 10 x 2  #5 10 X 2- starting at neutral in s/l #5 10 X 2  #5 10 X 2 #3 10 X1, #4 10 X 1   Supine protraction  #3 10 X 2 #4 10 X 2  #5 10 X 2 #5 15 X 2  #5 15 X 2 #5 15 X 2  #5 15 X 2 #5 15 X 2   Prone row  Bent #5 10 X 2 Bent #5 10 X 2  Bent #5 15 X 2 Bent #5 15 X 2  Bent #5 15 X 2 Bent #5 15 X 2  Bent #5 15 X 2 Bent #7 10 X 2   Prone extension  Bent #3 10 X 2 Bent #3 10 X 2  Bent #4 10 X 2 Bent #4 15 X 2  Bent #4 15 X 2 Bent #5 10 X 2  Bent #5 10 X 2 Bent #5 10 X 2   Prone horizontal abduction  Bent 10 X 2 Bent 10 X 2  Bent 10 X 2  Bent 10 X 2  Bent 10 X 2 Bent 10 X 2  Bent #1 10 X 2 Bent #1 10 X 2   Sleeper stretch               TB rows/extension               TB IR/ER               Towel STAR on wall  3X 3X  3X 4X  3X 3X  3X 3X   Horizontal adduction stretch               AROM shoulder flexion, scaption   5X each  10 X 2 each #1 10 X 1 each  #1 10 X 1 each #1 10 X 1 each  #2 10 X 1 each #2 10 X 1 each   Stability ball rolls on wall   30 X CW, CCW X 2  Body blade 30 seconds each direction Body blade 30 seconds each direction  Body blade 30 seconds each direction Body blade 30 seconds each direction  Body blade 30 seconds each direction Hold   Doorway stretch               Seated piriformis stretch               Seated glute stretch               clamshell 10 X 2 - no resistance   10 X 2    15 X 2   15 X 2     Reverse clamshell Hold   Hold   hold   Hold      Hip abduction 10 X 2   Standing 15 X 2   Standing #2    Standing #3 10 X 2     bridges 10 X 2   15 X 2   15 X 2   15 X 2     ASLR 10 X 2   15 x 2    15 X 2   #2 10 X 2     Standing hip hike on 6\" step 10 X 2   10 X 2      10 X 2     6\" lateral step ups          10 X 2     TB supine hip ER 10 X 2   BTB 10 X 2- limited range to keep painfree   BTB 15 X 2   BTB 15 X 2      Walking side steps 30 ft x 2 no resistance   30 ft x 2 no resistance      GTB 30 ft x 2        MANUAL THERAPY: (15 minutes): Joint mobilization, Soft tissue mobilization and Manipulation was utilized and necessary because of the patient's restricted joint motion, painful spasm, loss of articular motion and restricted motion of soft tissue. 4/20/22  +PROM R shoulder- all directions to pt tolerance  +STM to lateral deltoid, subscapularis    +manual stretching L ITB, glute, piriformis, hip flexor  +STM to glute med, ITB    (Used abbreviations: MET - muscle energy technique; PNF - proprioceptive neuromuscular facilitation; NMR - neuromuscular re-education; AP - anterior to posterior; PA - posterior to anterior)    MODALITIES: (15 minutes):      vasopneumatic device to reduce pain, inflammation, and edema med pressure 34 degrees      TREATMENT/SESSION ASSESSMENT: Pt tolerated progression of exercises well. Pt is making slow progress towards all LTG at this point. Anticipate discharge of treatment to R shoulder by end of the month and increased treatment to 2X per week at L hip  Education: on objective findings and HEP    RECOMMENDATIONS/INTENT FOR NEXT TREATMENT SESSION: \"Next visit will focus on advancements to more challenging activities\".     PAIN: Initial: 3-4/10 R shoulder Post Session:  3-4/10 R shoulder - soreness following exercises     [facility]    Total Treatment Billable Duration:   PT Patient Time In/Time Out  Time In: 0805  Time Out: 0900  Mk Mcqueen PT, DPT    Future Appointments   Date Time Provider Elisabeth Chauhan   4/21/2022  9:30 AM [facility]   4/25/2022  9:30 AM Lethaniel Cheryl A [facility]   4/27/2022 10:15 AM Lethaniel [place] A [facility]   4/28/2022  9:30 AM Lethaniel Cheryl A [facility]   5/31/2022  8:00 AM Jourdan Landa MD [facility] POA   3/27/2023  2:10 PM Meg Wooten MD [id number]       Visit Approval Visit # Therapist initials Date A NS / Cx < 24 hr >24 hr Cx Comments    1 SHAISTA 1/5/22 [x]  [] [] Initial evaluation    2 SHAISTA 1/7/22 [x] [] []     3 SHAISTA 1/11/22 [x] [] []     4 SHAISTA 1/13/22 [x] [] []     5 PDE 1- [x] [] []     6 SHAISTA 1/20/22 [x] [] []     7 SHAISTA 1/25/22 [x] [] []     8 SHAISTA 1/27/22 [x] [] []     9 SHAISTA 2/1/22 [x] [] [] PN due next visit    10  SHAISTA 2/3/22 [x] [] [] PN today    11 SHAISTA 2/7/22 [x] [] [] 1    12 SHAISTA 2/9/22 [x] [] [] 2    13 SHAISTA 2/15/22 [x] [] [] 3    14 SHAISTA 4/50/98 [x] [] [] Recert/Reeval- added L hip pain to POC and treatment plan    15 SHAISTA 2/17/22 [x] [] [] 4    16 SHAISTA 2/21/22 [x] [] [] 5    17 SHAISTA 2/24/22 [x] [] [] 6    18 SHAISTA 2/25/22 [x] [] [] 7    19 SHAISTA 3/1/22 x   8    20 SHAISTA 3/2/22 x   9, PN due next visit    21 SHAISTA 3/4/22 x   PN today    22 SHAISTA 3/8/22 x   1    23 SHAISTA 3/10/22 x   2    24 SHAISTA 3/11/22 x   3    25 SHAISTA 3/14/22 x   4   **KX** 26 SHAISTA 3/16/22 x   5    27 SHAISTA 3/18/22 x   6    28 SHAISTA 3/21/22 x   7    29 SHAISTA 3/23/22 x   8    30 SHAISTA 3/24/22 x   9, PN due next visit    31 SHAISTA 3/29/22 x   10, PN today    32 SHAISTA 3/31/22 x   1    33 SHAISTA 4/1/22 x   2    34 SHAISTA 4/4/22 x   3    35 SHAISTA 4/6/22 x   4    36 SHAISTA 4/7/22 x   5    37 SHAISTA 4/11/22 x   6    38 SHAISTA 4/13/22 x   7    39 SHAISTA 4/14/22 x   8    40 SHAISTA 4/18/22 x   9, PN due next visit    41 SHAISTA 4/20/22 x   10, PN today

## 2022-04-20 NOTE — THERAPY EVALUATION
Elizabeth Burleson  : 1950  [place] / [place] / Product Type: Medicare /  [address]  [facility], [address]  [phone]   [phone]        OUTPATIENT PHYSICAL THERAPY:Progress Report 2022    ICD-10: Treatment Diagnosis:   M25.511 Pain in Right Shoulder  M25.611 Stiffness in Right Shoulder  M62.81 Muscle Weakness Generalized  Z47.89 Orthopedic Aftercare  M25.552 Pain in Left hip  M25.652 Stiffness of Left hip, not elsewhere classified    Precautions/Allergies:   Patient has no known allergies. Fall Risk Score: 2 (? 5 = High Risk)  MD Orders: Eval and Treat MEDICAL/REFERRING DIAGNOSIS:  R shoulder replacement   DATE OF ONSET: 12/3/21  REFERRING PHYSICIAN: Sylvia Arora MD, Jyothi Rachel MD  [address]: [address]   Progress Report:  Pt has been seen for a total of 41 PT sessions to date. POC has continued to address s/p R TSA 2X per week and L hip pain secondary to partial tear 1X per week. Pt again demonstrating improvements in R shoulder AROM with slight improvements in overall strength. Pt demonstrating improvements in L hip ROM and slight improvements in hip strength. Overall pt continues to make slow progress towards all goals. Will be discharging treatment to R shoulder by the end of the month with pt continuing with HEP for continue strengthening post op at home as pt is mostly independent with exercises at this point. Will transition from treatment to L hip from 1x per week to 2X per week to see if can make more improvements overall with solely focusing on that rehab. Pt will continue to benefit from skilled PT interventions at this time. Progress Report:  Pt has been seen for a total of 31 PT sessions to date. Pt continues with POC addressing s/p R TSA 2x per week and L hip pain secondary to partial mm tear 1X per week.  Pt unfortunately having a set back with increased L hip pain with overstretching. Prior to this, pt was having noticeable improvements in L hip pain. Pt has been make slow but steady progress towards shoulder goals with improvements in AROM now focusing more on strengthening. Pt will continue to benefit from skilled PT interventions. Updated POC and extended out for another 60 days. Progress Report:  Pt has been seen for a total of 21 sessions to date. Pt is making good steady progress. Pt demonstrating improvements in AROM, PROM, and strength. Mostly limited by pain at this time affecting return to functional mobility, ADLs, and recreational activities. Pt will continue to benefit from skilled PT interventions at this time. Re-Evaluation: Pt has attended 13 PT sessions to date. Pt has been experiencing L hip pain for several weeks now and now has referral for treatment by Christine Mary. Will modify POC to 3X per week to continue treating s/p TSA 2X per week and L hip pain for 1X per week. Pt presents with decreased L hip ROM, decreased strength and increased pain affecting participation in ADLs and functional mobility at this time. Pt will benefit from PT interventions to address these deficits. Progress Report:  Pt has attended 10 PT sessions to date. Pt has met 3/5 STG and 0/5 LTG at this time. Pt with 4 point improvement in DASH score. Pt demonstrating improvements in PROM, AROM, strength, and pain. Pt limited by increased pain at lateral deltoid tendon with possibly some tendonosis. Pt is making good steady progress towards remaining goals and will continue to benefit from skilled PT interventions at this time. ASSESSMENT:  Mr. Soumya Moody has attended 1 physical therapy session including the initial evaluation. Pt is s/p R TSA. Pt presents with decreased ROM, decreased strength and increased pain affecting participation in ADLs and functional mobility at this time.   Recommending skilled PT: manual therapeutic techniques (as appropriate), therapeutic exercises and activities, balance interventions, and a comprehensive home exercise program to address the current impairments, as listed above. Bienvenido Andrews will benefit from skilled PT (medically necessary) to address above deficits affecting participation in basic ADLs and overall functional tolerance. PROBLEM LIST (Impacting functional limitations):  1. Decreased Strength  2. Decreased ADL/Functional Activities  3. Decreased Transfer Abilities  4. Decreased Ambulation Ability/Technique  5. Decreased Balance  6. Increased Pain  7. Decreased Activity Tolerance  8. Decreased Flexibility/Joint Mobility  9. Decreased [place] with Home Exercise Program INTERVENTIONS PLANNED:  1. Balance Exercise  2. Cold  3. Cryotherapy  4. Electrical Stimulation  5. Family Education  6. Gait Training  7. Heat  8. Home Exercise Program (HEP)  9. Manual Therapy  10. Neuromuscular Re-education/Strengthening  11. Range of Motion (ROM)  12. Therapeutic Activites  13. Therapeutic Exercise/Strengthening  14. Transcutaneous Electrical Nerve Stimulation (TENS)  15. Transfer Training   TREATMENT PLAN:  TREATMENT PLAN:  Effective Dates: 3/29/2022 TO 5/29/2022 (60 days). Frequency/Duration: 3 times a week for 60 Days  GOALS: (Goals have been discussed and agreed upon with patient.)  Short Term Time Frame: 4 weeks  1. PT will be compliant with HEP. Goal met 2/3/22  2. Pt will decrease worst pain to 3/10 or less in order to return to dressing ADLs without difficulty Goal met 2/3/22 (3/10)  3. Pt will reduce DASH to 20 or less in order to return to personal hygiene ADLs Goal met 3/29/22  4. Pt will improve gross RUE strength to 3/5 or greater in order to reach overhead Goal met 3/29/22  5. Pt will demonstrate PROM R shoulder Flexion and Abduction of 120 or greater Goal met 2/3/22  GOALS: (Goals have been discussed and agreed upon with patient.)  Discharge Goals: Time Frame: 12 weeks  1.  PT will be independent with HEP. Progressing 3/29/22  2. Pt will decrease worst pain to 1/10 or less in order to return to dressing ADLs without difficulty Progressing 4/20/22 (4/10)  3. Pt will reduce DASH to 10 or less in order to return to personal hygiene ADLs Progressing 4/20/22  4. Pt will improve gross RUE strength to 4+/5 or greater in order to reach overhead Progressing 4/20/22  5. Pt will demonstrate active R shoulder Flexion and Abduction of 125 or greater Goal met 3/4/22    (Left Hip)  GOALS: (Goals have been discussed and agreed upon with patient.)  Discharge Goals: Time Frame: 8 weeks  16. Shaniqua Francois will be independent with HEP. Progressing 4/20/22  17. Pt will navigate 4 steps with no pain in order to enter home Progressing 4/20/22  18. Pt will improve hip strength to 5/5 in order to improve walking tolerance Progressing 4/20/22  19. Pt will c/o worst pain of 2/10 or less in order to decrease antalgic gait Progressing 4/20/22 (4/10)  20. Pt will improve LEFS to 55/80 in order to return ambulating for 1 mile or more for Recreational activity (corrected 3/29/22) Progressing 4/20/22            Rehabilitation Potential For Stated Goals: Fair    Outcome Measure: Tool Used: Disabilities of the Arm, Shoulder and Hand (DASH) Questionnaire - Quick Version  Score:  Initial: 25/55  Most Recent: 21/55 (Date: 2/3/22 ) Most Recent: 22/55 (Date: 3/4/22) Most Recent: 18/55 (Date: 3/29/22) Most Recent: 24/55 (Date: 4/20/22)   Interpretation of Score: The DASH is designed to measure the activities of daily living in person's with upper extremity dysfunction or pain. Each section is scored on a 1-5 scale, 5 representing the greatest disability. The scores of each section are added together for a total score of 55.       Tool Used: Lower Extremity Functional Scale  Score:  Initial: 34/80  Most Recent: 40/80 Most Recent: 29/80 (4/20/22)         Medical Necessity:   · Skilled intervention continues to be required due to decreased strength, ROM, balance, and functional mobility. Reason for Services/Other Comments:  · Patient continues to require skilled intervention due to decreased strength, balance, and ROM with increased pain affecting pt functional mobility. ·   Regarding Edger Blade Johnson's therapy, I certify that the treatment plan above will be carried out by a therapist or under their direction. Thank you for this referral,  Margarita George, PT, DPT     Referring Physician Signature: Jeramy Acosta MD   Referring Physician Signature: Saravanan Haider MD             The information in this section was collected on 1/5/22 (except where otherwise noted). HISTORY:   History of Present Injury/Illness (Reason for Referral):  Pt is s/p R TSA. Pt reports having a significant amount of swelling. Still with swelling/edema present. Pt reports he has been doing HEP. Pt goals are to return to higher level activities like fly fishing    2/16/22: Pt now arrives with c/o L hip pain. Pt was walking dog in the snow several weeks ago and has L hip pain ever since. Pt with recent MRI showing MRI scan of his left hip which does show some high-grade partial tearing of the gluteus medius and minimus with minimal retraction. There is some atrophy in the muscle as well. Degenerative labral tearing and degenerative chondromalacia intra-articularly per referring physician note. MD wanting to treat this conservatively and recommending PT at this time for 6-8 weeks.  Difficulty going for floor to standing, increased pain with walking, difficulty going up steps (anything that involves pushing off of L LE)    CC/Primary Concern: Return to full ROM and strength R UE            Treatment Side: Right      Past Medical History/Comorbidities:   Mr. Avril Gomez  has a past medical history of Arthritis (07/06/2015), Back pain, DDD (degenerative disc disease), lumbar, Dry eyes, Enlarged prostate, GERD (gastroesophageal reflux disease), History of basal cell cancer, History of squamous cell carcinoma, IBS (irritable bowel syndrome), Insomnia (07/06/2015), Left inguinal hernia, MVP (mitral valve prolapse), Onychomycosis (7/6/2015), RBBB (7/6/2015), Right wrist pain (7/6/2015), Scoliosis, and Spondylosis of lumbar region without myelopathy or radiculopathy. Mr. Mindi Pack  has a past surgical history that includes neurological procedure unlisted (2007); hx colonoscopy; hx orthopaedic; hx shoulder arthroscopy (Right); hx orthopaedic (Left, 06/2020); hx orthopaedic (Right, 08/2020); hx cataract removal; and hx carpal tunnel release. Social History/Living Environment:  Lives with wiife, independent with all mobility at baseline    Pain/Symptom Location: post op pain- generalized    Worst Pain: 6/10 (shoulder), 7/10 (L hip)  Current Pain: 1/10 (shoulder), 4/10 (L hip)    Aggravating Factors: any use of R UE    Occupation: Retired cardiologist    Prior Level of Function/Work/Activity:  History of chronic LBP and R foot pain due to OA- despite this lives a very active lifestyle enjoying many outdoor activities- hunting, fly fishing, etc      Patient Goals: Gain full ROM, strength and return to recreational activities    Current Medications:       Current Outpatient Medications:     sildenafil citrate (Viagra) 100 mg tablet, Take 1 Tablet by mouth as needed for Erectile Dysfunction. , Disp: 6 Tablet, Rfl: 5    traMADoL (ULTRAM) 50 mg tablet, Take 1 Tablet by mouth every six (6) hours as needed for Pain for up to 30 days. Max Daily Amount: 200 mg., Disp: 120 Tablet, Rfl: 0    tamsulosin (FLOMAX) 0.4 mg capsule, Take 2 Capsules by mouth daily. , Disp: 180 Capsule, Rfl: 3    Myrbetriq 50 mg ER tablet, Take 1 Tablet by mouth daily. , Disp: 90 Tablet, Rfl: 3    sildenafil citrate (Viagra) 100 mg tablet, Take 1 Tablet by mouth as needed for Erectile Dysfunction. , Disp: 8 Tablet, Rfl: 12    zolpidem (AMBIEN) 10 mg tablet, Take 1 Tablet by mouth nightly as needed for Sleep. Max Daily Amount: 10 mg., Disp: 90 Tablet, Rfl: 1    linaCLOtide (Linzess) 145 mcg cap capsule, Take 1 Capsule by mouth Daily (before breakfast). , Disp: 30 Capsule, Rfl: 5    cetirizine (ZyrTEC) 10 mg tablet, 1 tablet, Disp: , Rfl:     loratadine-pseudoephedrine (Claritin-D 12 Hour) 5-120 mg per tablet, Take 1 Tablet by mouth two (2) times a day., Disp: , Rfl:     oxymetazoline HCl (AFRIN NASAL SPRAY NA), by Nasal route., Disp: , Rfl:     temazepam (RESTORIL) 15 mg capsule, Take 1 Capsule by mouth nightly as needed for Sleep. Max Daily Amount: 15 mg. Indications: difficulty sleeping, Disp: 30 Capsule, Rfl: 0    ipratropium (Atrovent) 21 mcg (0.03 %) nasal spray, 2 Sprays by Both Nostrils route every twelve (12) hours as needed for Rhinitis., Disp: 30 mL, Rfl: 5    terbinafine HCL (LAMISIL) 250 mg tablet, Take 250 mg by mouth daily. , Disp: , Rfl:     fluocinonide (LIDEX) 0.05 % topical gel, Apply  to affected area two (2) times a day., Disp: , Rfl:     meloxicam (Mobic) 15 mg tablet, Take 1 Tablet by mouth daily. , Disp: 90 Tablet, Rfl: 3    tadalafiL (CIALIS) 5 mg tablet, Take 1 Tablet by mouth daily. , Disp: 90 Tablet, Rfl: 3    tretinoin (RETIN-A) 0.05 % topical cream, APPLY TO AFFECTED AREA AT BEDTIME, Disp: , Rfl:     dicyclomine (BentyL) 20 mg tablet, Take 1 Tab by mouth every six (6) hours. , Disp: 120 Tab, Rfl: 5    lidocaine (LIDODERM) 5 %, by TransDERmal route daily as needed. Apply patch to the affected area for 12 hours a day and remove for 12 hours a day. , Disp: , Rfl:     RESTASIS 0.05 % ophthalmic emulsion, INSTILL 1 DROP IN BOTH EYES TWICE DAILY, Disp: , Rfl: 12    omeprazole (PRILOSEC) 20 mg capsule, Take 20 mg by mouth daily as needed. , Disp: , Rfl:    Date Last Reviewed:  4/20/2022       Ambulatory/Rehab Services H2 Model Falls Risk Assessment    Risk Factors:       (1)  Gender [Male] Ability to Rise from Chair:       (1)  Pushes up, successful in one attempt    Falls Prevention Plan:       No modifications necessary   Total: (5 or greater = High Risk): 2    ©2010 [facility] [employer] States Patent #[id number].  Federal Law prohibits the replication, distribution or use without written permission from [facility] [employer]        Number of Personal Factors/Comorbidities that affect the Plan of Care: 3+: HIGH COMPLEXITY   EXAMINATION:   Inspection        Post Op/Wound: Healed                Additional Comments:   ________________________________________________________________________________________________  Observation:          Posture: Rounded shoulders, forward head            Edema: Increased throughout entirety of UE into hand               Addition Comments:     ________________________________________________________________________________________________  Range of Motion            Upper:  Joint: Passive Active Active Passive Right 2/3/22 Active Right 2/3/22 Active Right 3/4/22 Active 3/29/22 Active 4/20/22    Right (Degrees) Right (Degrees) Left Active (Degrees)        Shoulder Flexion 80 degrees NT per protocol 140 degrees 130 degrees 115 degrees 126 degrees  130 degrees  137 degrees   Shoulder Extension  NT per protocol 62 degrees  60 degrees 65 degrees 80 degrees 80 degrees    Shoulder Adduction  NT per protocol 125 degrees   WFL       Shoulder Abduction 60 degrees NT per protocol  170 degrees 115 degrees  128 degrees  128 degrees 145 degrees   Shoulder Internal Rotation (Neutral Position) NT per protocol          Shoulder External Rotation  (Neutral Position) NT per protocol          Functional IR  NT per protocol T11 45 degrees R hip T12  T12  T12   Functional ER  NT per protocol T2 70 degrees C7 C7 C7 C7     Lower  Joint: Active Active Active (Left) Active Left 4/20/22    Right (Degrees) Left (Degrees)     Hip Flexion       Hip Extension       Hip Adduction       Hip Abduction              Hip Internal rotation 30 degrees 20 degrees (discomfort) 13 degrees (pain) 25 degrees (pain)   Hip External rotation 35 degrees 20 degrees (discomfort) 30 degrees (discomfort) 36 degrees (discomfort)    Knee Extension         ________________________________________________________________________________________________  Strength          Upper Extremity    Joint:   Right 2/3/22 Right 3/4/22 Right 3/29/22 Right 4/20/22    RIGHT LEFT       Shoulder Flexion 2/5 4+/5 2+/5 4/5 (pain) 4+-5/5 (pain) 4+-5/5 (p)   Shoulder Extension 2/5 5/5 3/5 5/5 (pain) 5/5 (slight pain) 5/5 (slight pain)    Shoulder Abduction 2/5 4+/5 2/5 3/5 (pain)  3/5 (pain)  4/5 (pain)   Shoulder Internal Rotation 2/5 4/5 2+/5 3+/5 (pain) 4/5 (slight pain) 4-4+/5 (slight pain)   Shoulder External Rotation 2/5 3+/5 3/5 3/5 (pain) 3+/5 (slight pain) 3+/5 (slight pain)   Elbow Flexion 3/5 5/5 4/5 5/5     Elbow Extension 3/5 5/5 4/5 5/5       Lower  Joint:   Left 3/29/22 Left 4/20/22    RIGHT LEFT     Hip Flexion 5/5 3/5 (p) 5/5 (pf)    Hip Extension NT/5 NT/5     Hip Internal Rotation 5/5 3/5 (p) 3-/5 (p) 3-/5 (p)   Hip External Rotation 5/5 3/5 (p) 4-4+5 (pf) 4+/5 (pf)   Hip Abduction 4+/5 3-/5 (p) 4/5 (p)    Hip Adduction NT/5 NT/5     Knee Flexion 5/5 5/5     Knee Extension 5/5 4/5 (discomfort) 5/5 (pf)      _____________________________________________________________________________________________  [facility]        C/O Radicular Symptoms: No      Additional Comments:   ___________________________________________________________________________________________________________________________________________________________  Palpation: Lateral deltoid; L glute med, max, proximal ITB  Joint mobilization: Capsular hypomobility post op; not assessed at hip this session       Body Structures Involved:  1. Nerves  2. Bones  3. Joints  4. Muscles  5. Ligaments Body Functions Affected:  1. Sensory/Pain  2. Neuromusculoskeletal  3.  Movement Related Activities and Participation Affected:  1. General Tasks and Demands  2. Mobility  3. Self Care  4. Domestic Life  5. Interpersonal Interactions and Relationships  6.  Community, Social and [place]   Number of elements (examined above) that affect the Plan of Care: 4+: HIGH COMPLEXITY   CLINICAL PRESENTATION:   Presentation: Evolving clinical presentation with changing clinical characteristics: MODERATE COMPLEXITY   CLINICAL DECISION MAKING:      Use of outcome tool(s) and clinical judgement create a POC that gives a: Clear prediction of patient's progress: LOW COMPLEXITY

## 2022-04-21 ENCOUNTER — HOSPITAL ENCOUNTER (OUTPATIENT)
Dept: PHYSICAL THERAPY | Age: 72
Discharge: HOME | End: 2022-04-21
Payer: MEDICARE

## 2022-04-21 PROCEDURE — 97140 MANUAL THERAPY 1/> REGIONS: CPT

## 2022-04-21 PROCEDURE — 97110 THERAPEUTIC EXERCISES: CPT

## 2022-04-21 NOTE — PROGRESS NOTES
Bekah Sender  : 1950  [place] / Product Type: Medicare /  [address]  at [facility]  [address], [address] [facility], [address]  [phone]   [phone]      OUTPATIENT PHYSICAL THERAPY: Daily Treatment Note 2022  Visit Count:  42    ICD-10: Treatment Diagnosis:   M25.511 Pain in Right Shoulder  M25.611 Stiffness in Right Shoulder  M62.81 Muscle Weakness Generalized  Z47.89 Orthopedic Aftercare  M25.552 Pain in Left hip  M25.652 Stiffness of Left hip, not elsewhere classified    Precautions/Allergies:   Patient has no known allergies. Follow Protocol  -Limit AROM to : Flexion (80 degrees), Extension (20 degrees), Abduction (80 degrees, IR (full), ER (to neutral only)  -Sling may be off  -No subscapularis stretch or strengthening until 6 weeks post op date    TSA after 6 weeks (22)  - All active and passive ROM ok  -Resistance in all arcs OK  TREATMENT PLAN:  Effective Dates: 3/29/2022 TO 2022 (60 days). Frequency/Duration: 3 times a week for 60 Days        PRE-TREATMENT SYMPTOMS/COMPLAINTS:  Pt reports mild soreness at L hip today. MEDICATIONS REVIEWED:  2022   TREATMENT:   (In addition to Assessment/Re-Assessment sessions the following treatments were rendered)    THERAPEUTIC EXERCISE: (25 minutes):  Exercises per grid below to improve mobility, strength and balance. Required minimal visual and verbal cues to promote proper body alignment and promote proper body posture. Progressed resistance, range and complexity of movement as indicated.      Date:  22 Date:  22 Date:  22 Date:  22 Date:  22 Date:  22 Date:  22   Activity/Exercise          Rhythmic stabilization           Shoulder flex (supine)  #5 supine wand 15 X 2 #5 supine wand 15 X 2  #5 supine wand 15 X 2 #5 supine wand 15 X 2    S/l shoulder abduction          S/l ER  #5 10 X 2- starting at neutral in s/l #5 10 X 2  #5 10 X 2 #3 10 X1, #4 10 X 1    Supine protraction  #5 15 X 2 #5 15 X 2  #5 15 X 2 #5 15 X 2    Prone row  Bent #5 15 X 2 Bent #5 15 X 2  Bent #5 15 X 2 Bent #7 10 X 2    Prone extension  Bent #4 15 X 2 Bent #5 10 X 2  Bent #5 10 X 2 Bent #5 10 X 2    Prone horizontal abduction  Bent 10 X 2 Bent 10 X 2  Bent #1 10 X 2 Bent #1 10 X 2    Sleeper stretch          TB rows/extension          TB IR/ER          Towel STAR on wall  3X 3X  3X 3X    Horizontal adduction stretch          AROM shoulder flexion, scaption  #1 10 X 1 each #1 10 X 1 each  #2 10 X 1 each #2 10 X 1 each    Stability ball rolls on wall  Body blade 30 seconds each direction Body blade 30 seconds each direction  Body blade 30 seconds each direction Hold    Doorway stretch          Seated piriformis stretch          Seated glute stretch          clamshell 15 X 2   15 X 2   GTB 10 X 3   Reverse clamshell hold   Hold    Hold   Hip abduction Standing #2    Standing #3 10 X 2   Standing #3 15 X 2   bridges 15 X 2   15 X 2   15 X 2   ASLR 15 X 2   #2 10 X 2   #2 10 X 2   Standing hip hike on 6\" step    10 X 2   8\" 10 X 2   6\" lateral step ups    10 X 2   8\" 10 X 2   TB supine hip ER BTB 15 X 2   BTB 15 X 2    BTB 15 X 2   Walking side steps    GTB 30 ft x 2    GTB 30 ft X 2     MANUAL THERAPY: (15 minutes): Joint mobilization, Soft tissue mobilization and Manipulation was utilized and necessary because of the patient's restricted joint motion, painful spasm, loss of articular motion and restricted motion of soft tissue.      +PROM R shoulder- all directions to pt tolerance  +STM to lateral deltoid, subscapularis  4/21/22  +manual stretching L ITB, glute, piriformis, hip flexor  +STM to glute med, ITB    (Used abbreviations: MET - muscle energy technique; PNF - proprioceptive neuromuscular facilitation; NMR - neuromuscular re-education; AP - anterior to posterior; PA - posterior to anterior)    MODALITIES: (15 minutes): vasopneumatic device to reduce pain, inflammation, and edema med pressure 34 degrees not today 4/21/22- ice pack only to L hip region in seated position     TREATMENT/SESSION ASSESSMENT: Able to tolerate progression of exercises and addition of more resistance today with no pain or discomfort noted. Education: on objective findings and HEP    RECOMMENDATIONS/INTENT FOR NEXT TREATMENT SESSION: \"Next visit will focus on advancements to more challenging activities\".     PAIN: Initial: 2/10 L hip  Post Session:  2/10     MedBridge Portal    Total Treatment Billable Duration:   PT Patient Time In/Time Out  Time In: 0930  Time Out: 1  Rubina Sanders, PT, DPT    Future Appointments   Date Time Provider Elisabeth Hodgeisti   4/25/2022  9:30 AM [facility]   4/27/2022 10:15 AM Katiana BAKER DORPLESLY D   4/28/2022  9:30 AM Katiana BAKER SFDORPLESLY D   5/31/2022  8:00 AM Jaylon Triana MD [facility] POA   3/27/2023  2:10 PM Cortney Fonseca MD [id number] PGU       Visit Approval Visit # Therapist initials Date A NS / Cx < 24 hr >24 hr Cx Comments    1 SHAISTA 1/5/22 [x]  [] [] Initial evaluation    2 SHAISTA 1/7/22 [x] [] []     3 SHAISTA 1/11/22 [x] [] []     4 SHAISTA 1/13/22 [x] [] []     5 PDE 1- [x] [] []     6 SHAISTA 1/20/22 [x] [] []     7 SHAISTA 1/25/22 [x] [] []     8 SHAISTA 1/27/22 [x] [] []     9 SHAISTA 2/1/22 [x] [] [] PN due next visit    10  SHAISTA 2/3/22 [x] [] [] PN today    11 SHAISTA 2/7/22 [x] [] [] 1    12 SHAISTA 2/9/22 [x] [] [] 2    13 SHAISTA 2/15/22 [x] [] [] 3    14 SHAISTA 9/81/34 [x] [] [] Recert/Reeval- added L hip pain to [address] and treatment plan    15 SHAISTA 2/17/22 [x] [] [] 4    16 SHAISTA 2/21/22 [x] [] [] 5    17 SHAISTA 2/24/22 [x] [] [] 6    18 SHAISTA 2/25/22 [x] [] [] 7    19 SHAISTA 3/1/22 x   8    20 SHAISTA 3/2/22 x   9, PN due next visit    21 SHAISTA 3/4/22 x   PN today    22 SHAISTA 3/8/22 x   1    23 SHAISTA 3/10/22 x   2    24 SHAISTA 3/11/22 x   3    25 SHAISTA 3/14/22 x   4   **KX** 26 SHAISTA 3/16/22 x   5    27 SHAISTA 3/18/22 x   6    28 SHAISTA 3/21/22 x 7    29 SHAISTA 3/23/22 x   8    30 SHAISTA 3/24/22 x   9, PN due next visit    31 SHAISTA 3/29/22 x   10, PN today    32 SHAISTA 3/31/22 x   1    33 SHAISTA 4/1/22 x   2    34 SHAISTA 4/4/22 x   3    35 SHAISTA 4/6/22 x   4    36 SHAISTA 4/7/22 x   5    37 SHAISTA 4/11/22 x   6    38 SHAISTA 4/13/22 x   7    39 SHAISTA 4/14/22 x   8    40 SHAISTA 4/18/22 x   9, PN due next visit    41 SHAISTA 4/20/22 x   10, PN today    42 SHAISTA 4/21/22 x   1

## 2022-04-22 NOTE — PROGRESS NOTES
PT NOTE:     I am accessing Mr. Johnson's chart as a part of our department's internal chart auditing process. I certify that Mr. Sky Kong is, or was, a patient in our department.   Thank you,  Elvi Sandoval, PT  4/22/2022

## 2022-04-25 ENCOUNTER — HOSPITAL ENCOUNTER (OUTPATIENT)
Dept: PHYSICAL THERAPY | Age: 72
Discharge: HOME | End: 2022-04-25
Payer: MEDICARE

## 2022-04-25 PROCEDURE — 97110 THERAPEUTIC EXERCISES: CPT

## 2022-04-25 PROCEDURE — 97016 VASOPNEUMATIC DEVICE THERAPY: CPT

## 2022-04-25 PROCEDURE — 97140 MANUAL THERAPY 1/> REGIONS: CPT

## 2022-04-25 NOTE — PROGRESS NOTES
Karlos Bhandari  : 1950  [place] / Product Type: Medicare /  [address]  at [facility]  [address], [address] [facility], [address]  [phone]   [phone]      OUTPATIENT PHYSICAL THERAPY: Daily Treatment Note 2022  Visit Count:  37    ICD-10: Treatment Diagnosis:   M25.511 Pain in Right Shoulder  M25.611 Stiffness in Right Shoulder  M62.81 Muscle Weakness Generalized  Z47.89 Orthopedic Aftercare  M25.552 Pain in Left hip  M25.652 Stiffness of Left hip, not elsewhere classified    Precautions/Allergies:   Patient has no known allergies. Follow Protocol  -Limit AROM to : Flexion (80 degrees), Extension (20 degrees), Abduction (80 degrees, IR (full), ER (to neutral only)  -Sling may be off  -No subscapularis stretch or strengthening until 6 weeks post op date    TSA after 6 weeks (22)  - All active and passive ROM ok  -Resistance in all arcs OK  TREATMENT PLAN:  Effective Dates: 3/29/2022 TO 2022 (60 days). Frequency/Duration: 3 times a week for 60 Days        PRE-TREATMENT SYMPTOMS/COMPLAINTS:  Pt reports his shoulder overall is feeling better. Continues with soreness but feels good about wrapping up treatment to shoulder and continuing with HEP. MEDICATIONS REVIEWED:  2022   TREATMENT:   (In addition to Assessment/Re-Assessment sessions the following treatments were rendered)    THERAPEUTIC EXERCISE: (25 minutes):  Exercises per grid below to improve mobility, strength and balance. Required minimal visual and verbal cues to promote proper body alignment and promote proper body posture. Progressed resistance, range and complexity of movement as indicated.      Date:  22 Date:  22 Date:  22 Date:  22 Date:  22 Date:  22 Date:  22 Date:  22   Activity/Exercise           Rhythmic stabilization            Shoulder flex (supine)  #5 supine wand 15 X 2 #5 supine wand 15 X 2  #5 supine wand 15 X 2 #5 supine wand 15 X 2  #5 supine wand 15 X 2   S/l shoulder abduction           S/l ER  #5 10 X 2- starting at neutral in s/l #5 10 X 2  #5 10 X 2 #3 10 X1, #4 10 X 1  #4 10 X 2   Supine protraction  #5 15 X 2 #5 15 X 2  #5 15 X 2 #5 15 X 2  #5 15 X 2   Prone row  Bent #5 15 X 2 Bent #5 15 X 2  Bent #5 15 X 2 Bent #7 10 X 2  Bent #7 10 X 2   Prone extension  Bent #4 15 X 2 Bent #5 10 X 2  Bent #5 10 X 2 Bent #5 10 X 2  Bent #5 10 X 2   Prone horizontal abduction  Bent 10 X 2 Bent 10 X 2  Bent #1 10 X 2 Bent #1 10 X 2  Bent #1 10 X 2   Sleeper stretch           TB rows/extension           TB IR/ER           Towel STAR on wall  3X 3X  3X 3X  3X   Horizontal adduction stretch           AROM shoulder flexion, scaption  #1 10 X 1 each #1 10 X 1 each  #2 10 X 1 each #2 10 X 1 each  #2 10 x 1 each   Stability ball rolls on wall  Body blade 30 seconds each direction Body blade 30 seconds each direction  Body blade 30 seconds each direction Hold  30 seconds each direction   Doorway stretch           Seated piriformis stretch           Seated glute stretch           clamshell 15 X 2   15 X 2   GTB 10 X 3    Reverse clamshell hold   Hold    Hold    Hip abduction Standing #2    Standing #3 10 X 2   Standing #3 15 X 2    bridges 15 X 2   15 X 2   15 X 2    ASLR 15 X 2   #2 10 X 2   #2 10 X 2    Standing hip hike on 6\" step    10 X 2   8\" 10 X 2    6\" lateral step ups    10 X 2   8\" 10 X 2    TB supine hip ER BTB 15 X 2   BTB 15 X 2    BTB 15 X 2    Walking side steps    GTB 30 ft x 2    GTB 30 ft X 2      MANUAL THERAPY: (15 minutes): Joint mobilization, Soft tissue mobilization and Manipulation was utilized and necessary because of the patient's restricted joint motion, painful spasm, loss of articular motion and restricted motion of soft tissue.    4/25/22  +PROM R shoulder- all directions to pt tolerance  +STM to lateral deltoid, subscapularis    +manual stretching L ITB, glute, piriformis, hip flexor  +STM to glute med, ITB    (Used abbreviations: MET - muscle energy technique; PNF - proprioceptive neuromuscular facilitation; NMR - neuromuscular re-education; AP - anterior to posterior; PA - posterior to anterior)    MODALITIES: (15 minutes):      vasopneumatic device to reduce pain, inflammation, and edema med pressure 34 degrees      TREATMENT/SESSION ASSESSMENT: Able to tolerate progression of exercises and addition of more resistance today with no pain or discomfort noted. Education: on objective findings and HEP    RECOMMENDATIONS/INTENT FOR NEXT TREATMENT SESSION: \"Next visit will focus on advancements to more challenging activities\".  Provide pt with finalized HEP for shoulder strengthening     PAIN: Initial: 2/10 L hip  Post Session:  2/10     MedBridge Portal    Total Treatment Billable Duration:   PT Patient Time In/Time Out  Time In: 0930  Time Out: 1023  Nicholas Marie, PT, DPT    Future Appointments   Date Time Provider Elisabeth Gissel   4/27/2022 10:15 AM Shonna Merlin A [facility] SFD   4/28/2022  9:30 AM Shonna Merlin A [facility] SFD   5/31/2022  8:00 AM Kira Pool MD PO[facility] POA   3/27/2023  2:10 PM Barbara Cochran MD [id number] PGU       Visit Approval Visit # Therapist initials Date A NS / Cx < 24 hr >24 hr Cx Comments    1 SHAISTA 1/5/22 [x]  [] [] Initial evaluation    2 SHAISTA 1/7/22 [x] [] []     3 SHAISTA 1/11/22 [x] [] []     4 SHAISTA 1/13/22 [x] [] []     5 PDE 1- [x] [] []     6 SHAISTA 1/20/22 [x] [] []     7 SHAISTA 1/25/22 [x] [] []     8 SHAISTA 1/27/22 [x] [] []     9 SHAISTA 2/1/22 [x] [] [] PN due next visit    10  SHAISTA 2/3/22 [x] [] [] PN today    11 SHAISTA 2/7/22 [x] [] [] 1    12 SHAISTA 2/9/22 [x] [] [] 2    13 SHAISTA 2/15/22 [x] [] [] 3    14 SHAISTA 5/67/66 [x] [] [] Recert/Reeval- added L hip pain to [address] and treatment plan    15 SHAISTA 2/17/22 [x] [] [] 4    16 SHAISTA 2/21/22 [x] [] [] 5    17 SHAISTA 2/24/22 [x] [] [] 6    18 SHAISTA 2/25/22 [x] [] [] 7    19 SHIASTA 3/1/22 x   8    20 SHAISTA 3/2/22 x   9, PN due next visit    21 SHAISTA 3/4/22 x   PN today    22 SHAISTA 3/8/22 x   1    23 SHAISTA 3/10/22 x   2    24 SHAISTA 3/11/22 x   3    25 SHAISTA 3/14/22 x   4   **KX** 26 SHAISTA 3/16/22 x   5    27 SHAISTA 3/18/22 x   6    28 SHAISTA 3/21/22 x   7    29 SHAISTA 3/23/22 x   8    30 SHAISTA 3/24/22 x   9, PN due next visit    31 SHAISTA 3/29/22 x   10, PN today    32 SHAISTA 3/31/22 x   1    33 SHAISTA 4/1/22 x   2    34 SHAISTA 4/4/22 x   3    35 SHAISTA 4/6/22 x   4    36 SHAISTA 4/7/22 x   5    37 SHAISTA 4/11/22 x   6    38 SHAISTA 4/13/22 x   7    39 SHAISTA 4/14/22 x   8    40 SHAISTA 4/18/22 x   9, PN due next visit    41 SHAISTA 4/20/22 x   10, PN today    42 SHAISTA 4/21/22 x   1    43 SHAISTA 4/25/22 x   2

## 2022-04-27 ENCOUNTER — HOSPITAL ENCOUNTER (OUTPATIENT)
Dept: PHYSICAL THERAPY | Age: 72
Discharge: HOME | End: 2022-04-27
Payer: MEDICARE

## 2022-04-27 PROCEDURE — 97110 THERAPEUTIC EXERCISES: CPT

## 2022-04-27 PROCEDURE — 97016 VASOPNEUMATIC DEVICE THERAPY: CPT

## 2022-04-27 PROCEDURE — 97140 MANUAL THERAPY 1/> REGIONS: CPT

## 2022-04-27 NOTE — PROGRESS NOTES
David Heller  : 1950  [place] / Product Type: Medicare /  [address]  at [facility]  [address], [address] [facility], [address]  [phone]   [phone]      OUTPATIENT PHYSICAL THERAPY: Daily Treatment Note 2022  Visit Count:  40    ICD-10: Treatment Diagnosis:   M25.511 Pain in Right Shoulder  M25.611 Stiffness in Right Shoulder  M62.81 Muscle Weakness Generalized  Z47.89 Orthopedic Aftercare  M25.552 Pain in Left hip  M25.652 Stiffness of Left hip, not elsewhere classified    Precautions/Allergies:   Patient has no known allergies. Follow Protocol  -Limit AROM to : Flexion (80 degrees), Extension (20 degrees), Abduction (80 degrees, IR (full), ER (to neutral only)  -Sling may be off  -No subscapularis stretch or strengthening until 6 weeks post op date    TSA after 6 weeks (22)  - All active and passive ROM ok  -Resistance in all arcs OK  TREATMENT PLAN:  Effective Dates: 3/29/2022 TO 2022 (60 days). Frequency/Duration: 3 times a week for 60 Days        PRE-TREATMENT SYMPTOMS/COMPLAINTS:    MEDICATIONS REVIEWED:  2022   TREATMENT:   (In addition to Assessment/Re-Assessment sessions the following treatments were rendered)    THERAPEUTIC EXERCISE: (25 minutes):  Exercises per grid below to improve mobility, strength and balance. Required minimal visual and verbal cues to promote proper body alignment and promote proper body posture. Progressed resistance, range and complexity of movement as indicated.      Date:  22 Date:  22 Date:  22 Date:  22 Date:  22 Date:  22 Date:  22 Date:  22 Date:  22   Activity/Exercise            Rhythmic stabilization             Shoulder flex (supine)  #5 supine wand 15 X 2 #5 supine wand 15 X 2  #5 supine wand 15 X 2 #5 supine wand 15 X 2  #5 supine wand 15 X 2 #5 supine wand 15 X 2    S/l shoulder abduction S/l ER  #5 10 X 2- starting at neutral in s/l #5 10 X 2  #5 10 X 2 #3 10 X1, #4 10 X 1  #4 10 X 2 #3 10 X 2 for full ROM   Supine protraction  #5 15 X 2 #5 15 X 2  #5 15 X 2 #5 15 X 2  #5 15 X 2 #7 10 X 2   Prone row  Bent #5 15 X 2 Bent #5 15 X 2  Bent #5 15 X 2 Bent #7 10 X 2  Bent #7 10 X 2 Bent #7 10 X 2   Prone extension  Bent #4 15 X 2 Bent #5 10 X 2  Bent #5 10 X 2 Bent #5 10 X 2  Bent #5 10 X 2 Bent #5 10 X 2   Prone horizontal abduction  Bent 10 X 2 Bent 10 X 2  Bent #1 10 X 2 Bent #1 10 X 2  Bent #1 10 X 2 Bent #1 10 X 2   Sleeper stretch            TB rows/extension            TB IR/ER            Towel STAR on wall  3X 3X  3X 3X  3X 3X   Horizontal adduction stretch            AROM shoulder flexion, scaption  #1 10 X 1 each #1 10 X 1 each  #2 10 X 1 each #2 10 X 1 each  #2 10 x 1 each #2 10 X 2 each   Stability ball rolls on wall  Body blade 30 seconds each direction Body blade 30 seconds each direction  Body blade 30 seconds each direction Hold  30 seconds each direction 30 seconds each direction   Doorway stretch            Seated piriformis stretch            Seated glute stretch            clamshell 15 X 2   15 X 2   GTB 10 X 3     Reverse clamshell hold   Hold    Hold     Hip abduction Standing #2    Standing #3 10 X 2   Standing #3 15 X 2     bridges 15 X 2   15 X 2   15 X 2     ASLR 15 X 2   #2 10 X 2   #2 10 X 2     Standing hip hike on 6\" step    10 X 2   8\" 10 X 2     6\" lateral step ups    10 X 2   8\" 10 X 2     TB supine hip ER BTB 15 X 2   BTB 15 X 2    BTB 15 X 2     Walking side steps    GTB 30 ft x 2    GTB 30 ft X 2       MANUAL THERAPY: (15 minutes): Joint mobilization, Soft tissue mobilization and Manipulation was utilized and necessary because of the patient's restricted joint motion, painful spasm, loss of articular motion and restricted motion of soft tissue.    4/27/22  +PROM R shoulder- all directions to pt tolerance  +STM to lateral deltoid, subscapularis    +manual stretching L ITB, glute, piriformis, hip flexor  +STM to glute med, ITB    (Used abbreviations: MET - muscle energy technique; PNF - proprioceptive neuromuscular facilitation; NMR - neuromuscular re-education; AP - anterior to posterior; PA - posterior to anterior)    MODALITIES: (15 minutes):      vasopneumatic device to reduce pain, inflammation, and edema med pressure 34 degrees      TREATMENT/SESSION ASSESSMENT: Discharge treatment to R shoulder today. Pt to continue with HEP at home for continued strengthening. Reduce POC to 2X per week to address continued hip pain. Provided pt with handout HEP. Education: on objective findings and HEP    RECOMMENDATIONS/INTENT FOR NEXT TREATMENT SESSION: \"Next visit will focus on advancements to more challenging activities\".  Provide pt with finalized HEP for shoulder strengthening     PAIN: Initial: 2/10  Post Session:  2/10     [facility] Portal    Total Treatment Billable Duration:   PT Patient Time In/Time Out  Time In: 1020  Time Out: 200  Evelyn Astorga, PT, DPT    Future Appointments   Date Time Provider Elisabeth Chauhan   4/28/2022  9:30 AM Suma Hals A [facility] S[facility]   5/3/2022  1:45 PM Suma Hals A [facility] SFD   5/5/2022 11:00 AM Suma Hals A [facility] SFD   5/10/2022 11:00 AM Suma Hals A [facility] SFD   5/31/2022  8:00 AM Gt Mcfarland MD [facility] POA   3/27/2023  2:10 PM Caroline Schaefer MD [id number] PGU       Visit Approval Visit # Therapist initials Date A NS / Cx < 24 hr >24 hr Cx Comments    1 SHAISTA 1/5/22 [x]  [] [] Initial evaluation    2 SHAISTA 1/7/22 [x] [] []     3 SHAISTA 1/11/22 [x] [] []     4 SHAISTA 1/13/22 [x] [] []     5 PDE 1- [x] [] []     6 SHAISTA 1/20/22 [x] [] []     7 SHAISTA 1/25/22 [x] [] []     8 SHAISTA 1/27/22 [x] [] []     9 SHAISTA 2/1/22 [x] [] [] PN due next visit    10  SHAISTA 2/3/22 [x] [] [] PN today    11 SHAISTA 2/7/22 [x] [] [] 1    12 SHAISTA 2/9/22 [x] [] [] 2    13 SHAISTA 2/15/22 [x] [] [] 3    14 SHAISTA 1/28/23 [x] [] [] Recert/Reeval- added L hip pain to POC and treatment plan    15 SHAISTA 2/17/22 [x] [] [] 4    16 SHAISTA 2/21/22 [x] [] [] 5    17 SHAISTA 2/24/22 [x] [] [] 6    18 SHAISTA 2/25/22 [x] [] [] 7    19 SHAISTA 3/1/22 x   8    20 SHAISTA 3/2/22 x   9, PN due next visit    21 SHAISTA 3/4/22 x   PN today    22 SHAISTA 3/8/22 x   1    23 SHAISTA 3/10/22 x   2    24 SHAISTA 3/11/22 x   3    25 SHAISTA 3/14/22 x   4   **KX** 26 SHAISTA 3/16/22 x   5    27 SHAISTA 3/18/22 x   6    28 SHAISTA 3/21/22 x   7    29 SHAISTA 3/23/22 x   8    30 SHAISTA 3/24/22 x   9, PN due next visit    31 SHAISTA 3/29/22 x   10, PN today    32 SHAISTA 3/31/22 x   1    33 SHAISTA 4/1/22 x   2    34 SHAISTA 4/4/22 x   3    35 SHAISTA 4/6/22 x   4    36 SHAISTA 4/7/22 x   5    37 SHAISTA 4/11/22 x   6    38 SHAISTA 4/13/22 x   7    39 SHAISTA 4/14/22 x   8    40 SHAISTA 4/18/22 x   9, PN due next visit    41 SHAISTA 4/20/22 x   10, PN today    42 SHAISTA 4/21/22 x   1    43 SHAISTA 4/25/22 x   2    44 SHAISTA 4/27/22 x   3

## 2022-04-28 ENCOUNTER — HOSPITAL ENCOUNTER (OUTPATIENT)
Dept: PHYSICAL THERAPY | Age: 72
Discharge: HOME | End: 2022-04-28
Payer: MEDICARE

## 2022-04-28 PROCEDURE — 97110 THERAPEUTIC EXERCISES: CPT

## 2022-04-28 PROCEDURE — 97140 MANUAL THERAPY 1/> REGIONS: CPT

## 2022-04-28 NOTE — PROGRESS NOTES
Paco Golden  : 1950  [place] / Product Type: Medicare /  [address]  at [facility]  [address] [facility], [place] [address] [address] [facility]  [phone]   [phone]      OUTPATIENT PHYSICAL THERAPY: Daily Treatment Note 2022  Visit Count:  39    ICD-10: Treatment Diagnosis:   M62.81 Muscle Weakness Generalized  M25.552 Pain in Left hip  M25.652 Stiffness of Left hip, not elsewhere classified    Precautions/Allergies:   Patient has no known allergies. TREATMENT PLAN:  Effective Dates: 3/29/2022 TO 2022 (60 days). Frequency/Duration: 2 times a week for 60 Days (for continued treatment to L hip)        PRE-TREATMENT SYMPTOMS/COMPLAINTS:  L hip discomfort but slowly improving  MEDICATIONS REVIEWED:  2022   TREATMENT:   (In addition to Assessment/Re-Assessment sessions the following treatments were rendered)    THERAPEUTIC EXERCISE: (25 minutes):  Exercises per grid below to improve mobility, strength and balance. Required minimal visual and verbal cues to promote proper body alignment and promote proper body posture. Progressed resistance, range and complexity of movement as indicated.      Date:  22 Date:  22 Date:  22 Date:  22 Date:  22 Date:  22   Activity/Exercise         Seated piriformis stretch         Seated glute stretch         clamshell 15 X 2   15 X 2 GTB 10 X 3 GTB 15 X 2   Hip abduction Standing #2    Standing #3 10 X 2 Standing #3 15 X 2 Standing #4    bridges 15 X 2   15 X 2 15 X 2 15 X 2   ASLR 15 X 2   #2 10 X 2 #2 10 X 2 #2 15 X 2   Standing hip hike on 6\" step    10 X 2 8\" 10 X 2 8\" 10 X 2   6\" lateral step ups    10 X 2 8\" 10 X 2 8\" 10 X 2   TB supine hip ER BTB 15 X 2   BTB 15 X 2  BTB 15 X 2 BTB 15 X 2   Walking side steps    GTB 30 ft x 2  GTB 30 ft X 2 BTB 30 ft X 2     MANUAL THERAPY: (15 minutes): Joint mobilization, Soft tissue mobilization and Manipulation was utilized and necessary because of the patient's restricted joint motion, painful spasm, loss of articular motion and restricted motion of soft tissue.   +manual stretching L ITB, glute, piriformis, hip flexor  +STM to glute med, ITB    (Used abbreviations: MET - muscle energy technique; PNF - proprioceptive neuromuscular facilitation; NMR - neuromuscular re-education; AP - anterior to posterior; PA - posterior to anterior)    MODALITIES: (15 minutes):      *  Cold Pack Therapy in order to provide analgesia and reduce inflammation and edema. in sitting position      TREATMENT/SESSION ASSESSMENT: Continue with treatment 2X per week to L hip at this time. Tolerated progression of exercises well. No discomfort today only mm fatigue. Education: on objective findings and HEP    RECOMMENDATIONS/INTENT FOR NEXT TREATMENT SESSION: \"Next visit will focus on advancements to more challenging activities\".     PAIN: Initial: 2/10  Post Session:  2/10     [facility] Portal    Total Treatment Billable Duration:   PT Patient Time In/Time Out  Time In: 0930  Time Out: 1  Pete Trejo, PT, DPT    Future Appointments   Date Time Provider Elisabeth Chauhan   5/3/2022  1:45 PM [facility]   5/5/2022 11:00 AM Jamal BAKER [facility]   5/10/2022 11:00 AM Jamal BAKER [facility]PT D   5/31/2022  8:00 AM Sandy Szymanski MD [facility] POA   8/1/2022  7:55 AM MAT LAB [facility] MAT BSCPC   8/8/2022  1:00 PM Jake Quiroz MD [facility] BSCPC   3/27/2023  2:10 PM Latoya Pinto MD [id number] PGU       Visit Approval Visit # Therapist initials Date A NS / Cx < 24 hr >24 hr Cx Comments    1 SHAISTA 1/5/22 [x]  [] [] Initial evaluation    2 SHAISTA 1/7/22 [x] [] []     3 SHAISTA 1/11/22 [x] [] []     4 SHAISTA 1/13/22 [x] [] []     5 PDE 1- [x] [] []     6 SHAISTA 1/20/22 [x] [] []     7 SHAISTA 1/25/22 [x] [] []     8 SHAISTA 1/27/22 [x] [] []     9 KAI 2/1/22 [x] [] [] PN due next visit    10  SHAISTA 2/3/22 [x] [] [] PN today    11 KAI 2/7/22 [x] [] [] 1    12 SHAISTA 2/9/22 [x] [] [] 2    13 SHAISTA 2/15/22 [x] [] [] 3    14 SHAISTA 1/05/42 [x] [] [] Recert/Reeval- added L hip pain to POC and treatment plan    15 SHAISTA 2/17/22 [x] [] [] 4    16 SHAISTA 2/21/22 [x] [] [] 5    17 SHAISTA 2/24/22 [x] [] [] 6    18 SHAISTA 2/25/22 [x] [] [] 7    19 SHAISTA 3/1/22 x   8    20 SHAISTA 3/2/22 x   9, PN due next visit    21 SHAISTA 3/4/22 x   PN today    22 SHAISTA 3/8/22 x   1    23 SHAISTA 3/10/22 x   2    24 SHAISTA 3/11/22 x   3    25 SHAISTA 3/14/22 x   4   **KX** 26 SHAISTA 3/16/22 x   5    27 SHAISTA 3/18/22 x   6    28 SHAISTA 3/21/22 x   7    29 SHAISTA 3/23/22 x   8    30 SHAISTA 3/24/22 x   9, PN due next visit    31 SHAISTA 3/29/22 x   10, PN today    32 SHAISTA 3/31/22 x   1    33 SHAISTA 4/1/22 x   2    34 SHAISTA 4/4/22 x   3    35 SHAISTA 4/6/22 x   4    36 SHAISTA 4/7/22 x   5    37 SHAISTA 4/11/22 x   6    38 SHAISTA 4/13/22 x   7    39 SHAISTA 4/14/22 x   8    40 SHAISTA 4/18/22 x   9, PN due next visit    41 SHAISTA 4/20/22 x   10, PN today    42 SHAISTA 4/21/22 x   1    43 SHAISTA 4/25/22 x   2    44 SHAISTA 4/27/22 x   3    45 SHAISTA 4/28/22 x   4

## 2022-05-03 ENCOUNTER — HOSPITAL ENCOUNTER (OUTPATIENT)
Dept: PHYSICAL THERAPY | Age: 72
Discharge: HOME | End: 2022-05-03
Payer: MEDICARE

## 2022-05-03 PROCEDURE — 97140 MANUAL THERAPY 1/> REGIONS: CPT

## 2022-05-03 PROCEDURE — 97110 THERAPEUTIC EXERCISES: CPT

## 2022-05-03 NOTE — PROGRESS NOTES
Bekah Sender  : 1950  [place] / Product Type: Medicare /  [address]  at [address] [facility] [address], [address] [place] [address], [address]  [phone]   [phone]      OUTPATIENT PHYSICAL THERAPY: Daily Treatment Note 5/3/2022  Visit Count:  55    ICD-10: Treatment Diagnosis:   M62.81 Muscle Weakness Generalized  M25.552 Pain in Left hip  M25.652 Stiffness of Left hip, not elsewhere classified    Precautions/Allergies:   Patient has no known allergies. TREATMENT PLAN:  Effective Dates: 3/29/2022 TO 2022 (60 days). Frequency/Duration: 2 times a week for 60 Days (for continued treatment to L hip)        PRE-TREATMENT SYMPTOMS/COMPLAINTS:  Again L hip seems to be making very slow improvements overall- he has been doing his exercises at home but just has to modify dependent on how hip feels that day  MEDICATIONS REVIEWED:  5/3/2022   TREATMENT:   (In addition to Assessment/Re-Assessment sessions the following treatments were rendered)    THERAPEUTIC EXERCISE: (25 minutes):  Exercises per grid below to improve mobility, strength and balance. Required minimal visual and verbal cues to promote proper body alignment and promote proper body posture. Progressed resistance, range and complexity of movement as indicated.      Date:  22 Date:  22 Date:  22 Date:  22 Date:  22 Date:  22 Date:  5/3/22   Activity/Exercise          Seated piriformis stretch          Seated glute stretch          clamshell 15 X 2   15 X 2 GTB 10 X 3 GTB 15 X 2 BTB 10 X 3    Hip abduction Standing #2    Standing #3 10 X 2 Standing #3 15 X 2 Standing #4  Standing # 5 10 X 2   bridges 15 X 2   15 X 2 15 X 2 15 X 2 15 X 2   ASLR 15 X 2   #2 10 X 2 #2 10 X 2 #2 15 X 2 #2 10 X 3    Standing hip hike on 6\" step    10 X 2 8\" 10 X 2 8\" 10 X 2 8\" 10 X 2   6\" lateral step ups    10 X 2 8\" 10 X 2 8\" 10 X 2 8\" 10 X 2   TB supine hip ER BTB 15 X 2   BTB 15 X 2  BTB 15 X 2 BTB 15 X 2 BTB 10 X 3   Walking side steps    GTB 30 ft x 2  GTB 30 ft X 2 BTB 30 ft X 2      MANUAL THERAPY: (15 minutes): Joint mobilization, Soft tissue mobilization and Manipulation was utilized and necessary because of the patient's restricted joint motion, painful spasm, loss of articular motion and restricted motion of soft tissue.   +manual stretching L ITB, glute, piriformis, hip flexor  +STM to glute med, ITB    (Used abbreviations: MET - muscle energy technique; PNF - proprioceptive neuromuscular facilitation; NMR - neuromuscular re-education; AP - anterior to posterior; PA - posterior to anterior)    MODALITIES: (15 minutes):      *  Cold Pack Therapy in order to provide analgesia and reduce inflammation and edema. in sitting position     TREATMENT/SESSION ASSESSMENT: Pt tolerating exercises well. Needing to modify as seen above doing 2-3 sets dependent on discomfort level with exercise. Anticipate discharge next week. Pt appears to be approaching maximum rehab potential.    Education: on objective findings and HEP    RECOMMENDATIONS/INTENT FOR NEXT TREATMENT SESSION: \"Next visit will focus on advancements to more challenging activities\".     PAIN: Initial: 2/10  Post Session:  2/10     [facility] Portal    Total Treatment Billable Duration:   PT Patient Time In/Time Out  Time In: 1345  Time Out: 1440  Priyanka Camilo, PT, DPT    Future Appointments   Date Time Provider Elisabeth Chauhan   5/5/2022 11:00 AM Linda BAKER [facility]   5/10/2022 11:00 AM Linda BAKER SFDORPT SFD   5/12/2022  9:30 AM Linda BAKER SFDORPT SFD   5/31/2022  8:00 AM Tevin Vaughn MD [facility] POA   8/1/2022  7:55 AM MAT LAB [facility] MAT BSCPC   8/8/2022  1:00 PM Davey Randall MD [facility] MAT BSCPC   3/27/2023  2:10 PM Yina Ayon MD [id number] PGU       Visit Approval Visit # Therapist initials Date A NS / Cx < 24 hr >24 hr Cx Comments    1 SHAISTA 1/5/22 [x]  [] [] Initial evaluation    2 SHAISTA 1/7/22 [x] [] []     3 SHAISTA 1/11/22 [x] [] []     4 SHAISTA 1/13/22 [x] [] []     5 PDE 1- [x] [] []     6 SHAISTA 1/20/22 [x] [] []     7 SHAISTA 1/25/22 [x] [] []     8 SHAISTA 1/27/22 [x] [] []     9 SHAISTA 2/1/22 [x] [] [] PN due next visit    10  SHAISTA 2/3/22 [x] [] [] PN today    11 SHAISTA 2/7/22 [x] [] [] 1    12 SHAISTA 2/9/22 [x] [] [] 2    13 SHAISTA 2/15/22 [x] [] [] 3    14 SHAISTA 4/59/05 [x] [] [] Recert/Reeval- added L hip pain to POC and treatment plan    15 SHAISTA 2/17/22 [x] [] [] 4    16 SHAISTA 2/21/22 [x] [] [] 5    17 SHAISTA 2/24/22 [x] [] [] 6    18 SHAISTA 2/25/22 [x] [] [] 7    19 SHAISTA 3/1/22 x   8    20 SHAISTA 3/2/22 x   9, PN due next visit    21 SHAISTA 3/4/22 x   PN today    22 SHAISTA 3/8/22 x   1    23 SHAISTA 3/10/22 x   2    24 SHAISTA 3/11/22 x   3    25 SHAISTA 3/14/22 x   4   **KX** 26 SHAISTA 3/16/22 x   5    27 SHAISTA 3/18/22 x   6    28 SHAISTA 3/21/22 x   7    29 SHAISTA 3/23/22 x   8    30 SHAISTA 3/24/22 x   9, PN due next visit    31 SHAISTA 3/29/22 x   10, PN today    32 SHAISTA 3/31/22 x   1    33 SHAISTA 4/1/22 x   2    34 SHAISTA 4/4/22 x   3    35 SHAISTA 4/6/22 x   4    36 SHAISTA 4/7/22 x   5    37 SHAISTA 4/11/22 x   6    38 SHAISTA 4/13/22 x   7    39 SHAISTA 4/14/22 x   8    40 SHAISTA 4/18/22 x   9, PN due next visit    41 SHAISTA 4/20/22 x   10, PN today    42 SHAISTA 4/21/22 x   1    43 SHAISTA 4/25/22 x   2    44 SHAISTA 4/27/22 x   3    45 SHAISTA 4/28/22 x   4    46 SHAISTA 5/3/22 x   5

## 2022-05-05 ENCOUNTER — HOSPITAL ENCOUNTER (OUTPATIENT)
Dept: PHYSICAL THERAPY | Age: 72
Discharge: HOME | End: 2022-05-05
Payer: MEDICARE

## 2022-05-05 PROCEDURE — 97110 THERAPEUTIC EXERCISES: CPT

## 2022-05-05 PROCEDURE — 97140 MANUAL THERAPY 1/> REGIONS: CPT

## 2022-05-05 NOTE — PROGRESS NOTES
Kam Matias  : 1950  [place] / Product Type: Medicare /  [address]  at [facility]  [address], [address] [facility], [place] [address] [address] [facility]  [phone]   [phone]      OUTPATIENT PHYSICAL THERAPY: Daily Treatment Note 2022  Visit Count:  52    ICD-10: Treatment Diagnosis:   M62.81 Muscle Weakness Generalized  M25.552 Pain in Left hip  M25.652 Stiffness of Left hip, not elsewhere classified    Precautions/Allergies:   Patient has no known allergies. TREATMENT PLAN:  Effective Dates: 3/29/2022 TO 2022 (60 days). Frequency/Duration: 2 times a week for 60 Days (for continued treatment to L hip)        PRE-TREATMENT SYMPTOMS/COMPLAINTS:  Pt reports only very mild soreness after last visit. MEDICATIONS REVIEWED:  2022   TREATMENT:   (In addition to Assessment/Re-Assessment sessions the following treatments were rendered)    THERAPEUTIC EXERCISE: (25 minutes):  Exercises per grid below to improve mobility, strength and balance. Required minimal visual and verbal cues to promote proper body alignment and promote proper body posture. Progressed resistance, range and complexity of movement as indicated.      Date:  22 Date:  22 Date:  22 Date:  22 Date:  22 Date:  22 Date:  5/3/22 Date:  22   Activity/Exercise           Seated piriformis stretch           Seated glute stretch           clamshell 15 X 2   15 X 2 GTB 10 X 3 GTB 15 X 2 BTB 10 X 3  BTB 10 X 3   Hip abduction Standing #2    Standing #3 10 X 2 Standing #3 15 X 2 Standing #4  Standing # 5 10 X 2 S/l hip 10 X 2; standing #5 10 X 2   bridges 15 X 2   15 X 2 15 X 2 15 X 2 15 X 2 15 X 2   ASLR 15 X 2   #2 10 X 2 #2 10 X 2 #2 15 X 2 #2 10 X 3  #3 10 X 3   Standing hip hike on 6\" step    10 X 2 8\" 10 X 2 8\" 10 X 2 8\" 10 X 2 8\" 10 X 2   6\" lateral step ups    10 X 2 8\" 10 X 2 8\" 10 X 2 8\" 10 X 2 8\" 10 X 2   TB supine hip ER BTB 15 X 2   BTB 15 X 2  BTB 15 X 2 BTB 15 X 2 BTB 10 X 3 BTB 10 X 3   Walking side steps    GTB 30 ft x 2  GTB 30 ft X 2 BTB 30 ft X 2  BTB 30 ft X 2     MANUAL THERAPY: (15 minutes): Joint mobilization, Soft tissue mobilization and Manipulation was utilized and necessary because of the patient's restricted joint motion, painful spasm, loss of articular motion and restricted motion of soft tissue.   +manual stretching L ITB, glute, piriformis, hip flexor  +STM to glute med, ITB    (Used abbreviations: MET - muscle energy technique; PNF - proprioceptive neuromuscular facilitation; NMR - neuromuscular re-education; AP - anterior to posterior; PA - posterior to anterior)    MODALITIES: (15 minutes):      *  Cold Pack Therapy in order to provide analgesia and reduce inflammation and edema. in sitting position     TREATMENT/SESSION ASSESSMENT: Pt tolerated session well. Again appears to be reaching maximum rehab potential and independence with exercises at this time. Anticipate discharge next week. Education: on objective findings and HEP    RECOMMENDATIONS/INTENT FOR NEXT TREATMENT SESSION: \"Next visit will focus on advancements to more challenging activities\".     PAIN: Initial: 1-2/10  Post Session:  1-2/10     [facility] Portal    Total Treatment Billable Duration:   PT Patient Time In/Time Out  Time In: 1100  Time Out: 1155  Cecelia Simmons PT, DPT    Future Appointments   Date Time Provider Elisabeth Chauhan   5/10/2022 11:00 AM one [facility] SFD   5/12/2022  9:30 AM Jamari Lax A SFDORPT SFD   5/31/2022  8:00 AM Ronal Khan MD [facility] POA   8/1/2022  7:55 AM MAT LAB [facility] MAT BSCP   8/8/2022  1:00 PM Neelima Carrillo MD [facility] MAT BSCPC   3/27/2023  2:10 PM Georgetet Lloyd MD [id number] PGU       Visit Approval Visit # Therapist initials Date A NS / Cx < 24 hr >24 hr Cx Comments    1 SHAISTA 1/5/22 [x]  [] [] Initial evaluation    2 SHAISTA 1/7/22 [x] [] []     3 SHAISTA 1/11/22 [x] [] []     4 SHAISTA 1/13/22 [x] [] [] 5 PDE 1- [x] [] []     6 SHAISTA 1/20/22 [x] [] []     7 SHAISTA 1/25/22 [x] [] []     8 SHAISTA 1/27/22 [x] [] []     9 SHAISTA 2/1/22 [x] [] [] PN due next visit    10  SHAISTA 2/3/22 [x] [] [] PN today    11 SHAISTA 2/7/22 [x] [] [] 1    12 SHAISTA 2/9/22 [x] [] [] 2    13 SHAISTA 2/15/22 [x] [] [] 3    14 SHAISTA 3/41/55 [x] [] [] Recert/Reeval- added L hip pain to POC and treatment plan    15 SHAISTA 2/17/22 [x] [] [] 4    16 SHAISTA 2/21/22 [x] [] [] 5    17 SHAISTA 2/24/22 [x] [] [] 6    18 SHAISTA 2/25/22 [x] [] [] 7    19 SHAISTA 3/1/22 x   8    20 SHAISTA 3/2/22 x   9, PN due next visit    21 SHAISTA 3/4/22 x   PN today    22 SHAISTA 3/8/22 x   1    23 SHAISTA 3/10/22 x   2    24 SHAISTA 3/11/22 x   3    25 SHAISTA 3/14/22 x   4   **KX** 26 SHAISTA 3/16/22 x   5    27 SHAISTA 3/18/22 x   6    28 SHAISTA 3/21/22 x   7    29 SHAISTA 3/23/22 x   8    30 SHAISTA 3/24/22 x   9, PN due next visit    31 SHAISTA 3/29/22 x   10, PN today    32 SHAISTA 3/31/22 x   1    33 SHAISTA 4/1/22 x   2    34 SHAISTA 4/4/22 x   3    35 SHAISTA 4/6/22 x   4    36 SHAISTA 4/7/22 x   5    37 SHAISTA 4/11/22 x   6    38 SHAISTA 4/13/22 x   7    39 SHAISTA 4/14/22 x   8    40 SHAISTA 4/18/22 x   9, PN due next visit    41 SHAISTA 4/20/22 x   10, PN today    42 SHAISTA 4/21/22 x   1    43 SHAISTA 4/25/22 x   2    44 SHAISTA 4/27/22 x   3    45 SHAISTA 4/28/22 x   4    46 SHAISTA 5/3/22 x   5    47 SHAISTA 5/5/22 x   6

## 2022-05-10 ENCOUNTER — HOSPITAL ENCOUNTER (OUTPATIENT)
Dept: PHYSICAL THERAPY | Age: 72
Discharge: HOME | End: 2022-05-10
Payer: MEDICARE

## 2022-05-10 PROCEDURE — 97140 MANUAL THERAPY 1/> REGIONS: CPT

## 2022-05-10 PROCEDURE — 97110 THERAPEUTIC EXERCISES: CPT

## 2022-05-10 NOTE — PROGRESS NOTES
Brittany   : 1950  [place] / Product Type: Medicare /  [address]  at [facility]  [address], [address] [facility], [address]  [phone]   [phone]      OUTPATIENT PHYSICAL THERAPY: Daily Treatment Note 5/10/2022  Visit Count:  48    ICD-10: Treatment Diagnosis:   M62.81 Muscle Weakness Generalized  M25.552 Pain in Left hip  M25.652 Stiffness of Left hip, not elsewhere classified    Precautions/Allergies:   Patient has no known allergies. TREATMENT PLAN:  Effective Dates: 3/29/2022 TO 2022 (60 days). Frequency/Duration: 2 times a week for 60 Days (for continued treatment to L hip)        PRE-TREATMENT SYMPTOMS/COMPLAINTS:  Pt reports getting DN yesterday so a little more sore today. MEDICATIONS REVIEWED:  5/10/2022   TREATMENT:   (In addition to Assessment/Re-Assessment sessions the following treatments were rendered)    THERAPEUTIC EXERCISE: (25 minutes):  Exercises per grid below to improve mobility, strength and balance. Required minimal visual and verbal cues to promote proper body alignment and promote proper body posture. Progressed resistance, range and complexity of movement as indicated.      Date:  22 Date:  22 Date:  22 Date:  22 Date:  22 Date:  22 Date:  5/3/22 Date:  22 Date:  5/10/22   Activity/Exercise            Seated piriformis stretch            Seated glute stretch            clamshell 15 X 2   15 X 2 GTB 10 X 3 GTB 15 X 2 BTB 10 X 3  BTB 10 X 3 BTB 10 X 3    Hip abduction Standing #2    Standing #3 10 X 2 Standing #3 15 X 2 Standing #4  Standing # 5 10 X 2 S/l hip 10 X 2; standing #5 10 X 2 S/l hip 10 X 3,    bridges 15 X 2   15 X 2 15 X 2 15 X 2 15 X 2 15 X 2 15 X 2   ASLR 15 X 2   #2 10 X 2 #2 10 X 2 #2 15 X 2 #2 10 X 3  #2 10 X 3 #2 10 X 3   Standing hip hike on 6\" step    10 X 2 8\" 10 X 2 8\" 10 X 2 8\" 10 X 2 8\" 10 X 2 8\" 10 X 2   6\" lateral step ups    10 X 2 8\" 10 X 2 8\" 10 X 2 8\" 10 X 2 8\" 10 X 2 8\" 10 X 2   TB supine hip ER BTB 15 X 2   BTB 15 X 2  BTB 15 X 2 BTB 15 X 2 BTB 10 X 3 BTB 10 X 3 BTB 15 X 2   Walking side steps    GTB 30 ft x 2  GTB 30 ft X 2 BTB 30 ft X 2  BTB 30 ft X 2 BTB 30 ft X 2     MANUAL THERAPY: (15 minutes): Joint mobilization, Soft tissue mobilization and Manipulation was utilized and necessary because of the patient's restricted joint motion, painful spasm, loss of articular motion and restricted motion of soft tissue.   +manual stretching L ITB, glute, piriformis, hip flexor  +STM to glute med, ITB    (Used abbreviations: MET - muscle energy technique; PNF - proprioceptive neuromuscular facilitation; NMR - neuromuscular re-education; AP - anterior to posterior; PA - posterior to anterior)    MODALITIES: (15 minutes):      *  Cold Pack Therapy in order to provide analgesia and reduce inflammation and edema. in sitting position     TREATMENT/SESSION ASSESSMENT: Pt tolerated exercises well today despite increased soreness today. Discharge next visit. Education: on objective findings and HEP    RECOMMENDATIONS/INTENT FOR NEXT TREATMENT SESSION: \"Next visit will focus on advancements to more challenging activities\".     PAIN: Initial: 2/10  Post Session:  2/10     [facility] Portal    Total Treatment Billable Duration:   PT Patient Time In/Time Out  Time In: 1100  Time Out: 18  David Nichole PT, DPT    Future Appointments   Date Time Provider Elisabeth Chauhan   5/12/2022  9:30 AM Jessica [facility] SFD       Visit Approval Visit # Therapist initials Date A NS / Cx < 24 hr >24 hr Cx Comments    1 SHAISTA 1/5/22 [x]  [] [] Initial evaluation    2 SHAISTA 1/7/22 [x] [] []     3 SHAISTA 1/11/22 [x] [] []     4 SHAISTA 1/13/22 [x] [] []     5 PDE 1- [x] [] []     6 SHAISTA 1/20/22 [x] [] []     7 SHAISTA 1/25/22 [x] [] []     8 SHAISTA 1/27/22 [x] [] []     9 SHAISTA 2/1/22 [x] [] [] PN due next visit    10  SHAISTA 2/3/22 [x] [] [] PN today 11 SHAISTA 2/7/22 [x] [] [] 1    12 SHAISTA 2/9/22 [x] [] [] 2    13 SHAISTA 2/15/22 [x] [] [] 3    14 SHAISTA 1/33/07 [x] [] [] Recert/Reeval- added L hip pain to POC and treatment plan    15 SHAISTA 2/17/22 [x] [] [] 4    16 SHAISTA 2/21/22 [x] [] [] 5    17 SHAISTA 2/24/22 [x] [] [] 6    18 SHAISTA 2/25/22 [x] [] [] 7    19 SHAISTA 3/1/22 x   8    20 SHAISTA 3/2/22 x   9, PN due next visit    21 SHAISTA 3/4/22 x   PN today    22 SHAISTA 3/8/22 x   1    23 SHAISTA 3/10/22 x   2    24 SHAISTA 3/11/22 x   3    25 SHAISTA 3/14/22 x   4   **KX** 26 SHAISTA 3/16/22 x   5    27 SHAISTA 3/18/22 x   6    28 SHAISTA 3/21/22 x   7    29 SHAISTA 3/23/22 x   8    30 SHAISTA 3/24/22 x   9, PN due next visit    31 SHAISTA 3/29/22 x   10, PN today    32 SHAISTA 3/31/22 x   1    33 SHAISTA 4/1/22 x   2    34 SHAISTA 4/4/22 x   3    35 SHAISTA 4/6/22 x   4    36 SHAISTA 4/7/22 x   5    37 SHAISTA 4/11/22 x   6    38 SHAISTA 4/13/22 x   7    39 SHAISTA 4/14/22 x   8    40 SHAISTA 4/18/22 x   9, PN due next visit    41 SHAISTA 4/20/22 x   10, PN today    42 SHAISTA 4/21/22 x   1    43 SHAISTA 4/25/22 x   2    44 SHAISTA 4/27/22 x   3    45 SHAISTA 4/28/22 x   4    46 SHAISTA 5/3/22 x   5    47 SHAISTA 5/5/22 x   6    48 SHAISTA 5/10/22 x   7, discharge next visit

## 2022-05-12 ENCOUNTER — HOSPITAL ENCOUNTER (OUTPATIENT)
Dept: PHYSICAL THERAPY | Age: 72
Discharge: HOME | End: 2022-05-12
Payer: MEDICARE

## 2022-05-12 PROCEDURE — 97110 THERAPEUTIC EXERCISES: CPT

## 2022-05-12 PROCEDURE — 97140 MANUAL THERAPY 1/> REGIONS: CPT

## 2022-05-12 NOTE — THERAPY DISCHARGE
Maninder Jones  : 1950  [place] / Product Type: Medicare /  [address] at [address]  11 [facility], [address] [place] [address], [address]  [phone]   [phone]        OUTPATIENT PHYSICAL 61 [facility] 2022    ICD-10: Treatment Diagnosis:   M25.511 Pain in Right Shoulder  M25.611 Stiffness in Right Shoulder  M62.81 Muscle Weakness Generalized  Z47.89 Orthopedic Aftercare  M25.552 Pain in Left hip  M25.652 Stiffness of Left hip, not elsewhere classified    Precautions/Allergies:   Patient has no known allergies. Fall Risk Score: 2 (? 5 = High Risk)  MD Orders: Eval and Treat MEDICAL/REFERRING DIAGNOSIS:  R shoulder replacement   DATE OF ONSET: 12/3/21  REFERRING PHYSICIAN: Malena Wills MD, Karishma Coburn MD  [address]: 22   Discharge Summary:  Pt has been seen for a total of 49 PT sessions to date. The past 8 visits have been treatment only to L hip. Pt has met 4/5 LTG for treatment to L hip today. Pt at this time is independent with HEP for continued strengthening. Does continue with mild levels pain however much improved from months ago. Pt has met maximum rehab potential at this time. Will discharge case. Progress Report:  Pt has been seen for a total of 41 PT sessions to date. POC has continued to address s/p R TSA 2X per week and L hip pain secondary to partial tear 1X per week. Pt again demonstrating improvements in R shoulder AROM with slight improvements in overall strength. Pt demonstrating improvements in L hip ROM and slight improvements in hip strength. Overall pt continues to make slow progress towards all goals. Will be discharging treatment to R shoulder by the end of the month with pt continuing with HEP for continue strengthening post op at home as pt is mostly independent with exercises at this point.  Will transition from treatment to L hip from 1x per week to 2X per week to see if can make more improvements overall with solely focusing on that rehab. Pt will continue to benefit from skilled PT interventions at this time. Progress Report:  Pt has been seen for a total of 31 PT sessions to date. Pt continues with POC addressing s/p R TSA 2x per week and L hip pain secondary to partial mm tear 1X per week. Pt unfortunately having a set back with increased L hip pain with overstretching. Prior to this, pt was having noticeable improvements in L hip pain. Pt has been make slow but steady progress towards shoulder goals with improvements in AROM now focusing more on strengthening. Pt will continue to benefit from skilled PT interventions. Updated POC and extended out for another 60 days. Progress Report:  Pt has been seen for a total of 21 sessions to date. Pt is making good steady progress. Pt demonstrating improvements in AROM, PROM, and strength. Mostly limited by pain at this time affecting return to functional mobility, ADLs, and recreational activities. Pt will continue to benefit from skilled PT interventions at this time. Re-Evaluation: Pt has attended 13 PT sessions to date. Pt has been experiencing L hip pain for several weeks now and now has referral for treatment by Kasia aBlbuena. Will modify POC to 3X per week to continue treating s/p TSA 2X per week and L hip pain for 1X per week. Pt presents with decreased L hip ROM, decreased strength and increased pain affecting participation in ADLs and functional mobility at this time. Pt will benefit from PT interventions to address these deficits. Progress Report:  Pt has attended 10 PT sessions to date. Pt has met 3/5 STG and 0/5 LTG at this time. Pt with 4 point improvement in DASH score. Pt demonstrating improvements in PROM, AROM, strength, and pain. Pt limited by increased pain at lateral deltoid tendon with possibly some tendonosis.  Pt is making good steady progress towards remaining goals and will continue to benefit from skilled PT interventions at this time. ASSESSMENT:  Mr. Rhianna Blanco has attended 1 physical therapy session including the initial evaluation. Pt is s/p R TSA. Pt presents with decreased ROM, decreased strength and increased pain affecting participation in ADLs and functional mobility at this time. Recommending skilled PT: manual therapeutic techniques (as appropriate), therapeutic exercises and activities, balance interventions, and a comprehensive home exercise program to address the current impairments, as listed above. Mary Stringer will benefit from skilled PT (medically necessary) to address above deficits affecting participation in basic ADLs and overall functional tolerance. PROBLEM LIST (Impacting functional limitations):  1. Decreased Strength  2. Decreased ADL/Functional Activities  3. Decreased Transfer Abilities  4. Decreased Ambulation Ability/Technique  5. Decreased Balance  6. Increased Pain  7. Decreased Activity Tolerance  8. Decreased Flexibility/Joint Mobility  9. Decreased [place] with Home Exercise Program INTERVENTIONS PLANNED:  1. Balance Exercise  2. Cold  3. Cryotherapy  4. Electrical Stimulation  5. Family Education  6. Gait Training  7. Heat  8. Home Exercise Program (HEP)  9. Manual Therapy  10. Neuromuscular Re-education/Strengthening  11. Range of Motion (ROM)  12. Therapeutic Activites  13. Therapeutic Exercise/Strengthening  14. Transcutaneous Electrical Nerve Stimulation (TENS)  15. Transfer Training   TREATMENT PLAN:  TREATMENT PLAN:  Effective Dates: 3/29/2022 TO 5/29/2022 (60 days). Frequency/Duration: 3 times a week for 60 Days      (Left Hip)  GOALS: (Goals have been discussed and agreed upon with patient.)  Discharge Goals: Time Frame: 8 weeks  16. Mary Stringer will be independent with HEP. Goal met 5/12/22  17. Pt will navigate 4 steps with no pain in order to enter home Goal met 5/12/22  18.  Pt will improve hip strength to 5/5 in order to improve walking tolerance Partially met w/ exception of IR 5/12/22  19. Pt will c/o worst pain of 2/10 or less in order to decrease antalgic gait Goal met 5/12/22  20. Pt will improve LEFS to 55/80 in order to return ambulating for 1 mile or more for Recreational activity Goal met 5/12/22            Rehabilitation Potential For Stated Goals: Fair    Outcome Measure: Tool Used: Disabilities of the Arm, Shoulder and Hand (DASH) Questionnaire - Quick Version  Score:  Initial: 25/55  Most Recent: 21/55 (Date: 2/3/22 ) Most Recent: 22/55 (Date: 3/4/22) Most Recent: 18/55 (Date: 3/29/22) Most Recent: 24/55 (Date: 4/20/22)   Interpretation of Score: The DASH is designed to measure the activities of daily living in person's with upper extremity dysfunction or pain. Each section is scored on a 1-5 scale, 5 representing the greatest disability. The scores of each section are added together for a total score of 55. Tool Used: Lower Extremity Functional Scale  Score:  Initial: 34/80  Most Recent: 40/80 Most Recent: 29/80 (4/20/22) Most Recent: 64/80 (5/12/22)         Regarding Papito Villagomez therapy, I certify that the treatment plan above will be carried out by a therapist or under their direction. Thank you for this referral,  Haylie Nguyen, PT, DPT     Referring Physician Signature: Prosper Shelton MD   Referring Physician Signature: Jayne Masters MD             The information in this section was collected on 1/5/22 (except where otherwise noted). HISTORY:   History of Present Injury/Illness (Reason for Referral):  Pt is s/p R TSA. Pt reports having a significant amount of swelling. Still with swelling/edema present. Pt reports he has been doing HEP. Pt goals are to return to higher level activities like fly fishing    2/16/22: Pt now arrives with c/o L hip pain. Pt was walking dog in the snow several weeks ago and has L hip pain ever since.  Pt with recent MRI showing MRI scan of his left hip which does show some high-grade partial tearing of the gluteus medius and minimus with minimal retraction. There is some atrophy in the muscle as well. Degenerative labral tearing and degenerative chondromalacia intra-articularly per referring physician note. MD wanting to treat this conservatively and recommending PT at this time for 6-8 weeks. Difficulty going for floor to standing, increased pain with walking, difficulty going up steps (anything that involves pushing off of L LE)    CC/Primary Concern: Return to full ROM and strength R UE            Treatment Side: Right      Past Medical History/Comorbidities:   Mr. Oneal Kan  has a past medical history of Arthritis (07/06/2015), Back pain, DDD (degenerative disc disease), lumbar, Dry eyes, Enlarged prostate, GERD (gastroesophageal reflux disease), History of basal cell cancer, History of squamous cell carcinoma, IBS (irritable bowel syndrome), Insomnia (07/06/2015), Left inguinal hernia, MVP (mitral valve prolapse), Onychomycosis (7/6/2015), RBBB (7/6/2015), Right wrist pain (7/6/2015), Scoliosis, and Spondylosis of lumbar region without myelopathy or radiculopathy. Mr. Oneal Kan  has a past surgical history that includes neurological procedure unlisted (2007); hx colonoscopy; hx orthopaedic; hx shoulder arthroscopy (Right); hx orthopaedic (Left, 06/2020); hx orthopaedic (Right, 08/2020); hx cataract removal; and hx carpal tunnel release.   Social History/Living Environment:  Lives with wiife, independent with all mobility at baseline    Pain/Symptom Location: post op pain- generalized    Worst Pain: 6/10 (shoulder), 7/10 (L hip)  Current Pain: 1/10 (shoulder), 4/10 (L hip)    Aggravating Factors: any use of R UE    Occupation: Retired cardiologist    Prior Level of Function/Work/Activity:  History of chronic LBP and R foot pain due to OA- despite this lives a very active lifestyle enjoying many outdoor activities- hunting, fly fishing, etc      Patient Goals: Gain full ROM, strength and return to recreational activities    Current Medications:       Current Outpatient Medications:     traMADoL (ULTRAM) 50 mg tablet, Take 1 Tablet by mouth every six (6) hours as needed for Pain for up to 30 days. Max Daily Amount: 200 mg., Disp: 120 Tablet, Rfl: 1    sildenafil citrate (Viagra) 100 mg tablet, Take 1 Tablet by mouth as needed for Erectile Dysfunction. , Disp: 6 Tablet, Rfl: 5    tamsulosin (FLOMAX) 0.4 mg capsule, Take 2 Capsules by mouth daily. , Disp: 180 Capsule, Rfl: 3    Myrbetriq 50 mg ER tablet, Take 1 Tablet by mouth daily. , Disp: 90 Tablet, Rfl: 3    sildenafil citrate (Viagra) 100 mg tablet, Take 1 Tablet by mouth as needed for Erectile Dysfunction. , Disp: 8 Tablet, Rfl: 12    zolpidem (AMBIEN) 10 mg tablet, Take 1 Tablet by mouth nightly as needed for Sleep. Max Daily Amount: 10 mg., Disp: 90 Tablet, Rfl: 1    linaCLOtide (Linzess) 145 mcg cap capsule, Take 1 Capsule by mouth Daily (before breakfast). , Disp: 30 Capsule, Rfl: 5    cetirizine (ZyrTEC) 10 mg tablet, 1 tablet, Disp: , Rfl:     loratadine-pseudoephedrine (Claritin-D 12 Hour) 5-120 mg per tablet, Take 1 Tablet by mouth two (2) times a day., Disp: , Rfl:     oxymetazoline HCl (AFRIN NASAL SPRAY NA), by Nasal route., Disp: , Rfl:     temazepam (RESTORIL) 15 mg capsule, Take 1 Capsule by mouth nightly as needed for Sleep. Max Daily Amount: 15 mg. Indications: difficulty sleeping, Disp: 30 Capsule, Rfl: 0    ipratropium (Atrovent) 21 mcg (0.03 %) nasal spray, 2 Sprays by Both Nostrils route every twelve (12) hours as needed for Rhinitis., Disp: 30 mL, Rfl: 5    terbinafine HCL (LAMISIL) 250 mg tablet, Take 250 mg by mouth daily. , Disp: , Rfl:     fluocinonide (LIDEX) 0.05 % topical gel, Apply  to affected area two (2) times a day., Disp: , Rfl:     meloxicam (Mobic) 15 mg tablet, Take 1 Tablet by mouth daily. , Disp: 90 Tablet, Rfl: 3    tadalafiL (CIALIS) 5 mg tablet, Take 1 Tablet by mouth daily. , Disp: 90 Tablet, Rfl: 3    tretinoin (RETIN-A) 0.05 % topical cream, APPLY TO AFFECTED AREA AT BEDTIME, Disp: , Rfl:     dicyclomine (BentyL) 20 mg tablet, Take 1 Tab by mouth every six (6) hours. , Disp: 120 Tab, Rfl: 5    lidocaine (LIDODERM) 5 %, by TransDERmal route daily as needed. Apply patch to the affected area for 12 hours a day and remove for 12 hours a day. , Disp: , Rfl:     RESTASIS 0.05 % ophthalmic emulsion, INSTILL 1 DROP IN BOTH EYES TWICE DAILY, Disp: , Rfl: 12    omeprazole (PRILOSEC) 20 mg capsule, Take 20 mg by mouth daily as needed. , Disp: , Rfl:    Date Last Reviewed:  5/12/2022       Ambulatory/Rehab Services H2 Model Falls Risk Assessment    Risk Factors:       (1)  Gender [Male] Ability to Rise from Chair:       (1)  Pushes up, successful in one attempt    Falls Prevention Plan:       No modifications necessary   Total: (5 or greater = High Risk): 2    ©2010 [facility] [employer]. All Rights Reserved. [facility] Patent #[id number].  Federal Law prohibits the replication, distribution or use without written permission from [facility] [employer]        Number of Personal Factors/Comorbidities that affect the Plan of Care: 3+: HIGH COMPLEXITY   EXAMINATION:   Inspection        Post Op/Wound: Healed                Additional Comments:   ________________________________________________________________________________________________  Observation:          Posture: Rounded shoulders, forward head            Edema: Increased throughout entirety of UE into hand               Addition Comments:     ________________________________________________________________________________________________  Range of Motion            Upper:  Joint: Passive Active Active Passive Right 2/3/22 Active Right 2/3/22 Active Right 3/4/22 Active 3/29/22 Active 4/20/22    Right (Degrees) Right (Degrees) Left Active (Degrees)        Shoulder Flexion 80 degrees NT per protocol 140 degrees 130 degrees 115 degrees 126 degrees  130 degrees  137 degrees   Shoulder Extension  NT per protocol 62 degrees  60 degrees 65 degrees 80 degrees 80 degrees    Shoulder Adduction  NT per protocol 125 degrees   WFL       Shoulder Abduction 60 degrees NT per protocol  170 degrees 115 degrees  128 degrees  128 degrees 145 degrees   Shoulder Internal Rotation (Neutral Position) NT per protocol          Shoulder External Rotation  (Neutral Position) NT per protocol          Functional IR  NT per protocol T11 45 degrees R hip T12  T12  T12   Functional ER  NT per protocol T2 70 degrees C7 C7 C7 C7     Lower  Joint: Active Active Active (Left) Active Left 4/20/22 Active Left 5/12/22    Right (Degrees) Left (Degrees)      Hip Flexion        Hip Extension        Hip Adduction        Hip Abduction                Hip Internal rotation 30 degrees 20 degrees (discomfort) 13 degrees (pain) 25 degrees (pain) 35 degrees (painfree)   Hip External rotation 35 degrees 20 degrees (discomfort) 30 degrees (discomfort) 36 degrees (discomfort)  40 degrees (painfree)   Knee Extension          ________________________________________________________________________________________________  Strength          Upper Extremity    Joint:   Right 2/3/22 Right 3/4/22 Right 3/29/22 Right 4/20/22    RIGHT LEFT       Shoulder Flexion 2/5 4+/5 2+/5 4/5 (pain) 4+-5/5 (pain) 4+-5/5 (p)   Shoulder Extension 2/5 5/5 3/5 5/5 (pain) 5/5 (slight pain) 5/5 (slight pain)    Shoulder Abduction 2/5 4+/5 2/5 3/5 (pain)  3/5 (pain)  4/5 (pain)   Shoulder Internal Rotation 2/5 4/5 2+/5 3+/5 (pain) 4/5 (slight pain) 4-4+/5 (slight pain)   Shoulder External Rotation 2/5 3+/5 3/5 3/5 (pain) 3+/5 (slight pain) 3+/5 (slight pain)   Elbow Flexion 3/5 5/5 4/5 5/5     Elbow Extension 3/5 5/5 4/5 5/5       Lower  Joint:   Left 3/29/22 Left 4/20/22 Left 5/12/22    RIGHT LEFT      Hip Flexion 5/5 3/5 (p) 5/5 (pf)     Hip Extension NT/5 NT/5      Hip Internal Rotation 5/5 3/5 (p) 3-/5 (p) 3-/5 (p) 3/5 (p)   Hip External Rotation 5/5 3/5 (p) 4-4+5 (pf) 4+/5 (pf) 5/5 (pf)   Hip Abduction 4+/5 3-/5 (p) 4/5 (p)     Hip Adduction NT/5 NT/5      Knee Flexion 5/5 5/5      Knee Extension 5/5 4/5 (discomfort) 5/5 (pf)       _____________________________________________________________________________________________  [facility]        C/O Radicular Symptoms: No      Additional Comments:   ___________________________________________________________________________________________________________________________________________________________  Palpation: Lateral deltoid; L glute med, max, proximal ITB  Joint mobilization: Capsular hypomobility post op; not assessed at hip this session       Body Structures Involved:  1. Nerves  2. Bones  3. Joints  4. Muscles  5. Ligaments Body Functions Affected:  1. Sensory/Pain  2. Neuromusculoskeletal  3. Movement Related Activities and Participation Affected:  1. General Tasks and Demands  2. Mobility  3. Self Care  4. Domestic Life  5. Interpersonal Interactions and Relationships  6.  Community, Social and [place]   Number of elements (examined above) that affect the Plan of Care: 4+: HIGH COMPLEXITY   CLINICAL PRESENTATION:   Presentation: Evolving clinical presentation with changing clinical characteristics: MODERATE COMPLEXITY   CLINICAL DECISION MAKING:      Use of outcome tool(s) and clinical judgement create a POC that gives a: Clear prediction of patient's progress: LOW COMPLEXITY

## 2022-05-12 NOTE — PROGRESS NOTES
Sergey Marcos  : 1950  [place] / Product Type: Medicare /  [address]  at [facility]  [address], [address] [facility], [place] [address] [address] [facility]  [phone]   [phone]      OUTPATIENT PHYSICAL THERAPY: Daily Treatment Note 2022  Visit Count:  52    ICD-10: Treatment Diagnosis:   M62.81 Muscle Weakness Generalized  M25.552 Pain in Left hip  M25.652 Stiffness of Left hip, not elsewhere classified    Precautions/Allergies:   Patient has no known allergies. TREATMENT PLAN:  Effective Dates: 3/29/2022 TO 2022 (60 days). Frequency/Duration: 2 times a week for 60 Days (for continued treatment to L hip)        PRE-TREATMENT SYMPTOMS/COMPLAINTS:  Pt reports some increased soreness along L quad today. MEDICATIONS REVIEWED:  2022   TREATMENT:   (In addition to Assessment/Re-Assessment sessions the following treatments were rendered)    THERAPEUTIC EXERCISE: (25 minutes):  Exercises per grid below to improve mobility, strength and balance. Required minimal visual and verbal cues to promote proper body alignment and promote proper body posture. Progressed resistance, range and complexity of movement as indicated.      Date:  22 Date:  22 Date:  22 Date:  22 Date:  22 Date:  22 Date:  5/3/22 Date:  22 Date:  5/10/22 Date:  22   Activity/Exercise             Seated piriformis stretch             Seated glute stretch             clamshell 15 X 2   15 X 2 GTB 10 X 3 GTB 15 X 2 BTB 10 X 3  BTB 10 X 3 BTB 10 X 3  BTB 10 X 2   Hip abduction Standing #2    Standing #3 10 X 2 Standing #3 15 X 2 Standing #4  Standing # 5 10 X 2 S/l hip 10 X 2; standing #5 10 X 2 S/l hip 10 X 3,  S/l hip 10 X 3; standing #5 10 X 3   bridges 15 X 2   15 X 2 15 X 2 15 X 2 15 X 2 15 X 2 15 X 2 15 X 2   ASLR 15 X 2   #2 10 X 2 #2 10 X 2 #2 15 X 2 #2 10 X 3  #2 10 X 3 #2 10 X 3 hold   Standing hip hike on 6\" step    10 X 2 8\" 10 X 2 8\" 10 X 2 8\" 10 X 2 8\" 10 X 2 8\" 10 X 2 8\" 10 X 2   6\" lateral step ups    10 X 2 8\" 10 X 2 8\" 10 X 2 8\" 10 X 2 8\" 10 X 2 8\" 10 X 2 8\" 10 X 2   TB supine hip ER BTB 15 X 2   BTB 15 X 2  BTB 15 X 2 BTB 15 X 2 BTB 10 X 3 BTB 10 X 3 BTB 15 X 2 BTB 15 X 2   Walking side steps    GTB 30 ft x 2  GTB 30 ft X 2 BTB 30 ft X 2  BTB 30 ft X 2 BTB 30 ft X 2      MANUAL THERAPY: (15 minutes): Joint mobilization, Soft tissue mobilization and Manipulation was utilized and necessary because of the patient's restricted joint motion, painful spasm, loss of articular motion and restricted motion of soft tissue.   +manual stretching L ITB, glute, piriformis, hip flexor  +STM to glute med, ITB    (Used abbreviations: MET - muscle energy technique; PNF - proprioceptive neuromuscular facilitation; NMR - neuromuscular re-education; AP - anterior to posterior; PA - posterior to anterior)    MODALITIES: (15 minutes):      *  Cold Pack Therapy in order to provide analgesia and reduce inflammation and edema. in sitting position     TREATMENT/SESSION ASSESSMENT: Pt tolerated exercises well today. Discharge today as pt has met maximum rehab potential    Education: on discharge HEP        PAIN: Initial: 2/10  Post Session:  2/10     [facility] Portal    Total Treatment Billable Duration:   PT Patient Time In/Time Out  Time In: 0930  Time Out: 1  Meghan Murguia PT, DPT    No future appointments.     Visit Approval Visit # Therapist initials Date A NS / Cx < 24 hr >24 hr Cx Comments    1 SHAISTA 1/5/22 [x]  [] [] Initial evaluation    2 SHAISTA 1/7/22 [x] [] []     3 SHAISTA 1/11/22 [x] [] []     4 SHAISTA 1/13/22 [x] [] []     5 PDE 1- [x] [] []     6 SHAISTA 1/20/22 [x] [] []     7 SHAISTA 1/25/22 [x] [] []     8 SHAISTA 1/27/22 [x] [] []     9 SHAISTA 2/1/22 [x] [] [] PN due next visit    10  SHAISTA 2/3/22 [x] [] [] PN today    11 KAI 2/7/22 [x] [] [] 1    12 KAI 2/9/22 [x] [] [] 2    13 SHAISTA 2/15/22 [x] [] [] 3    14 SHAISTA 2/16/22 [x] [] [] Recert/Reeval- added L hip pain to POC and treatment plan    15 SHAISTA 2/17/22 [x] [] [] 4    16 SHAISTA 2/21/22 [x] [] [] 5    17 SHAISTA 2/24/22 [x] [] [] 6    18 SHAISTA 2/25/22 [x] [] [] 7    19 SHAISTA 3/1/22 x   8    20 SHAISTA 3/2/22 x   9, PN due next visit    21 SHAISTA 3/4/22 x   PN today    22 SHAISTA 3/8/22 x   1    23 SHAISTA 3/10/22 x   2    24 SHAISTA 3/11/22 x   3    25 SHAISTA 3/14/22 x   4   **KX** 26 SHAISTA 3/16/22 x   5    27 SHAISTA 3/18/22 x   6    28 SHAISTA 3/21/22 x   7    29 SHAISTA 3/23/22 x   8    30 SHAISTA 3/24/22 x   9, PN due next visit    31 SHAISTA 3/29/22 x   10, PN today    32 SHAISTA 3/31/22 x   1    33 SHAISTA 4/1/22 x   2    34 SHAISTA 4/4/22 x   3    35 SHAISTA 4/6/22 x   4    36 SHAISTA 4/7/22 x   5    37 SHAISTA 4/11/22 x   6    38 SHAISTA 4/13/22 x   7    39 SHAISTA 4/14/22 x   8    40 SHAISTA 4/18/22 x   9, PN due next visit    41 SHAISTA 4/20/22 x   10, PN today    42 SHAISTA 4/21/22 x   1    43 SHAISTA 4/25/22 x   2    44 SHAISTA 4/27/22 x   3    45 SHAISTA 4/28/22 x   4    46 SHAISTA 5/3/22 x   5    47 SHAISTA 5/5/22 x   6    48 SHAISTA 5/10/22 x   7, discharge next visit    52 SHAISTA 5/12/22 x   8, discharge today

## 2022-05-31 ENCOUNTER — OFFICE VISIT (OUTPATIENT)
Dept: ORTHOPEDIC SURGERY | Age: 72
End: 2022-05-31
Payer: MEDICARE

## 2022-05-31 DIAGNOSIS — M70.62 TROCHANTERIC BURSITIS OF LEFT HIP: Primary | ICD-10-CM

## 2022-05-31 DIAGNOSIS — S76.012D STRAIN OF GLUTEUS MEDIUS OF LEFT LOWER EXTREMITY, SUBSEQUENT ENCOUNTER: ICD-10-CM

## 2022-05-31 DIAGNOSIS — M25.552 LEFT HIP PAIN: ICD-10-CM

## 2022-05-31 PROCEDURE — G8428 CUR MEDS NOT DOCUMENT: HCPCS | Performed by: ORTHOPAEDIC SURGERY

## 2022-05-31 PROCEDURE — 99213 OFFICE O/P EST LOW 20 MIN: CPT | Performed by: ORTHOPAEDIC SURGERY

## 2022-05-31 PROCEDURE — 1123F ACP DISCUSS/DSCN MKR DOCD: CPT | Performed by: ORTHOPAEDIC SURGERY

## 2022-05-31 PROCEDURE — 3017F COLORECTAL CA SCREEN DOC REV: CPT | Performed by: ORTHOPAEDIC SURGERY

## 2022-05-31 PROCEDURE — G8420 CALC BMI NORM PARAMETERS: HCPCS | Performed by: ORTHOPAEDIC SURGERY

## 2022-05-31 PROCEDURE — 4004F PT TOBACCO SCREEN RCVD TLK: CPT | Performed by: ORTHOPAEDIC SURGERY

## 2022-05-31 PROCEDURE — 20610 DRAIN/INJ JOINT/BURSA W/O US: CPT | Performed by: ORTHOPAEDIC SURGERY

## 2022-05-31 RX ORDER — TRIAMCINOLONE ACETONIDE 40 MG/ML
40 INJECTION, SUSPENSION INTRA-ARTICULAR; INTRAMUSCULAR ONCE
Status: COMPLETED | OUTPATIENT
Start: 2022-05-31 | End: 2022-05-31

## 2022-05-31 RX ADMIN — TRIAMCINOLONE ACETONIDE 40 MG: 40 INJECTION, SUSPENSION INTRA-ARTICULAR; INTRAMUSCULAR at 08:28

## 2022-05-31 NOTE — PROGRESS NOTES
Name: Sherine Menard  YOB: 1950  Gender: male  MRN: [id number]      CC: Hip Pain (L hip recheck)       HPI: Sherine Menard is a 80 y.o. male who returns for follow up on his left hip. He reports that he has felt improvement with continued physical therapy with strengthening and dry needling, although he is still not feeling back to normal.  He reports being diligent with a home exercise program.         Physical Examination:  General: no acute distress  Lungs: breathing easily  CV: regular rhythm by pulse  Left Hip: Still has some focal tenderness palpation of the peritrochanteric region as well as the gluteal muscles. He has appropriate strength with side-lying abduction but still some mild buckling associated with that as well as with good bridging and resisted AB duction. Motor and sensory intact. No significant groin pain. Assessment:   1. Trochanteric bursitis of left hip    2. Left hip pain    3. Strain of gluteus medius of left lower extremity, subsequent encounter         Plan:   He is progressing well with strength he still having some trochanteric bursa symptoms. He has a trip coming up to [facility] later this week so we discussed potentially a trochanteric bursa injection which he elected to proceed with. I think he can continue home exercise program advance his activities as tolerated and follow-up as needed as long as he continues to progress. After informed verbal consent was obtained and sterile prep the left trochanteric bursa region of area of maximal tenderness was injected with 6 cc of 0.25% Marcaine and 1 cc of 40 mg Kenalog from a lateral approach. Patient tolerated the injection well was given postinjection flare precautions. Laure Esquivel MD, [address] [place] [address] and Sports Medicine

## 2022-06-22 ENCOUNTER — OFFICE VISIT (OUTPATIENT)
Dept: UROLOGY | Age: 72
End: 2022-06-22
Payer: MEDICARE

## 2022-06-22 DIAGNOSIS — N13.8 BPH WITH OBSTRUCTION/LOWER URINARY TRACT SYMPTOMS: ICD-10-CM

## 2022-06-22 DIAGNOSIS — N40.1 BPH WITH OBSTRUCTION/LOWER URINARY TRACT SYMPTOMS: ICD-10-CM

## 2022-06-22 DIAGNOSIS — N42.9 ABNORMAL PROSTATE ON PHYSICAL EXAMINATION: ICD-10-CM

## 2022-06-22 DIAGNOSIS — N50.89 TESTICLE LUMP: Primary | ICD-10-CM

## 2022-06-22 LAB
BILIRUBIN, URINE, POC: NEGATIVE
BLOOD URINE, POC: NEGATIVE
GLUCOSE URINE, POC: NEGATIVE
KETONES, URINE, POC: NEGATIVE
LEUKOCYTE ESTERASE, URINE, POC: NEGATIVE
NITRITE, URINE, POC: NEGATIVE
PH, URINE, POC: 5.5 (ref 4.6–8)
PROTEIN,URINE, POC: NEGATIVE
SPECIFIC GRAVITY, URINE, POC: 1.03 (ref 1–1.03)
URINALYSIS CLARITY, POC: NORMAL
URINALYSIS COLOR, POC: NORMAL
UROBILINOGEN, POC: NORMAL

## 2022-06-22 PROCEDURE — 99213 OFFICE O/P EST LOW 20 MIN: CPT | Performed by: UROLOGY

## 2022-06-22 PROCEDURE — 81003 URINALYSIS AUTO W/O SCOPE: CPT | Performed by: UROLOGY

## 2022-06-22 PROCEDURE — 4004F PT TOBACCO SCREEN RCVD TLK: CPT | Performed by: UROLOGY

## 2022-06-22 PROCEDURE — 3017F COLORECTAL CA SCREEN DOC REV: CPT | Performed by: UROLOGY

## 2022-06-22 PROCEDURE — G8427 DOCREV CUR MEDS BY ELIG CLIN: HCPCS | Performed by: UROLOGY

## 2022-06-22 PROCEDURE — 1123F ACP DISCUSS/DSCN MKR DOCD: CPT | Performed by: UROLOGY

## 2022-06-22 PROCEDURE — G8420 CALC BMI NORM PARAMETERS: HCPCS | Performed by: UROLOGY

## 2022-06-22 RX ORDER — TRAMADOL HYDROCHLORIDE 50 MG/1
50 TABLET ORAL EVERY 6 HOURS PRN
COMMUNITY

## 2022-06-22 ASSESSMENT — ENCOUNTER SYMPTOMS
BACK PAIN: 0
NAUSEA: 0

## 2022-06-22 NOTE — PROGRESS NOTES
[facility]  [address]  [phone]    Daniela Cook  : 1950    Chief Complaint   Patient presents with    Testicle Pain     lump on groin. HPI     Daniela Cook is a 80 y.o. male was seen by Dr. Garcia on routine exam . [place] Shutters to have asymmetric prostate . Hs has moderate hesitancy, takes Flomax. PSA 1.0 in -18. No family history of prostate cancer.   PSA 0.9 in -, 0.8 in -. He has been taking Flomax . , takes Cialis 20 mg prn for mild ED. He has nocturia x 5, intermittent stream.   He had a valencia when he had back surgery.   Increased Flomax to 0.8 mg. Cysto showed mild BPH,     PVR 2 ml by US. AUASS is 21 with highest score of 5 for intermittency. QOL is \"unhappy\". Has nocturia x 3.   We discussed medical and surgical options. Has small prostate. His main complaint is nocturia. May have component of OAB. Was started on  Myrbetriq. 50 mg . Continues tamsulosin 0.4 mg daily. May be candidate for bipolar button vaporization of prostate or TUIP. .   He thinks that his sxs are better. Nocturia went from 8 to 2 or 3.   Had urinary retention after shoulder surgery in -. This resolved. Back on tamsulosin 0.4 mg bid and Myrbetriq. He is having worsening ED, despite taking Cialis 20 mg. Gave Viagra. Testosterone 418 in 3-22. He comes in today, thinks that his testes are asymmetric. Has decreased volume of ejaculate.    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 Dodge Letters Left 06/2020    CTR    ORTHOPEDIC SURGERY Right 08/2020    CTR    ORTHOPEDIC SURGERY      RIGHT knee scoped, Dr. Bridgette Fan ARTHROSCOPY Right      Current Outpatient Medications   Medication Sig Dispense Refill    traMADol (ULTRAM) 50 MG tablet Take 50 mg by mouth every 6 hours as needed for Pain.  cycloSPORINE (RESTASIS) 0.05 % ophthalmic emulsion INSTILL 1 DROP IN BOTH EYES TWICE DAILY      dicyclomine (BENTYL) 20 MG tablet Take 20 mg by mouth every 6 hours      fluocinonide (LIDEX) 0.05 % gel Apply topically 2 times daily      ipratropium (ATROVENT) 0.03 % nasal spray 2 sprays by Nasal route      lidocaine (LIDODERM) 5 % Place onto the skin daily as needed      linaclotide (LINZESS) 145 MCG capsule Take 145 mcg by mouth every morning (before breakfast)      meloxicam (MOBIC) 15 MG tablet Take 15 mg by mouth daily      mirabegron (MYRBETRIQ) 50 MG TB24 Take 50 mg by mouth daily      omeprazole (PRILOSEC) 20 MG delayed release capsule Take 20 mg by mouth daily as needed      sildenafil (VIAGRA) 100 MG tablet Take 100 mg by mouth as needed      tamsulosin (FLOMAX) 0.4 MG capsule Take 0.8 mg by mouth daily      terbinafine (LAMISIL) 250 MG tablet Take 250 mg by mouth daily      tretinoin (RETIN-A) 0.05 % cream APPLY TO AFFECTED AREA AT BEDTIME      zolpidem (AMBIEN) 10 MG tablet Take 10 mg by mouth.        No current facility-administered medications for this visit. No Known Allergies  Social History     Socioeconomic History    Marital status:      Spouse name: Not on file    Number of children: Not on file    Years of education: Not on file    Highest education level: Not on file   Occupational History    Not on file   Tobacco Use    Smoking status: Never Smoker    Smokeless tobacco: Never Used   Substance and Sexual Activity    Alcohol use: Yes     Alcohol/week: 5.0 standard drinks    Drug use: No    Sexual activity: Not on file   Other Topics Concern    Not on file   Social History Narrative    Not on file     Social Determinants of Health     Financial Resource Strain:     Difficulty of Paying Living Expenses: Not on file   Food Insecurity:     Worried About Running Out of Food in the Last Year: Not on file    Mary of Food in the Last Year: Not on file   Transportation Needs:     Lack of Transportation (Medical): Not on file    Lack of Transportation (Non-Medical):  Not on file   Physical Activity:     Days of Exercise per Week: Not on file    Minutes of Exercise per Session: Not on file   Stress:     Feeling of Stress : Not on file   Social Connections:     Frequency of Communication with Friends and Family: Not on file    Frequency of Social Gatherings with Friends and Family: Not on file    Attends Christian Services: Not on file    Active Member of [address] [place] [address] [employer] or Organizations: Not on file    Attends Club or Organization Meetings: Not on file    Marital Status: Not on file   Intimate Partner Violence:     Fear of Current or Ex-Partner: Not on file    Emotionally Abused: Not on file    Physically Abused: Not on file    Sexually Abused: Not on file   Housing Stability:     Unable to Pay for Housing in the Last Year: Not on file    Number of [place] in the Last Year: Not on file    Unstable Housing in the Last Year: Not on file     Family History   Problem Relation Age of Onset and Dispositions    · Return for keep previous appt.

## 2022-07-14 RX ORDER — MELOXICAM 15 MG/1
15 TABLET ORAL DAILY
Qty: 90 TABLET | Refills: 3 | OUTPATIENT
Start: 2022-07-14

## 2022-07-14 RX ORDER — MELOXICAM 15 MG/1
15 TABLET ORAL DAILY
Qty: 90 TABLET | Refills: 3 | Status: SHIPPED | OUTPATIENT
Start: 2022-07-14

## 2022-07-14 NOTE — TELEPHONE ENCOUNTER
Orders Placed This Encounter    meloxicam (MOBIC) 15 MG tablet     Sig: Take 1 tablet by mouth daily     Dispense:  90 tablet     Refill:  3         Ezequiel Pagan MD

## 2022-07-27 DIAGNOSIS — N40.1 BPH WITH OBSTRUCTION/LOWER URINARY TRACT SYMPTOMS: Primary | ICD-10-CM

## 2022-07-27 DIAGNOSIS — Z00.00 MEDICARE ANNUAL WELLNESS VISIT, SUBSEQUENT: ICD-10-CM

## 2022-07-27 DIAGNOSIS — N13.8 BPH WITH OBSTRUCTION/LOWER URINARY TRACT SYMPTOMS: Primary | ICD-10-CM

## 2022-07-27 DIAGNOSIS — F51.01 PRIMARY INSOMNIA: ICD-10-CM

## 2022-08-02 DIAGNOSIS — F51.01 PRIMARY INSOMNIA: Primary | ICD-10-CM

## 2022-09-01 ENCOUNTER — TELEPHONE (OUTPATIENT)
Dept: INTERNAL MEDICINE CLINIC | Facility: CLINIC | Age: 72
End: 2022-09-01

## 2022-09-01 DIAGNOSIS — E55.9 HYPOVITAMINOSIS D: ICD-10-CM

## 2022-09-01 DIAGNOSIS — M89.9 BONE DISORDER: ICD-10-CM

## 2022-09-01 DIAGNOSIS — M85.88 OSTEOPENIA OF SPINE: Primary | ICD-10-CM

## 2022-09-01 SDOH — HEALTH STABILITY: PHYSICAL HEALTH: ON AVERAGE, HOW MANY MINUTES DO YOU ENGAGE IN EXERCISE AT THIS LEVEL?: 30 MIN

## 2022-09-01 SDOH — HEALTH STABILITY: PHYSICAL HEALTH: ON AVERAGE, HOW MANY DAYS PER WEEK DO YOU ENGAGE IN MODERATE TO STRENUOUS EXERCISE (LIKE A BRISK WALK)?: 5 DAYS

## 2022-09-01 ASSESSMENT — LIFESTYLE VARIABLES
HAVE YOU OR SOMEONE ELSE BEEN INJURED AS A RESULT OF YOUR DRINKING: NO
HOW OFTEN DO YOU HAVE A DRINK CONTAINING ALCOHOL: 4 OR MORE TIMES A WEEK
HOW OFTEN DURING THE LAST YEAR HAVE YOU NEEDED AN ALCOHOLIC DRINK FIRST THING IN THE MORNING TO GET YOURSELF GOING AFTER A NIGHT OF HEAVY DRINKING: NEVER
HOW OFTEN DURING THE LAST YEAR HAVE YOU HAD A FEELING OF GUILT OR REMORSE AFTER DRINKING: NEVER
HOW MANY STANDARD DRINKS CONTAINING ALCOHOL DO YOU HAVE ON A TYPICAL DAY: 1
HOW OFTEN DURING THE LAST YEAR HAVE YOU HAD A FEELING OF GUILT OR REMORSE AFTER DRINKING: 0
HAS A RELATIVE, FRIEND, DOCTOR, OR ANOTHER HEALTH PROFESSIONAL EXPRESSED CONCERN ABOUT YOUR DRINKING OR SUGGESTED YOU CUT DOWN: 0
HAS A RELATIVE, FRIEND, DOCTOR, OR ANOTHER HEALTH PROFESSIONAL EXPRESSED CONCERN ABOUT YOUR DRINKING OR SUGGESTED YOU CUT DOWN: NO
HOW OFTEN DO YOU HAVE SIX OR MORE DRINKS ON ONE OCCASION: 1
HOW OFTEN DURING THE LAST YEAR HAVE YOU BEEN UNABLE TO REMEMBER WHAT HAPPENED THE NIGHT BEFORE BECAUSE YOU HAD BEEN DRINKING: NEVER
HAVE YOU OR SOMEONE ELSE BEEN INJURED AS A RESULT OF YOUR DRINKING: 0
HOW MANY STANDARD DRINKS CONTAINING ALCOHOL DO YOU HAVE ON A TYPICAL DAY: 1 OR 2
HOW OFTEN DO YOU HAVE A DRINK CONTAINING ALCOHOL: 5
HOW OFTEN DURING THE LAST YEAR HAVE YOU FAILED TO DO WHAT WAS NORMALLY EXPECTED FROM YOU BECAUSE OF DRINKING: NEVER
HOW OFTEN DURING THE LAST YEAR HAVE YOU BEEN UNABLE TO REMEMBER WHAT HAPPENED THE NIGHT BEFORE BECAUSE YOU HAD BEEN DRINKING: 0
HOW OFTEN DURING THE LAST YEAR HAVE YOU FOUND THAT YOU WERE NOT ABLE TO STOP DRINKING ONCE YOU HAD STARTED: NEVER
HOW OFTEN DURING THE LAST YEAR HAVE YOU NEEDED AN ALCOHOLIC DRINK FIRST THING IN THE MORNING TO GET YOURSELF GOING AFTER A NIGHT OF HEAVY DRINKING: 0
HOW OFTEN DURING THE LAST YEAR HAVE YOU FAILED TO DO WHAT WAS NORMALLY EXPECTED FROM YOU BECAUSE OF DRINKING: 0
HOW OFTEN DURING THE LAST YEAR HAVE YOU FOUND THAT YOU WERE NOT ABLE TO STOP DRINKING ONCE YOU HAD STARTED: 0

## 2022-09-01 ASSESSMENT — PATIENT HEALTH QUESTIONNAIRE - PHQ9
1. LITTLE INTEREST OR PLEASURE IN DOING THINGS: 0
SUM OF ALL RESPONSES TO PHQ9 QUESTIONS 1 & 2: 0
SUM OF ALL RESPONSES TO PHQ QUESTIONS 1-9: 0
2. FEELING DOWN, DEPRESSED OR HOPELESS: 0
SUM OF ALL RESPONSES TO PHQ QUESTIONS 1-9: 0

## 2022-09-02 DIAGNOSIS — F51.01 PRIMARY INSOMNIA: ICD-10-CM

## 2022-09-02 LAB
BASOPHILS # BLD: 0 K/UL (ref 0–0.2)
BASOPHILS NFR BLD: 1 % (ref 0–2)
DIFFERENTIAL METHOD BLD: ABNORMAL
EOSINOPHIL # BLD: 0.1 K/UL (ref 0–0.8)
EOSINOPHIL NFR BLD: 2 % (ref 0.5–7.8)
ERYTHROCYTE [DISTWIDTH] IN BLOOD BY AUTOMATED COUNT: 12.2 % (ref 11.9–14.6)
HCT VFR BLD AUTO: 42.3 % (ref 41.1–50.3)
HGB BLD-MCNC: 13.8 G/DL (ref 13.6–17.2)
IMM GRANULOCYTES # BLD AUTO: 0 K/UL (ref 0–0.5)
IMM GRANULOCYTES NFR BLD AUTO: 0 % (ref 0–5)
LYMPHOCYTES # BLD: 1.6 K/UL (ref 0.5–4.6)
LYMPHOCYTES NFR BLD: 21 % (ref 13–44)
MCH RBC QN AUTO: 32.4 PG (ref 26.1–32.9)
MCHC RBC AUTO-ENTMCNC: 32.6 G/DL (ref 31.4–35)
MCV RBC AUTO: 99.3 FL (ref 79.6–97.8)
MONOCYTES # BLD: 0.8 K/UL (ref 0.1–1.3)
MONOCYTES NFR BLD: 11 % (ref 4–12)
NEUTS SEG # BLD: 4.9 K/UL (ref 1.7–8.2)
NEUTS SEG NFR BLD: 65 % (ref 43–78)
NRBC # BLD: 0 K/UL (ref 0–0.2)
PLATELET # BLD AUTO: 280 K/UL (ref 150–450)
PMV BLD AUTO: 10 FL (ref 9.4–12.3)
RBC # BLD AUTO: 4.26 M/UL (ref 4.23–5.6)
WBC # BLD AUTO: 7.5 K/UL (ref 4.3–11.1)

## 2022-09-02 NOTE — TELEPHONE ENCOUNTER
Orders Placed This Encounter    DEXA BONE DENSITY AXIAL SKELETON     Standing Status:   Future     Standing Expiration Date:   9/1/2023     Order Specific Question:   Reason for exam:     Answer:   Abnormal bone density on xrays of spine. Hx of spine surgery and hypovitaminosis D and a history of adult fracture         Ezequiel Candelaria MD

## 2022-09-13 ENCOUNTER — HOSPITAL ENCOUNTER (OUTPATIENT)
Dept: MAMMOGRAPHY | Age: 72
Discharge: HOME | End: 2022-09-16
Payer: MEDICARE

## 2022-09-13 VITALS — HEIGHT: 69 IN | BODY MASS INDEX: 24.73 KG/M2 | WEIGHT: 167 LBS

## 2022-09-13 DIAGNOSIS — E55.9 HYPOVITAMINOSIS D: ICD-10-CM

## 2022-09-13 DIAGNOSIS — M85.88 OSTEOPENIA OF SPINE: ICD-10-CM

## 2022-09-13 DIAGNOSIS — M89.9 BONE DISORDER: ICD-10-CM

## 2022-09-13 PROCEDURE — 77080 DXA BONE DENSITY AXIAL: CPT

## 2022-09-15 ENCOUNTER — OFFICE VISIT (OUTPATIENT)
Dept: INTERNAL MEDICINE CLINIC | Facility: CLINIC | Age: 72
End: 2022-09-15
Payer: MEDICARE

## 2022-09-15 VITALS
BODY MASS INDEX: 25.62 KG/M2 | SYSTOLIC BLOOD PRESSURE: 122 MMHG | TEMPERATURE: 98.2 F | OXYGEN SATURATION: 99 % | DIASTOLIC BLOOD PRESSURE: 64 MMHG | HEIGHT: 69 IN | WEIGHT: 173 LBS | HEART RATE: 81 BPM

## 2022-09-15 DIAGNOSIS — Z23 NEED FOR VACCINATION: ICD-10-CM

## 2022-09-15 DIAGNOSIS — Z23 NEED FOR PROPHYLACTIC VACCINATION WITH TETANUS-DIPHTHERIA (TD): ICD-10-CM

## 2022-09-15 DIAGNOSIS — Z23 ENCOUNTER FOR IMMUNIZATION: ICD-10-CM

## 2022-09-15 DIAGNOSIS — F51.01 PRIMARY INSOMNIA: ICD-10-CM

## 2022-09-15 DIAGNOSIS — Z00.00 MEDICARE ANNUAL WELLNESS VISIT, SUBSEQUENT: Primary | ICD-10-CM

## 2022-09-15 DIAGNOSIS — K58.1 IRRITABLE BOWEL SYNDROME WITH CONSTIPATION: ICD-10-CM

## 2022-09-15 PROBLEM — R79.89 LOW TSH LEVEL: Status: RESOLVED | Noted: 2017-06-30 | Resolved: 2022-09-15

## 2022-09-15 PROBLEM — G62.9 NEUROPATHY: Status: RESOLVED | Noted: 2020-03-19 | Resolved: 2022-09-15

## 2022-09-15 PROBLEM — R29.3 POSTURAL IMBALANCE: Status: RESOLVED | Noted: 2020-03-19 | Resolved: 2022-09-15

## 2022-09-15 PROCEDURE — 90715 TDAP VACCINE 7 YRS/> IM: CPT | Performed by: INTERNAL MEDICINE

## 2022-09-15 PROCEDURE — 3017F COLORECTAL CA SCREEN DOC REV: CPT | Performed by: INTERNAL MEDICINE

## 2022-09-15 PROCEDURE — 90694 VACC AIIV4 NO PRSRV 0.5ML IM: CPT | Performed by: INTERNAL MEDICINE

## 2022-09-15 PROCEDURE — G0009 ADMIN PNEUMOCOCCAL VACCINE: HCPCS | Performed by: INTERNAL MEDICINE

## 2022-09-15 PROCEDURE — G0439 PPPS, SUBSEQ VISIT: HCPCS | Performed by: INTERNAL MEDICINE

## 2022-09-15 PROCEDURE — 1123F ACP DISCUSS/DSCN MKR DOCD: CPT | Performed by: INTERNAL MEDICINE

## 2022-09-15 PROCEDURE — G0008 ADMIN INFLUENZA VIRUS VAC: HCPCS | Performed by: INTERNAL MEDICINE

## 2022-09-15 RX ORDER — ZOLPIDEM TARTRATE 10 MG/1
10 TABLET ORAL NIGHTLY PRN
Qty: 90 TABLET | Refills: 1 | Status: SHIPPED | OUTPATIENT
Start: 2022-09-15 | End: 2023-03-14

## 2022-09-15 NOTE — PROGRESS NOTES
Medicare Annual Wellness Visit    Shimon Wise is here for Medicare AWV and Discuss Labs    Assessment & Plan   Medicare annual wellness visit, subsequent  Encounter for immunization  -     Influenza, FLUAD, (age 72 y+), IM, Preservative Free, 0.5 mL  Primary insomnia  -     zolpidem (AMBIEN) 10 MG tablet; Take 1 tablet by mouth nightly as needed for Sleep for up to 180 days. , Disp-90 tablet, R-1Normal  Need for prophylactic vaccination with tetanus-diphtheria (Td)  Need for vaccination  -     Tdap, BOOSTRIX, (age 8 yrs+), IM  Irritable bowel syndrome with constipation  -     linaclotide (LINZESS) 145 MCG capsule; Take 1 capsule by mouth every morning (before breakfast), Disp-30 capsule, R-5Normal    Recommendations for Preventive Services Due: see orders and patient instructions/AVS.  Recommended screening schedule for the next 5-10 years is provided to the patient in written form: see Patient Instructions/AVS.  Orders Placed This Encounter    Influenza, FLUAD, (age 72 y+), IM, Preservative Free, 0.5 mL    Tdap, BOOSTRIX, (age 8 yrs+), IM    linaclotide (LINZESS) 145 MCG capsule     Sig: Take 1 capsule by mouth every morning (before breakfast)     Dispense:  30 capsule     Refill:  5    zolpidem (AMBIEN) 10 MG tablet     Sig: Take 1 tablet by mouth nightly as needed for Sleep for up to 180 days. Dispense:  90 tablet     Refill:  1     Controlled Substance Monitoring:    Acute and Chronic Pain Monitoring:   RX Monitoring 9/15/2022   Periodic Controlled Substance Monitoring No signs of potential drug abuse or diversion identified. Return for Medicare Annual Wellness Visit in 1 year. Subjective   The following acute and/or chronic problems were also addressed today:  Labs reviewed and discussed. Dexa reviewed and discussed potential treatment options. Benefits and risks discussed.       DEXA Result (most recent):  DEXA BONE DENSITY AXIAL SKELETON 09/13/2022    Narrative  BONE DENSITOMETRY:    CLINICAL HISTORY:  Follow-up low bone density in a 27-year-old man with a  history of hypovitaminosis D and adult fracture. COMPARISON:  March 4, 2020. FINDINGS:  Density in the lumbar spine at L1-2 is 1.150 grams per square  centimeter with a T-score of -0.5, which represents interval decrease of 1% from  1.161. Minimal density at the femoral necks today is measured at 0.817 grams  per square centimeter with a T-score of -1.9. Total mean density at the hips is  0.917 grams per square centimeter with a T-score of -1.3, which represents  interval decrease of 3% from 0.942. The FRAX index gives a 10-year risk of a  hip fracture of 3.2% and of any major osteoporotic fracture of 12%. Impression  AFTER MODEST PROGRESSIVE DENSITY LOSS SINCE MARCH 2020, LOW BONE DENSITY IS NOW  ASSOCIATED WITH SIGNIFICANT RISK OF A HIP FRACTURE IN THE NEXT DECADE (SEE  ABOVE). INITIATION OF MEDICAL THERAPY AND FOLLOW-UP SCAN IN 2 YEARS SHOULD BE  CONSIDERED. DC4  The significant findings in this report have been referred to the Imaging  Navigator in order to communicate to the referring provider or his/her designee  as outlined in Section II.C.2.a.ii-iii of the ACR Practice Guideline for  Communication of Diagnostic Imaging Findings.     Lab Results   Component Value Date/Time    WBC 7.5 09/02/2022 01:42 PM    HGB 13.8 09/02/2022 01:42 PM    HCT 42.3 09/02/2022 01:42 PM    MCV 99.3 09/02/2022 01:42 PM    RDW 12.2 09/02/2022 01:42 PM     09/02/2022 01:42 PM    NEUTOPHILPCT 47 06/24/2021 08:34 AM    LYMPHOPCT 21 09/02/2022 01:42 PM    LYMPHOPCT 37 06/24/2021 08:34 AM    MONOPCT 11 09/02/2022 01:42 PM    MONOPCT 11 06/24/2021 08:34 AM    EOSRELPCT 2 09/02/2022 01:42 PM    BASOPCT 1 09/02/2022 01:42 PM    BASOPCT 1 06/24/2021 08:34 AM    LYMPHSABS 1.6 09/02/2022 01:42 PM    LYMPHSABS 2.3 06/24/2021 08:34 AM    MONOSABS 0.8 09/02/2022 01:42 PM    MONOSABS 0.7 06/24/2021 08:34 AM    EOSABS 0.1 09/02/2022 01:42 PM    EOSABS 0.2 06/24/2021 08:34 AM    BASOSABS 0.0 09/02/2022 01:42 PM    IMMGRAN 0 09/02/2022 01:42 PM    GRANULOCYTEABSOLUTECOUNT 0.0 06/24/2021 08:34 AM       Lab Results   Component Value Date/Time     08/01/2022 12:08 PM    K 4.7 08/01/2022 12:08 PM     08/01/2022 12:08 PM    CO2 27 08/01/2022 12:08 PM    ANIONGAP 5 08/01/2022 12:08 PM    GLUCOSE 93 08/01/2022 12:08 PM    BUN 26 08/01/2022 12:08 PM    CREATININE 1.10 08/01/2022 12:08 PM    GFRAA >60 08/01/2022 12:08 PM    LABGLOM >60 08/01/2022 12:08 PM    CALCIUM 8.9 08/01/2022 12:08 PM    BILITOT 0.7 08/01/2022 12:08 PM    ALT 43 08/01/2022 12:08 PM    AST 34 08/01/2022 12:08 PM    ALKPHOS 55 08/01/2022 12:08 PM    ALKPHOS 49 06/24/2021 08:34 AM    PROT 6.2 08/01/2022 12:08 PM    LABALBU 3.7 08/01/2022 12:08 PM    GLOB 2.5 08/01/2022 12:08 PM    ALBUMIN 1.5 08/01/2022 12:08 PM       Lab Results   Component Value Date/Time    CHOL 145 08/01/2022 12:08 PM    HDL 67 08/01/2022 12:08 PM    TRIG 31 08/01/2022 12:08 PM    LDLCALC 71.8 08/01/2022 12:08 PM    VLDL 13 06/24/2021 08:34 AM       No results found for: LABA1C    Lab Results   Component Value Date/Time    TSH 1.550 06/24/2021 08:34 AM    TSH 1.320 07/23/2020 08:21 AM    TSH 1.240 07/03/2019 07:42 AM       Lab Results   Component Value Date/Time    PSA 2.0 08/01/2022 12:08 PM    PSA 0.8 06/24/2021 08:34 AM    PSA 0.9 07/23/2020 08:21 AM    PSA 0.6 07/03/2019 07:42 AM       Lab Results   Component Value Date/Time    SPECGRAV 1.017 08/01/2022 12:08 PM    PHUR 7.5 06/24/2021 08:34 AM    COLORU YELLOW/STRAW 08/01/2022 12:08 PM    CLARITYU Clear 06/24/2021 08:34 AM    LEUKOCYTESUR Negative 08/01/2022 12:08 PM    PROTEINU Negative 08/01/2022 12:08 PM    GLUCOSEU Negative 08/01/2022 12:08 PM    KETUA Negative 08/01/2022 12:08 PM    BLOODU Negative 08/01/2022 12:08 PM    BILIRUBINUR Negative 08/01/2022 12:08 PM    BILIRUBINUR Negative 06/24/2021 08:34 AM    UROBILINOGEN 0.2 08/01/2022 12:08 PM NITRU Negative 08/01/2022 12:08 PM    LABMICR Comment 06/24/2021 08:34 AM          Health Maintenance   Topic Date Due    Shingles vaccine (2 of 3) 08/30/2016    Pneumococcal 65+ years Vaccine (2 - PCV) 10/15/2020    COVID-19 Vaccine (3 - Booster for Pfizer series) 07/02/2021    Colorectal Cancer Screen  10/04/2022    Depression Screen  09/01/2023    Annual Wellness Visit (AWV)  09/16/2023    Lipids  08/01/2027    DTaP/Tdap/Td vaccine (3 - Td or Tdap) 09/15/2032    Flu vaccine  Completed    Hepatitis C screen  Completed    Hepatitis A vaccine  Aged Out    Hepatitis B vaccine  Aged Out    Hib vaccine  Aged Out    Meningococcal (ACWY) vaccine  Aged Out     The patient's available records and electronic chart records are reviewed. The PMH, PSH, medications, allergies, medications, FH, health maintenance and vaccination status are all reviewed and updated as appropriate. Patient's complete Health Risk Assessment and screening values have been reviewed and are found in Flowsheets. The following problems were reviewed today and where indicated follow up appointments were made and/or referrals ordered.     Positive Risk Factor Screenings with Interventions:             General Health and ACP:  General  In general, how would you say your health is?: Very Good  In the past 7 days, have you experienced any of the following: New or Increased Pain, New or Increased Fatigue, Loneliness, Social Isolation, Stress or Anger?: No  Do you get the social and emotional support that you need?: Yes  Do you have a Living Will?: (!) No    Advance Directives       Power of  Living Will ACP-Advance Directive ACP-Power of     Not on File Not on File Not on File Not on File          General Health Risk Interventions:  No Living Will: Advance Care Planning addressed with patient today      Safety:  Do you have working smoke detectors?: Yes  Do you have any tripping hazards - loose or unsecured carpets or rugs?: No  Do you have any tripping hazards - clutter in doorways, halls, or stairs?: No  Do you have either shower bars, grab bars, non-slip mats or non-slip surfaces in your shower or bathtub?: Yes  Do all of your stairways have a railing or banister?: (!) No  Do you always fasten your seatbelt when you are in a car?: Yes  Safety Interventions:  Home safety tips provided           Objective   Vitals:    09/15/22 0802   BP: 122/64   Site: Left Upper Arm   Position: Sitting   Cuff Size: Small Adult   Pulse: 81   Temp: 98.2 °F (36.8 °C)   TempSrc: Temporal   SpO2: 99%   Weight: 173 lb (78.5 kg)   Height: 5' 9\" (1.753 m)      Body mass index is 25.55 kg/m². General Appearance: alert and oriented to person, place and time, well developed and well- nourished, in no acute distress  Skin: warm and dry, no rash or erythema  Head: normocephalic and atraumatic  Eyes: pupils equal, round, and reactive to light, extraocular eye movements intact, conjunctivae normal  ENT: tympanic membrane, external ear and ear canal normal bilaterally, nose without deformity, nasal mucosa and turbinates normal without polyps  Neck: supple and non-tender without mass, no thyromegaly or thyroid nodules, no cervical lymphadenopathy  Pulmonary/Chest: clear to auscultation bilaterally- no wheezes, rales or rhonchi, normal air movement, no respiratory distress  Cardiovascular: normal rate, regular rhythm, normal S1 and S2, no murmurs, rubs, clicks, or gallops, distal pulses intact, no carotid bruits  Abdomen: soft, non-tender, non-distended, normal bowel sounds, no masses or organomegaly  Extremities: no cyanosis, clubbing or edema  Musculoskeletal: normal range of motion, no joint swelling, deformity or tenderness  Neurologic: reflexes normal and symmetric, no cranial nerve deficit, gait, coordination and speech normal       No Known Allergies  Prior to Visit Medications    Medication Sig Taking?  Authorizing Provider   linaclotide Jerez Beam) 145 MCG capsule Take 1 capsule by mouth every morning (before breakfast) Yes Jackeline Florez MD   zolpidem (AMBIEN) 10 MG tablet Take 1 tablet by mouth nightly as needed for Sleep for up to 180 days. Yes Jackeline Florez MD   meloxicam CARMELA ARTEMDhaval [facility]) 15 MG tablet Take 1 tablet by mouth daily Yes Jackeline Florez MD   traMADol (ULTRAM) 50 MG tablet Take 50 mg by mouth every 6 hours as needed for Pain.  Yes Historical Provider, MD   cycloSPORINE (RESTASIS) 0.05 % ophthalmic emulsion INSTILL 1 DROP IN BOTH EYES TWICE DAILY Yes Ar Automatic Reconciliation   dicyclomine (BENTYL) 20 MG tablet Take 20 mg by mouth every 6 hours Yes Ar Automatic Reconciliation   fluocinonide (LIDEX) 0.05 % gel Apply topically 2 times daily Yes Ar Automatic Reconciliation   ipratropium (ATROVENT) 0.03 % nasal spray 2 sprays by Nasal route Yes Ar Automatic Reconciliation   lidocaine (LIDODERM) 5 % Place onto the skin daily as needed Yes Ar Automatic Reconciliation   mirabegron (MYRBETRIQ) 50 MG TB24 Take 50 mg by mouth daily Yes Ar Automatic Reconciliation   omeprazole (PRILOSEC) 20 MG delayed release capsule Take 20 mg by mouth daily as needed Yes Ar Automatic Reconciliation   sildenafil (VIAGRA) 100 MG tablet Take 100 mg by mouth as needed Yes Ar Automatic Reconciliation   tamsulosin (FLOMAX) 0.4 MG capsule Take 0.8 mg by mouth daily Yes Ar Automatic Reconciliation   tretinoin (RETIN-A) 0.05 % cream APPLY TO AFFECTED AREA AT BEDTIME Yes Ar Automatic Reconciliation       CareTeam (Including outside providers/suppliers regularly involved in providing care):   Patient Care Team:  Jackeline Florez MD as PCP - Hanane Baird MD as PCP - [facility] Empaneled Provider  Niall Pierce LPN as Ambulatory Care Manager     Reviewed and updated this visit:  Tobacco  Allergies  Meds  Problems  Med Hx  Surg Hx  Soc Hx  Fam Hx

## 2022-09-15 NOTE — PATIENT INSTRUCTIONS
Personalized Preventive Plan for Divya Lazo - 9/15/2022  Medicare offers a range of preventive health benefits. Some of the tests and screenings are paid in full while other may be subject to a deductible, co-insurance, and/or copay. Some of these benefits include a comprehensive review of your medical history including lifestyle, illnesses that may run in your family, and various assessments and screenings as appropriate. After reviewing your medical record and screening and assessments performed today your provider may have ordered immunizations, labs, imaging, and/or referrals for you. A list of these orders (if applicable) as well as your Preventive Care list are included within your After Visit Summary for your review. Other Preventive Recommendations:    A preventive eye exam performed by an eye specialist is recommended every 1-2 years to screen for glaucoma; cataracts, macular degeneration, and other eye disorders. A preventive dental visit is recommended every 6 months. Try to get at least 150 minutes of exercise per week or 10,000 steps per day on a pedometer . Order or download the FREE \"Exercise & Physical Activity: Your Everyday Guide\" from [employer]. Call 0-322.840.3050 or search [employer] online. You need 9644-2341 mg of calcium and 6389-5556 IU of vitamin D per day. It is possible to meet your calcium requirement with diet alone, but a vitamin D supplement is usually necessary to meet this goal.  When exposed to the sun, use a sunscreen that protects against both UVA and UVB radiation with an SPF of 30 or greater. Reapply every 2 to 3 hours or after sweating, drying off with a towel, or swimming. Always wear a seat belt when traveling in a car. Always wear a helmet when riding a bicycle or motorcycle.

## 2022-10-18 ENCOUNTER — OFFICE VISIT (OUTPATIENT)
Dept: ORTHOPEDIC SURGERY | Age: 72
End: 2022-10-18
Payer: MEDICARE

## 2022-10-18 DIAGNOSIS — M76.71 PERONEAL TENDINITIS, RIGHT LEG: ICD-10-CM

## 2022-10-18 DIAGNOSIS — M19.071 PRIMARY OSTEOARTHRITIS, RIGHT ANKLE AND FOOT: Primary | ICD-10-CM

## 2022-10-18 DIAGNOSIS — M25.571 PAIN IN RIGHT ANKLE AND JOINTS OF RIGHT FOOT: ICD-10-CM

## 2022-10-18 PROCEDURE — 1123F ACP DISCUSS/DSCN MKR DOCD: CPT | Performed by: ORTHOPAEDIC SURGERY

## 2022-10-18 PROCEDURE — 3017F COLORECTAL CA SCREEN DOC REV: CPT | Performed by: ORTHOPAEDIC SURGERY

## 2022-10-18 PROCEDURE — 99214 OFFICE O/P EST MOD 30 MIN: CPT | Performed by: ORTHOPAEDIC SURGERY

## 2022-10-18 PROCEDURE — G8417 CALC BMI ABV UP PARAM F/U: HCPCS | Performed by: ORTHOPAEDIC SURGERY

## 2022-10-18 PROCEDURE — G8427 DOCREV CUR MEDS BY ELIG CLIN: HCPCS | Performed by: ORTHOPAEDIC SURGERY

## 2022-10-18 PROCEDURE — 20605 DRAIN/INJ JOINT/BURSA W/O US: CPT | Performed by: ORTHOPAEDIC SURGERY

## 2022-10-18 PROCEDURE — G8484 FLU IMMUNIZE NO ADMIN: HCPCS | Performed by: ORTHOPAEDIC SURGERY

## 2022-10-18 PROCEDURE — 1036F TOBACCO NON-USER: CPT | Performed by: ORTHOPAEDIC SURGERY

## 2022-10-18 RX ORDER — METHYLPREDNISOLONE 4 MG/1
TABLET ORAL
COMMUNITY
Start: 2022-09-01

## 2022-10-18 RX ORDER — METHYLPREDNISOLONE ACETATE 40 MG/ML
40 INJECTION, SUSPENSION INTRA-ARTICULAR; INTRALESIONAL; INTRAMUSCULAR; SOFT TISSUE ONCE
Status: COMPLETED | OUTPATIENT
Start: 2022-10-18 | End: 2022-10-18

## 2022-10-18 RX ADMIN — METHYLPREDNISOLONE ACETATE 80 MG: 40 INJECTION, SUSPENSION INTRA-ARTICULAR; INTRALESIONAL; INTRAMUSCULAR; SOFT TISSUE at 16:02

## 2022-10-18 NOTE — PROGRESS NOTES
Name: Marium Rogers  YOB: 1950  Gender: male  MRN: [id number]     CC: Right outer hindfoot pain    HPI:   0564-5463: Treated for anterior calcaneal process arthritis, sinus Tarsi syndrome, insertional peroneal tendinitis. 08/26/2021: Right hindfoot injection  09/14/2021: Last visit with me. Placed in a Reparel sleeve and lace up ankle brace and discussed future potential MRI scan  [id number]: He reports seeing Elmer Stone and Janett Skelton for multiple insole modifications  [id number]: Right shoulder replacement: Left gluteus medius tendon tear  5519-4261: He reports seeing Dr. Fidel Lockett for new insoles, hindfoot injection, MRI scan at [address]  10/18/2022: He presents in reconsultation regarding his persistent outer hindfoot pain. ROS/Meds/PSH/PMH/FH/SH: reviewed today    Tobacco:  reports that he has never smoked. He has never used smokeless tobacco.     Physical Examination:  Patient appears to be alert and oriented with acceptable appearance. No obvious distress or SOB  CV: appears to have acceptable vascular color and capillary refill  Neuro: appears to have mostly intact light touch sensation   Skin: Calcaneocuboid level soft tissue thickening  MS: Standing: Pes planovalgus: Gait full  Right = sinus Tarsi to calcaneocuboid pain; no lateral ankle pain; no true peroneal pain; no fifth tuberosity pain  Right = Limited hindfoot mobility; 5/5 strength; no instability or crepitance    XR: Right side: Standing AP lateral mortise ankle plus AP oblique foot taken today with calcaneocuboid joint arthritic changes; naviculocuneiform area sagging; mild anterior ankle subluxation  XR Impression:  As above      Reviewed Test/Records/Documents:   03/25/2022: Right ankle MRI scan: Ordered by Dr. Zainab De Leon: Dr. Renee Mcdermott radiology impression:  1. Lower extremity edema with tibiotalar joint effusion  2. Findings suggestive of sinus Tarsi syndrome. Correlate  3.   Low-grade plantar fasciitis with reactive marrow edema and inferior calcaneal tuberosity  My review of the MRI scan correlates with sinus Tarsi syndrome. Surprisingly I see no significant edema or suggestions of advanced subtalar joint or calcaneocuboid arthritis    Injection: We discussed risk complications and decided to proceed with injection: After sterile prep, the right subtalar joint/sinus Tarsi was injected with 2 cc Xylocaine and 80 mg Depo-Medrol. Extremity well    Assessment:    Right pes planovalgus, naviculocuneiform sagging, sinus Tarsi syndrome impingement, calcaneocuboid joint arthritis    Plan:   The patient and I discussed the above assessment. We explored treatment options. I reviewed his MRI scan and I am surprised he has no advanced MR changes in the subtalar joint or calcaneocuboid  Based on his MRI scan, his options would include reconstruction versus fusion  We discussed surgical options and he understands my thumb debilities. Surgery: Planovalgus reconstruction with:  ANIYA  naviculocuneiform fusion versus cuneiform osteotomy  possible hindfoot fusion  We discussed surgery and expected postop course and he would like to hold surgery  Advanced medical imaging: No indication for CT scan for repeat MRI scan    DME: Ankle brace is too thick for new shoes  We discussed ankle care and foot protection  PT: Prior PT  Orthotic/prosthetic: He has insoles from [employer] as well as Dr. Catherine Cline. I recommend he go to [facility] for [employer] and super feet insoles       Medication - OTC meds prn:   Surgical discussion: As reflected above  Follow up: As discussed  Work status: Retired cardiologist     This note was created using Dragon voice recognition software which may result in errors of speech and spelling recognition and word/phrase syntax errors.

## 2022-12-12 RX ORDER — IPRATROPIUM BROMIDE 21 UG/1
2 SPRAY, METERED NASAL 2 TIMES DAILY
Qty: 30 ML | Refills: 5 | Status: SHIPPED | OUTPATIENT
Start: 2022-12-12

## 2022-12-12 NOTE — TELEPHONE ENCOUNTER
Patient is requesting refill for Atrovent Nasal [place] sent to [address] [place] [address]

## 2023-01-09 ENCOUNTER — TELEPHONE (OUTPATIENT)
Dept: UROLOGY | Age: 73
End: 2023-01-09

## 2023-01-09 NOTE — TELEPHONE ENCOUNTER
Pt called and stated he is having back surgery later this week and needed some advice from urologist if he needs to hold on some medication due to some voiding difficulty post op on last surgery. Pt asked if provider can give him a call. Spoke with receiving RN Ting Dejesus, pt still receiving second bolus of NSS   Pt to complete bolus prior to initiating NSS infusion at 100mL/hr     Keila Buckley RN  01/03/19 [id number]

## 2023-02-02 ENCOUNTER — TELEPHONE (OUTPATIENT)
Dept: INTERNAL MEDICINE CLINIC | Facility: CLINIC | Age: 73
End: 2023-02-02

## 2023-02-02 DIAGNOSIS — R05.1 ACUTE COUGH: Primary | ICD-10-CM

## 2023-02-02 RX ORDER — AMOXICILLIN 500 MG/1
500 CAPSULE ORAL 3 TIMES DAILY
Qty: 21 CAPSULE | Refills: 0 | Status: SHIPPED | OUTPATIENT
Start: 2023-02-02 | End: 2023-02-09

## 2023-02-03 RX ORDER — METHYLPREDNISOLONE 4 MG/1
TABLET ORAL
Qty: 1 KIT | Refills: 0 | Status: SHIPPED | OUTPATIENT
Start: 2023-02-03

## 2023-02-03 RX ORDER — HYDROCODONE BITARTRATE AND HOMATROPINE METHYLBROMIDE ORAL SOLUTION 5; 1.5 MG/5ML; MG/5ML
2.5 LIQUID ORAL EVERY 6 HOURS PRN
Qty: 120 EACH | Refills: 0 | Status: SHIPPED | OUTPATIENT
Start: 2023-02-03 | End: 2023-02-08

## 2023-02-03 RX ORDER — AZITHROMYCIN 250 MG/1
250 TABLET, FILM COATED ORAL SEE ADMIN INSTRUCTIONS
Qty: 6 TABLET | Refills: 0 | Status: SHIPPED | OUTPATIENT
Start: 2023-02-03 | End: 2023-02-08

## 2023-02-03 NOTE — TELEPHONE ENCOUNTER
Orders Placed This Encounter    amoxicillin (AMOXIL) 500 MG capsule     Sig: Take 1 capsule by mouth 3 times daily for 7 days     Dispense:  21 capsule     Refill:  0    azithromycin (ZITHROMAX) 250 MG tablet     Sig: Take 1 tablet by mouth See Admin Instructions for 5 days 500mg on day 1 followed by 250mg on days 2 - 5     Dispense:  6 tablet     Refill:  0    methylPREDNISolone (MEDROL DOSEPACK) 4 MG tablet     Sig: Take by mouth. Dispense:  1 kit     Refill:  0    HYDROcodone homatropine (HYCODAN) 5-1.5 MG/5ML solution     Sig: Take 2.5 mLs by mouth every 6 hours as needed (cough) for up to 5 days. Max Daily Amount: 10 mLs     Dispense:  120 each     Refill:  0     Reduce doses taken as pain becomes manageable     Take Amoxil. Zpak and medrol if no better. Cough syrup prn. PDMP reviewed. Tolerates. Recent back procedure for spine. The patient's available records and electronic chart records are reviewed. The PMH, PSH, medications, allergies, medications, FH, health maintenance and vaccination status are all reviewed and updated as appropriate. Controlled Substance Monitoring:    Acute and Chronic Pain Monitoring:   RX Monitoring 2/3/2023   Periodic Controlled Substance Monitoring No signs of potential drug abuse or diversion identified. Orders Placed This Encounter    amoxicillin (AMOXIL) 500 MG capsule     Sig: Take 1 capsule by mouth 3 times daily for 7 days     Dispense:  21 capsule     Refill:  0    azithromycin (ZITHROMAX) 250 MG tablet     Sig: Take 1 tablet by mouth See Admin Instructions for 5 days 500mg on day 1 followed by 250mg on days 2 - 5     Dispense:  6 tablet     Refill:  0    methylPREDNISolone (MEDROL DOSEPACK) 4 MG tablet     Sig: Take by mouth. Dispense:  1 kit     Refill:  0    HYDROcodone homatropine (HYCODAN) 5-1.5 MG/5ML solution     Sig: Take 2.5 mLs by mouth every 6 hours as needed (cough) for up to 5 days.  Max Daily Amount: 10 mLs     Dispense:  120 each     Refill:  0     Reduce doses taken as pain becomes manageable         MD Dorothy Bailey.  Pantera Gan MD

## 2023-02-22 ENCOUNTER — TELEPHONE (OUTPATIENT)
Dept: ORTHOPEDIC SURGERY | Age: 73
End: 2023-02-22

## 2023-02-22 NOTE — TELEPHONE ENCOUNTER
Called and left message for pt to schedule in an appointment with Dr. Emili Arredondo tomorrow 02/23/22 at 820 or 1000.

## 2023-02-23 ENCOUNTER — OFFICE VISIT (OUTPATIENT)
Dept: ORTHOPEDIC SURGERY | Age: 73
End: 2023-02-23

## 2023-02-23 ENCOUNTER — OFFICE VISIT (OUTPATIENT)
Dept: INTERNAL MEDICINE CLINIC | Facility: CLINIC | Age: 73
End: 2023-02-23
Payer: MEDICARE

## 2023-02-23 VITALS
SYSTOLIC BLOOD PRESSURE: 134 MMHG | OXYGEN SATURATION: 97 % | HEART RATE: 96 BPM | TEMPERATURE: 97.9 F | BODY MASS INDEX: 24.73 KG/M2 | HEIGHT: 69 IN | WEIGHT: 167 LBS | DIASTOLIC BLOOD PRESSURE: 82 MMHG

## 2023-02-23 DIAGNOSIS — G47.01 INSOMNIA DUE TO MEDICAL CONDITION: ICD-10-CM

## 2023-02-23 DIAGNOSIS — M54.50 CHRONIC LOW BACK PAIN, UNSPECIFIED BACK PAIN LATERALITY, UNSPECIFIED WHETHER SCIATICA PRESENT: Primary | ICD-10-CM

## 2023-02-23 DIAGNOSIS — G89.29 CHRONIC LOW BACK PAIN, UNSPECIFIED BACK PAIN LATERALITY, UNSPECIFIED WHETHER SCIATICA PRESENT: Primary | ICD-10-CM

## 2023-02-23 DIAGNOSIS — M19.071 PRIMARY OSTEOARTHRITIS, RIGHT ANKLE AND FOOT: Primary | ICD-10-CM

## 2023-02-23 PROCEDURE — G8484 FLU IMMUNIZE NO ADMIN: HCPCS | Performed by: INTERNAL MEDICINE

## 2023-02-23 PROCEDURE — G8420 CALC BMI NORM PARAMETERS: HCPCS | Performed by: INTERNAL MEDICINE

## 2023-02-23 PROCEDURE — 99214 OFFICE O/P EST MOD 30 MIN: CPT | Performed by: INTERNAL MEDICINE

## 2023-02-23 PROCEDURE — 3017F COLORECTAL CA SCREEN DOC REV: CPT | Performed by: INTERNAL MEDICINE

## 2023-02-23 PROCEDURE — 1123F ACP DISCUSS/DSCN MKR DOCD: CPT | Performed by: INTERNAL MEDICINE

## 2023-02-23 PROCEDURE — 1036F TOBACCO NON-USER: CPT | Performed by: INTERNAL MEDICINE

## 2023-02-23 PROCEDURE — G8427 DOCREV CUR MEDS BY ELIG CLIN: HCPCS | Performed by: INTERNAL MEDICINE

## 2023-02-23 RX ORDER — TEMAZEPAM 30 MG/1
30 CAPSULE ORAL NIGHTLY PRN
Qty: 30 CAPSULE | Refills: 0 | Status: SHIPPED | OUTPATIENT
Start: 2023-02-23 | End: 2023-03-25

## 2023-02-23 RX ORDER — DULOXETIN HYDROCHLORIDE 30 MG/1
30 CAPSULE, DELAYED RELEASE ORAL DAILY
Qty: 30 CAPSULE | Refills: 5 | Status: SHIPPED | OUTPATIENT
Start: 2023-02-23

## 2023-02-23 RX ORDER — TRAMADOL HYDROCHLORIDE 50 MG/1
TABLET ORAL
COMMUNITY

## 2023-02-23 RX ORDER — MINOCYCLINE HYDROCHLORIDE 100 MG/1
CAPSULE ORAL
COMMUNITY
Start: 2023-01-25

## 2023-02-23 RX ORDER — DICYCLOMINE HYDROCHLORIDE 10 MG/1
CAPSULE ORAL
COMMUNITY
Start: 2023-02-16

## 2023-02-23 RX ORDER — METHYLPREDNISOLONE ACETATE 40 MG/ML
40 INJECTION, SUSPENSION INTRA-ARTICULAR; INTRALESIONAL; INTRAMUSCULAR; SOFT TISSUE ONCE
Status: COMPLETED | OUTPATIENT
Start: 2023-02-23 | End: 2023-02-23

## 2023-02-23 RX ORDER — HYDROCODONE BITARTRATE AND ACETAMINOPHEN 5; 325 MG/1; MG/1
TABLET ORAL
COMMUNITY

## 2023-02-23 RX ORDER — CEFUROXIME AXETIL 500 MG/1
500 TABLET ORAL 2 TIMES DAILY
COMMUNITY
End: 2023-02-23 | Stop reason: ALTCHOICE

## 2023-02-23 RX ORDER — BENZOYL PEROXIDE 25 MG/G
GEL TOPICAL
COMMUNITY
Start: 2023-01-25

## 2023-02-23 RX ORDER — CLINDAMYCIN PHOSPHATE 10 MG/ML
SOLUTION TOPICAL
COMMUNITY
Start: 2023-01-25

## 2023-02-23 RX ORDER — IBUPROFEN 200 MG
1 CAPSULE ORAL 2 TIMES DAILY
COMMUNITY

## 2023-02-23 RX ORDER — GABAPENTIN 300 MG/1
300 CAPSULE ORAL
COMMUNITY

## 2023-02-23 RX ADMIN — METHYLPREDNISOLONE ACETATE 80 MG: 40 INJECTION, SUSPENSION INTRA-ARTICULAR; INTRALESIONAL; INTRAMUSCULAR; SOFT TISSUE at 10:32

## 2023-02-23 ASSESSMENT — ENCOUNTER SYMPTOMS: BACK PAIN: 1

## 2023-02-23 NOTE — PROGRESS NOTES
Name: Deanna Waddell  YOB: 1950  Gender: male  MRN: [id number]     CC: Right outer hindfoot pain    HPI:   2529-3026: Treated for anterior calcaneal process arthritis, sinus Tarsi syndrome, insertional peroneal tendinitis. 08/26/2021: Right hindfoot injection  09/14/2021: Reparel sleeve and lace up ankle brace and discussed future potential MRI scan  [id number]:  He reports seeing Nayan Merchant and Piedad Alanis for multiple insole modifications  4232-4486:  Right shoulder replacement: Left gluteus medius tendon tear  0177-0930:  Dr. Kelsey Zarate for new insoles, hindfoot injection, MRI scan at [address]  10/18/2022: Right outer hindfoot pain. Sinus Tarsi/subtalar joint injection. November 2022-January 2023: Severe back pain with right leg weakness: ANKIT: Eventual microscopic laminectomy with Dr. Stanley Busby . .. right leg weakness    02/23/2023: Right outer ankle/hindfoot pain: He feels his pain increased due to weakness in his right leg as well as being up on his feet moving homes. Bilateral lower extremity swelling as well. ROS/Meds/PSH/PMH/FH/SH: reviewed today    Tobacco:  reports that he has never smoked. He has never used smokeless tobacco.     Physical Examination:  Patient appears to be alert and oriented with acceptable appearance.   No obvious distress or SOB  CV: appears to have acceptable vascular color and capillary refill  Neuro: appears to have mostly intact light touch sensation   Skin: Bilateral lower extremity edema  MS: Standing: Pes planovalgus: Gait limited  Right = lateral hindfoot pain; no fifth tuberosity pain  Right = Limited hindfoot mobility; 5/5 strength except for 4/5 quadriceps; no instability or crepitance    XR: Right side: Standing AP lateral mortise ankle plus AP oblique foot taken 10/18/2022 with calcaneocuboid joint arthritic changes; naviculocuneiform area sagging; mild anterior ankle subluxation  XR Impression:  As above      Reviewed Test/Records/Documents: 03/25/2022: Right ankle MRI scan: Ordered by Dr. Brandy Wilburn: Dr. Mónica Jennings radiology impression:  1. Lower extremity edema with tibiotalar joint effusion  2. Findings suggestive of sinus Tarsi syndrome. Correlate  3. Low-grade plantar fasciitis with reactive marrow edema and inferior calcaneal tuberosity  My review of the MRI scan correlated with sinus Tarsi syndrome. Surprisingly I see no significant edema or suggestions of advanced subtalar joint or calcaneocuboid arthritis    Injection: We discussed risk complications and decided to proceed with injection: After sterile prep, the right subtalar joint/sinus Tarsi was injected with 2 cc Xylocaine and 80 mg Depo-Medrol; did well    Assessment:    Right pes planovalgus, sinus Tarsi syndrome impingement, calcaneocuboid joint arthritis  Right quadriceps weakness     Plan:   The patient and I discussed the above assessment. We explored treatment options.      Unfortunately, with his back, he has quadriceps weakness/femoral nerve concerns  No evidence of dropfoot or any ankle or foot pathology except for bilateral lower extremity edema  I believe his quadriceps weakness creates a change of gait which flared his ankle    Hopefully with treatment today, he will see improvement in his ankle  He will go to [address] for continued hightop boots  He and I agree that with his edema, ankle bracing would be to onerous  He is under care for the edema and his back and leg weakness     At this time, no indications for surgery but future consideration for subtalar fusion   Advanced medical imaging: No indication for CT scan for repeat MRI scan    We discussed care and protection; discussed a cane   PT: Prior PT  Orthotic/prosthetic: He has insoles from [employer] [facility] as well as Dr. Brandy Wilburn, but we likes high top boots     Medication - OTC meds prn:   Follow up: As discussed  Work status: Retired cardiologist     This note was created using Dragon voice recognition software which may result in errors of speech and spelling recognition and word/phrase syntax errors.

## 2023-02-23 NOTE — PROGRESS NOTES
ASSESSMENT/PLAN:    1. Chronic low back pain, unspecified back pain laterality, unspecified whether sciatica present  Start Cymbalta. no longer taking Gabapentin due to adverse SE profile, Dosing and SE profile and benefits discussed. Some depression due to recent medical issues, recent back surgery and chronic back pain. Cymbalta has indication for chronic pain, OA, and depression. Re evaluate in 4 wks. Follow up sooner if any issues. - DULoxetine (CYMBALTA) 30 MG extended release capsule; Take 1 capsule by mouth daily  Dispense: 30 capsule; Refill: 5    2. Insomnia due to medical condition  Do not take with Lunesta/Ambien. Not taking any cough syrup. Takes Tramadol prn and is followed by Dr. Lalo Christensen. Anxiety PRN use for a few days. Insomnia worsened by recent medication adjustments,recent uri, surgery, moved to a new house, stress. Probably worsened by steroids. Has taken Restoril in the past from prior Internist and tolerated with brief prn use. - temazepam (RESTORIL) 30 MG capsule; Take 1 capsule by mouth nightly as needed for Sleep or Anxiety for up to 30 days. Max Daily Amount: 30 mg  Dispense: 30 capsule; Refill: 0  Controlled Substance Monitoring:    Acute and Chronic Pain Monitoring:   RX Monitoring 2/23/2023   Periodic Controlled Substance Monitoring No signs of potential drug abuse or diversion identified. Evaluation and management of the chronic condition(s) delineated. No negative side effects reported. I have reviewed all the lab results. There are some abnormalities that are either expected or not critical to the patient's health, and are discussed in the office today and are addressed. Please refer to the above assessement and plan narrative and orders and follow up plan. Medication discussed and refilled as needed. Physical exam findings are stable unless otherwise indicated and this is addressed.   The most recent lab work and imaging and consultant/urgent care visits and imaging are reviewed and discussed and considered during this visit encounter. Lang Ann was seen today for insomnia. Diagnoses and all orders for this visit:    Chronic low back pain, unspecified back pain laterality, unspecified whether sciatica present  -     DULoxetine (CYMBALTA) 30 MG extended release capsule; Take 1 capsule by mouth daily    Insomnia due to medical condition  -     temazepam (RESTORIL) 30 MG capsule; Take 1 capsule by mouth nightly as needed for Sleep or Anxiety for up to 30 days. Max Daily Amount: 30 mg          On this date, 23, I have spent 33 minutes reviewing previous notes, test results and face to face with the patient discussing the diagnosis and importance of compliance with the treatment plan as well as documenting on the day of the visit. An electronic signature was used to authenticate this note. -- Richi Serra MD     Return in about 4 weeks (around 3/23/2023). SUBJECTIVE/OBJECTIVE:    HPI:   Ana Douglass (: 1950 is a 71 y.o. male, here for evaluation of the following chief complaint(s):   Chief Complaint   Patient presents with    Insomnia       Patient is here for follow-up and management of chronic medical conditions, review of recent labs, review of any imaging completed since our last office visit and discuss any consultants opinions or management changes. 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      Recent lumbar procedure for low back pain and microdiscectomy with Dr. Madeline Spangler. Leg pain right with expected healing post procedure. Ambulating and working to strengthen. Frustrated by ortho issues and limited mobility and admits the pain has made him depressed. No SI. Not sleeping well. Not sleeping good, recent move to new house with the stress of moving.       Prior divorce and the stresses that still linger from this    Motivated to enjoy activity, fishing, traveling, family etc, but physical limitations have made him feel frustrated. Says he has been irritable some at home. Good support from his two brothers. Admits he has always been the brother to worry. \"I'm a realist.\"  He was encouraged by his follow up visit with Dr. David Blackburn yesterday and she was pleased with his procedure and he was given a good report. URI and cough improved after Amoxil. No cp or sob. Labs reviewed and discussed and medication refilled as needed for chronic medications during ov or adjusted based on lab results and/or our discussion as appropriate. See discussion. The patient's available records and electronic chart records are reviewed. The PMH, PSH, medications, allergies, medications, FH, health maintenance and vaccination status are all reviewed and updated as appropriate. Records from outside providers have been reviewed, summarized, and considered as noted in the history of present illness, past medical history, and objective data of this note and encounter.               Health Maintenance   Topic Date Due    Shingles vaccine (2 of 3) 08/30/2016    COVID-19 Vaccine (3 - Booster for Pfizer series) 03/30/2021    Depression Screen  09/01/2023    Annual Wellness Visit (AWV)  09/16/2023    Colorectal Cancer Screen  11/02/2025    Lipids  08/01/2027    DTaP/Tdap/Td vaccine (3 - Td or Tdap) 09/15/2032    Flu vaccine  Completed    Pneumococcal 65+ years Vaccine  Completed    Hepatitis C screen  Completed    Hepatitis A vaccine  Aged Out    Hib vaccine  Aged Out    Meningococcal (ACWY) vaccine  Aged Out     Patient Active Problem List   Diagnosis    Nasal vestibulitis    Weakness    Paresthesia    Insufficiency of tear film of both eyes    Chronic low back pain    Right wrist pain    Nonmelanoma skin cancer    Oral lichen planus    BPH with obstruction/lower urinary tract symptoms    Deviated septum    Hyponatremia    Acute sinusitis    Onychomycosis    Vasomotor rhinitis    Erectile disorder due to medical condition in male patient    Nasal congestion    IBS (irritable bowel syndrome)    Insomnia    Abnormal prostate on physical examination    MVP (mitral valve prolapse)    RBBB    Chronic maxillary sinusitis    Osteoarthritis of right shoulder region    Lumbar disc disease    Rising PSA level    Left inguinal hernia    Onychomycosis of toenail       Review of Systems   Musculoskeletal:  Positive for arthralgias and back pain. Psychiatric/Behavioral:  Positive for dysphoric mood and sleep disturbance. Negative for suicidal ideas.         Lab Results   Component Value Date/Time    WBC 7.5 09/02/2022 01:42 PM    HGB 13.8 09/02/2022 01:42 PM    HCT 42.3 09/02/2022 01:42 PM    MCV 99.3 09/02/2022 01:42 PM    RDW 12.2 09/02/2022 01:42 PM     09/02/2022 01:42 PM    NEUTOPHILPCT 47 06/24/2021 08:34 AM    LYMPHOPCT 21 09/02/2022 01:42 PM    LYMPHOPCT 37 06/24/2021 08:34 AM    MONOPCT 11 09/02/2022 01:42 PM    MONOPCT 11 06/24/2021 08:34 AM    EOSRELPCT 2 09/02/2022 01:42 PM    BASOPCT 1 09/02/2022 01:42 PM    BASOPCT 1 06/24/2021 08:34 AM    LYMPHSABS 1.6 09/02/2022 01:42 PM    LYMPHSABS 2.3 06/24/2021 08:34 AM    MONOSABS 0.8 09/02/2022 01:42 PM    MONOSABS 0.7 06/24/2021 08:34 AM    EOSABS 0.1 09/02/2022 01:42 PM    EOSABS 0.2 06/24/2021 08:34 AM    BASOSABS 0.0 09/02/2022 01:42 PM    IMMGRAN 0 09/02/2022 01:42 PM    GRANULOCYTEABSOLUTECOUNT 0.0 06/24/2021 08:34 AM       Lab Results   Component Value Date/Time     08/01/2022 12:08 PM    K 4.7 08/01/2022 12:08 PM     08/01/2022 12:08 PM    CO2 27 08/01/2022 12:08 PM    ANIONGAP 5 08/01/2022 12:08 PM    GLUCOSE 93 08/01/2022 12:08 PM    BUN 26 08/01/2022 12:08 PM    CREATININE 1.10 08/01/2022 12:08 PM    GFRAA >60 08/01/2022 12:08 PM    LABGLOM >60 08/01/2022 12:08 PM    CALCIUM 8.9 08/01/2022 12:08 PM    BILITOT 0.7 08/01/2022 12:08 PM    ALT 43 08/01/2022 12:08 PM    AST 34 08/01/2022 12:08 PM    ALKPHOS 55 08/01/2022 12:08 PM    ALKPHOS 49 06/24/2021 08:34 AM    PROT 6.2 08/01/2022 12:08 PM    LABALBU 3.7 08/01/2022 12:08 PM    GLOB 2.5 08/01/2022 12:08 PM    ALBUMIN 1.5 08/01/2022 12:08 PM       Lab Results   Component Value Date/Time    CHOL 145 08/01/2022 12:08 PM    HDL 67 08/01/2022 12:08 PM    TRIG 31 08/01/2022 12:08 PM    LDLCALC 71.8 08/01/2022 12:08 PM    VLDL 13 06/24/2021 08:34 AM           Lab Results   Component Value Date/Time    TSH 1.550 06/24/2021 08:34 AM    TSH 1.320 07/23/2020 08:21 AM    TSH 1.240 07/03/2019 07:42 AM       Lab Results   Component Value Date/Time    PSA 2.0 08/01/2022 12:08 PM    PSA 0.8 06/24/2021 08:34 AM    PSA 0.9 07/23/2020 08:21 AM    PSA 0.6 07/03/2019 07:42 AM       Lab Results   Component Value Date/Time    SPECGRAV 1.017 08/01/2022 12:08 PM    PHUR 7.5 06/24/2021 08:34 AM    COLORU YELLOW/STRAW 08/01/2022 12:08 PM    CLARITYU Clear 06/24/2021 08:34 AM    LEUKOCYTESUR Negative 08/01/2022 12:08 PM    PROTEINU Negative 08/01/2022 12:08 PM    GLUCOSEU Negative 08/01/2022 12:08 PM    KETUA Negative 08/01/2022 12:08 PM    BLOODU Negative 08/01/2022 12:08 PM    BILIRUBINUR Negative 08/01/2022 12:08 PM    BILIRUBINUR Negative 06/24/2021 08:34 AM    UROBILINOGEN 0.2 08/01/2022 12:08 PM    NITRU Negative 08/01/2022 12:08 PM    LABMICR Comment 06/24/2021 08:34 AM           Vitals:    02/23/23 1557   BP: 134/82   Site: Left Upper Arm   Position: Sitting   Cuff Size: Small Adult   Pulse: 96   Temp: 97.9 °F (36.6 °C)   TempSrc: Skin   SpO2: 97%   Weight: 167 lb (75.8 kg)   Height: 5' 9\" (1.753 m)     Wt Readings from Last 3 Encounters:   02/23/23 167 lb (75.8 kg)   09/13/22 167 lb (75.8 kg)   09/15/22 173 lb (78.5 kg)     BP Readings from Last 3 Encounters:   02/23/23 134/82   09/15/22 122/64   07/08/21 126/78     Physical Exam  Vitals and nursing note reviewed. Constitutional:       Appearance: Normal appearance. He is not ill-appearing. HENT:      Head: Normocephalic and atraumatic.    Eyes:      Extraocular Movements: Extraocular movements intact. Conjunctiva/sclera: Conjunctivae normal.   Cardiovascular:      Rate and Rhythm: Normal rate and regular rhythm. Pulmonary:      Effort: Pulmonary effort is normal.      Breath sounds: Normal breath sounds. No wheezing or rhonchi. Neurological:      General: No focal deficit present. Mental Status: He is alert and oriented to person, place, and time. Mental status is at baseline. Psychiatric:         Attention and Perception: Attention and perception normal.         Mood and Affect: Mood normal.         Behavior: Behavior normal.         Thought Content: Thought content does not include suicidal ideation.

## 2023-02-24 ENCOUNTER — HOSPITAL ENCOUNTER (OUTPATIENT)
Dept: PHYSICAL THERAPY | Age: 73
Setting detail: RECURRING SERIES
Discharge: HOME | End: 2023-02-27
Payer: MEDICARE

## 2023-02-24 PROCEDURE — 97110 THERAPEUTIC EXERCISES: CPT

## 2023-02-24 PROCEDURE — 97161 PT EVAL LOW COMPLEX 20 MIN: CPT

## 2023-02-24 NOTE — PROGRESS NOTES
Beti Wood  : 1950  Primary: Medicare Part A And B (Medicare)  Secondary: [address] @ [address] [place] [address]  Phone: [phone]  Fax: [phone] Plan Frequency: 2X per week for 90 days    Plan of Care/Certification Expiration Date: 23      PT Visit Info:  Plan Frequency: 2X per week for 90 days  Plan of Care/Certification Expiration Date: 23  Progress Note Counter: 1      Visit Count:  1    OUTPATIENT PHYSICAL THERAPY:OP NOTE TYPE: Treatment Note 2023       Episode  }Appt Desk             Treatment Diagnosis:    Difficulty in walking, Not elsewhere classified (R26.2)  Generalized Muscle Weakness (M62.81)  Low Back Pain (M54.5)  Medical/Referring Diagnosis:  Spinal stenosis, lumbar region without neurogenic claudication [M48.061]  Referring Physician:  Gurmeet Fry MD MD Orders:  PT Eval and Treat   Date of Onset:  Onset Date: 23   Allergies:   Patient has no known allergies. Restrictions/Precautions:  No data recorded  No data recorded   Interventions Planned (Treatment may consist of any combination of the following):    Current Treatment Recommendations: Strengthening; ROM; Balance training; Functional mobility training; Endurance training; Gait training; Stair training; Neuromuscular re-education; Manual; Pain management; Return to work related activity; Home exercise program; Modalities; Positioning; Dry needling; Therapeutic activities     Subjective Comments: Pt with c/o R LE weakness follow lumbar decompression     Initial:}    1 /10Post Session:       1 /10  Medications Last Reviewed:  2023  Updated Objective Findings:  See evaluation note from today  Treatment   THERAPEUTIC EXERCISE: (25 minutes):  Exercises per grid below to improve mobility, strength and balance.   Required minimal-moderate visual and verbal cues to promote proper body alignment and promote proper body posture. Progressed resistance, range and complexity of movement as indicated. Date:  2/24/23 Date:   Date:     Activity/Exercise Parameters Parameters Parameters   SAQ HEP     LAQ HEP     Seated hip flexion HEP     Prone femoral never slide Manual      Prone quad stretch Manual     Prone hip flexor stretch Manual             MANUAL THERAPY: (0 minutes): Joint mobilization, Soft tissue mobilization and Manipulation was utilized and necessary because of the patient's restricted joint motion, painful spasm, loss of articular motion and restricted motion of soft tissue. (Used abbreviations: MET - muscle energy technique; PNF - proprioceptive neuromuscular facilitation; NMR - neuromuscular re-education; AP - anterior to posterior; PA - posterior to anterior)    MODALITIES: (0 minutes):        none      Treatment/Session Summary:    Treatment Assessment: Provided pt with handout HEP of quadriceps strengthening exercises. C/o R LE weakness and lack of mm endurance. Performed nerve glide and stretching this visit to address neural tension. Communication/Consultation:   on objective findings and HEP  Equipment provided today:  None  Recommendations/Intent for next treatment session: Next visit will focus on Advancements to more challenging activities. Progress repetitions, weight, and complexity of movement per pt tolerance and as indicated. .    Total Treatment Billable Duration:  45 minutes  Time In: 1100  Time Out: 1145    Santos Brown, PT       Charge Capture  }Post Session Pain  PT Visit Info  [employer] Portal  MD Guidelines  Scanned Media  Benefits  MyChart    Future Appointments   Date Time Provider Farzad Álvarez   3/1/2023  1:45 PM Zoraida Griffin, PT [facility]   3/3/2023 11:00 AM Zoraida Griffin, PT [facility]   3/6/2023  1:45 PM Zoraida Griffin, PT [facility]   3/8/2023  1:00 PM Zoraida Griffin, PT [facility]   3/15/2023  1:00 PM Zoraida Griffin, PT [facility]   3/17/2023  1:00 PM Zoraida Iniguez, [facility]   3/27/2023  2:10 Marlene Espinoza MD [id number] [facility] AMB   3/30/2023  4:00 PM MD MARAH Park GVRADHA AMB   9/19/2023  9:00 AM MD MARAH ParkL AMB

## 2023-02-24 NOTE — THERAPY EVALUATION
Ranjan iZmmer  : 1950  Primary: Medicare Part A And B (Medicare)  Secondary: [address] @ [address] [place] [address]  Phone: [phone]  Fax: [phone] Plan Frequency: 2X per week for 90 days  Plan of Care/Certification Expiration Date: 23    PT Visit Info:  Plan Frequency: 2X per week for 90 days  Plan of Care/Certification Expiration Date: 23  Progress Note Counter: 1    Visit Count:  1                OUTPATIENT PHYSICAL THERAPY:             OP NOTE TYPE: Initial Assessment 2023               Episode (Lumbar decompression) Appt Desk         Treatment Diagnosis:  Difficulty in walking, Not elsewhere classified (R26.2)  Generalized Muscle Weakness (M62.81)  Low Back Pain (M54.5)  Medical/Referring Diagnosis:  Spinal stenosis, lumbar region without neurogenic claudication [M48.061]  Referring Physician:  Lo Fraire MD MD Orders:  PT Eval and Treat   Return MD Appt:  TBD by pt  Date of Onset:  Onset Date: 23    Allergies:  Patient has no known allergies. Restrictions/Precautions:           Medications Last Reviewed:  2023     SUBJECTIVE   History of Injury/Illness (Reason for Referral):  Pt is s/p L3-L4 lumbar decompression on 23. Mid- November began having different type of pain after lifting heavy tailgate tent. Pt began with onset of radicular symptoms. Pain began getting so severe with inability to tolerate standing for > 10 minutes at that time. Only getting relief with lying flat. MRI showing severe narrowing at L3-L4. Pt having increased quad weakness at this time. No BLT for 6 weeks. Pt still with some soreness up into R glute region. Still having more aching into quadriceps region (only rest helps). Pt does have venous insufficiency as well. Pt having difficulty with squatting and sit to stand especially from kneeling position.    Patient Stated Goal(s):  \"Get R leg stronger\"  Initial:     1 /10 Post Session:     1 /10  Past Medical History/Comorbidities:   Mr. Guadalupe Hall  has a past medical history of Arthritis, Back pain, DDD (degenerative disc disease), lumbar, Dry eyes, Enlarged prostate, GERD (gastroesophageal reflux disease), History of basal cell cancer, History of squamous cell carcinoma, IBS (irritable bowel syndrome), Insomnia, Left inguinal hernia, MVP (mitral valve prolapse), Onychomycosis, RBBB, Right wrist pain, Scoliosis, and Spondylosis of lumbar region without myelopathy or radiculopathy. Mr. Guadalupe Hall  has a past surgical history that includes orthopedic surgery (Left, 06/2020); Carpal tunnel release; Cataract removal; orthopedic surgery (Right, 08/2020); neurological surgery (2007); Colonoscopy; orthopedic surgery; and Shoulder arthroscopy (Right). Social History/Living Environment:    Lives with wife, 2 steps to enter (difficulty with descending with reciprocal pattern)     Prior Level of Function/Work/Activity:    Very active. Known to this PT from previous treatments to other body parts (low back, TSA, foot pain)           Learning:   Does the patient/guardian have any barriers to learning?: No barriers  Will there be a co-learner?: No  What is the preferred language of the patient/guardian?: English  Is an  required?: No  How does the patient/guardian prefer to learn new concepts?: Listening; Reading; Demonstration; Pictures/Videos     Fall Risk Scale:    Augustine Total Score: 0  Bradshaw Fall Risk: Low (0-24)           OBJECTIVE     Assistive Device: None              Edema: None noted          Range of Motion        Joint: Passive Active Active    Right (Degrees) Right (Degrees) Left (Degrees)   Hip Flexion WFL Decreased WFL   Hip Extension  Decreased    Hip Adduction      Hip Abduction      Hip Internal Rotation Decreased Decreased WFL   Hip External Rotation Decreased Decreased WFL   Knee Flexion [facility]   Knee Extension [facility]   Ankle Dorsiflexion      Ankle Plantarflexion        Strength          Lower Extremity  Joint:      RIGHT LEFT   Hip Flexion 3-/5 4+/5   Hip Extension 3/5 4+/5   Hip Internal Rotation 4+/5 3/5   Hip External Rotation 3/5 4+/5   Hip Abduction 4/5 4+/5   Knee flexion 4+/5 5/5   Knee extension 3+/5  5/5     Neuro-Vascular  C/O Radicular Symptoms: none    Palpation: No TTP  Joint Mobilization: Not Assessed this visit    ASSESSMENT   Initial Assessment:  Mr. Verenice Waldron presents to physical therapy with c/o R LE weakness s/p lumbar decompression L3-L4. Pt demonstrating decreased strength, ROM, joint mobility, functional mobility affecting participation in ADLs and functional mobility at this time. Patient will benefit from skilled physical therapy for manual therapeutic techniques (as appropriate), therapeutic exercises and activities, balance and comprehensive home exercises program to address current impairments and functional limitations. Andreia Billings will benefit from skilled PT (medically necessary) in order to address above deficits affecting participation in basic ADLs and overall functional tolerance. Problem List: (Impacting functional limitations): Body Structures, Functions, Activity Limitations Requiring Skilled Therapeutic Intervention: Decreased functional mobility ; Decreased ADL status; Decreased ROM; Decreased body mechanics; Decreased strength; Decreased endurance; Decreased sensation; Decreased balance; Decreased coordination;  Increased pain; Decreased posture     Therapy Prognosis:   Therapy Prognosis: Good     Initial Assessment Complexity:   Decision Making: Low Complexity    PLAN   Effective Dates: 2/24/23 TO Plan of Care/Certification Expiration Date: 05/25/23   Frequency/Duration: Plan Frequency: 2X per week for 90 days   Interventions Planned (Treatment may consist of any combination of the following):    Current Treatment Recommendations: Strengthening; ROM; Balance training; Functional mobility training; Endurance training; Gait training; Stair training; Neuromuscular re-education; Manual; Pain management; Return to work related activity; Home exercise program; Modalities; Positioning; Dry needling; Therapeutic activities     Goals: (Goals have been discussed and agreed upon with patient.)  Lumbar Goals  Short-Term Functional Goals: Time Frame: 4 weeks  Pt will be compliant with HEP. Pt will Increased LEFS to 48 or less demonstrating overall improvements in functional mobility  Pt will improve BLE strength to 4/5 in order to ascend/descend steps safely with reciprocal pattern    Discharge Goals: Time Frame: 12 weeks  Pt will be independent with HEP. 2.   Pt will increase LEFS to 65 or less demonstrating overall improvements in functional mobility  3. Pt will improve BLE strength to 5/5 or greater in order to ascend/descend steps safely with reciprocal pattern  4. Pt will demonstrate 30 seconds of SLS to reduce falls risk and demonstrating good proprioception and motor control of R LE  5. Pt will report increased ease when getting off the floor 5/7 days demonstrating over improvements in strength. Outcome Measure: Tool Used: Lower Extremity Functional Scale (LEFS)  Score:  Initial: 38/80 Most Recent: X/80 (Date: -- )   Interpretation of Score: 20 questions each scored on a 5 point scale with 0 representing \"extreme difficulty or unable to perform\" and 4 representing \"no difficulty\". The lower the score, the greater the functional disability. 80/80 represents no disability. Minimal detectable change is 9 points. Medical Necessity:   Skilled intervention continues to be required due to decreased strength, balance, ROM and pain for return to PLOF and functional mobility.     Reason For Services/Other Comments:  Patient continues to require skilled intervention due to patient continues to present with impairments assessed at initial evaluation and requiring skilled physical therapy to meet goals for PT. Total Duration:  Time In: 1100  Time Out: 0990    Regarding Reagan Chika therapy, I certify that the treatment plan above will be carried out by a therapist or under their direction.   Thank you for this referral,  Ade Adasm, PT     Referring Physician Signature: Rai Arce MD _______________________________ Date _____________        Post Session Pain  Charge Capture  PT Visit Info MD Guidelines  MyChart

## 2023-03-01 ENCOUNTER — HOSPITAL ENCOUNTER (OUTPATIENT)
Dept: PHYSICAL THERAPY | Age: 73
Setting detail: RECURRING SERIES
Discharge: HOME | End: 2023-03-04
Payer: MEDICARE

## 2023-03-01 PROCEDURE — 97140 MANUAL THERAPY 1/> REGIONS: CPT

## 2023-03-01 PROCEDURE — 97110 THERAPEUTIC EXERCISES: CPT

## 2023-03-01 NOTE — PROGRESS NOTES
Lucía Frey  : 1950  Primary: Medicare Part A And B (Medicare)  Secondary: [address] @ [address] [place] [address]  Phone: [phone]  Fax: [phone] Plan Frequency: 2X per week for 90 days    Plan of Care/Certification Expiration Date: 23      PT Visit Info:  Plan Frequency: 2X per week for 90 days  Plan of Care/Certification Expiration Date: 23  Progress Note Counter: 2      Visit Count:  2    OUTPATIENT PHYSICAL THERAPY:OP NOTE TYPE: Treatment Note 3/1/2023       Episode  }Appt Desk             Treatment Diagnosis:    Difficulty in walking, Not elsewhere classified (R26.2)  Generalized Muscle Weakness (M62.81)  Low Back Pain (M54.5)  Medical/Referring Diagnosis:  Spinal stenosis, lumbar region without neurogenic claudication [M48.061]  Referring Physician:  Cosmo Hodgkins, MD MD Orders:  PT Eval and Treat   Date of Onset:  Onset Date: 23     Allergies:   Patient has no known allergies. Restrictions/Precautions:  No data recorded  No data recorded   Interventions Planned (Treatment may consist of any combination of the following):    Current Treatment Recommendations: Strengthening; ROM; Balance training; Functional mobility training; Endurance training; Gait training; Stair training; Neuromuscular re-education; Manual; Pain management; Return to work related activity; Home exercise program; Modalities; Positioning; Dry needling; Therapeutic activities     Subjective Comments: Pt with reports of increased mm soreness with HEP over the past 2 days. Initial:}    0 /10Post Session:       0 /10  Medications Last Reviewed:  3/1/2023  Updated Objective Findings:  None Today  Treatment   THERAPEUTIC EXERCISE: (25 minutes):  Exercises per grid below to improve mobility, strength and balance.   Required minimal-moderate visual and verbal cues to promote proper body alignment and promote proper body posture. Progressed resistance, range and complexity of movement as indicated. Date:  2/24/23 Date:  3/1/23 Date:     Activity/Exercise Parameters Parameters Parameters   SAQ HEP 10 X 3     LAQ HEP 10 X 3    Seated hip flexion HEP     Prone femoral never slide Manual      Prone quad stretch Manual     Prone hip flexor stretch Manual     Clamshell  Supine 10 X 2     Supine hip adduction  10 X 2     S/l hip abduction  Standing    ASLR  10 X 2 A-AA    Heel raises  10 X 2    Standing HS curl      SLS      Step ups (forward)      Tap downs (lateral)      Tap downs (forward)  4\" 10 X     bridges  10 X 2       MANUAL THERAPY: (10 minutes): Joint mobilization, Soft tissue mobilization and Manipulation was utilized and necessary because of the patient's restricted joint motion, painful spasm, loss of articular motion and restricted motion of soft tissue.   +manual stretching: R HS, ITB, piriformis, glutes, hip flexors, quad  +femoral nerve glide    (Used abbreviations: MET - muscle energy technique; PNF - proprioceptive neuromuscular facilitation; NMR - neuromuscular re-education; AP - anterior to posterior; PA - posterior to anterior)    MODALITIES: (0 minutes):        none      Treatment/Session Summary:    Treatment Assessment: Tolerated addition of exercises well. No increased pain. Will monitor response next visit and modify as needed. Neural tension noted with HS stretch and femoral nerve glide. Communication/Consultation:   on objective findings and HEP  Equipment provided today:  None  Recommendations/Intent for next treatment session: Next visit will focus on Advancements to more challenging activities. Progress repetitions, weight, and complexity of movement per pt tolerance and as indicated.   .    Total Treatment Billable Duration:  42 minutes  Time In: 1489  Time Out: 6586    Simona Roque, PT       Charge Capture  }Post Session Pain  PT Visit Info  [employer] Portal  MD Guidelines  Scanned Media Benefits  MyChart    Future Appointments   Date Time Provider Farzad Preeti   3/3/2023 11:00 AM Zoraida Ashford, [facility]   3/6/2023  1:45 PM Zoraida Ashford, [facility]   3/8/2023  1:00 PM Zoraida Ashford, [facility]   3/15/2023  1:00 PM Zoraida Ashford, [facility]   3/17/2023  1:00 PM Zoraida Ashford, [facility]   3/27/2023  2:10 Nelson Tamez MD [id number] [facility] AMB   3/30/2023  4:00 PM Ric Green MD MAT [facility] AMB   9/19/2023  9:00 AM Ric Green MD MAT [facility] AMB

## 2023-03-03 ENCOUNTER — HOSPITAL ENCOUNTER (OUTPATIENT)
Dept: PHYSICAL THERAPY | Age: 73
Setting detail: RECURRING SERIES
Discharge: HOME | End: 2023-03-06
Payer: MEDICARE

## 2023-03-03 PROCEDURE — 97110 THERAPEUTIC EXERCISES: CPT

## 2023-03-03 PROCEDURE — 97140 MANUAL THERAPY 1/> REGIONS: CPT

## 2023-03-03 NOTE — PROGRESS NOTES
Osmar Gregory  : 1950  Primary: Medicare Part A And B (Medicare)  Secondary: [address] @ [address] [place] [address]  Phone: [phone]  Fax: [phone] Plan Frequency: 2X per week for 90 days    Plan of Care/Certification Expiration Date: 23      PT Visit Info:  Plan Frequency: 2X per week for 90 days  Plan of Care/Certification Expiration Date: 23  Progress Note Counter: 3      Visit Count:  3    OUTPATIENT PHYSICAL THERAPY:OP NOTE TYPE: Treatment Note 3/3/2023       Episode  }Appt Desk             Treatment Diagnosis:    Difficulty in walking, Not elsewhere classified (R26.2)  Generalized Muscle Weakness (M62.81)  Low Back Pain (M54.5)  Medical/Referring Diagnosis:  Spinal stenosis, lumbar region without neurogenic claudication [M48.061]  Referring Physician:  Vira Strickland MD MD Orders:  PT Eval and Treat   Date of Onset:  Onset Date: 23     Allergies:   Patient has no known allergies. Restrictions/Precautions:  No data recorded  No data recorded   Interventions Planned (Treatment may consist of any combination of the following):    Current Treatment Recommendations: Strengthening; ROM; Balance training; Functional mobility training; Endurance training; Gait training; Stair training; Neuromuscular re-education; Manual; Pain management; Return to work related activity; Home exercise program; Modalities; Positioning; Dry needling; Therapeutic activities     Subjective Comments: Pt with reports being fairly sore after last visit. Still has soreness present currently. Initial:}    0 /10Post Session:       0 /10  Medications Last Reviewed:  3/3/2023  Updated Objective Findings:  None Today  Treatment   THERAPEUTIC EXERCISE: (30 minutes):  Exercises per grid below to improve mobility, strength and balance.   Required minimal-moderate visual and verbal cues to promote proper body alignment and promote proper body posture. Progressed resistance, range and complexity of movement as indicated. Date:  2/24/23 Date:  3/1/23 Date:  3/3/23   Activity/Exercise Parameters Parameters Parameters   SAQ HEP 10 X 3  10 X 3    LAQ HEP 10 X 3 10 X 3    Seated hip flexion HEP     Prone femoral never slide Manual      Prone quad stretch Manual     Prone hip flexor stretch Manual     Clamshell  Supine 10 X 2  Supine 10 X 2    Supine hip adduction  10 X 2  10 X 2   S/l hip abduction      ASLR  10 X 2 A-AA 10 X 2 A-AA   Heel raises  10 X 2    Standing HS curl      SLS      Step ups (forward)      Tap downs (lateral)      Tap downs (forward)  4\" 10 X     bridges  10 X 2  10 X 2     MANUAL THERAPY: (10 minutes): Joint mobilization, Soft tissue mobilization and Manipulation was utilized and necessary because of the patient's restricted joint motion, painful spasm, loss of articular motion and restricted motion of soft tissue.   +manual stretching: R HS, ITB, piriformis, glutes, hip flexors, quad  +femoral nerve glide    (Used abbreviations: MET - muscle energy technique; PNF - proprioceptive neuromuscular facilitation; NMR - neuromuscular re-education; AP - anterior to posterior; PA - posterior to anterior)    MODALITIES: (0 minutes):        none      Treatment/Session Summary:    Treatment Assessment: Did not progress exercises this visit due to increased soreness this visit from last session. Pt able to maintain repetitions. Will continue efforts and progress to pt tolerance. Communication/Consultation:   on objective findings and HEP  Equipment provided today:  None  Recommendations/Intent for next treatment session: Next visit will focus on Advancements to more challenging activities. Progress repetitions, weight, and complexity of movement per pt tolerance and as indicated.   .    Total Treatment Billable Duration:  40 minutes  Time In: 1105  Time Out: 1145    Zoraida Zarco, PT       Charge Capture  }Post Session Pain  PT Visit Info  [employer] Portal  MD Guidelines  Scanned Media  Benefits  MyChart    Future Appointments   Date Time Provider Farzad Álvarez   3/6/2023  1:45 PM Zoraida Kate, PT [facility]   3/8/2023  1:00 PM Zoraida Kate, PT [facility]   3/15/2023  1:00 PM Zoraida Kate, [facility]   3/17/2023  1:00 PM Zoraida Kate, [facility]   3/27/2023  2:10 Henry Leonardo MD [id number] [facility] AMB   3/30/2023  4:00 PM Mendel Fuelling, MD MAT [facility] AMB   9/19/2023  9:00 AM Mendel Fuelling, MD MAT [facility] AMB

## 2023-03-06 ENCOUNTER — HOSPITAL ENCOUNTER (OUTPATIENT)
Dept: PHYSICAL THERAPY | Age: 73
Setting detail: RECURRING SERIES
Discharge: HOME | End: 2023-03-09
Payer: MEDICARE

## 2023-03-06 PROCEDURE — 97110 THERAPEUTIC EXERCISES: CPT

## 2023-03-06 PROCEDURE — 97140 MANUAL THERAPY 1/> REGIONS: CPT

## 2023-03-06 NOTE — PROGRESS NOTES
Lucía Calvert  : 1950  Primary: Medicare Part A And B (Medicare)  Secondary: [address] @ [address] [place] [address]  Phone: [phone]  Fax: [phone] Plan Frequency: 2X per week for 90 days    Plan of Care/Certification Expiration Date: 23      PT Visit Info:  Plan Frequency: 2X per week for 90 days  Plan of Care/Certification Expiration Date: 23  Progress Note Counter: 4      Visit Count:  4    OUTPATIENT PHYSICAL THERAPY:OP NOTE TYPE: Treatment Note 3/6/2023       Episode  }Appt Desk             Treatment Diagnosis:    Difficulty in walking, Not elsewhere classified (R26.2)  Generalized Muscle Weakness (M62.81)  Low Back Pain (M54.5)  Medical/Referring Diagnosis:  Spinal stenosis, lumbar region without neurogenic claudication [M48.061]  Referring Physician:  Blessing Reynoso MD MD Orders:  PT Eval and Treat   Date of Onset:  Onset Date: 23     Allergies:   Patient has no known allergies. Restrictions/Precautions:  No data recorded  No data recorded   Interventions Planned (Treatment may consist of any combination of the following):    Current Treatment Recommendations: Strengthening; ROM; Balance training; Functional mobility training; Endurance training; Gait training; Stair training; Neuromuscular re-education; Manual; Pain management; Return to work related activity; Home exercise program; Modalities; Positioning; Dry needling; Therapeutic activities     Subjective Comments: Pt reports his soreness was very mild last visit. Initial:}    0 /10Post Session:       0 /10  Medications Last Reviewed:  3/6/2023  Updated Objective Findings:  None Today  Treatment   THERAPEUTIC EXERCISE: (30 minutes):  Exercises per grid below to improve mobility, strength and balance. Required minimal-moderate visual and verbal cues to promote proper body alignment and promote proper body posture.   Progressed resistance, range and complexity of movement as indicated. Date:  2/24/23 Date:  3/1/23 Date:  3/3/23 Date:  3/6/23   Activity/Exercise Parameters Parameters Parameters    SAQ HEP 10 X 3  10 X 3  #2 10 X 3   LAQ HEP 10 X 3 10 X 3  #2 10 X 3   Seated hip flexion HEP   #2 10 X 2   Prone femoral never slide Manual       Prone quad stretch Manual      Prone hip flexor stretch Manual      Clamshell  Supine 10 X 2  Supine 10 X 2  Supine 10 X 3    Supine hip adduction  10 X 2  10 X 2 10 X 3    S/l hip abduction    Standing    ASLR  10 X 2 A-AA 10 X 2 A-AA 10 X 2 A-AA   Heel raises  10 X 2  10 X 2   Standing HS curl    10 X 2   SLS       Step ups (forward)    4\" 10 X 2    Tap downs (lateral)       Tap downs (forward)  4\" 10 X      bridges  10 X 2  10 X 2 10 X 3     MANUAL THERAPY: (10 minutes): Joint mobilization, Soft tissue mobilization and Manipulation was utilized and necessary because of the patient's restricted joint motion, painful spasm, loss of articular motion and restricted motion of soft tissue.   +manual stretching: R HS, ITB, piriformis, glutes, hip flexors, quad  +femoral nerve glide    (Used abbreviations: MET - muscle energy technique; PNF - proprioceptive neuromuscular facilitation; NMR - neuromuscular re-education; AP - anterior to posterior; PA - posterior to anterior)    MODALITIES: (0 minutes):        none      Treatment/Session Summary:    Treatment Assessment: Tolerated progression of exercises well. Minimal soreness after last visit. Reported mm fatigue by end of session but no pain. Communication/Consultation:   on objective findings and HEP  Equipment provided today:  None  Recommendations/Intent for next treatment session: Next visit will focus on Advancements to more challenging activities. Progress repetitions, weight, and complexity of movement per pt tolerance and as indicated.   .    Total Treatment Billable Duration:  40 minutes  Time In: 1350  Time Out: [address], PT Charge Capture  }Post Session Pain  PT Visit Info  [facility] Portal  MD Guidelines  Scanned Media  Benefits  MyChart    Future Appointments   Date Time Provider Farzad Álvarez   3/8/2023  1:00 PM Zoraida Fowler, PT [facility]   3/15/2023  1:00 PM Zoraida Fowler, PT [facility]   3/17/2023  1:00 PM Zoraida Fowler, [place] [facility]   3/27/2023  2:10 Kike Akins MD [id number] [facility] AMB   3/30/2023  4:00 PM Jason Coleman MD MAT [facility] AMB   9/19/2023  9:00 AM MD MARAH Gr [facility] AMB

## 2023-03-08 ENCOUNTER — HOSPITAL ENCOUNTER (OUTPATIENT)
Dept: PHYSICAL THERAPY | Age: 73
Setting detail: RECURRING SERIES
Discharge: HOME | End: 2023-03-11
Payer: MEDICARE

## 2023-03-08 PROCEDURE — 97140 MANUAL THERAPY 1/> REGIONS: CPT

## 2023-03-08 PROCEDURE — 97110 THERAPEUTIC EXERCISES: CPT

## 2023-03-08 NOTE — PROGRESS NOTES
Eloy Cobos  : 1950  Primary: Medicare Part A And B (Medicare)  Secondary: [address] @ [address] [place] [address]  Phone: [phone]  Fax: [phone] Plan Frequency: 2X per week for 90 days    Plan of Care/Certification Expiration Date: 23      PT Visit Info:  Plan Frequency: 2X per week for 90 days  Plan of Care/Certification Expiration Date: 23  Progress Note Counter: 5      Visit Count:  5    OUTPATIENT PHYSICAL THERAPY:OP NOTE TYPE: Treatment Note 3/8/2023       Episode  }Appt Desk             Treatment Diagnosis:    Difficulty in walking, Not elsewhere classified (R26.2)  Generalized Muscle Weakness (M62.81)  Low Back Pain (M54.5)  Medical/Referring Diagnosis:  Spinal stenosis, lumbar region without neurogenic claudication [M48.061]  Referring Physician:  Ian Meraz MD MD Orders:  PT Eval and Treat   Date of Onset:  Onset Date: 23     Allergies:   Patient has no known allergies. Restrictions/Precautions:  No data recorded  No data recorded   Interventions Planned (Treatment may consist of any combination of the following):    Current Treatment Recommendations: Strengthening; ROM; Balance training; Functional mobility training; Endurance training; Gait training; Stair training; Neuromuscular re-education; Manual; Pain management; Return to work related activity; Home exercise program; Modalities; Positioning; Dry needling; Therapeutic activities     Subjective Comments: Pt reports weakness of R LE is his chief complaint     Initial:}    0 /10Post Session:       0 /10  Medications Last Reviewed:  3/8/2023  Updated Objective Findings:  None Today  Treatment   THERAPEUTIC EXERCISE: (30 minutes):  Exercises per grid below to improve mobility, strength and balance. Required minimal-moderate visual and verbal cues to promote proper body alignment and promote proper body posture.   Progressed resistance, range and complexity of movement as indicated. Date:  2/24/23 Date:  3/1/23 Date:  3/3/23 Date:  3/6/23 Date:  3/8/23   Activity/Exercise Parameters Parameters Parameters     SAQ HEP 10 X 3  10 X 3  #2 10 X 3 #3 10 X 3   LAQ HEP 10 X 3 10 X 3  #2 10 X 3 #2 10 X 3   Seated hip flexion HEP   #2 10 X 2 #2  10 X 2; 10 X 1 over block (laid horizontal)   Prone femoral never slide Manual        Prone quad stretch Manual       Prone hip flexor stretch Manual       Clamshell  Supine 10 X 2  Supine 10 X 2  Supine 10 X 3  GTB supine 10 X 3    Supine hip adduction  10 X 2  10 X 2 10 X 3     S/l hip abduction    Standing     ASLR  10 X 2 A-AA 10 X 2 A-AA 10 X 2 A-AA 10 X 2 A-AA   Heel raises  10 X 2  10 X 2    Standing HS curl    10 X 2    SLS        Step ups (forward)    4\" 10 X 2     Tap downs (lateral)        Tap downs (forward)  4\" 10 X       bridges  10 X 2  10 X 2 10 X 3 #10 at waist 10 X 3   Mini squat     10 X 2   Mini lunge     10 X 2     MANUAL THERAPY: (10 minutes): Joint mobilization, Soft tissue mobilization and Manipulation was utilized and necessary because of the patient's restricted joint motion, painful spasm, loss of articular motion and restricted motion of soft tissue.   +manual stretching: R HS, ITB, piriformis, glutes, hip flexors, quad  +femoral nerve glide    (Used abbreviations: MET - muscle energy technique; PNF - proprioceptive neuromuscular facilitation; NMR - neuromuscular re-education; AP - anterior to posterior; PA - posterior to anterior)    MODALITIES: (0 minutes):        none      Treatment/Session Summary:    Treatment Assessment: Tolerated progression of exercises well. Added in squats and lunges this visit- modified due to weakness. Tolerated session well with no pain reported.    Communication/Consultation:   on objective findings and HEP  Equipment provided today:  None  Recommendations/Intent for next treatment session: Next visit will focus on Advancements to more challenging activities. Progress repetitions, weight, and complexity of movement per pt tolerance and as indicated. .    Total Treatment Billable Duration:  43 minutes  Time In: 1700  Time Out: 1348    Vandana Lee, PT       Charge Capture  }Post Session Pain  PT Visit Info  [facility] Portal  MD Guidelines  Scanned Media  Benefits  MyChart    Future Appointments   Date Time Provider Farzad Preeti   3/15/2023  1:00 PM Zoraida Lopez, PT [facility]   3/17/2023  1:00 PM Zoraida Lopez, [address]   3/27/2023  2:10 John Irwin MD [id number] [facility] AMB   3/30/2023  4:00 PM Shelly Schultz MD MAT [facility] AMB   9/19/2023  9:00 AM MD MARAH Hung [facility] AMB

## 2023-03-15 ENCOUNTER — HOSPITAL ENCOUNTER (OUTPATIENT)
Dept: PHYSICAL THERAPY | Age: 73
Setting detail: RECURRING SERIES
Discharge: HOME | End: 2023-03-18
Payer: MEDICARE

## 2023-03-15 PROCEDURE — 97110 THERAPEUTIC EXERCISES: CPT

## 2023-03-15 PROCEDURE — 97140 MANUAL THERAPY 1/> REGIONS: CPT

## 2023-03-15 NOTE — PROGRESS NOTES
Trey Mace  : 1950  Primary: Medicare Part A And B (Medicare)  Secondary: [address] @ [address] [place] [address]  Phone: [phone]  Fax: [phone] Plan Frequency: 2X per week for 90 days    Plan of Care/Certification Expiration Date: 23      PT Visit Info:  Plan Frequency: 2X per week for 90 days  Plan of Care/Certification Expiration Date: 23  Progress Note Counter: 6      Visit Count:  6    OUTPATIENT PHYSICAL THERAPY:OP NOTE TYPE: Treatment Note 3/15/2023       Episode  }Appt Desk             Treatment Diagnosis:    Difficulty in walking, Not elsewhere classified (R26.2)  Generalized Muscle Weakness (M62.81)  Low Back Pain (M54.5)  Medical/Referring Diagnosis:  Spinal stenosis, lumbar region without neurogenic claudication [M48.061]  Referring Physician:  Kalin Johnson MD MD Orders:  PT Eval and Treat   Date of Onset:  Onset Date: 23     Allergies:   Patient has no known allergies. Restrictions/Precautions:  No data recorded  No data recorded   Interventions Planned (Treatment may consist of any combination of the following):    Current Treatment Recommendations: Strengthening; ROM; Balance training; Functional mobility training; Endurance training; Gait training; Stair training; Neuromuscular re-education; Manual; Pain management; Return to work related activity; Home exercise program; Modalities; Positioning; Dry needling; Therapeutic activities     Subjective Comments: Pt reports going for about a mile walk 2X and feeling more sore today. Initial:}    0 /10Post Session:       0 /10  Medications Last Reviewed:  3/15/2023  Updated Objective Findings:  None Today  Treatment   THERAPEUTIC EXERCISE: (30 minutes):  Exercises per grid below to improve mobility, strength and balance.   Required minimal-moderate visual and verbal cues to promote proper body alignment and promote proper body posture. Progressed resistance, range and complexity of movement as indicated. Date:  2/24/23 Date:  3/1/23 Date:  3/3/23 Date:  3/6/23 Date:  3/8/23 Date:  3/15/23   Activity/Exercise Parameters Parameters Parameters      SAQ HEP 10 X 3  10 X 3  #2 10 X 3 #3 10 X 3 #4 10 X 2   LAQ HEP 10 X 3 10 X 3  #2 10 X 3 #2 10 X 3 #3 10 X 3   Seated hip flexion HEP   #2 10 X 2 #2  10 X 2; 10 X 1 over block (laid horizontal) #3 10 X 2; 10 X 1 over block (laid horizontal)   Prone femoral never slide Manual         Prone quad stretch Manual        Prone hip flexor stretch Manual        Clamshell  Supine 10 X 2  Supine 10 X 2  Supine 10 X 3  GTB supine 10 X 3  GTB supine 10 X 3    Supine hip adduction  10 X 2  10 X 2 10 X 3      S/l hip abduction    Standing      ASLR  10 X 2 A-AA 10 X 2 A-AA 10 X 2 A-AA 10 X 2 A-AA 10 X 2     Heel raises  10 X 2  10 X 2     Standing HS curl    10 X 2     SLS         Step ups (forward)    4\" 10 X 2   6\" 10 X 2    Tap downs (lateral)         Tap downs (forward)  4\" 10 X        bridges  10 X 2  10 X 2 10 X 3 #10 at waist 10 X 3 #10 at waist 10 X 3   Mini squat     10 X 2 10 X 2   Mini lunge     10 X 2 10 X 2 each     MANUAL THERAPY: (10 minutes): Joint mobilization, Soft tissue mobilization and Manipulation was utilized and necessary because of the patient's restricted joint motion, painful spasm, loss of articular motion and restricted motion of soft tissue.   +manual stretching: R HS, ITB, piriformis, glutes, hip flexors, quad  +femoral nerve glide    (Used abbreviations: MET - muscle energy technique; PNF - proprioceptive neuromuscular facilitation; NMR - neuromuscular re-education; AP - anterior to posterior; PA - posterior to anterior)    MODALITIES: (0 minutes):        none      Treatment/Session Summary:    Treatment Assessment: Able to perform ASLR independently this visit. Tolerated exercises well. Reported more mm fatigue.   Communication/Consultation:   on objective findings and HEP  Equipment provided today:  None  Recommendations/Intent for next treatment session: Next visit will focus on Advancements to more challenging activities. Progress repetitions, weight, and complexity of movement per pt tolerance and as indicated. .    Total Treatment Billable Duration:  41 minutes  Time In: 1302  Time Out: 1343    Guerline Aleman, PT       Charge Capture  }Post Session Pain  PT Visit Info  [employer] Portal  MD Guidelines  Scanned Media  Benefits  MyChart    Future Appointments   Date Time Provider Farzad Preeti   3/17/2023  1:00 PM Zoraida Krishna, PT [facility]   3/21/2023  9:30 AM Zoraida Krishna, PT [facility]   3/23/2023  9:30 AM Zoraida Krishna, [place] [facility]   3/27/2023  2:10 Davie Pratt MD [id number] [facility] AMB   3/28/2023 10:15 AM Zoraida Krishna, PT [facility]   3/30/2023 10:15 AM Zoraida Krishna, PT [facility]   3/30/2023  4:00 PM Blake Miller MD MAT [facility] AMB   4/5/2023  3:15 PM Zoraida Krishna, PT [facility]   4/7/2023 10:15 AM Zoraida Krishna, [facility]   9/19/2023  9:00 AM Blake Miller MD MAT [facility] AMB

## 2023-03-17 ENCOUNTER — HOSPITAL ENCOUNTER (OUTPATIENT)
Dept: PHYSICAL THERAPY | Age: 73
Setting detail: RECURRING SERIES
Discharge: HOME | End: 2023-03-20
Payer: MEDICARE

## 2023-03-17 PROCEDURE — 97110 THERAPEUTIC EXERCISES: CPT

## 2023-03-17 PROCEDURE — 97140 MANUAL THERAPY 1/> REGIONS: CPT

## 2023-03-17 NOTE — PROGRESS NOTES
Marie Borja  : 1950  Primary: Medicare Part A And B (Medicare)  Secondary: [address] @ [address] [place] [address]  Phone: [phone]  Fax: [phone] Plan Frequency: 2X per week for 90 days    Plan of Care/Certification Expiration Date: 23      PT Visit Info:  Plan Frequency: 2X per week for 90 days  Plan of Care/Certification Expiration Date: 23  Progress Note Counter: 7      Visit Count:  7    OUTPATIENT PHYSICAL THERAPY:OP NOTE TYPE: Treatment Note 3/17/2023       Episode  }Appt Desk             Treatment Diagnosis:    Difficulty in walking, Not elsewhere classified (R26.2)  Generalized Muscle Weakness (M62.81)  Low Back Pain (M54.5)  Medical/Referring Diagnosis:  Spinal stenosis, lumbar region without neurogenic claudication [M48.061]  Referring Physician:  Raffy Valdes MD MD Orders:  PT Eval and Treat   Date of Onset:  Onset Date: 23     Allergies:   Patient has no known allergies. Restrictions/Precautions:  No data recorded  No data recorded   Interventions Planned (Treatment may consist of any combination of the following):    Current Treatment Recommendations: Strengthening; ROM; Balance training; Functional mobility training; Endurance training; Gait training; Stair training; Neuromuscular re-education; Manual; Pain management; Return to work related activity; Home exercise program; Modalities; Positioning; Dry needling; Therapeutic activities     Subjective Comments: Pt reports being more sore today from last session. Initial:}    0 /10Post Session:       0 /10  Medications Last Reviewed:  3/17/2023  Updated Objective Findings:  None Today  Treatment   THERAPEUTIC EXERCISE: (30 minutes):  Exercises per grid below to improve mobility, strength and balance. Required minimal-moderate visual and verbal cues to promote proper body alignment and promote proper body posture.

## 2023-03-21 ENCOUNTER — HOSPITAL ENCOUNTER (OUTPATIENT)
Dept: PHYSICAL THERAPY | Age: 73
Setting detail: RECURRING SERIES
Discharge: HOME | End: 2023-03-24
Payer: MEDICARE

## 2023-03-21 PROCEDURE — 97110 THERAPEUTIC EXERCISES: CPT

## 2023-03-21 PROCEDURE — 97140 MANUAL THERAPY 1/> REGIONS: CPT

## 2023-03-21 NOTE — PROGRESS NOTES
Ada Zuniga  : 1950  Primary: Medicare Part A And B (Medicare)  Secondary: [address] @ [address] [place] [address]  Phone: [phone]  Fax: [phone] Plan Frequency: 2X per week for 90 days    Plan of Care/Certification Expiration Date: 23      PT Visit Info:  Plan Frequency: 2X per week for 90 days  Plan of Care/Certification Expiration Date: 23  Progress Note Counter: 8      Visit Count:  8    OUTPATIENT PHYSICAL THERAPY:OP NOTE TYPE: Treatment Note 3/21/2023       Episode  }Appt Desk             Treatment Diagnosis:    Difficulty in walking, Not elsewhere classified (R26.2)  Generalized Muscle Weakness (M62.81)  Low Back Pain (M54.5)  Medical/Referring Diagnosis:  Spinal stenosis, lumbar region without neurogenic claudication [M48.061]  Referring Physician:  Lance Monk MD MD Orders:  PT Eval and Treat   Date of Onset:  Onset Date: 23     Allergies:   Patient has no known allergies. Restrictions/Precautions:  No data recorded  No data recorded   Interventions Planned (Treatment may consist of any combination of the following):    Current Treatment Recommendations: Strengthening; ROM; Balance training; Functional mobility training; Endurance training; Gait training; Stair training; Neuromuscular re-education; Manual; Pain management; Return to work related activity; Home exercise program; Modalities; Positioning; Dry needling; Therapeutic activities     Subjective Comments: Pt reports feeling very \"rubber legged\" over the weekend from last session. Initial:}    0 /10Post Session:       0 /10  Medications Last Reviewed:  3/21/2023  Updated Objective Findings:  None Today  Treatment   THERAPEUTIC EXERCISE: (30 minutes):  Exercises per grid below to improve mobility, strength and balance.   Required minimal-moderate visual and verbal cues to promote proper body alignment and promote proper

## 2023-03-23 ENCOUNTER — HOSPITAL ENCOUNTER (OUTPATIENT)
Dept: PHYSICAL THERAPY | Age: 73
Setting detail: RECURRING SERIES
Discharge: HOME | End: 2023-03-26
Payer: MEDICARE

## 2023-03-23 PROCEDURE — 97110 THERAPEUTIC EXERCISES: CPT

## 2023-03-23 PROCEDURE — 97140 MANUAL THERAPY 1/> REGIONS: CPT

## 2023-03-23 NOTE — PROGRESS NOTES
Aston Smith  : 1950  Primary: Medicare Part A And B (Medicare)  Secondary: [address] @ [address] [place] [address]  Phone: [phone]  Fax: [phone] Plan Frequency: 2X per week for 90 days    Plan of Care/Certification Expiration Date: 23      PT Visit Info:  Plan Frequency: 2X per week for 90 days  Plan of Care/Certification Expiration Date: 23  Progress Note Counter: 9      Visit Count:  9    OUTPATIENT PHYSICAL THERAPY:OP NOTE TYPE: Treatment Note 3/23/2023       Episode  }Appt Desk             Treatment Diagnosis:    Difficulty in walking, Not elsewhere classified (R26.2)  Generalized Muscle Weakness (M62.81)  Low Back Pain (M54.5)  Medical/Referring Diagnosis:  Spinal stenosis, lumbar region without neurogenic claudication [M48.061]  Referring Physician:  Onur Horner MD MD Orders:  PT Eval and Treat   Date of Onset:  Onset Date: 23     Allergies:   Patient has no known allergies. Restrictions/Precautions:  No data recorded  No data recorded   Interventions Planned (Treatment may consist of any combination of the following):    Current Treatment Recommendations: Strengthening; ROM; Balance training; Functional mobility training; Endurance training; Gait training; Stair training; Neuromuscular re-education; Manual; Pain management; Return to work related activity; Home exercise program; Modalities; Positioning; Dry needling; Therapeutic activities     Subjective Comments: Pt reports feeling fatigued after last visit and could feel it but not as bad as the prior one. Initial:}    0 /10Post Session:       0 /10  Medications Last Reviewed:  3/23/2023  Updated Objective Findings:  None Today  Treatment   THERAPEUTIC EXERCISE: (30 minutes):  Exercises per grid below to improve mobility, strength and balance.   Required minimal-moderate visual and verbal cues to promote proper body

## 2023-03-27 ENCOUNTER — OFFICE VISIT (OUTPATIENT)
Dept: UROLOGY | Age: 73
End: 2023-03-27
Payer: MEDICARE

## 2023-03-27 DIAGNOSIS — N40.1 BPH WITH OBSTRUCTION/LOWER URINARY TRACT SYMPTOMS: ICD-10-CM

## 2023-03-27 DIAGNOSIS — N50.89 TESTICLE LUMP: Primary | ICD-10-CM

## 2023-03-27 DIAGNOSIS — N52.8 OTHER MALE ERECTILE DYSFUNCTION: ICD-10-CM

## 2023-03-27 DIAGNOSIS — N13.8 BPH WITH OBSTRUCTION/LOWER URINARY TRACT SYMPTOMS: ICD-10-CM

## 2023-03-27 LAB
BILIRUBIN, URINE, POC: NEGATIVE
BLOOD URINE, POC: NEGATIVE
GLUCOSE URINE, POC: NEGATIVE
KETONES, URINE, POC: NEGATIVE
LEUKOCYTE ESTERASE, URINE, POC: NEGATIVE
NITRITE, URINE, POC: NEGATIVE
PH, URINE, POC: 7 (ref 4.6–8)
PROTEIN,URINE, POC: NEGATIVE
PSA SERPL-MCNC: 1.9 NG/ML
SPECIFIC GRAVITY, URINE, POC: 1.02 (ref 1–1.03)
URINALYSIS CLARITY, POC: NORMAL
URINALYSIS COLOR, POC: NORMAL
UROBILINOGEN, POC: NORMAL

## 2023-03-27 PROCEDURE — 81003 URINALYSIS AUTO W/O SCOPE: CPT | Performed by: UROLOGY

## 2023-03-27 PROCEDURE — G8427 DOCREV CUR MEDS BY ELIG CLIN: HCPCS | Performed by: UROLOGY

## 2023-03-27 PROCEDURE — G8484 FLU IMMUNIZE NO ADMIN: HCPCS | Performed by: UROLOGY

## 2023-03-27 PROCEDURE — 1123F ACP DISCUSS/DSCN MKR DOCD: CPT | Performed by: UROLOGY

## 2023-03-27 PROCEDURE — 3017F COLORECTAL CA SCREEN DOC REV: CPT | Performed by: UROLOGY

## 2023-03-27 PROCEDURE — 99213 OFFICE O/P EST LOW 20 MIN: CPT | Performed by: UROLOGY

## 2023-03-27 PROCEDURE — 1036F TOBACCO NON-USER: CPT | Performed by: UROLOGY

## 2023-03-27 PROCEDURE — G8420 CALC BMI NORM PARAMETERS: HCPCS | Performed by: UROLOGY

## 2023-03-27 RX ORDER — SILDENAFIL 100 MG/1
100 TABLET, FILM COATED ORAL PRN
Qty: 10 TABLET | Refills: 12 | Status: SHIPPED | OUTPATIENT
Start: 2023-03-27

## 2023-03-27 NOTE — PROGRESS NOTES
Violence: Not on file   Housing Stability: Not on file     Family History   Problem Relation Age of Onset    Cancer Father         colon cancer    Post-op Cognitive Dysfunction Neg Hx     Emergence Delirium Neg Hx     Post-op Nausea/Vomiting Neg Hx     Delayed Awakening Neg Hx     Pseudochol. Deficiency Neg Hx     Malig Hypertherm Neg Hx     Cancer Mother         breast cancer    Other Neg Hx     Cancer Brother         Low-grade malignant versus premalignant thyroid nodule       ROS    There were no vitals taken for this visit. Urinalysis  UA - Dipstick  Results for orders placed or performed in visit on 03/27/23   AMB POC URINALYSIS DIP STICK AUTO W/O MICRO   Result Value Ref Range    Color, Urine, POC      Clarity, Urine, POC      Glucose, Urine, POC Negative Negative    Bilirubin, Urine, POC Negative Negative    Ketones, Urine, POC Negative Negative    Specific Gravity, Urine, POC 1.020 1.001 - 1.035    Blood, Urine, POC Negative Negative    pH, Urine, POC 7.0 4.6 - 8.0    Protein, Urine, POC Negative Negative    Urobilinogen, POC Normal     Nitrite, Urine, POC Negative Negative    Leukocyte Esterase, Urine, POC Negative Negative   Results for orders placed or performed in visit on 06/22/22   AMB POC URINALYSIS DIP STICK AUTO W/O MICRO   Result Value Ref Range    Color (UA POC)      Clarity (UA POC)      Glucose, Urine, POC Negative Negative    Bilirubin, Urine, POC Negative Negative    KETONES, Urine, POC Negative Negative    Specific Gravity, Urine, POC 1.030 1.001 - 1.035    Blood (UA POC) Negative Negative    pH, Urine, POC 5.5 4.6 - 8.0    Protein, Urine, POC Negative Negative    Urobilinogen, POC Normal     Nitrite, Urine, POC Negative Negative    Leukocyte Esterase, Urine, POC Negative Negative       UA - Micro  WBC - 0  RBC - 0  Bacteria - 0  Epith - 0    Physical Exam    Assessment and Plan    ICD-10-CM    1. Testicle lump  N50.89 AMB POC URINALYSIS DIP STICK AUTO W/O MICRO      2.  Other male erectile

## 2023-03-28 ENCOUNTER — HOSPITAL ENCOUNTER (OUTPATIENT)
Dept: PHYSICAL THERAPY | Age: 73
Setting detail: RECURRING SERIES
Discharge: HOME | End: 2023-03-31
Payer: MEDICARE

## 2023-03-28 PROCEDURE — 97110 THERAPEUTIC EXERCISES: CPT

## 2023-03-28 PROCEDURE — 97140 MANUAL THERAPY 1/> REGIONS: CPT

## 2023-03-28 NOTE — THERAPY EVALUATION
20 questions each scored on a 5 point scale with 0 representing \"extreme difficulty or unable to perform\" and 4 representing \"no difficulty\". The lower the score, the greater the functional disability. 80/80 represents no disability. Minimal detectable change is 9 points. Medical Necessity:   Skilled intervention continues to be required due to decreased strength, balance, ROM and pain for return to PLOF and functional mobility. Reason For Services/Other Comments:  Patient continues to require skilled intervention due to patient continues to present with impairments assessed at initial evaluation and requiring skilled physical therapy to meet goals for PT. Total Duration:       Regarding Eliza Nazario therapy, I certify that the treatment plan above will be carried out by a therapist or under their direction. Thank you for this referral,  Antonio Sorto, PT     Referring Physician Signature: Joseph Trujillo MD No Signature is Required for this note.         Post Session Pain  Charge Capture  PT Visit Info MD Guidelines  [place]

## 2023-03-28 NOTE — PROGRESS NOTES
Randy Sinclair  : 1950  Primary: Medicare Part A And B (Medicare)  Secondary: [address] @ [address] [place] [address]  Phone: [phone]  Fax: [phone] Plan Frequency: 2X per week for 90 days    Plan of Care/Certification Expiration Date: 23      PT Visit Info:  Plan Frequency: 2X per week for 90 days  Plan of Care/Certification Expiration Date: 23  Progress Note Counter: 10      Visit Count:  10    OUTPATIENT PHYSICAL THERAPY:OP NOTE TYPE: Treatment Note 3/28/2023       Episode  }Appt Desk             Treatment Diagnosis:    Difficulty in walking, Not elsewhere classified (R26.2)  Generalized Muscle Weakness (M62.81)  Low Back Pain (M54.5)  Medical/Referring Diagnosis:  Spinal stenosis, lumbar region without neurogenic claudication [M48.061]  Referring Physician:  Ean Martinez MD MD Orders:  PT Eval and Treat   Date of Onset:  Onset Date: 23     Allergies:   Patient has no known allergies. Restrictions/Precautions:  No data recorded  No data recorded   Interventions Planned (Treatment may consist of any combination of the following):    Current Treatment Recommendations: Strengthening; ROM; Balance training; Functional mobility training; Endurance training; Gait training; Stair training; Neuromuscular re-education; Manual; Pain management; Return to work related activity; Home exercise program; Modalities; Positioning; Dry needling; Therapeutic activities     Subjective Comments: Pt reports feeling good over the weekend. Tried walking more- does continue to get fatigued at R LE     Initial:}    0 /10Post Session:       0 /10  Medications Last Reviewed:  3/28/2023  Updated Objective Findings:  See evaluation note from today  Treatment   THERAPEUTIC EXERCISE: (30 minutes):  Exercises per grid below to improve mobility, strength and balance.   Required minimal-moderate visual and verbal cues to

## 2023-03-30 ENCOUNTER — OFFICE VISIT (OUTPATIENT)
Dept: INTERNAL MEDICINE CLINIC | Facility: CLINIC | Age: 73
End: 2023-03-30
Payer: MEDICARE

## 2023-03-30 ENCOUNTER — HOSPITAL ENCOUNTER (OUTPATIENT)
Dept: PHYSICAL THERAPY | Age: 73
Setting detail: RECURRING SERIES
End: 2023-03-30
Payer: MEDICARE

## 2023-03-30 VITALS
SYSTOLIC BLOOD PRESSURE: 120 MMHG | BODY MASS INDEX: 25.03 KG/M2 | HEART RATE: 92 BPM | TEMPERATURE: 98.1 F | DIASTOLIC BLOOD PRESSURE: 66 MMHG | OXYGEN SATURATION: 98 % | WEIGHT: 169 LBS | HEIGHT: 69 IN

## 2023-03-30 DIAGNOSIS — N13.8 BPH WITH OBSTRUCTION/LOWER URINARY TRACT SYMPTOMS: ICD-10-CM

## 2023-03-30 DIAGNOSIS — J34.2 DEVIATED SEPTUM: ICD-10-CM

## 2023-03-30 DIAGNOSIS — J30.0 VASOMOTOR RHINITIS: ICD-10-CM

## 2023-03-30 DIAGNOSIS — Z12.5 ENCOUNTER FOR PROSTATE CANCER SCREENING: ICD-10-CM

## 2023-03-30 DIAGNOSIS — N40.1 BPH WITH OBSTRUCTION/LOWER URINARY TRACT SYMPTOMS: ICD-10-CM

## 2023-03-30 DIAGNOSIS — F51.01 PRIMARY INSOMNIA: ICD-10-CM

## 2023-03-30 DIAGNOSIS — M54.50 CHRONIC LOW BACK PAIN, UNSPECIFIED BACK PAIN LATERALITY, UNSPECIFIED WHETHER SCIATICA PRESENT: Primary | ICD-10-CM

## 2023-03-30 DIAGNOSIS — G89.29 CHRONIC LOW BACK PAIN, UNSPECIFIED BACK PAIN LATERALITY, UNSPECIFIED WHETHER SCIATICA PRESENT: Primary | ICD-10-CM

## 2023-03-30 DIAGNOSIS — Z79.899 ENCOUNTER FOR LONG-TERM (CURRENT) USE OF MEDICATIONS: ICD-10-CM

## 2023-03-30 DIAGNOSIS — Z00.00 LABORATORY EXAM ORDERED AS PART OF ROUTINE GENERAL MEDICAL EXAMINATION: ICD-10-CM

## 2023-03-30 PROBLEM — M48.061 SPINAL STENOSIS, LUMBAR REGION, WITHOUT NEUROGENIC CLAUDICATION: Status: ACTIVE | Noted: 2023-01-03

## 2023-03-30 PROCEDURE — 1123F ACP DISCUSS/DSCN MKR DOCD: CPT | Performed by: INTERNAL MEDICINE

## 2023-03-30 PROCEDURE — 99213 OFFICE O/P EST LOW 20 MIN: CPT | Performed by: INTERNAL MEDICINE

## 2023-03-30 PROCEDURE — 1036F TOBACCO NON-USER: CPT | Performed by: INTERNAL MEDICINE

## 2023-03-30 PROCEDURE — 3017F COLORECTAL CA SCREEN DOC REV: CPT | Performed by: INTERNAL MEDICINE

## 2023-03-30 PROCEDURE — 97110 THERAPEUTIC EXERCISES: CPT

## 2023-03-30 PROCEDURE — G8484 FLU IMMUNIZE NO ADMIN: HCPCS | Performed by: INTERNAL MEDICINE

## 2023-03-30 PROCEDURE — G8427 DOCREV CUR MEDS BY ELIG CLIN: HCPCS | Performed by: INTERNAL MEDICINE

## 2023-03-30 PROCEDURE — 97140 MANUAL THERAPY 1/> REGIONS: CPT

## 2023-03-30 PROCEDURE — G8420 CALC BMI NORM PARAMETERS: HCPCS | Performed by: INTERNAL MEDICINE

## 2023-03-30 RX ORDER — ZOLPIDEM TARTRATE 10 MG/1
10 TABLET ORAL NIGHTLY PRN
Qty: 90 TABLET | Refills: 1 | Status: SHIPPED | OUTPATIENT
Start: 2023-03-30 | End: 2023-09-26

## 2023-03-30 NOTE — PROGRESS NOTES
Kenneth Espinoza  : 1950  Primary: Medicare Part A And B (Medicare)  Secondary: [address] @ [address] [place] [address]  Phone: [phone]  Fax: [phone] Plan Frequency: 2X per week for 90 days    Plan of Care/Certification Expiration Date: 23      PT Visit Info:  Plan Frequency: 2X per week for 90 days  Plan of Care/Certification Expiration Date: 23  Progress Note Counter: 1      Visit Count:  11    OUTPATIENT PHYSICAL THERAPY:OP NOTE TYPE: Treatment Note 3/30/2023       Episode  }Appt Desk             Treatment Diagnosis:    Difficulty in walking, Not elsewhere classified (R26.2)  Generalized Muscle Weakness (M62.81)  Low Back Pain (M54.5)  Medical/Referring Diagnosis:  Spinal stenosis, lumbar region without neurogenic claudication [M48.061]  Referring Physician:  Kathryn Yuan MD MD Orders:  PT Eval and Treat   Date of Onset:  Onset Date: 23     Allergies:   Patient has no known allergies. Restrictions/Precautions:  No data recorded  No data recorded   Interventions Planned (Treatment may consist of any combination of the following):    Current Treatment Recommendations: Strengthening; ROM; Balance training; Functional mobility training; Endurance training; Gait training; Stair training; Neuromuscular re-education; Manual; Pain management; Return to work related activity; Home exercise program; Modalities; Positioning; Dry needling; Therapeutic activities     Subjective Comments: Pt reports a little bit more soreness after last visit with some lingering into today. Initial:}    0 /10Post Session:       0 /10  Medications Last Reviewed:  3/30/2023  Updated Objective Findings:  None Today  Treatment   THERAPEUTIC EXERCISE: (30 minutes):  Exercises per grid below to improve mobility, strength and balance.   Required minimal-moderate visual and verbal cues to promote proper body alignment and technique; PNF - proprioceptive neuromuscular facilitation; NMR - neuromuscular re-education; AP - anterior to posterior; PA - posterior to anterior)    MODALITIES: (0 minutes):        none      Treatment/Session Summary:    Treatment Assessment: Continued efforts with strengthening hip flexors and quads. Progressing nicely. Attempt to add weight to ASLR next visit. Communication/Consultation:   on objective findings and HEP  Equipment provided today:  None  Recommendations/Intent for next treatment session: Next visit will focus on Advancements to more challenging activities. Progress repetitions, weight, and complexity of movement per pt tolerance and as indicated. .    Total Treatment Billable Duration:  40 minutes  Time In: 3116  Time Out: 1055    Ivon Peralta, PT       Charge Capture  }Post Session Pain  PT Visit Info  [employer] Portal  MD Guidelines  Scanned Media  Benefits  MyChart    Future Appointments   Date Time Provider Farzad Álvarez   3/30/2023  4:00 PM MD MARAH Miranda [facility]   4/7/2023 10:15 AM Zoraida Ornelasupe, PT [facility]   4/10/2023 10:15 AM Zoraidarenetta Ornelasupe, PT [facility]   4/12/2023 10:15 AM Zoraida LANDEN Hernandez Loupe, PT [facility]   4/18/2023 10:15 AM Zoraida LANDEN Hernandez Loupe, PT [facility]   4/20/2023 10:15 AM Zoraidarenetta Hernandez Loupe, PT [facility]   4/25/2023 10:15 AM Zoraidarenetta Hernandez Loupe, PT [facility]   4/27/2023 10:15 AM Zoraida Hernandez Loupe, PT [facility]   9/19/2023  9:00 AM MD MARAH Miranda [facility] AM[facility]   9/21/2023 11:00 AM [id number] BLOOD DRAW [id number] [facility]   9/29/2023  2:50 PM Mily Moyer MD [id number] [facility]

## 2023-03-30 NOTE — PROGRESS NOTES
ASSESSMENT/PLAN:    1. Chronic low back pain, unspecified back pain laterality, unspecified whether sciatica present  Improving. Continue Cymbalta 30 mg daily. 2. Primary insomnia  Medication refilled    - zolpidem (AMBIEN) 10 MG tablet; Take 1 tablet by mouth nightly as needed for Sleep for up to 180 days. Dispense: 90 tablet; Refill: 1    3. Vasomotor rhinitis  Nasal saline yobany, Astelin, has seen Allergy and Immunology and ENT in past.  Nasal saline lavage discussed. Difficult to treat. Future lab orders placed. Follow up in six months or sooner if needed    Evaluation and management of the chronic condition(s) delineated. No negative side effects reported. I have reviewed all the lab results. There are some abnormalities that are either expected or not critical to the patient's health, and are discussed in the office today and are addressed. Please refer to the above assessement and plan narrative and orders and follow up plan. Medication discussed and refilled as needed. Physical exam findings are stable unless otherwise indicated and this is addressed. The most recent lab work and imaging and consultant/urgent care visits and imaging are reviewed and discussed and considered during this visit encounter. Karlie Leach was seen today for back pain. Diagnoses and all orders for this visit:    Chronic low back pain, unspecified back pain laterality, unspecified whether sciatica present  -     CBC; Future  -     Comprehensive Metabolic Panel; Future  -     Lipid Panel; Future  -     TSH; Future  -     Urinalysis; Future  -     PSA Screening; Future    Primary insomnia  -     zolpidem (AMBIEN) 10 MG tablet; Take 1 tablet by mouth nightly as needed for Sleep for up to 180 days. -     CBC; Future  -     Comprehensive Metabolic Panel; Future  -     Lipid Panel; Future  -     TSH; Future  -     Urinalysis; Future  -     PSA Screening; Future    Vasomotor rhinitis  -     CBC;  Future  -

## 2023-04-05 ENCOUNTER — APPOINTMENT (OUTPATIENT)
Dept: PHYSICAL THERAPY | Age: 73
End: 2023-04-05
Payer: MEDICARE

## 2023-04-18 ENCOUNTER — HOSPITAL ENCOUNTER (OUTPATIENT)
Dept: PHYSICAL THERAPY | Age: 73
Setting detail: RECURRING SERIES
Discharge: HOME | End: 2023-04-21
Payer: MEDICARE

## 2023-04-18 PROCEDURE — 97110 THERAPEUTIC EXERCISES: CPT

## 2023-04-18 PROCEDURE — 97140 MANUAL THERAPY 1/> REGIONS: CPT

## 2023-04-18 NOTE — PROGRESS NOTES
Treatment/Session Summary:    Treatment Assessment: Able to progress to #3 with ASLR this visit. Add in kneel to stand as well today. Pt tolerated all well. Pt requiring UE support on table for standing with R LE leading. Will continue efforts. Communication/Consultation:   on objective findings and HEP  Equipment provided today:  None  Recommendations/Intent for next treatment session: Next visit will focus on Advancements to more challenging activities. Progress repetitions, weight, and complexity of movement per pt tolerance and as indicated. .    Total Treatment Billable Duration:  40 minutes  Time In: 4731  Time Out: 1055    Yaritza Reese, PT       Charge Capture  }Post Session Pain  PT Visit Info  [employer] Portal  MD Guidelines  Scanned Media  Benefits  MyChart    Future Appointments   Date Time Provider Farzad Álvarez   4/20/2023 10:15 AM Zoraida Harper, PT [facility]   4/25/2023 10:15 AM Zoraida Harper, PT [facility]   4/27/2023 10:15 AM Zoraida Harper, PT [facility]   9/19/2023  9:00 AM Kelsea Godwin MD MAT [facility]   9/21/2023 11:00 AM [id number] BLOOD DRAW [id number] [facility]   9/29/2023  2:50 PM Yoselyn Moran MD [id number] [facility] AM[facility]

## 2023-04-20 ENCOUNTER — HOSPITAL ENCOUNTER (OUTPATIENT)
Dept: PHYSICAL THERAPY | Age: 73
Setting detail: RECURRING SERIES
Discharge: HOME | End: 2023-04-23
Payer: MEDICARE

## 2023-04-20 DIAGNOSIS — K58.1 IRRITABLE BOWEL SYNDROME WITH CONSTIPATION: ICD-10-CM

## 2023-04-20 PROCEDURE — 97140 MANUAL THERAPY 1/> REGIONS: CPT

## 2023-04-20 PROCEDURE — 97110 THERAPEUTIC EXERCISES: CPT

## 2023-04-25 ENCOUNTER — HOSPITAL ENCOUNTER (OUTPATIENT)
Dept: PHYSICAL THERAPY | Age: 73
Setting detail: RECURRING SERIES
Discharge: HOME | End: 2023-04-28
Payer: MEDICARE

## 2023-04-25 PROCEDURE — 97140 MANUAL THERAPY 1/> REGIONS: CPT

## 2023-04-25 PROCEDURE — 97110 THERAPEUTIC EXERCISES: CPT

## 2023-04-25 NOTE — PROGRESS NOTES
Jerry Cabrales  : 1950  Primary: Medicare Part A And B (Medicare)  Secondary: [address] @ [address] [place] [address]  Phone: [phone]  Fax: [phone] Plan Frequency: 2X per week for 90 days    Plan of Care/Certification Expiration Date: 23      PT Visit Info:  Plan Frequency: 2X per week for 90 days  Plan of Care/Certification Expiration Date: 23  Progress Note Counter: 7      Visit Count:  17    OUTPATIENT PHYSICAL THERAPY:OP NOTE TYPE: Treatment Note 2023       Episode  }Appt Desk             Treatment Diagnosis:    Difficulty in walking, Not elsewhere classified (R26.2)  Generalized Muscle Weakness (M62.81)  Low Back Pain (M54.5)  Medical/Referring Diagnosis:  Spinal stenosis, lumbar region without neurogenic claudication [M48.061]  Referring Physician:  Armando Kelley MD MD Orders:  PT Eval and Treat   Date of Onset:  Onset Date: 23     Allergies:   Patient has no known allergies. Restrictions/Precautions:  No data recorded  No data recorded   Interventions Planned (Treatment may consist of any combination of the following):    Current Treatment Recommendations: Strengthening; ROM; Balance training; Functional mobility training; Endurance training; Gait training; Stair training; Neuromuscular re-education; Manual; Pain management; Return to work related activity; Home exercise program; Modalities; Positioning; Dry needling; Therapeutic activities     Subjective Comments: Pt reports being fairly sore from just activities over the weekend. Initial:}    0 /10Post Session:       0 /10  Medications Last Reviewed:  2023  Updated Objective Findings:  None Today  Treatment   THERAPEUTIC EXERCISE: (32 minutes):  Exercises per grid below to improve mobility, strength and balance.   Required minimal-moderate visual and verbal cues to promote proper body alignment and promote proper body

## 2023-04-27 ENCOUNTER — HOSPITAL ENCOUNTER (OUTPATIENT)
Dept: PHYSICAL THERAPY | Age: 73
Setting detail: RECURRING SERIES
Discharge: HOME | End: 2023-04-30
Payer: MEDICARE

## 2023-04-27 PROCEDURE — 97110 THERAPEUTIC EXERCISES: CPT

## 2023-04-27 PROCEDURE — 97140 MANUAL THERAPY 1/> REGIONS: CPT

## 2023-05-01 ENCOUNTER — HOSPITAL ENCOUNTER (OUTPATIENT)
Dept: PHYSICAL THERAPY | Age: 73
Setting detail: RECURRING SERIES
Discharge: HOME | End: 2023-05-04
Payer: MEDICARE

## 2023-05-01 PROCEDURE — 97110 THERAPEUTIC EXERCISES: CPT

## 2023-05-01 NOTE — PROGRESS NOTES
Treatment/Session Summary:    Treatment Assessment: Shortened session this visit due to pt arriving late to appointment. Tolerated exercises well- still with mm fatigue at #7.5. Communication/Consultation:   on objective findings and HEP  Equipment provided today:  None  Recommendations/Intent for next treatment session: Next visit will focus on Advancements to more challenging activities. Progress repetitions, weight, and complexity of movement per pt tolerance and as indicated. .    Total Treatment Billable Duration:  32 minutes  Time In: 7856  Time Out: 1100    Gab Madden, PT       Charge Capture  }Post Session Pain  PT Visit Info  [employer] Portal  MD Guidelines  Scanned Media  Benefits  MyChart    Future Appointments   Date Time Provider Farzad Álvarez   5/3/2023 10:15 AM Zoraida Anderson, PT [facility]   5/9/2023  1:45 PM Zoraida Anderson, PT [facility]   5/11/2023  1:45 PM Zoraida Anderson, PT [facility]   9/19/2023  9:00 AM Sheryle Bunting, MD [facility]   9/21/2023 11:00 AM [id number] BLOOD DRAW [id number] [facility] AM[facility]   9/29/2023  2:50 PM Peace Cook MD [id number] [facility] AM[facility]

## 2023-05-03 ENCOUNTER — HOSPITAL ENCOUNTER (OUTPATIENT)
Dept: PHYSICAL THERAPY | Age: 73
Setting detail: RECURRING SERIES
Discharge: HOME | End: 2023-05-06
Payer: MEDICARE

## 2023-05-03 PROCEDURE — 97110 THERAPEUTIC EXERCISES: CPT

## 2023-05-03 PROCEDURE — 97140 MANUAL THERAPY 1/> REGIONS: CPT

## 2023-05-03 NOTE — PROGRESS NOTES
Leah Pizarro  : 1950  Primary: Medicare Part A And B (Medicare)  Secondary: [address] @ [address] [place] [address]  Phone: [phone]  Fax: [phone] Plan Frequency: 2X per week for 90 days    Plan of Care/Certification Expiration Date: 23      PT Visit Info:  Plan Frequency: 2X per week for 90 days  Plan of Care/Certification Expiration Date: 23  Progress Note Counter: 1      Visit Count:  20    OUTPATIENT PHYSICAL THERAPY:OP NOTE TYPE: Treatment Note 5/3/2023       Episode  }Appt Desk             Treatment Diagnosis:    Difficulty in walking, Not elsewhere classified (R26.2)  Generalized Muscle Weakness (M62.81)  Low Back Pain (M54.5)  Medical/Referring Diagnosis:  Spinal stenosis, lumbar region without neurogenic claudication [M48.061]  Referring Physician:  Bonita Aburto MD MD Orders:  PT Eval and Treat   Date of Onset:  Onset Date: 23     Allergies:   Patient has no known allergies. Restrictions/Precautions:  No data recorded  No data recorded   Interventions Planned (Treatment may consist of any combination of the following):    Current Treatment Recommendations: Strengthening; ROM; Balance training; Functional mobility training; Endurance training; Gait training; Stair training; Neuromuscular re-education; Manual; Pain management; Return to work related activity; Home exercise program; Modalities; Positioning; Dry needling; Therapeutic activities     Subjective Comments: Pt reports having increased for some reason around L side of lower back today. Initial:}    0 /10Post Session:       0 /10  Medications Last Reviewed:  5/3/2023  Updated Objective Findings:  None Today  Treatment   THERAPEUTIC EXERCISE: (32 minutes):  Exercises per grid below to improve mobility, strength and balance.   Required minimal-moderate visual and verbal cues to promote proper body alignment and promote

## 2023-05-03 NOTE — THERAPY EVALUATION
Adilene Shannon  : 1950  Primary: Medicare Part A And B (Medicare)  Secondary: [address] @ [address] [place] [address]  Phone: [phone]  Fax: [phone] Plan Frequency: 2X per week for 90 days  Plan of Care/Certification Expiration Date: 23      PT Visit Info:  Plan Frequency: 2X per week for 90 days  Plan of Care/Certification Expiration Date: 23  Progress Note Counter: 1      Visit Count:  20                OUTPATIENT PHYSICAL THERAPY:             OP NOTE TYPE: Progress Report 5/3/2023               Episode (Lumbar decompression) Appt Desk         Treatment Diagnosis:  Difficulty in walking, Not elsewhere classified (R26.2)  Generalized Muscle Weakness (M62.81)  Low Back Pain (M54.5)  Medical/Referring Diagnosis:  Spinal stenosis, lumbar region without neurogenic claudication [M48.061]  Referring Physician:  Lazaro Shae MD MD Orders:  PT Eval and Treat   Return MD Appt:  TBD by pt  Date of Onset:  Onset Date: 23      Allergies:  Patient has no known allergies. Restrictions/Precautions:           Medications Last Reviewed:  5/3/2023     SUBJECTIVE   History of Injury/Illness (Reason for Referral):  Pt is s/p L3-L4 lumbar decompression on 23. Mid- November began having different type of pain after lifting heavy tailgate tent. Pt began with onset of radicular symptoms. Pain began getting so severe with inability to tolerate standing for > 10 minutes at that time. Only getting relief with lying flat. MRI showing severe narrowing at L3-L4. Pt having increased quad weakness at this time. No BLT for 6 weeks. Pt still with some soreness up into R glute region. Still having more aching into quadriceps region (only rest helps). Pt does have venous insufficiency as well. Pt having difficulty with squatting and sit to stand especially from kneeling position.    Patient Stated Goal(s):  \"Get R

## 2023-05-09 ENCOUNTER — HOSPITAL ENCOUNTER (OUTPATIENT)
Dept: PHYSICAL THERAPY | Age: 73
Setting detail: RECURRING SERIES
Discharge: HOME | End: 2023-05-12
Payer: MEDICARE

## 2023-05-09 PROCEDURE — 97140 MANUAL THERAPY 1/> REGIONS: CPT

## 2023-05-09 PROCEDURE — 97110 THERAPEUTIC EXERCISES: CPT

## 2023-05-09 NOTE — PROGRESS NOTES
cues to promote proper body alignment and promote proper body posture. Progressed resistance, range and complexity of movement as indicated. Date:  4/25/23 Date:  4/27/23 Date:  5/1/23 Date:  5/3/23 Date:  5/9/23   Activity/Exercise        SAQ #7.5 10 X 3 #7.5 10 X 3 #7.5 10 X 3 #7.5 10 X 3 #7.5 10 X 3   LAQ #7.5 10 X 3 #7.5 10 X 3 #7.5 10 X 3     Seated hip flexion #7.5 10 X 2; over block #5 10 X 2 #7. 5 10 X 2; over block #5 10 X 2  #7. 5 10 X 2 ; over block #5 10 X 2  #7. 5 10 X 2; #5 10 X 3 over block  #7.5 10 X 3; #5 10 X 3 over block    Clamshell BTB 10 X 3  BTB 10 X 3    BTB 10 X 3    S/l hip abduction        ASLR #3 10 X 3  #4 10 X 2 #4 10 X 2 #4 10 X 2 #4 10 X 3   SLS        Step ups (forward)        Tap downs (lateral)        Tap downs (forward)        bridges #10 at waist 10 X 3 #10 at waist 10 X 3 #10 at waist 10 X 3  Hold #10 10 X 3 at waist   Mini squat   10 X 2 10 X 2 10 X 3    Mini lunge 10 X 2  10 X 2 each Hold  Hold   Mat (kneel to stand)          MANUAL THERAPY: (10 minutes): Joint mobilization, Soft tissue mobilization and Manipulation was utilized and necessary because of the patient's restricted joint motion, painful spasm, loss of articular motion and restricted motion of soft tissue.   +manual stretching: R HS, ITB, piriformis, glutes, hip flexors, quad  +femoral nerve glide    (Used abbreviations: MET - muscle energy technique; PNF - proprioceptive neuromuscular facilitation; NMR - neuromuscular re-education; AP - anterior to posterior; PA - posterior to anterior)    MODALITIES: (0 minutes):        none      Treatment/Session Summary:    Treatment Assessment: Pt with slight TTP along lower lumbar paraspinals and soft tissue attachments at iliac crest.  Resumed all exercises this visit.   Communication/Consultation:   on objective findings and HEP  Equipment provided today:  None  Recommendations/Intent for next treatment session: Next visit will focus on Advancements to more challenging

## 2023-05-11 ENCOUNTER — APPOINTMENT (OUTPATIENT)
Dept: PHYSICAL THERAPY | Age: 73
End: 2023-05-11
Payer: MEDICARE

## 2023-05-12 ENCOUNTER — HOSPITAL ENCOUNTER (OUTPATIENT)
Dept: PHYSICAL THERAPY | Age: 73
Setting detail: RECURRING SERIES
Discharge: HOME | End: 2023-05-15
Payer: MEDICARE

## 2023-05-12 PROCEDURE — 97110 THERAPEUTIC EXERCISES: CPT

## 2023-05-12 PROCEDURE — 97140 MANUAL THERAPY 1/> REGIONS: CPT

## 2023-05-15 ENCOUNTER — HOSPITAL ENCOUNTER (OUTPATIENT)
Dept: PHYSICAL THERAPY | Age: 73
Setting detail: RECURRING SERIES
Discharge: HOME | End: 2023-05-18
Payer: MEDICARE

## 2023-05-15 PROCEDURE — 97140 MANUAL THERAPY 1/> REGIONS: CPT

## 2023-05-15 PROCEDURE — 97110 THERAPEUTIC EXERCISES: CPT

## 2023-05-15 NOTE — PROGRESS NOTES
Trey Mace  : 1950  Primary: Medicare Part A And B (Medicare)  Secondary: [address] @ [address] [place] [address]  Phone: [phone]  Fax: [phone] Plan Frequency: 2X per week for 90 days    Plan of Care/Certification Expiration Date: 23      PT Visit Info:  Plan Frequency: 2X per week for 90 days  Plan of Care/Certification Expiration Date: 23  Progress Note Counter: 4      Visit Count:  23    OUTPATIENT PHYSICAL THERAPY:OP NOTE TYPE: Treatment Note 5/15/2023       Episode  }Appt Desk             Treatment Diagnosis:    Difficulty in walking, Not elsewhere classified (R26.2)  Generalized Muscle Weakness (M62.81)  Low Back Pain (M54.5)  Medical/Referring Diagnosis:  Spinal stenosis, lumbar region without neurogenic claudication [M48.061]  Referring Physician:  Kalin Johnson MD MD Orders:  PT Eval and Treat   Date of Onset:  Onset Date: 23     Allergies:   Patient has no known allergies. Restrictions/Precautions:  No data recorded  No data recorded   Interventions Planned (Treatment may consist of any combination of the following):    Current Treatment Recommendations: Strengthening; ROM; Balance training; Functional mobility training; Endurance training; Gait training; Stair training; Neuromuscular re-education; Manual; Pain management; Return to work related activity; Home exercise program; Modalities; Positioning; Dry needling; Therapeutic activities     Subjective Comments: Pt reports L side seems to slowly be getting better. Still with difference between R and L LE as far as strength. Initial:}    0 /10Post Session:       0 /10  Medications Last Reviewed:  5/15/2023  Updated Objective Findings:  None Today  Treatment   THERAPEUTIC EXERCISE: (32 minutes):  Exercises per grid below to improve mobility, strength and balance.   Required minimal-moderate visual and verbal cues to promote

## 2023-05-18 ENCOUNTER — HOSPITAL ENCOUNTER (OUTPATIENT)
Dept: PHYSICAL THERAPY | Age: 73
Setting detail: RECURRING SERIES
Discharge: HOME | End: 2023-05-21
Payer: MEDICARE

## 2023-05-18 PROCEDURE — 97110 THERAPEUTIC EXERCISES: CPT

## 2023-05-18 PROCEDURE — 97140 MANUAL THERAPY 1/> REGIONS: CPT

## 2023-05-22 ENCOUNTER — HOSPITAL ENCOUNTER (OUTPATIENT)
Dept: PHYSICAL THERAPY | Age: 73
Setting detail: RECURRING SERIES
Discharge: HOME | End: 2023-05-25
Payer: MEDICARE

## 2023-05-22 PROCEDURE — 97110 THERAPEUTIC EXERCISES: CPT

## 2023-05-22 PROCEDURE — 97140 MANUAL THERAPY 1/> REGIONS: CPT

## 2023-05-22 NOTE — PROGRESS NOTES
#Billy Espinosa Cords  : 1950  Primary: Medicare Part A And B (Medicare)  Secondary: [address] @ [address] [place] [address]  Phone: [phone]  Fax: [phone] Plan Frequency: 2X per week for 90 days    Plan of Care/Certification Expiration Date: 23      PT Visit Info:  Plan Frequency: 2X per week for 90 days  Plan of Care/Certification Expiration Date: 23  Progress Note Counter: 6      Visit Count:  25    OUTPATIENT PHYSICAL THERAPY:OP NOTE TYPE: Treatment Note 2023       Episode  }Appt Desk             Treatment Diagnosis:    Difficulty in walking, Not elsewhere classified (R26.2)  Generalized Muscle Weakness (M62.81)  Low Back Pain (M54.5)  Medical/Referring Diagnosis:  Spinal stenosis, lumbar region without neurogenic claudication [M48.061]  Referring Physician:  Pk Reis MD MD Orders:  PT Eval and Treat   Date of Onset:  Onset Date: 23     Allergies:   Patient has no known allergies. Restrictions/Precautions:  No data recorded  No data recorded   Interventions Planned (Treatment may consist of any combination of the following):    Current Treatment Recommendations: Strengthening; ROM; Balance training; Functional mobility training; Endurance training; Gait training; Stair training; Neuromuscular re-education; Manual; Pain management; Return to work related activity; Home exercise program; Modalities; Positioning; Dry needling; Therapeutic activities     Subjective Comments: Pt reports doing a good workout over the weekend. Working on advancing to #8 but feeling like this may be his max for HEP. Initial:}    0 /10Post Session:       0 /10  Medications Last Reviewed:  2023  Updated Objective Findings:  None Today  Treatment   THERAPEUTIC EXERCISE: (32 minutes):  Exercises per grid below to improve mobility, strength and balance.   Required minimal-moderate visual and verbal cues to

## 2023-05-24 ENCOUNTER — TELEPHONE (OUTPATIENT)
Dept: UROLOGY | Age: 73
End: 2023-05-24

## 2023-05-25 ENCOUNTER — HOSPITAL ENCOUNTER (OUTPATIENT)
Dept: PHYSICAL THERAPY | Age: 73
Setting detail: RECURRING SERIES
Discharge: HOME | End: 2023-05-28
Payer: MEDICARE

## 2023-05-25 PROCEDURE — 97110 THERAPEUTIC EXERCISES: CPT

## 2023-05-25 PROCEDURE — 97140 MANUAL THERAPY 1/> REGIONS: CPT

## 2023-06-19 NOTE — PROGRESS NOTES
Mario Jones  : 1950  [place] / [place] / Product Type: Medicare /  [address]  [facility], [address]  [phone]   [phone]      OUTPATIENT PHYSICAL THERAPY: Daily Treatment Note 10/28/2020  Visit Count:  40 visits    ICD-10: Treatment Diagnosis:   M54.5 Low Back Pain   R26.2 Difficulty in Walking, Not Elsewhere Classified    Precautions/Allergies:   Patient has no known allergies. TREATMENT PLAN:  Effective Dates:  Twice per week from 2020 until 2020 (12 weeks). Frequency/Duration: 1X times a week for 90 Days     PRE-TREATMENT SYMPTOMS/COMPLAINTS: Pt reports back is feeling better. Got injection at [facility] and seems to have some improvements requiring less medication and not needing to use TENS machine as much. MEDICATIONS REVIEWED:  10/28/2020   TREATMENT:   (In addition to Assessment/Re-Assessment sessions the following treatments were rendered)    MANUAL THERAPY: (40 minutes):   Joint mobilization, Soft tissue mobilization and Manipulation was utilized and necessary because of the patient's restricted joint motion, painful spasm, loss of articular motion and restricted motion of soft tissue. +PA L1-L5, B SI joints, Sacrum Grade III  + STM B lumbar paraspinals and QL  +Manual stretching B HS, glutes, piriformis, hip flexor, quads      (Used abbreviations: MET - muscle energy technique; PNF - proprioceptive neuromuscular facilitation; NMR - neuromuscular re-education; AP - anterior to posterior; PA - posterior to anterior)    MODALITIES: (15 minutes)  Estim level 30 with MHP for pain relief and promote mm relaxation      Assessment: Pt continues with hypomobility noted at L/S segments. Finds good relief with manual techniques. RECOMMENDATIONS/INTENT FOR NEXT TREATMENT SESSION: \"Next visit will focus on advancements to more challenging activities\".     PAIN: NOV due March 2024   Initial: 2/10 Post Session: 1-2 /10     MedBridge Portal    Total Treatment Billable Duration:   PT Patient Time In/Time Out  Time In: 0930  Time Out: 1  Pernell Lee, PT, DPT    Future Appointments   Date Time Provider Elisabeth Gissel   11/4/2020 10:15 AM Viviana Been [facility]   11/11/2020  9:30 AM Viviana Been [facility] D        Visit Approval Visit # Therapist initials Date A NS Cx Comments    31 SHAISTA 3/37/89 [x]  [] [] Recert/progress note on 7/27 - visit 4 today    28 SHAISTA 8/20/20 [x] [] []     35 SHAISTA 8/27/20 [x] [] [] Progress note today.     [address] 9/9/20 [x] [] [] 1    37 SHAISTA 2/64/26 [x] [] [] Recert today-maintenance program 2    38 SHAISTA 10/7/20 [x] [] [] 3    39 SHAISTA 10/14/20 [x] [] [] 4    40 SHAISTA 10/21/20 [x] [] [] 5    41 SHAISTA 10/28/20 [x] [] [] 6       [] [] []        [] [] []        [] [] []        [] [] []        [] [] []        [] [] []        [] [] []        [] [] []        [] [] []

## 2023-06-21 ENCOUNTER — OFFICE VISIT (OUTPATIENT)
Dept: UROLOGY | Age: 73
End: 2023-06-21
Payer: MEDICARE

## 2023-06-21 ENCOUNTER — TELEPHONE (OUTPATIENT)
Dept: UROLOGY | Age: 73
End: 2023-06-21

## 2023-06-21 DIAGNOSIS — N32.81 OVERACTIVE BLADDER: Primary | ICD-10-CM

## 2023-06-21 DIAGNOSIS — N52.9 ERECTILE DYSFUNCTION, UNSPECIFIED ERECTILE DYSFUNCTION TYPE: Primary | ICD-10-CM

## 2023-06-21 DIAGNOSIS — N13.8 BPH WITH OBSTRUCTION/LOWER URINARY TRACT SYMPTOMS: ICD-10-CM

## 2023-06-21 DIAGNOSIS — N40.1 BPH WITH OBSTRUCTION/LOWER URINARY TRACT SYMPTOMS: ICD-10-CM

## 2023-06-21 PROCEDURE — 99214 OFFICE O/P EST MOD 30 MIN: CPT | Performed by: NURSE PRACTITIONER

## 2023-06-21 PROCEDURE — 1123F ACP DISCUSS/DSCN MKR DOCD: CPT | Performed by: NURSE PRACTITIONER

## 2023-06-21 PROCEDURE — 3017F COLORECTAL CA SCREEN DOC REV: CPT | Performed by: NURSE PRACTITIONER

## 2023-06-21 PROCEDURE — G8427 DOCREV CUR MEDS BY ELIG CLIN: HCPCS | Performed by: NURSE PRACTITIONER

## 2023-06-21 PROCEDURE — G8420 CALC BMI NORM PARAMETERS: HCPCS | Performed by: NURSE PRACTITIONER

## 2023-06-21 PROCEDURE — 1036F TOBACCO NON-USER: CPT | Performed by: NURSE PRACTITIONER

## 2023-06-21 ASSESSMENT — ENCOUNTER SYMPTOMS: BACK PAIN: 0

## 2023-06-21 NOTE — PROGRESS NOTES
children: Not on file    Years of education: Not on file    Highest education level: Not on file   Occupational History    Not on file   Tobacco Use    Smoking status: Never    Smokeless tobacco: Never   Vaping Use    Vaping Use: Never used   Substance and Sexual Activity    Alcohol use: Yes     Alcohol/week: 5.0 standard drinks    Drug use: No    Sexual activity: Not on file   Other Topics Concern    Not on file   Social History Narrative    Not on file     Social Determinants of Health     Financial Resource Strain: Not on file   Food Insecurity: Not on file   Transportation Needs: Not on file   Physical Activity: Sufficiently Active    Days of Exercise per Week: 5 days    Minutes of Exercise per Session: 30 min   Stress: Not on file   Social Connections: Not on file   Intimate Partner Violence: Not on file   Housing Stability: Not on file     Family History   Problem Relation Age of Onset    Cancer Father         colon cancer    Post-op Cognitive Dysfunction Neg Hx     Emergence Delirium Neg Hx     Post-op Nausea/Vomiting Neg Hx     Delayed Awakening Neg Hx     Pseudochol. Deficiency Neg Hx     Malig Hypertherm Neg Hx     Cancer Mother         breast cancer    Other Neg Hx     Cancer Brother         Low-grade malignant versus premalignant thyroid nodule       Review of Systems  Constitutional:   Negative for fever. Genitourinary: Positive for erectile dysfunction. Negative for hematuria. Musculoskeletal:  Negative for back pain.     Urinalysis  UA - Dipstick  Results for orders placed or performed in visit on 03/27/23   AMB POC URINALYSIS DIP STICK AUTO W/O MICRO   Result Value Ref Range    Color, Urine, POC      Clarity, Urine, POC      Glucose, Urine, POC Negative Negative    Bilirubin, Urine, POC Negative Negative    Ketones, Urine, POC Negative Negative    Specific Gravity, Urine, POC 1.020 1.001 - 1.035    Blood, Urine, POC Negative Negative    pH, Urine, POC 7.0 4.6 - 8.0    Protein, Urine, POC Negative

## 2023-07-05 NOTE — PROGRESS NOTES
Poppy Chase  : 1950  [place] / [place] / Product Type: Medicare /  [address]  at [facility]  [address], [address] [facility], [address]  [phone]   [phone]      OUTPATIENT PHYSICAL THERAPY: Daily Treatment Note 2022  Visit Count:  37    ICD-10: Treatment Diagnosis:   M25.511 Pain in Right Shoulder  M25.611 Stiffness in Right Shoulder  M62.81 Muscle Weakness Generalized  Z47.89 Orthopedic Aftercare  M25.552 Pain in Left hip  M25.652 Stiffness of Left hip, not elsewhere classified    Precautions/Allergies:   Patient has no known allergies. Follow Protocol  -Limit AROM to : Flexion (80 degrees), Extension (20 degrees), Abduction (80 degrees, IR (full), ER (to neutral only)  -Sling may be off  -No subscapularis stretch or strengthening until 6 weeks post op date    TSA after 6 weeks (22)  - All active and passive ROM ok  -Resistance in all arcs OK  TREATMENT PLAN:  Effective Dates: 3/29/2022 TO 2022 (60 days). Frequency/Duration: 3 times a week for 60 Days        PRE-TREATMENT SYMPTOMS/COMPLAINTS:    MEDICATIONS REVIEWED:  2022   TREATMENT:   (In addition to Assessment/Re-Assessment sessions the following treatments were rendered)    THERAPEUTIC EXERCISE: (25 minutes):  Exercises per grid below to improve mobility, strength and balance. Required minimal visual and verbal cues to promote proper body alignment and promote proper body posture. Progressed resistance, range and complexity of movement as indicated.      Date:  3/24/22 Date:  3/29/22 Date:  3/31/22 Date:  22 Date:  22 Date:  22 Date:  22 Date:  22   Activity/Exercise           Rhythmic stabilization            Shoulder flex (supine)  #4 supine wand 10 X 2 #5 supine wand 10 X 2  #5 supine wand 10 X 2 #5 supine wand 10 X 2  #5 supine wand 15 X 2   S/l shoulder abduction           S/l ER  #3 10 X 2 #4 10 X 2  #4 10 Problem: OCCUPATIONAL THERAPY ADULT  Goal: Performs self-care activities at highest level of function for planned discharge setting. See evaluation for individualized goals. Description: Treatment Interventions: ADL retraining, Functional transfer training, UE strengthening/ROM, Endurance training, Patient/family training, Cognitive reorientation, Equipment evaluation/education, Compensatory technique education, Energy conservation, Activityengagement          See flowsheet documentation for full assessment, interventions and recommendations. Outcome: Progressing  Note: Limitation: Decreased ADL status, Decreased UE ROM, Decreased UE strength, Decreased cognition, Decreased Safe judgement during ADL, Decreased endurance, Decreased high-level ADLs, Decreased self-care trans  Prognosis: Fair  Assessment: Pt greeted bedside for OT treatment on 7/5/2023 focusing on maximizing independence with ADLs. Pt lethargic this OT session AM, however, willing to participate. Pt with poor endurance this session and with NGT in for nutrition and medication. Pt requires mod AX2 with bed mobility and able to sit on EOB x15 min with min-max A with trunk control. Pt requires min A with eating, and mod-max A with grooming activities. Pt requires max AX2 with bed mobility to return supine in bed. Limitations that impact functional performance include decreased ADL status, decreased UE ROM, decreased UE strength, decreased safe judgement during ADLs, decreased cognition, decreased endurance, decreased self care transfers, decreased high level ADLs and pain. Occupational performance areas to address ADL retraining, UE strengthening/ROM, endurance training, cognitive reorientation, Pt/caregiver education, equipment evaluation/education, compensatory technique education, energy conservation and activity engagement . Pt would benefit from continued skilled OT services while in hospital to maximize independence with ADLs.  Will continue X 2 #4 10 x 2  #5 10 X 2- starting at neutral in s/l   Supine protraction  #3 10 X 2 #4 10 X 2  #5 10 X 2 #5 15 X 2  #5 15 X 2   Prone row  Bent #5 10 X 2 Bent #5 10 X 2  Bent #5 15 X 2 Bent #5 15 X 2  Bent #5 15 X 2   Prone extension  Bent #3 10 X 2 Bent #3 10 X 2  Bent #4 10 X 2 Bent #4 15 X 2  Bent #4 15 X 2   Prone horizontal abduction  Bent 10 X 2 Bent 10 X 2  Bent 10 X 2  Bent 10 X 2  Bent 10 X 2   Sleeper stretch           TB rows/extension           TB IR/ER           Towel STAR on wall  3X 3X  3X 4X  3X   Horizontal adduction stretch           AROM shoulder flexion, scaption   5X each  10 X 2 each #1 10 X 1 each  #1 10 X 1 each   Stability ball rolls on wall   30 X CW, CCW X 2  Body blade 30 seconds each direction Body blade 30 seconds each direction  Body blade 30 seconds each direction   Doorway stretch           Seated piriformis stretch           Seated glute stretch           clamshell 10 X 2 - no resistance   10 X 2    15 X 2    Reverse clamshell Hold   Hold   hold    Hip abduction 10 X 2   Standing 15 X 2   Standing #2     bridges 10 X 2   15 X 2   15 X 2    ASLR 10 X 2   15 x 2    15 X 2    Standing hip hike on 6\" step 10 X 2   10 X 2       6\" lateral step ups           TB supine hip ER 10 X 2   BTB 10 X 2- limited range to keep painfree   BTB 15 X 2    Walking side steps 30 ft x 2 no resistance   30 ft x 2 no resistance         MANUAL THERAPY: (15 minutes): Joint mobilization, Soft tissue mobilization and Manipulation was utilized and necessary because of the patient's restricted joint motion, painful spasm, loss of articular motion and restricted motion of soft tissue.    4/11/22  +PROM R shoulder- all directions to pt tolerance  +STM to lateral deltoid, subscapularis    +manual stretching L ITB, glute, piriformis, hip flexor  +STM to glute med, ITB    (Used abbreviations: MET - muscle energy technique; PNF - proprioceptive neuromuscular facilitation; NMR - neuromuscular re-education; AP - anterior to follow Pt's goals and progress. Pt would benefit from post acute rehabilitation services upon DC to maximize safety and independence with ADLs and functional tasks of choice.      OT Discharge Recommendation: Post acute rehabilitation services to posterior; PA - posterior to anterior)    MODALITIES: (15 minutes):      vasopneumatic device to reduce pain, inflammation, and edema med pressure 34 degrees     TREATMENT/SESSION ASSESSMENT: Pt tolerated progression of exercises well. Requiring minimal cues for technique. Increased mm soreness by end of session. Education: on objective findings and HEP    RECOMMENDATIONS/INTENT FOR NEXT TREATMENT SESSION: \"Next visit will focus on advancements to more challenging activities\".     PAIN: Initial: 2/10 shoulder Post Session:  3/10 shoulder     MedBridge Portal    Total Treatment Billable Duration:   PT Patient Time In/Time Out  Time In: 0930  Time Out: 1  Karsten Perry, PT, DPT    Future Appointments   Date Time Provider Elisabeth Hodgeisti   4/13/2022  2:30 PM [facility]   4/14/2022  9:30 AM Alejandro Raymundo A [facility] SFD   4/18/2022  1:00 PM Alejandro Raymundo A [facility]   4/20/2022 10:15 AM Alejandro Raymundo A [facility] SFD   4/21/2022  9:30 AM Alejandro Raymundo A [facility]   4/25/2022  9:30 AM Alejandro Raymundo A [facility]   4/27/2022 10:15 AM Alejandro Raymundo A [facility]   4/28/2022  9:30 AM Alejandro Raymundo A [facility] S[facility]   5/31/2022  8:00 AM Lilly Hwang MD [facility] PO[facility]   3/27/2023  2:10 PM Duong Gil MD [id number]       Visit Approval Visit # Therapist initials Date A NS / Cx < 24 hr >24 hr Cx Comments    1 SHAISTA 1/5/22 [x]  [] [] Initial evaluation    2 SHAISTA 1/7/22 [x] [] []     3 SHAISTA 1/11/22 [x] [] []     4 SHAISTA 1/13/22 [x] [] []     5 PDE 1- [x] [] []     6 SHAISTA 1/20/22 [x] [] []     7 SHAISTA 1/25/22 [x] [] []     8 SHAISTA 1/27/22 [x] [] []     9 SHAISTA 2/1/22 [x] [] [] PN due next visit    10  SHAISTA 2/3/22 [x] [] [] PN today    11 SHAISTA 2/7/22 [x] [] [] 1    12 SHAISTA 2/9/22 [x] [] [] 2    13 SHAISTA 2/15/22 [x] [] [] 3    14 SHAISTA 7/47/13 [x] [] [] Recert/Reeval- added L hip pain to [address] and treatment plan    15 SHAISTA 2/17/22 [x] [] [] 4    16 SHAISTA 2/21/22 [x] [] [] 5    17 SHAISTA 2/24/22 [x] [] [] 6    18 SHAISTA 2/25/22 [x] [] [] 7    19 SHAISTA 3/1/22 x   8    20 SHAISTA 3/2/22 x   9, PN due next visit    21 SHAISTA 3/4/22 x   PN today    22 SHAISTA 3/8/22 x   1    23 SHAISTA 3/10/22 x   2    24 SHAISTA 3/11/22 x   3    25 SHAISTA 3/14/22 x   4   **KX** 26 SHAISTA 3/16/22 x   5    27 SHAISTA 3/18/22 x   6    28 SHAISTA 3/21/22 x   7    29 SHAISTA 3/23/22 x   8    30 SHAISTA 3/24/22 x   9, PN due next visit    31 SHAISTA 3/29/22 x   10, PN today    32 SHAISTA 3/31/22 x   1    33 SHAISTA 4/1/22 x   2    34 SHAISTA 4/4/22 x   3    35 SHAISTA 4/6/22 x   4    36 SHAISTA 4/7/22 x   5    37 SHAISTA 4/11/22 x   6

## 2023-07-21 DIAGNOSIS — N32.81 OVERACTIVE BLADDER: ICD-10-CM

## 2023-07-21 NOTE — TELEPHONE ENCOUNTER
From: Cathy Orta  To:  Office of Gabriela Wise  Sent: 7/20/2023 8:28 PM EDT  Subject: Medication Renewal Request    Refills have been requested for the following medications:     mirabegron (Mg Claude) 50 MG TB24 Gabriela Wise, APRN - CNP]    Preferred pharmacy: [id number] [place] [address] 29, [address] [phone]

## 2023-08-21 ENCOUNTER — OFFICE VISIT (OUTPATIENT)
Dept: ORTHOPEDIC SURGERY | Age: 73
End: 2023-08-21
Payer: MEDICARE

## 2023-08-21 VITALS — BODY MASS INDEX: 25.03 KG/M2 | WEIGHT: 169 LBS | HEIGHT: 69 IN

## 2023-08-21 DIAGNOSIS — M25.571 PAIN IN RIGHT ANKLE AND JOINTS OF RIGHT FOOT: Primary | ICD-10-CM

## 2023-08-21 PROCEDURE — 20605 DRAIN/INJ JOINT/BURSA W/O US: CPT | Performed by: ORTHOPAEDIC SURGERY

## 2023-08-21 RX ORDER — METHYLPREDNISOLONE ACETATE 40 MG/ML
40 INJECTION, SUSPENSION INTRA-ARTICULAR; INTRALESIONAL; INTRAMUSCULAR; SOFT TISSUE ONCE
Status: COMPLETED | OUTPATIENT
Start: 2023-08-21 | End: 2023-08-21

## 2023-08-21 RX ADMIN — METHYLPREDNISOLONE ACETATE 80 MG: 40 INJECTION, SUSPENSION INTRA-ARTICULAR; INTRALESIONAL; INTRAMUSCULAR; SOFT TISSUE at 12:16

## 2023-08-21 NOTE — PROGRESS NOTES
Name: Rashaun Perez  YOB: 1950  Gender: male  MRN: [id number]     CC: Right outer hindfoot pain    HPI:   [id number]: Treated for anterior calcaneal process arthritis, sinus Tarsi syndrome, insertional peroneal tendinitis. 08/26/2021: Right hindfoot injection  09/14/2021: Reparel sleeve and lace up ankle brace and discussed future potential MRI scan  [id number]:  He reports seeing Zhang Chaudhari and Gi Chakraborty for multiple insole modifications  [id number]:  Right shoulder replacement: Left gluteus medius tendon tear  [id number]:  Dr. Carey Zamarripa for new insoles, hindfoot injection, MRI scan at [address] [place] [address]  10/18/2022: Right outer hindfoot pain. Sinus Tarsi/subtalar joint injection. November 2022-January 2023: Severe back pain with right leg weakness: ANKIT: Eventual microscopic laminectomy with Dr. Nighat Jon . .. 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 Bilateral lower extremity swelling as well.    08/21/2023: Recurrent outer ankle/hindfoot pain. Darnell Reza modified his insoles      ROS/Meds/PSH/PMH/FH/SH: reviewed today    Tobacco:  reports that he has never smoked. He has never used smokeless tobacco.     Physical Examination:  Patient appears to be alert and oriented with acceptable appearance.   No obvious distress or SOB  CV: appears to have acceptable vascular color and capillary refill  Neuro: appears to have mostly intact light touch sensation   Skin: Bilateral lower extremity edema  MS: Standing: Pes planovalgus: Gait limited  Right = lateral hindfoot sinus Tarsi area pain  Right = Limited hindfoot mobility; mainly 5/5 strength; no instability or crepitance    XR: Right side: Standing AP lateral mortise ankle plus AP oblique foot taken 10/18/2022 with calcaneocuboid joint arthritic changes; naviculocuneiform area sagging; mild anterior ankle subluxation  XR Impression:

## 2023-08-28 ENCOUNTER — PATIENT MESSAGE (OUTPATIENT)
Dept: INTERNAL MEDICINE CLINIC | Facility: CLINIC | Age: 73
End: 2023-08-28

## 2023-08-28 DIAGNOSIS — G89.29 CHRONIC LOW BACK PAIN, UNSPECIFIED BACK PAIN LATERALITY, UNSPECIFIED WHETHER SCIATICA PRESENT: ICD-10-CM

## 2023-08-28 DIAGNOSIS — M54.50 CHRONIC LOW BACK PAIN, UNSPECIFIED BACK PAIN LATERALITY, UNSPECIFIED WHETHER SCIATICA PRESENT: ICD-10-CM

## 2023-08-28 NOTE — TELEPHONE ENCOUNTER
From: Bear Jennings  To: Dr. Henao [place]: 8/28/2023 10:20 AM EDT  Subject: refill and lab    I need a refill on meloxicam 15mg. I have an annual physical with Dr Alexandra Garber 8/19 and labs ordered. When do I need to come by and have lab done?  Thanks

## 2023-08-29 RX ORDER — MELOXICAM 15 MG/1
15 TABLET ORAL DAILY
Qty: 90 TABLET | Refills: 3 | Status: SHIPPED | OUTPATIENT
Start: 2023-08-29

## 2023-08-30 DIAGNOSIS — M54.50 CHRONIC LOW BACK PAIN, UNSPECIFIED BACK PAIN LATERALITY, UNSPECIFIED WHETHER SCIATICA PRESENT: ICD-10-CM

## 2023-08-30 DIAGNOSIS — G89.29 CHRONIC LOW BACK PAIN, UNSPECIFIED BACK PAIN LATERALITY, UNSPECIFIED WHETHER SCIATICA PRESENT: ICD-10-CM

## 2023-08-30 RX ORDER — DULOXETIN HYDROCHLORIDE 30 MG/1
30 CAPSULE, DELAYED RELEASE ORAL DAILY
Qty: 30 CAPSULE | Refills: 5 | Status: CANCELLED | OUTPATIENT
Start: 2023-08-30

## 2023-08-30 RX ORDER — DULOXETIN HYDROCHLORIDE 30 MG/1
30 CAPSULE, DELAYED RELEASE ORAL DAILY
Qty: 90 CAPSULE | Refills: 3 | Status: SHIPPED | OUTPATIENT
Start: 2023-08-30

## 2023-08-30 NOTE — TELEPHONE ENCOUNTER
----- Message from Lamontsavanna Malcolm sent at 8/30/2023  9:20 AM EDT -----  Regarding: refill and lab  Contact: [phone]  Thanks. I overlooked that my Cymbalta Rx also needs to be renewed as well. Thanks so much.

## 2023-08-30 NOTE — TELEPHONE ENCOUNTER
Orders Placed This Encounter    DULoxetine (CYMBALTA) 30 MG extended release capsule     Sig: Take 1 capsule by mouth daily     Dispense:  90 capsule     Refill:  3         Ezequiel Medeiros MD

## 2023-08-31 RX ORDER — TAMSULOSIN HYDROCHLORIDE 0.4 MG/1
0.8 CAPSULE ORAL DAILY
Qty: 90 CAPSULE | Refills: 3 | Status: SHIPPED | OUTPATIENT
Start: 2023-08-31

## 2023-09-01 ENCOUNTER — HOSPITAL ENCOUNTER (OUTPATIENT)
Dept: LAB | Age: 73
Discharge: HOME | End: 2023-09-04

## 2023-09-01 DIAGNOSIS — N40.1 BPH WITH OBSTRUCTION/LOWER URINARY TRACT SYMPTOMS: ICD-10-CM

## 2023-09-01 DIAGNOSIS — F51.01 PRIMARY INSOMNIA: ICD-10-CM

## 2023-09-01 DIAGNOSIS — J34.2 DEVIATED SEPTUM: ICD-10-CM

## 2023-09-01 DIAGNOSIS — G89.29 CHRONIC LOW BACK PAIN, UNSPECIFIED BACK PAIN LATERALITY, UNSPECIFIED WHETHER SCIATICA PRESENT: ICD-10-CM

## 2023-09-01 DIAGNOSIS — Z12.5 ENCOUNTER FOR PROSTATE CANCER SCREENING: ICD-10-CM

## 2023-09-01 DIAGNOSIS — M54.50 CHRONIC LOW BACK PAIN, UNSPECIFIED BACK PAIN LATERALITY, UNSPECIFIED WHETHER SCIATICA PRESENT: ICD-10-CM

## 2023-09-01 DIAGNOSIS — N13.8 BPH WITH OBSTRUCTION/LOWER URINARY TRACT SYMPTOMS: ICD-10-CM

## 2023-09-01 DIAGNOSIS — Z00.00 LABORATORY EXAM ORDERED AS PART OF ROUTINE GENERAL MEDICAL EXAMINATION: ICD-10-CM

## 2023-09-01 DIAGNOSIS — Z79.899 ENCOUNTER FOR LONG-TERM (CURRENT) USE OF MEDICATIONS: ICD-10-CM

## 2023-09-01 DIAGNOSIS — J30.0 VASOMOTOR RHINITIS: ICD-10-CM

## 2023-09-01 LAB
ALBUMIN SERPL-MCNC: 3.7 G/DL (ref 3.2–4.6)
ALBUMIN/GLOB SERPL: 1.5 (ref 0.4–1.6)
ALP SERPL-CCNC: 55 U/L (ref 50–136)
ALT SERPL-CCNC: 29 U/L (ref 12–65)
ANION GAP SERPL CALC-SCNC: 3 MMOL/L (ref 2–11)
APPEARANCE UR: CLEAR
AST SERPL-CCNC: 26 U/L (ref 15–37)
BILIRUB SERPL-MCNC: 0.9 MG/DL (ref 0.2–1.1)
BILIRUB UR QL: NEGATIVE
BUN SERPL-MCNC: 21 MG/DL (ref 8–23)
CALCIUM SERPL-MCNC: 8.7 MG/DL (ref 8.3–10.4)
CHLORIDE SERPL-SCNC: 102 MMOL/L (ref 101–110)
CHOLEST SERPL-MCNC: 147 MG/DL
CO2 SERPL-SCNC: 30 MMOL/L (ref 21–32)
COLOR UR: NORMAL
CREAT SERPL-MCNC: 1.1 MG/DL (ref 0.8–1.5)
ERYTHROCYTE [DISTWIDTH] IN BLOOD BY AUTOMATED COUNT: 11.9 % (ref 11.9–14.6)
GLOBULIN SER CALC-MCNC: 2.4 G/DL (ref 2.8–4.5)
GLUCOSE SERPL-MCNC: 105 MG/DL (ref 65–100)
GLUCOSE UR STRIP.AUTO-MCNC: NEGATIVE MG/DL
HCT VFR BLD AUTO: 40.3 % (ref 41.1–50.3)
HDLC SERPL-MCNC: 59 MG/DL (ref 40–60)
HDLC SERPL: 2.5
HGB BLD-MCNC: 13.4 G/DL (ref 13.6–17.2)
HGB UR QL STRIP: NEGATIVE
KETONES UR QL STRIP.AUTO: NEGATIVE MG/DL
LDLC SERPL CALC-MCNC: 73.4 MG/DL
LEUKOCYTE ESTERASE UR QL STRIP.AUTO: NEGATIVE
MCH RBC QN AUTO: 31.9 PG (ref 26.1–32.9)
MCHC RBC AUTO-ENTMCNC: 33.3 G/DL (ref 31.4–35)
MCV RBC AUTO: 96 FL (ref 82–102)
NITRITE UR QL STRIP.AUTO: NEGATIVE
NRBC # BLD: 0 K/UL (ref 0–0.2)
PH UR STRIP: 7.5 (ref 5–9)
PLATELET # BLD AUTO: 258 K/UL (ref 150–450)
PMV BLD AUTO: 9.3 FL (ref 9.4–12.3)
POTASSIUM SERPL-SCNC: 4.3 MMOL/L (ref 3.5–5.1)
PROT SERPL-MCNC: 6.1 G/DL (ref 6.3–8.2)
PROT UR STRIP-MCNC: NEGATIVE MG/DL
PSA SERPL-MCNC: 1.2 NG/ML
RBC # BLD AUTO: 4.2 M/UL (ref 4.23–5.6)
SODIUM SERPL-SCNC: 135 MMOL/L (ref 133–143)
SP GR UR REFRACTOMETRY: 1.01 (ref 1–1.02)
TRIGL SERPL-MCNC: 73 MG/DL (ref 35–150)
TSH, 3RD GENERATION: 1.78 UIU/ML (ref 0.36–3.74)
UROBILINOGEN UR QL STRIP.AUTO: 0.2 EU/DL (ref 0.2–1)
VLDLC SERPL CALC-MCNC: 14.6 MG/DL (ref 6–23)
WBC # BLD AUTO: 7.7 K/UL (ref 4.3–11.1)

## 2023-09-17 SDOH — ECONOMIC STABILITY: FOOD INSECURITY: WITHIN THE PAST 12 MONTHS, THE FOOD YOU BOUGHT JUST DIDN'T LAST AND YOU DIDN'T HAVE MONEY TO GET MORE.: NEVER TRUE

## 2023-09-17 SDOH — ECONOMIC STABILITY: HOUSING INSECURITY
IN THE LAST 12 MONTHS, WAS THERE A TIME WHEN YOU DID NOT HAVE A STEADY PLACE TO SLEEP OR SLEPT IN A SHELTER (INCLUDING NOW)?: NO

## 2023-09-17 SDOH — ECONOMIC STABILITY: TRANSPORTATION INSECURITY
IN THE PAST 12 MONTHS, HAS LACK OF TRANSPORTATION KEPT YOU FROM MEETINGS, WORK, OR FROM GETTING THINGS NEEDED FOR DAILY LIVING?: NO

## 2023-09-17 SDOH — HEALTH STABILITY: PHYSICAL HEALTH: ON AVERAGE, HOW MANY MINUTES DO YOU ENGAGE IN EXERCISE AT THIS LEVEL?: 20 MIN

## 2023-09-17 SDOH — ECONOMIC STABILITY: INCOME INSECURITY: HOW HARD IS IT FOR YOU TO PAY FOR THE VERY BASICS LIKE FOOD, HOUSING, MEDICAL CARE, AND HEATING?: NOT HARD AT ALL

## 2023-09-17 SDOH — HEALTH STABILITY: PHYSICAL HEALTH: ON AVERAGE, HOW MANY DAYS PER WEEK DO YOU ENGAGE IN MODERATE TO STRENUOUS EXERCISE (LIKE A BRISK WALK)?: 6 DAYS

## 2023-09-17 SDOH — ECONOMIC STABILITY: FOOD INSECURITY: WITHIN THE PAST 12 MONTHS, YOU WORRIED THAT YOUR FOOD WOULD RUN OUT BEFORE YOU GOT MONEY TO BUY MORE.: NEVER TRUE

## 2023-09-17 ASSESSMENT — LIFESTYLE VARIABLES
HOW OFTEN DURING THE LAST YEAR HAVE YOU FOUND THAT YOU WERE NOT ABLE TO STOP DRINKING ONCE YOU HAD STARTED: NEVER
HOW OFTEN DURING THE LAST YEAR HAVE YOU FOUND THAT YOU WERE NOT ABLE TO STOP DRINKING ONCE YOU HAD STARTED: 0
HOW OFTEN DO YOU HAVE A DRINK CONTAINING ALCOHOL: 4 OR MORE TIMES A WEEK
HOW OFTEN DURING THE LAST YEAR HAVE YOU HAD A FEELING OF GUILT OR REMORSE AFTER DRINKING: 0
HAS A RELATIVE, FRIEND, DOCTOR, OR ANOTHER HEALTH PROFESSIONAL EXPRESSED CONCERN ABOUT YOUR DRINKING OR SUGGESTED YOU CUT DOWN: 0
HOW OFTEN DO YOU HAVE A DRINK CONTAINING ALCOHOL: 5
HOW OFTEN DURING THE LAST YEAR HAVE YOU FAILED TO DO WHAT WAS NORMALLY EXPECTED FROM YOU BECAUSE OF DRINKING: 0
HOW OFTEN DURING THE LAST YEAR HAVE YOU NEEDED AN ALCOHOLIC DRINK FIRST THING IN THE MORNING TO GET YOURSELF GOING AFTER A NIGHT OF HEAVY DRINKING: NEVER
HOW OFTEN DURING THE LAST YEAR HAVE YOU BEEN UNABLE TO REMEMBER WHAT HAPPENED THE NIGHT BEFORE BECAUSE YOU HAD BEEN DRINKING: 0
HOW OFTEN DURING THE LAST YEAR HAVE YOU NEEDED AN ALCOHOLIC DRINK FIRST THING IN THE MORNING TO GET YOURSELF GOING AFTER A NIGHT OF HEAVY DRINKING: 0
HOW MANY STANDARD DRINKS CONTAINING ALCOHOL DO YOU HAVE ON A TYPICAL DAY: 1
HOW OFTEN DURING THE LAST YEAR HAVE YOU BEEN UNABLE TO REMEMBER WHAT HAPPENED THE NIGHT BEFORE BECAUSE YOU HAD BEEN DRINKING: NEVER
HOW OFTEN DURING THE LAST YEAR HAVE YOU FAILED TO DO WHAT WAS NORMALLY EXPECTED FROM YOU BECAUSE OF DRINKING: NEVER
HOW OFTEN DURING THE LAST YEAR HAVE YOU HAD A FEELING OF GUILT OR REMORSE AFTER DRINKING: NEVER
HAVE YOU OR SOMEONE ELSE BEEN INJURED AS A RESULT OF YOUR DRINKING: NO
HAVE YOU OR SOMEONE ELSE BEEN INJURED AS A RESULT OF YOUR DRINKING: 0
HAS A RELATIVE, FRIEND, DOCTOR, OR ANOTHER HEALTH PROFESSIONAL EXPRESSED CONCERN ABOUT YOUR DRINKING OR SUGGESTED YOU CUT DOWN: NO
HOW OFTEN DO YOU HAVE SIX OR MORE DRINKS ON ONE OCCASION: 1
HOW MANY STANDARD DRINKS CONTAINING ALCOHOL DO YOU HAVE ON A TYPICAL DAY: 1 OR 2

## 2023-09-17 ASSESSMENT — PATIENT HEALTH QUESTIONNAIRE - PHQ9
SUM OF ALL RESPONSES TO PHQ QUESTIONS 1-9: 0
2. FEELING DOWN, DEPRESSED OR HOPELESS: 0
SUM OF ALL RESPONSES TO PHQ QUESTIONS 1-9: 0
SUM OF ALL RESPONSES TO PHQ9 QUESTIONS 1 & 2: 0
SUM OF ALL RESPONSES TO PHQ QUESTIONS 1-9: 0
SUM OF ALL RESPONSES TO PHQ QUESTIONS 1-9: 0
1. LITTLE INTEREST OR PLEASURE IN DOING THINGS: 0

## 2023-09-19 ENCOUNTER — OFFICE VISIT (OUTPATIENT)
Dept: INTERNAL MEDICINE CLINIC | Facility: CLINIC | Age: 73
End: 2023-09-19
Payer: MEDICARE

## 2023-09-19 VITALS
HEART RATE: 94 BPM | OXYGEN SATURATION: 97 % | SYSTOLIC BLOOD PRESSURE: 130 MMHG | DIASTOLIC BLOOD PRESSURE: 78 MMHG | HEIGHT: 69 IN | TEMPERATURE: 97.7 F | BODY MASS INDEX: 24.88 KG/M2 | WEIGHT: 168 LBS

## 2023-09-19 DIAGNOSIS — Z00.00 MEDICARE ANNUAL WELLNESS VISIT, SUBSEQUENT: Primary | ICD-10-CM

## 2023-09-19 DIAGNOSIS — G56.02 CARPAL TUNNEL SYNDROME OF LEFT WRIST: ICD-10-CM

## 2023-09-19 PROCEDURE — 1123F ACP DISCUSS/DSCN MKR DOCD: CPT | Performed by: INTERNAL MEDICINE

## 2023-09-19 PROCEDURE — G8420 CALC BMI NORM PARAMETERS: HCPCS | Performed by: INTERNAL MEDICINE

## 2023-09-19 PROCEDURE — G8427 DOCREV CUR MEDS BY ELIG CLIN: HCPCS | Performed by: INTERNAL MEDICINE

## 2023-09-19 PROCEDURE — 99213 OFFICE O/P EST LOW 20 MIN: CPT | Performed by: INTERNAL MEDICINE

## 2023-09-19 PROCEDURE — 3017F COLORECTAL CA SCREEN DOC REV: CPT | Performed by: INTERNAL MEDICINE

## 2023-09-19 PROCEDURE — 1036F TOBACCO NON-USER: CPT | Performed by: INTERNAL MEDICINE

## 2023-09-19 PROCEDURE — G0439 PPPS, SUBSEQ VISIT: HCPCS | Performed by: INTERNAL MEDICINE

## 2023-09-19 NOTE — PATIENT INSTRUCTIONS
Advance Directives: Care Instructions  Overview  An advance directive is a legal way to state your wishes at the end of your life. It tells your family and your doctor what to do if you can't say what you want. There are two main types of advance directives. You can change them any time your wishes change. Living will. This form tells your family and your doctor your wishes about life support and other treatment. The form is also called a declaration. Medical power of . This form lets you name a person to make treatment decisions for you when you can't speak for yourself. This person is called a health care agent (health care proxy, health care surrogate). The form is also called a durable power of  for health care. If you do not have an advance directive, decisions about your medical care may be made by a family member, or by a doctor or a  who doesn't know you. It may help to think of an advance directive as a gift to the people who care for you. If you have one, they won't have to make tough decisions by themselves. For more information, including forms for your state, see the [address] [place] [address] website (www.caringinfo.org/planning/advance-directives/). Follow-up care is a key part of your treatment and safety. Be sure to make and go to all appointments, and call your doctor if you are having problems. It's also a good idea to know your test results and keep a list of the medicines you take. What should you include in an advance directive? Many states have a unique advance directive form. (It may ask you to address specific issues.) Or you might use a universal form that's approved by many states. If your form doesn't tell you what to address, it may be hard to know what to include in your advance directive. Use the questions below to help you get started. Who do you want to make decisions about your medical care if you are not able to?   What life-support measures do you want if you

## 2023-09-19 NOTE — PROGRESS NOTES
SKELETON 09/13/2022    Narrative  BONE DENSITOMETRY:    CLINICAL HISTORY:  Follow-up low bone density in a 71-year-old man with a  history of hypovitaminosis D and adult fracture. COMPARISON:  March 4, 2020. FINDINGS:  Density in the lumbar spine at L1-2 is 1.150 grams per square  centimeter with a T-score of -0.5, which represents interval decrease of 1% from  1.161. Minimal density at the femoral necks today is measured at 0.817 grams  per square centimeter with a T-score of -1.9. Total mean density at the hips is  0.917 grams per square centimeter with a T-score of -1.3, which represents  interval decrease of 3% from 0.942. The FRAX index gives a 10-year risk of a  hip fracture of 3.2% and of any major osteoporotic fracture of 12%. Impression  AFTER MODEST PROGRESSIVE DENSITY LOSS SINCE MARCH 2020, LOW BONE DENSITY IS NOW  ASSOCIATED WITH SIGNIFICANT RISK OF A HIP FRACTURE IN THE NEXT DECADE (SEE  ABOVE). INITIATION OF MEDICAL THERAPY AND FOLLOW-UP SCAN IN 2 YEARS SHOULD BE  CONSIDERED. DC4  The significant findings in this report have been referred to the Imaging  Navigator in order to communicate to the referring provider or his/her designee  as outlined in Section II.C.2.a.ii-iii of the ACR Practice Guideline for  Communication of Diagnostic Imaging Findings.     Results for orders placed or performed in visit on 09/01/23 (from the past 2160 hour(s))   PSA Screening   Result Value Ref Range    PSA 1.2 <4.0 ng/mL   CBC   Result Value Ref Range    WBC 7.7 4.3 - 11.1 K/uL    RBC 4.20 (L) 4.23 - 5.6 M/uL    Hemoglobin 13.4 (L) 13.6 - 17.2 g/dL    Hematocrit 40.3 (L) 41.1 - 50.3 %    MCV 96.0 82 - 102 FL    MCH 31.9 26.1 - 32.9 PG    MCHC 33.3 31.4 - 35.0 g/dL    RDW 11.9 11.9 - 14.6 %    Platelets 597 813 - 616 K/uL    MPV 9.3 (L) 9.4 - 12.3 FL    nRBC 0.00 0.0 - 0.2 K/uL   Comprehensive Metabolic Panel   Result Value Ref Range    Sodium 135 133 - 143 mmol/L    Potassium 4.3 3.5 - 5.1 mmol/L

## 2023-09-28 DIAGNOSIS — F51.01 PRIMARY INSOMNIA: ICD-10-CM

## 2023-09-29 NOTE — TELEPHONE ENCOUNTER
Pt requesting zolpidem refills, please. Scripts last fill date: 6/29/23 for a 90 day supply.     Last OV: 9/19/23  Next OV: 3/19/24

## 2023-10-02 RX ORDER — ZOLPIDEM TARTRATE 10 MG/1
10 TABLET ORAL NIGHTLY PRN
Qty: 90 TABLET | Refills: 1 | Status: SHIPPED | OUTPATIENT
Start: 2023-10-02 | End: 2024-03-30

## 2023-10-23 ENCOUNTER — HOSPITAL ENCOUNTER (EMERGENCY)
Age: 73
Discharge: HOME | End: 2023-10-23
Attending: GENERAL PRACTICE
Payer: MEDICARE

## 2023-10-23 VITALS
OXYGEN SATURATION: 98 % | HEART RATE: 97 BPM | RESPIRATION RATE: 18 BRPM | TEMPERATURE: 99.2 F | SYSTOLIC BLOOD PRESSURE: 162 MMHG | DIASTOLIC BLOOD PRESSURE: 97 MMHG

## 2023-10-23 DIAGNOSIS — S81.811A LACERATION OF RIGHT LOWER EXTREMITY, INITIAL ENCOUNTER: Primary | ICD-10-CM

## 2023-10-23 PROCEDURE — 12001 RPR S/N/AX/GEN/TRNK 2.5CM/<: CPT

## 2023-10-23 PROCEDURE — 99282 EMERGENCY DEPT VISIT SF MDM: CPT

## 2023-10-23 PROCEDURE — 2500000003 HC RX 250 WO HCPCS: Performed by: GENERAL PRACTICE

## 2023-10-23 RX ORDER — LIDOCAINE HYDROCHLORIDE AND EPINEPHRINE 10; 10 MG/ML; UG/ML
20 INJECTION, SOLUTION INFILTRATION; PERINEURAL ONCE
Status: COMPLETED | OUTPATIENT
Start: 2023-10-23 | End: 2023-10-23

## 2023-10-23 RX ADMIN — LIDOCAINE HYDROCHLORIDE,EPINEPHRINE BITARTRATE 20 ML: 10; .01 INJECTION, SOLUTION INFILTRATION; PERINEURAL at 22:08

## 2023-10-24 NOTE — ED NOTES
Triple antibiotic ointment place on area and covered with band aid.      Thierry Finley RN  10/23/23 3841

## 2023-10-24 NOTE — ED TRIAGE NOTES
Pt states he punctured his leg with metal spear-like object tonight. Pt states he wrapped up leg himself. Bleeding controlled at this time. Pt here to get it checked because he doesn't know how deep it is. Pt a&ox4.

## 2023-10-24 NOTE — ED NOTES
Pt states laceration on leg. Pt wrapped up wound himself PTA. Bleeding controlled. Pt alert and oriented. Pt ambulatory.              Amrik Mccarthy RN  10/23/23 2018

## 2023-10-24 NOTE — ED PROVIDER NOTES
NEUROLOGICAL SURGERY  2007    L5 - S1 fused, Dr. Alex Rogers Left 06/2020    CTR    ORTHOPEDIC SURGERY Right 08/2020    CTR    ORTHOPEDIC SURGERY      RIGHT knee scoped, Dr. Brunilda Rizo ARTHROSCOPY Right         Social History     Socioeconomic History    Marital status:      Spouse name: None    Number of children: None    Years of education: None    Highest education level: None   Tobacco Use    Smoking status: Never    Smokeless tobacco: Never   Vaping Use    Vaping Use: Never used   Substance and Sexual Activity    Alcohol use:  Yes     Alcohol/week: 5.0 standard drinks of alcohol    Drug use: No     Social Determinants of Health     Physical Activity: Insufficiently Active (9/17/2023)    Exercise Vital Sign     Days of Exercise per Week: 6 days     Minutes of Exercise per Session: 20 min        Previous Medications    ACNE MEDICATION 2.5 2.5 % GEL        CALCIUM CITRATE (CALCITRATE) 950 (200 CA) MG TABLET    Take 1 tablet by mouth 2 times daily    CLINDAMYCIN PHOSPHATE,TOPICAL, 1 % SWAB    APPLY 1 APPLICATION TOPICALLLY EVERY MORNING TO FACE    CYCLOSPORINE (RESTASIS) 0.05 % OPHTHALMIC EMULSION    INSTILL 1 DROP IN BOTH EYES TWICE DAILY    DICYCLOMINE (BENTYL) 10 MG CAPSULE    TAKE 1 CAPSULE BY ORAL ROUTE 3 TIMES EVERY DAY    DULOXETINE (CYMBALTA) 30 MG EXTENDED RELEASE CAPSULE    Take 1 capsule by mouth daily    FLUOCINONIDE (LIDEX) 0.05 % GEL    Apply topically 2 times daily    HANDICAP PLACARD MISC    Handicap Placard    HYDROCODONE-ACETAMINOPHEN (NORCO) 5-325 MG PER TABLET    by Oral/Gastric Tube route    IPRATROPIUM (ATROVENT) 0.03 % NASAL SPRAY    2 sprays by Nasal route 2 times daily    LIDOCAINE (LIDODERM) 5 %    Place onto the skin daily as needed    LINACLOTIDE (LINZESS) 145 MCG CAPSULE    Take 1 capsule by mouth every morning (before breakfast)    MELOXICAM (MOBIC) 15 MG TABLET    Take 1 tablet by mouth daily    MINOCYCLINE (MINOCIN;DYNACIN) 100 MG CAPSULE    TAKE 1

## 2023-11-14 NOTE — LETTER
Mr. Will Rengen [address] [facility] [address] Dear Mr. Lizzeth Obrien, My name is Ino Lock RN, Employee Care Manager for [employer], and I have been trying to reach you. The Employee Care Management program is a free-of-charge, confidential service provided to our employees and their family members covered by the [employer]. Part of my job is to follow up with members who have recently been in the hospital or emergency room, to help them coordinate their care and answer questions they may have about their visit. As healthcare providers, we know that patients do better when they have close follow up with a primary care provider (PCP), especially after a hospital or emergency department visit. If you do not have a PCP, I can help you find one that is convenient to you and covered by your insurance. I can also help you understand any after visit instructions, such as what symptoms to watch out for, or any new or changed medications. Remember that you can access your After Visit Summary by logging into your [employer] account. If you do not have a [employer] account, I can help you request access. Our program is designed to provide you with the opportunity to have a [employer] care manager partner with you for your healthcare needs. Please contact me at the below number if I can provide you with assistance for any of the above services. Sincerely, Ino Lock RN, BSN  Employee Care Manager [email or web address] 
 Pt given safety dinner tray. Pt is resting comfortably watching TV, no other needs at this time.       Lucio Granado  11/14/23 [id number]

## 2023-12-01 ENCOUNTER — OFFICE VISIT (OUTPATIENT)
Dept: ORTHOPEDIC SURGERY | Age: 73
End: 2023-12-01

## 2023-12-01 DIAGNOSIS — M25.571 PAIN IN RIGHT ANKLE AND JOINTS OF RIGHT FOOT: Primary | ICD-10-CM

## 2023-12-01 RX ORDER — METHYLPREDNISOLONE ACETATE 40 MG/ML
40 INJECTION, SUSPENSION INTRA-ARTICULAR; INTRALESIONAL; INTRAMUSCULAR; SOFT TISSUE ONCE
Status: COMPLETED | OUTPATIENT
Start: 2023-12-01 | End: 2023-12-01

## 2023-12-01 RX ADMIN — METHYLPREDNISOLONE ACETATE 80 MG: 40 INJECTION, SUSPENSION INTRA-ARTICULAR; INTRALESIONAL; INTRAMUSCULAR; SOFT TISSUE at 11:45

## 2023-12-01 NOTE — PROGRESS NOTES
Name: Angie Quiroga  YOB: 1950  Gender: male  MRN: [id number]     CC: Right outer hindfoot pain    HPI:   4960-9468: Treated for anterior calcaneal process arthritis, sinus Tarsi syndrome, insertional peroneal tendinitis. 08/26/2021: Right hindfoot injection  09/14/2021: Reparel sleeve and lace up ankle brace and discussed future potential MRI scan  9289-4437:  He reports seeing Vince Vazquez and Norma Reyes for multiple insole modifications  2519-1258:  Right shoulder replacement: Left gluteus medius tendon tear  7285-6568:  Dr. Marlon Peralta for new insoles, hindfoot injection, MRI scan at [address] [place] [address]  10/18/2022: Right outer hindfoot pain. Sinus Tarsi/subtalar joint injection. November 2022-January 2023: Severe back pain with right leg weakness: ANKIT: Eventual microscopic laminectomy with Dr. Maeve Pang . .. 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 Bilateral lower extremity swelling as well.    08/21/2023: Recurrent outer ankle/hindfoot pain. Uvaldo Monet modified his insoles    12/01/2023: Recurrent lateral ankle/hindfoot pain: Again Uvaldo Monet modified his insoles before he retired. ROS/Meds/PSH/PMH/FH/SH: reviewed today    Tobacco:  reports that he has never smoked. He has never used smokeless tobacco.     Physical Examination:  Patient appears to be alert and oriented with acceptable appearance.   No obvious distress or SOB  CV: appears to have acceptable vascular color and capillary refill  Neuro: appears to have mostly intact light touch sensation   Skin: No gross swelling  MS: Standing: Pes planovalgus: Gait full  Right = lateral hindfoot sinus Tarsi area pain  Right = limited hindfoot mobility; mainly 5/5 strength; no instability or crepitance    XR: Right side: Standing AP lateral mortise ankle plus AP oblique foot taken 10/18/2022 with calcaneocuboid joint

## 2023-12-04 DIAGNOSIS — K58.1 IRRITABLE BOWEL SYNDROME WITH CONSTIPATION: ICD-10-CM

## 2023-12-05 ENCOUNTER — OFFICE VISIT (OUTPATIENT)
Dept: ORTHOPEDIC SURGERY | Age: 73
End: 2023-12-05

## 2023-12-05 VITALS — HEIGHT: 69 IN | BODY MASS INDEX: 24.88 KG/M2 | WEIGHT: 168 LBS

## 2023-12-05 DIAGNOSIS — M77.11 RIGHT LATERAL EPICONDYLITIS: Primary | ICD-10-CM

## 2023-12-05 RX ORDER — BETAMETHASONE SODIUM PHOSPHATE AND BETAMETHASONE ACETATE 3; 3 MG/ML; MG/ML
6 INJECTION, SUSPENSION INTRA-ARTICULAR; INTRALESIONAL; INTRAMUSCULAR; SOFT TISSUE ONCE
Status: COMPLETED | OUTPATIENT
Start: 2023-12-05 | End: 2023-12-05

## 2023-12-05 RX ADMIN — BETAMETHASONE SODIUM PHOSPHATE AND BETAMETHASONE ACETATE 6 MG: 3; 3 INJECTION, SUSPENSION INTRA-ARTICULAR; INTRALESIONAL; INTRAMUSCULAR; SOFT TISSUE at 14:31

## 2023-12-06 NOTE — PROGRESS NOTES
Orthopaedic Hand Surgery Note    Name: Jean Morel  YOB: 1950  Gender: male  MRN: [id number]    CC: New patient referred for elbow pain    HPI: Patient is a 68 y.o. male with a chief complaint of right elbow pain. Pain is located on the lateral side of the elbow and radiates down the forearm, denies numbness and paresthesias. Symptoms have been going on for  2 months and started after fly fishing and hunting. He states that he had to drag a deer through the woods. Pain is aggravated with lifting heavy objects, alleviated by rest. .     ROS/Meds/PSH/PMH/FH/SH: I personally reviewed the patients standard intake form. Pertinents are discussed in the HPI    Physical Examination:  Musculoskeletal:   Examination on the right upper extremity demonstrates normal sensation to light touch in the median, ulnar and radial distribution. There is  moderate tenderness on the lateral epicondyle, no tenderness on the medial epicondyle or the olecranon, pain is aggravated by resisted wrist and finger extension, there is no pain with palpation of the radial tunnel, negative Tinel along the radial nerve tract, no pain with resisted supination, no pain with resisted pronation. Elbow range of motion is full and pain free. Imaging / Electrodiagnostic Tests:     none    Assessment:     ICD-10-CM    1. Right lateral epicondylitis  M77.11 betamethasone acetate-betamethasone sodium phosphate (CELESTONE) injection 6 mg     INJECT TENDON ORIGIN/INSERT     Ambulatory referral to Physical Therapy          Plan:  We discussed the diagnosis and different treatment options. We discussed observation, bracing, cortisone injections, therapy and lateral epicondyle debridement with tendon reattachment surgery.   We discussed that lateral epicondylitis is a chronic condition that has been progressing for much longer than the symptoms were evident, this is caused by degeneration of the tendon due to poor vascular supply

## 2024-01-09 RX ORDER — IPRATROPIUM BROMIDE 21 UG/1
2 SPRAY, METERED NASAL 2 TIMES DAILY
Qty: 30 ML | Refills: 5 | Status: SHIPPED | OUTPATIENT
Start: 2024-01-09

## 2024-01-29 ENCOUNTER — OFFICE VISIT (OUTPATIENT)
Dept: UROLOGY | Age: 74
End: 2024-01-29
Payer: MEDICARE

## 2024-01-29 DIAGNOSIS — N13.8 BPH WITH OBSTRUCTION/LOWER URINARY TRACT SYMPTOMS: Primary | ICD-10-CM

## 2024-01-29 DIAGNOSIS — N40.1 BPH WITH OBSTRUCTION/LOWER URINARY TRACT SYMPTOMS: Primary | ICD-10-CM

## 2024-01-29 DIAGNOSIS — N32.81 OVERACTIVE BLADDER: ICD-10-CM

## 2024-01-29 DIAGNOSIS — N52.9 ERECTILE DYSFUNCTION, UNSPECIFIED ERECTILE DYSFUNCTION TYPE: ICD-10-CM

## 2024-01-29 LAB
BILIRUBIN, URINE, POC: NORMAL
BLOOD URINE, POC: NORMAL
GLUCOSE URINE, POC: NEGATIVE
KETONES, URINE, POC: 15
LEUKOCYTE ESTERASE, URINE, POC: NEGATIVE
NITRITE, URINE, POC: NEGATIVE
PH, URINE, POC: 6.5 (ref 4.6–8)
PROTEIN,URINE, POC: NEGATIVE
SPECIFIC GRAVITY, URINE, POC: >=1.03 (ref 1–1.03)
URINALYSIS CLARITY, POC: NORMAL
URINALYSIS COLOR, POC: NORMAL
UROBILINOGEN, POC: NORMAL

## 2024-01-29 PROCEDURE — G8420 CALC BMI NORM PARAMETERS: HCPCS | Performed by: UROLOGY

## 2024-01-29 PROCEDURE — G8484 FLU IMMUNIZE NO ADMIN: HCPCS | Performed by: UROLOGY

## 2024-01-29 PROCEDURE — 3017F COLORECTAL CA SCREEN DOC REV: CPT | Performed by: UROLOGY

## 2024-01-29 PROCEDURE — 81003 URINALYSIS AUTO W/O SCOPE: CPT | Performed by: UROLOGY

## 2024-01-29 PROCEDURE — 1123F ACP DISCUSS/DSCN MKR DOCD: CPT | Performed by: UROLOGY

## 2024-01-29 PROCEDURE — 1036F TOBACCO NON-USER: CPT | Performed by: UROLOGY

## 2024-01-29 PROCEDURE — 99213 OFFICE O/P EST LOW 20 MIN: CPT | Performed by: UROLOGY

## 2024-01-29 PROCEDURE — G8427 DOCREV CUR MEDS BY ELIG CLIN: HCPCS | Performed by: UROLOGY

## 2024-01-29 RX ORDER — TAMSULOSIN HYDROCHLORIDE 0.4 MG/1
0.8 CAPSULE ORAL DAILY
Qty: 90 CAPSULE | Refills: 3 | Status: SHIPPED | OUTPATIENT
Start: 2024-01-29

## 2024-01-29 RX ORDER — SILDENAFIL 100 MG/1
100 TABLET, FILM COATED ORAL PRN
Qty: 10 TABLET | Refills: 12 | Status: SHIPPED | OUTPATIENT
Start: 2024-01-29

## 2024-01-29 ASSESSMENT — ENCOUNTER SYMPTOMS: BACK PAIN: 0

## 2024-01-29 NOTE — PROGRESS NOTES
(CALCITRATE) 950 (200 Ca) MG tablet Take 1 tablet by mouth 2 times daily      CLINDAMYCIN PHOSPHATE,TOPICAL, 1 % SWAB APPLY 1 APPLICATION TOPICALLLY EVERY MORNING TO FACE      dicyclomine (BENTYL) 10 MG capsule TAKE 1 CAPSULE BY ORAL ROUTE 3 TIMES EVERY DAY      HYDROcodone-acetaminophen (NORCO) 5-325 MG per tablet by Oral/Gastric Tube route      minocycline (MINOCIN;DYNACIN) 100 MG capsule TAKE 1 (ONE) CAPSULE DAILY WITH FOOD      traMADol (ULTRAM) 50 MG tablet Take 1 tablet by mouth every 6 hours as needed for Pain.      cycloSPORINE (RESTASIS) 0.05 % ophthalmic emulsion INSTILL 1 DROP IN BOTH EYES TWICE DAILY      fluocinonide (LIDEX) 0.05 % gel Apply topically 2 times daily      lidocaine (LIDODERM) 5 % Place onto the skin daily as needed      omeprazole (PRILOSEC) 20 MG delayed release capsule Take 1 capsule by mouth daily as needed      tretinoin (RETIN-A) 0.05 % cream APPLY TO AFFECTED AREA AT BEDTIME      NONFORMULARY p10x Diclo5%Baclo2%Bupiv2%Cyclo2%Gaba6%Ibupr5% -Dgermygu87%       No current facility-administered medications for this visit.     No Known Allergies  Social History     Socioeconomic History    Marital status:      Spouse name: Not on file    Number of children: Not on file    Years of education: Not on file    Highest education level: Not on file   Occupational History    Not on file   Tobacco Use    Smoking status: Never    Smokeless tobacco: Never   Vaping Use    Vaping Use: Never used   Substance and Sexual Activity    Alcohol use: Yes     Alcohol/week: 5.0 standard drinks of alcohol    Drug use: No    Sexual activity: Not on file   Other Topics Concern    Not on file   Social History Narrative    Not on file     Social Determinants of Health     Financial Resource Strain: Not on file   Food Insecurity: Not on file (9/17/2023)   Transportation Needs: Not on file   Physical Activity: Insufficiently Active (9/17/2023)    Exercise Vital Sign     Days of Exercise per Week: 6 days

## 2024-01-31 ENCOUNTER — EVALUATION (OUTPATIENT)
Age: 74
End: 2024-01-31

## 2024-01-31 DIAGNOSIS — M77.11 RIGHT LATERAL EPICONDYLITIS: ICD-10-CM

## 2024-01-31 DIAGNOSIS — M25.521 RIGHT ELBOW PAIN: Primary | ICD-10-CM

## 2024-01-31 NOTE — PROGRESS NOTES
[facility]  [address] [place] [id number]  Dept: [phone]      Physical Therapy Initial Assessment     Referring MD: Itzel Gottlieb MD  Diagnosis:     ICD-10-CM    1. Right elbow pain  M25.521       2. Right lateral epicondylitis  M77.11          Therapy precautions:None  Co-morbidities affecting plan of care: s/p bilateral carpal tunnel release w/ minimal continued symptoms RUE, s/p R shoulder arthroplasty with some limitations in strength/ROM, s/p L5-S1 laminotomy, L ulnar nerve decompression procedure  Total Timed Procedure Codes: 25 min, Total Time: 55 min Modifier needed: No  Episode visit count:  1     PERTINENT MEDICAL HISTORY     Past medical and surgical history:   Past Medical History:   Diagnosis Date    Arthritis 07/06/2015    mostly lumbar area    Back pain     DDD (degenerative disc disease), lumbar     Dry eyes     Enlarged prostate     GERD (gastroesophageal reflux disease)     occassionally- managed with PRN OTC meds    History of basal cell cancer     removed from scalp    History of squamous cell carcinoma     removed from RLE    IBS (irritable bowel syndrome)     Insomnia 07/06/2015    managed with PRN meds    Left inguinal hernia     MVP (mitral valve prolapse)     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--- per pt-- last echo 2019 results \" mild\"-- report not in cc per pt-- done in office    Onychomycosis 7/6/2015    toenail     RBBB 7/6/2015    Right wrist pain 7/6/2015    Scoliosis     mild    Spondylosis of lumbar region without myelopathy or radiculopathy     chronic LBP      Past Surgical History:   Procedure Laterality Date    CARPAL TUNNEL RELEASE      bilateral    CATARACT REMOVAL      [place]    COLONOSCOPY      NEUROLOGICAL SURGERY  2007    L5 - S1 fused, Dr. Cooper    ORTHOPEDIC SURGERY Left 06/2020    CTR    ORTHOPEDIC SURGERY Right 08/2020    CTR    ORTHOPEDIC SURGERY      RIGHT knee scoped, Dr. Marcelino Swift    SHOULDER ARTHROSCOPY

## 2024-02-07 ENCOUNTER — TREATMENT (OUTPATIENT)
Age: 74
End: 2024-02-07

## 2024-02-07 DIAGNOSIS — M77.11 RIGHT LATERAL EPICONDYLITIS: ICD-10-CM

## 2024-02-07 DIAGNOSIS — M25.521 RIGHT ELBOW PAIN: Primary | ICD-10-CM

## 2024-02-07 NOTE — PROGRESS NOTES
affected elbow extension to at least 0 ° to improve function with reaching and donning LE dressing.    Long term goals to be met by 3/31/2024 (60 days):  Pt will be compliant and independent with comprehensive HEP and activity progression.   Pt will be able to lift and carry items (ie grocery bags, laundry basket etc) with affected UE pain 2 of 10 or less.  Increase strength in R elbow to at least 5/5 to improve function for lifting/carrying.  Pt will be able to use affected  UE for all ADL's without difficulty 75% of the time.  Improve QuickDASH Score to </=  8% impairment, demonstrating improved overall function.    [facility]  Access Code: [email or web address]  URL: [email or web address]  Date: 02/07/2024  Prepared by: Carey Mancuso    Exercises  - Standing Wrist Flexion Stretch  - 2 x daily - 1 sets - 10 reps - 10 hold  - Isometric Wrist Extension Pronated  - 1 x daily - 2 sets - 10 reps - 5 hold  - Seated Scapular Retraction  - 1 x daily - 2-3 sets - 10 reps - 5 hold  - Standing Forearm Pronation and Supination with Hammer  - 5 x weekly - 2-3 sets - 10 reps  - Prone Scapular Slide with Shoulder Extension  - 5 x weekly - 2 sets - 10 reps - 3 hold  - Prone Single Arm Shoulder Y  - 5 x weekly - 1-2 sets - 10 reps - 3 hold

## 2024-02-12 ENCOUNTER — TREATMENT (OUTPATIENT)
Age: 74
End: 2024-02-12
Payer: MEDICARE

## 2024-02-12 DIAGNOSIS — M25.521 RIGHT ELBOW PAIN: Primary | ICD-10-CM

## 2024-02-12 DIAGNOSIS — M77.11 RIGHT LATERAL EPICONDYLITIS: ICD-10-CM

## 2024-02-12 PROCEDURE — 97140 MANUAL THERAPY 1/> REGIONS: CPT | Performed by: PHYSICAL THERAPIST

## 2024-02-12 PROCEDURE — 20560 NDL INSJ W/O NJX 1 OR 2 MUSC: CPT | Performed by: PHYSICAL THERAPIST

## 2024-02-12 PROCEDURE — 97110 THERAPEUTIC EXERCISES: CPT | Performed by: PHYSICAL THERAPIST

## 2024-02-12 NOTE — PROGRESS NOTES
compliant and independent with comprehensive HEP and activity progression.   Pt will be able to lift and carry items (ie grocery bags, laundry basket etc) with affected UE pain 2 of 10 or less.  Increase strength in R elbow to at least 5/5 to improve function for lifting/carrying.  Pt will be able to use affected  UE for all ADL's without difficulty 75% of the time.  Improve QuickDASH Score to </=  8% impairment, demonstrating improved overall function.    [employer]  Access Code: [email or web address]  URL: [email or web address]  Date: 02/07/2024  Prepared by: Carey Mancuso    Exercises  - Standing Wrist Flexion Stretch  - 2 x daily - 1 sets - 10 reps - 10 hold  - Isometric Wrist Extension Pronated  - 1 x daily - 2 sets - 10 reps - 5 hold  - Seated Scapular Retraction  - 1 x daily - 2-3 sets - 10 reps - 5 hold  - Standing Forearm Pronation and Supination with Hammer  - 5 x weekly - 2-3 sets - 10 reps  - Prone Scapular Slide with Shoulder Extension  - 5 x weekly - 2 sets - 10 reps - 3 hold  - Prone Single Arm Shoulder Y  - 5 x weekly - 1-2 sets - 10 reps - 3 hold

## 2024-02-22 NOTE — PROGRESS NOTES
[facility]  [address] [place] [id number]  Dept: [phone]      Physical Therapy Daily Note     Referring MD: Friend, Itzel TARIQ MD  Diagnosis:     ICD-10-CM    1. Right elbow pain  M25.521       2. Right lateral epicondylitis  M77.11          Therapy precautions:None  Co-morbidities affecting plan of care: s/p bilateral carpal tunnel release w/ minimal continued symptoms RUE, s/p R shoulder arthroplasty with some limitations in strength/ROM, s/p L5-S1 laminotomy, L ulnar nerve decompression procedure  Chief complaints/history of injury: Lateral R elbow pain that radiates into forearm. Denies numbness/tingling.   Patient Stated Goals: return to full activity    Total Timed Procedure Codes: 40 min, Total Time: 55 min Modifier needed: No  Episode visit count:  4     SUBJECTIVE     Pt reports that symptoms are improved but he continues to note soreness at common extensor tendon and brachialis muscle belly.    Medications: no changes since last session    OBJECTIVE       Treatment provided today:  Therapeutic exercise (77367) x 15 min to develop ROM, strength, endurance and flexibility RUE and scapular stabilizers.  Retro UBE level 1 x 4 min  Passive wrist flexion and extension stretches w/ elbow flexed to 90 degrees and gradually extending  Isometric R wrist extension at various angles from flexion to extension  Manual therapy (50868) x 25 min utilizing techniques to improve joint and/or soft tissue mobility, ROM, and function as well as helping to decrease pain/spasms and swelling.  Palpation and assessment of soft tissue, muscles, and landmarks   STM R wrist extensors and upper arm  Posterior R radial head glides  Humeroulnar distraction R  Dry Needling (un-timed code) 22533: needle insertion(s) without injection(s); 1 or 2 muscle(s): Stimulating underlying myofascial trigger points, muscular and connective tissues for the management of neuromusculoskeletal pain and

## 2024-02-23 ENCOUNTER — TREATMENT (OUTPATIENT)
Age: 74
End: 2024-02-23

## 2024-02-23 DIAGNOSIS — M77.11 RIGHT LATERAL EPICONDYLITIS: ICD-10-CM

## 2024-02-23 DIAGNOSIS — M25.521 RIGHT ELBOW PAIN: Primary | ICD-10-CM

## 2024-02-26 ENCOUNTER — TREATMENT (OUTPATIENT)
Age: 74
End: 2024-02-26
Payer: MEDICARE

## 2024-02-26 DIAGNOSIS — M77.11 RIGHT LATERAL EPICONDYLITIS: ICD-10-CM

## 2024-02-26 DIAGNOSIS — M25.521 RIGHT ELBOW PAIN: Primary | ICD-10-CM

## 2024-02-26 PROCEDURE — 97110 THERAPEUTIC EXERCISES: CPT | Performed by: PHYSICAL THERAPIST

## 2024-02-26 PROCEDURE — G0283 ELEC STIM OTHER THAN WOUND: HCPCS | Performed by: PHYSICAL THERAPIST

## 2024-02-26 PROCEDURE — 20560 NDL INSJ W/O NJX 1 OR 2 MUSC: CPT | Performed by: PHYSICAL THERAPIST

## 2024-02-26 PROCEDURE — 97140 MANUAL THERAPY 1/> REGIONS: CPT | Performed by: PHYSICAL THERAPIST

## 2024-02-26 NOTE — PROGRESS NOTES
revealed improved R elbow strength, R elbow extension AROM, and decreased tenderness to palpation. Continued deficits include: Pain  Motor control deficits  Limited work/occupational capacity  Restricted recreational participation. Pt has not achieved max rehab potential and would benefit from continued skilled PT to address the above impairments and continue progress towards remaining therapy goals.      PLAN     Follow up in 2 weeks with planned discharge if still doing well .    Interventions may include but are not limited to:   (79516) Therapeutic exercise to develop ROM, strength, endurance and flexibility  (16298) Therapeutic activities using dynamic activities to improve function  (38800) Manual therapy techniques to improve joint and/or soft tissue mobility, ROM, and function as well as helping to decrease pain/spasms and swelling  (20560/20561) Dry needling for the management of neuromusculoskeletal pain and movement impairment  Home exercise program (HEP) development  Modalities prn to address pain, spasms, and swelling: (77325) Electrical stimulation - attended  (01546/) Electrical stimulation- unattended  (96577) Ultrasound/phonophoresis        GOALS     Short term goals to be met by 2/28/2024  (4 weeks):  Patient will show good recall of their HEP requiring no more than minimal verbal cuing for proper form and technique. Goal Met 2/26/2024   No pain at rest to affected elbow. Goal Met 2/26/2024 - rarely feel some at extensor tendon in certain positions  Increase AROM affected elbow extension to at least 0 ° to improve function with reaching and donning LE dressing. Goal Met 2/26/2024     Long term goals to be met by 3/31/2024 (60 days):  Pt will be compliant and independent with comprehensive HEP and activity progression.   Pt will be able to lift and carry items (ie grocery bags, laundry basket etc) with affected UE pain 2 of 10 or less.  Increase strength in R elbow to at least 5/5 to improve

## 2024-03-11 ENCOUNTER — TREATMENT (OUTPATIENT)
Age: 74
End: 2024-03-11

## 2024-03-11 DIAGNOSIS — M25.521 RIGHT ELBOW PAIN: Primary | ICD-10-CM

## 2024-03-11 DIAGNOSIS — M77.11 RIGHT LATERAL EPICONDYLITIS: ICD-10-CM

## 2024-03-11 NOTE — PROGRESS NOTES
weekly - 2 sets - 10 reps - 5 hold - 3 lbs  - Standing Bilateral Low Shoulder Row with Anchored Resistance  - 5 x weekly - 2 sets - 10-15 reps - 3 hold

## 2024-03-19 ENCOUNTER — OFFICE VISIT (OUTPATIENT)
Dept: INTERNAL MEDICINE CLINIC | Facility: CLINIC | Age: 74
End: 2024-03-19
Payer: MEDICARE

## 2024-03-19 VITALS
DIASTOLIC BLOOD PRESSURE: 74 MMHG | HEART RATE: 56 BPM | WEIGHT: 173 LBS | TEMPERATURE: 97.7 F | BODY MASS INDEX: 25.62 KG/M2 | HEIGHT: 69 IN | OXYGEN SATURATION: 100 % | SYSTOLIC BLOOD PRESSURE: 126 MMHG

## 2024-03-19 DIAGNOSIS — G89.29 CHRONIC LOW BACK PAIN, UNSPECIFIED BACK PAIN LATERALITY, UNSPECIFIED WHETHER SCIATICA PRESENT: ICD-10-CM

## 2024-03-19 DIAGNOSIS — M15.9 PRIMARY OSTEOARTHRITIS INVOLVING MULTIPLE JOINTS: Primary | ICD-10-CM

## 2024-03-19 DIAGNOSIS — N52.9 ERECTILE DYSFUNCTION, UNSPECIFIED ERECTILE DYSFUNCTION TYPE: ICD-10-CM

## 2024-03-19 DIAGNOSIS — Z12.5 ENCOUNTER FOR SCREENING FOR MALIGNANT NEOPLASM OF PROSTATE: ICD-10-CM

## 2024-03-19 DIAGNOSIS — K58.1 IRRITABLE BOWEL SYNDROME WITH CONSTIPATION: ICD-10-CM

## 2024-03-19 DIAGNOSIS — F51.01 PRIMARY INSOMNIA: ICD-10-CM

## 2024-03-19 DIAGNOSIS — R79.9 ABNORMAL FINDING OF BLOOD CHEMISTRY, UNSPECIFIED: ICD-10-CM

## 2024-03-19 DIAGNOSIS — M54.50 CHRONIC LOW BACK PAIN, UNSPECIFIED BACK PAIN LATERALITY, UNSPECIFIED WHETHER SCIATICA PRESENT: ICD-10-CM

## 2024-03-19 PROCEDURE — 1123F ACP DISCUSS/DSCN MKR DOCD: CPT | Performed by: INTERNAL MEDICINE

## 2024-03-19 PROCEDURE — G8427 DOCREV CUR MEDS BY ELIG CLIN: HCPCS | Performed by: INTERNAL MEDICINE

## 2024-03-19 PROCEDURE — 3017F COLORECTAL CA SCREEN DOC REV: CPT | Performed by: INTERNAL MEDICINE

## 2024-03-19 PROCEDURE — G8419 CALC BMI OUT NRM PARAM NOF/U: HCPCS | Performed by: INTERNAL MEDICINE

## 2024-03-19 PROCEDURE — 99214 OFFICE O/P EST MOD 30 MIN: CPT | Performed by: INTERNAL MEDICINE

## 2024-03-19 PROCEDURE — G8484 FLU IMMUNIZE NO ADMIN: HCPCS | Performed by: INTERNAL MEDICINE

## 2024-03-19 PROCEDURE — 1036F TOBACCO NON-USER: CPT | Performed by: INTERNAL MEDICINE

## 2024-03-19 RX ORDER — DULOXETIN HYDROCHLORIDE 20 MG/1
20 CAPSULE, DELAYED RELEASE ORAL DAILY
Qty: 90 CAPSULE | Refills: 3 | Status: SHIPPED | OUTPATIENT
Start: 2024-03-19

## 2024-03-19 RX ORDER — DULOXETIN HYDROCHLORIDE 30 MG/1
30 CAPSULE, DELAYED RELEASE ORAL DAILY
Qty: 90 CAPSULE | Refills: 3 | Status: SHIPPED | OUTPATIENT
Start: 2024-03-19 | End: 2024-03-19 | Stop reason: DRUGHIGH

## 2024-03-19 RX ORDER — ZOLPIDEM TARTRATE 10 MG/1
10 TABLET ORAL NIGHTLY PRN
Qty: 90 TABLET | Refills: 1 | Status: SHIPPED | OUTPATIENT
Start: 2024-03-19 | End: 2024-09-15

## 2024-03-19 RX ORDER — MELOXICAM 15 MG/1
15 TABLET ORAL DAILY
Qty: 90 TABLET | Refills: 3 | Status: SHIPPED | OUTPATIENT
Start: 2024-03-19

## 2024-03-19 RX ORDER — FLUCONAZOLE 200 MG/1
200 TABLET ORAL DAILY
COMMUNITY
Start: 2024-03-14

## 2024-03-19 RX ORDER — CLINDAMYCIN PHOSPHATE 10 MG/G
GEL TOPICAL
COMMUNITY
Start: 2024-02-12 | End: 2024-03-19 | Stop reason: SDUPTHER

## 2024-03-19 NOTE — PATIENT INSTRUCTIONS
The medication list included in this document is our record of what you are currently taking, including any changes that were made at today's visit.  If you find any differences when compared to your medications at home, or have any questions that were not answered at your visit, please contact the office.    Decrease Cymbalta to 20 mg daily.  Wean slowly as we discussed.  Maybe will help with ED.     Follow up in six months with labs prior for AWV.  See orders.     Ezequiel Woody MD

## 2024-03-19 NOTE — PROGRESS NOTES
ASSESSMENT/PLAN:    Patient Instructions   The medication list included in this document is our record of what you are currently taking, including any changes that were made at today's visit.  If you find any differences when compared to your medications at home, or have any questions that were not answered at your visit, please contact the office.    Decrease Cymbalta to 20 mg daily.  Wean slowly as we discussed.  Maybe will help with ED.     Follow up in six months with labs prior for AWV.  See orders.     Ezequiel Woody MD     Primary osteoarthritis involving multiple joints  -     meloxicam (MOBIC) 15 MG tablet; Take 1 tablet by mouth daily, Disp-90 tablet, R-3Normal  -     DULoxetine (CYMBALTA) 20 MG extended release capsule; Take 1 capsule by mouth daily, Disp-90 capsule, R-3Normal  -     CBC; Future  -     Comprehensive Metabolic Panel; Future  -     Hemoglobin A1C; Future  -     Lipid Panel; Future  -     TSH; Future  -     Urinalysis; Future  -     PSA Screening; Future  Chronic low back pain, unspecified back pain laterality, unspecified whether sciatica present  -     DULoxetine (CYMBALTA) 20 MG extended release capsule; Take 1 capsule by mouth daily, Disp-90 capsule, R-3Normal  -     CBC; Future  -     Comprehensive Metabolic Panel; Future  -     Hemoglobin A1C; Future  -     Lipid Panel; Future  -     TSH; Future  -     Urinalysis; Future  -     PSA Screening; Future  Irritable bowel syndrome with constipation  -     linaclotide (LINZESS) 145 MCG capsule; Take 1 capsule by mouth every morning (before breakfast), Disp-30 capsule, R-5Normal  -     CBC; Future  -     Comprehensive Metabolic Panel; Future  -     Hemoglobin A1C; Future  -     Lipid Panel; Future  -     TSH; Future  -     Urinalysis; Future  -     PSA Screening; Future  Primary insomnia  -     zolpidem (AMBIEN) 10 MG tablet; Take 1 tablet by mouth nightly as needed for Sleep for up to 180 days., Disp-90 tablet, R-1Normal  -     CBC;

## 2024-03-20 ENCOUNTER — OFFICE VISIT (OUTPATIENT)
Dept: ORTHOPEDIC SURGERY | Age: 74
End: 2024-03-20
Payer: MEDICARE

## 2024-03-20 DIAGNOSIS — M17.11 LOCALIZED OSTEOARTHRITIS OF RIGHT KNEE: ICD-10-CM

## 2024-03-20 DIAGNOSIS — M25.561 PAIN IN BOTH KNEES, UNSPECIFIED CHRONICITY: Primary | ICD-10-CM

## 2024-03-20 DIAGNOSIS — M17.12 OSTEOARTHRITIS OF LEFT KNEE, UNSPECIFIED OSTEOARTHRITIS TYPE: ICD-10-CM

## 2024-03-20 DIAGNOSIS — M25.562 PAIN IN BOTH KNEES, UNSPECIFIED CHRONICITY: Primary | ICD-10-CM

## 2024-03-20 PROCEDURE — 99204 OFFICE O/P NEW MOD 45 MIN: CPT | Performed by: PHYSICIAN ASSISTANT

## 2024-03-20 PROCEDURE — 1036F TOBACCO NON-USER: CPT | Performed by: PHYSICIAN ASSISTANT

## 2024-03-20 PROCEDURE — G8484 FLU IMMUNIZE NO ADMIN: HCPCS | Performed by: PHYSICIAN ASSISTANT

## 2024-03-20 PROCEDURE — 3017F COLORECTAL CA SCREEN DOC REV: CPT | Performed by: PHYSICIAN ASSISTANT

## 2024-03-20 PROCEDURE — 20611 DRAIN/INJ JOINT/BURSA W/US: CPT | Performed by: PHYSICIAN ASSISTANT

## 2024-03-20 PROCEDURE — G8428 CUR MEDS NOT DOCUMENT: HCPCS | Performed by: PHYSICIAN ASSISTANT

## 2024-03-20 PROCEDURE — G8419 CALC BMI OUT NRM PARAM NOF/U: HCPCS | Performed by: PHYSICIAN ASSISTANT

## 2024-03-20 PROCEDURE — 1123F ACP DISCUSS/DSCN MKR DOCD: CPT | Performed by: PHYSICIAN ASSISTANT

## 2024-03-20 RX ORDER — TRIAMCINOLONE ACETONIDE 40 MG/ML
40 INJECTION, SUSPENSION INTRA-ARTICULAR; INTRAMUSCULAR ONCE
Status: COMPLETED | OUTPATIENT
Start: 2024-03-20 | End: 2024-03-20

## 2024-03-20 RX ADMIN — TRIAMCINOLONE ACETONIDE 40 MG: 40 INJECTION, SUSPENSION INTRA-ARTICULAR; INTRAMUSCULAR at 09:27

## 2024-03-20 NOTE — PROGRESS NOTES
Session: 20 min   Stress: Not on file   Social Connections: Not on file   Intimate Partner Violence: Not on file   Housing Stability: Not on file       Review of Systems:  As per HPI.  Pertinent positives and negatives are addressed with the patient, particularly those related to musculoskeletal concerns.   Non-orthopaedic concerns were referred back to the primary care physician.    PHYSICAL EXAMINATION:   The patient appears their stated age and they are in no distress.  The lower extremities are as described below.  Circulation is normal with palpable pedal pulses bilaterally and no edema.  There is no lymph adenopathy in the popliteal or malleolar region.  The skin is without stasis disease distally bilaterally.  Hip ROM is smooth and painless.  Knee range of motion is 0-120 degrees on the right and 0-120 degrees on the left.  bilateral knee: There is 2mm of anterior/posterior translation and 2mm of medial/lateral instability bilaterally.  There is 6 degrees of varus alignment in the bilateral knee.  There is some pain to palpation over the medial joint line of the right knee.  Limb lengths are equal.  The gait is noted to be with a slight trendelenburg and antalgia.  Left knee varus thrust is noted.  Straight leg test is negative.  Quadriceps strength is good.  Sensation is intact to light touch bilaterally.  Their judgment and insight are normal.  They are oriented to time, place and person.  Their memory is good and the mood and affect appropriate.    X-RAY: Views of the bilateral knee are reviewed.  4 views standing reveal no dramatic joint space loss, eburnated bone or osteophyte formation.  There is some joint space narrowing present.      X-ray impression:  Probable early left knee degenerative joint disease.    4 views standing reveal some joint space loss, eburnated bone, and osteophyte formation present.      X-ray impression:  Moderate osteoarthritis of the right knee.    IMPRESSION:    Diagnosis

## 2024-03-25 ENCOUNTER — OFFICE VISIT (OUTPATIENT)
Dept: ORTHOPEDIC SURGERY | Age: 74
End: 2024-03-25
Payer: MEDICARE

## 2024-03-25 ENCOUNTER — TREATMENT (OUTPATIENT)
Age: 74
End: 2024-03-25
Payer: MEDICARE

## 2024-03-25 VITALS — HEIGHT: 69 IN | BODY MASS INDEX: 25.62 KG/M2 | WEIGHT: 173 LBS

## 2024-03-25 DIAGNOSIS — M25.521 RIGHT ELBOW PAIN: Primary | ICD-10-CM

## 2024-03-25 DIAGNOSIS — M25.571 PAIN IN RIGHT ANKLE AND JOINTS OF RIGHT FOOT: Primary | ICD-10-CM

## 2024-03-25 DIAGNOSIS — M77.11 RIGHT LATERAL EPICONDYLITIS: ICD-10-CM

## 2024-03-25 PROCEDURE — 97140 MANUAL THERAPY 1/> REGIONS: CPT | Performed by: PHYSICAL THERAPIST

## 2024-03-25 PROCEDURE — 20560 NDL INSJ W/O NJX 1 OR 2 MUSC: CPT | Performed by: PHYSICAL THERAPIST

## 2024-03-25 PROCEDURE — 97110 THERAPEUTIC EXERCISES: CPT | Performed by: PHYSICAL THERAPIST

## 2024-03-25 PROCEDURE — G0283 ELEC STIM OTHER THAN WOUND: HCPCS | Performed by: PHYSICAL THERAPIST

## 2024-03-25 PROCEDURE — 20605 DRAIN/INJ JOINT/BURSA W/O US: CPT | Performed by: ORTHOPAEDIC SURGERY

## 2024-03-25 RX ORDER — METHYLPREDNISOLONE ACETATE 40 MG/ML
40 INJECTION, SUSPENSION INTRA-ARTICULAR; INTRALESIONAL; INTRAMUSCULAR; SOFT TISSUE ONCE
Status: COMPLETED | OUTPATIENT
Start: 2024-03-25 | End: 2024-03-25

## 2024-03-25 RX ADMIN — METHYLPREDNISOLONE ACETATE 80 MG: 40 INJECTION, SUSPENSION INTRA-ARTICULAR; INTRALESIONAL; INTRAMUSCULAR; SOFT TISSUE at 14:52

## 2024-03-25 NOTE — PROGRESS NOTES
crepitance    XR: Right side: Standing AP lateral mortise ankle plus AP oblique foot taken 10/18/2022 with calcaneocuboid joint arthritic changes; naviculocuneiform area sagging; mild anterior ankle subluxation  XR Impression:  As above      Reviewed Test/Records/Documents:   03/25/2022: Right ankle MRI scan: Ordered by Dr. Connelly: Dr. Thacker radiology impression:  1.  Lower extremity edema with tibiotalar joint effusion  2.  Findings suggestive of sinus Tarsi syndrome.  Correlate  3.  Low-grade plantar fasciitis with reactive marrow edema and inferior calcaneal tuberosity  My review of the MRI scan correlated with sinus Tarsi syndrome.  Surprisingly I see no significant edema or suggestions of advanced subtalar joint or calcaneocuboid arthritis    Injection: We discussed risks/complications: After acceptance and sterile prep: Right subtalar joint/sinus Tarsi injected 2 cc Xylocaine; 80 mg Depo-Medrol; he did well    Assessment:    Right pes planovalgus, sinus Tarsi syndrome impingement, calcaneocuboid joint arthritis     Plan:   The patient and I discussed the above assessment. We explored treatment options.     Before Fady Coombs retired, he made a new pair insoles   He uses hightop boots  Decision made to inject again today but he understands future subtalar fusion   Advanced medical imaging: No indication for CT scan for repeat MRI scan   PT: Prior PT     Medication - OTC meds prn:   Follow up: As needed: If return: X-rays ankle and foot  Work status: Retired cardiologist     This note was created using Dragon voice recognition software which may result in errors of speech and spelling recognition and word/phrase syntax errors.

## 2024-03-25 NOTE — PROGRESS NOTES
[facility]  [address] [place] [id number]  Dept: [phone]      Physical Therapy Daily Note/Discharge Summary     Referring MD: Friend, Itzel TARIQ MD  Diagnosis:     ICD-10-CM    1. Right elbow pain  M25.521       2. Right lateral epicondylitis  M77.11          Therapy precautions:None  Co-morbidities affecting plan of care: s/p bilateral carpal tunnel release w/ minimal continued symptoms RUE, s/p R shoulder arthroplasty with some limitations in strength/ROM, s/p L5-S1 laminotomy, L ulnar nerve decompression procedure  Chief complaints/history of injury: Lateral R elbow pain that radiates into forearm. Denies numbness/tingling.   Patient Stated Goals: return to full activity    Total Timed Procedure Codes: 40 min, Total Time: 53 min Modifier needed: No  Episode visit count:  7     SUBJECTIVE     Overall, symptoms have improved. However, pt reports mild soreness after exercises and heavy activity such as yard. He also notes mild pain with carrying heavy pots/pans while cooking. Pt focuses on lifting with biceps to avoid aggravating elbow.     Medications: no changes since last session    OBJECTIVE       Findings 1/31/2024  2/26/2024 3/25/24   QuickDASH score 19/11 16/11 15/10   QuickDASH % functional deficit 18% 11% 13%   Elbow strength (MMT) 4/5 5/5 5/5   Elbow extension AROM Lacks 2° 0° 0°     Mild tenderness extensor muscles R    Treatment provided today:  Therapeutic exercise (53805) x 15 min to develop ROM, strength, endurance and flexibility RUE and scapular stabilizers.  Retro UBE level 1.5 x 4 min  Passive wrist flexion and extension stretches w/ elbow flexed to 90 degrees and gradually extending  Isometric R wrist extension at various angles from flexion to extension  Eccentric wrist extension 3# 3x10   Reviewed HEP  Manual therapy (42941) x 25 min utilizing techniques to improve joint and/or soft tissue mobility, ROM, and function as well as helping to decrease

## 2024-03-26 DIAGNOSIS — F51.01 PRIMARY INSOMNIA: ICD-10-CM

## 2024-03-26 RX ORDER — ZOLPIDEM TARTRATE 10 MG/1
10 TABLET ORAL NIGHTLY PRN
Qty: 90 TABLET | Refills: 1 | Status: CANCELLED | OUTPATIENT
Start: 2024-03-26 | End: 2024-09-22

## 2024-03-26 NOTE — TELEPHONE ENCOUNTER
Patient notified Dr Woody sent a new prescription to [facility] on 3/19/2024 #90 with 1 RF. Patient voiced understanding.

## 2024-04-30 RX ORDER — HYALURONATE SODIUM 10 MG/ML
20 SYRINGE (ML) INTRAARTICULAR ONCE
Status: CANCELLED | OUTPATIENT
Start: 2024-04-30

## 2024-05-01 ENCOUNTER — OFFICE VISIT (OUTPATIENT)
Dept: ORTHOPEDIC SURGERY | Age: 74
End: 2024-05-01

## 2024-05-01 DIAGNOSIS — M17.11 LOCALIZED OSTEOARTHRITIS OF RIGHT KNEE: Primary | ICD-10-CM

## 2024-05-01 DIAGNOSIS — M17.12 OSTEOARTHRITIS OF LEFT KNEE, UNSPECIFIED OSTEOARTHRITIS TYPE: ICD-10-CM

## 2024-06-17 RX ORDER — IPRATROPIUM BROMIDE 21 UG/1
2 SPRAY, METERED NASAL 2 TIMES DAILY
Qty: 30 ML | Refills: 5 | Status: SHIPPED | OUTPATIENT
Start: 2024-06-17

## 2024-07-09 ENCOUNTER — PATIENT MESSAGE (OUTPATIENT)
Dept: INTERNAL MEDICINE CLINIC | Facility: CLINIC | Age: 74
End: 2024-07-09

## 2024-07-09 DIAGNOSIS — M85.88 OSTEOPENIA OF SPINE: Primary | ICD-10-CM

## 2024-07-09 NOTE — TELEPHONE ENCOUNTER
Patient called for a Medication Refill Request    Name of Medication : dicyclomine (BENTYL)     Strength of Medication: 10 MG capsule     Directions: TAKE 1 CAPSULE BY ORAL ROUTE 3 TIMES EVERY DAY     30 day or 90 day supply: 90 days    Preferred Pharmacy: [facility]  [place] - [address] [facility] [phone]     Last Appt. Date: 3/19/2024    Next Appt. Date: 9/23/2024    Additional Information For Provider:

## 2024-07-09 NOTE — TELEPHONE ENCOUNTER
From: Billy Vega  To: Dr. Ezequiel oWody  Sent: 7/9/2024 1:31 PM EDT  Subject: bone density    Ezequiel should I get another bone density before I see you? Also can I get a Vit D level when I have lab in Sept. Hope you are having a good summer. Thanks!  Bird

## 2024-07-10 RX ORDER — DICYCLOMINE HYDROCHLORIDE 10 MG/1
10 CAPSULE ORAL 3 TIMES DAILY
Qty: 270 CAPSULE | Refills: 1 | Status: SHIPPED | OUTPATIENT
Start: 2024-07-10

## 2024-08-01 ENCOUNTER — OFFICE VISIT (OUTPATIENT)
Dept: UROLOGY | Age: 74
End: 2024-08-01
Payer: MEDICARE

## 2024-08-01 DIAGNOSIS — N32.81 OVERACTIVE BLADDER: Primary | ICD-10-CM

## 2024-08-01 DIAGNOSIS — N13.8 BPH WITH OBSTRUCTION/LOWER URINARY TRACT SYMPTOMS: ICD-10-CM

## 2024-08-01 DIAGNOSIS — N52.9 ERECTILE DYSFUNCTION, UNSPECIFIED ERECTILE DYSFUNCTION TYPE: ICD-10-CM

## 2024-08-01 DIAGNOSIS — N40.1 BPH WITH OBSTRUCTION/LOWER URINARY TRACT SYMPTOMS: ICD-10-CM

## 2024-08-01 LAB
BILIRUBIN, URINE, POC: NEGATIVE
BLOOD URINE, POC: NEGATIVE
GLUCOSE URINE, POC: NEGATIVE
KETONES, URINE, POC: NEGATIVE
LEUKOCYTE ESTERASE, URINE, POC: NEGATIVE
NITRITE, URINE, POC: NEGATIVE
PH, URINE, POC: 6 (ref 4.6–8)
PROTEIN,URINE, POC: NEGATIVE
SPECIFIC GRAVITY, URINE, POC: 1.02 (ref 1–1.03)
URINALYSIS CLARITY, POC: ABNORMAL
URINALYSIS COLOR, POC: ABNORMAL
UROBILINOGEN, POC: ABNORMAL

## 2024-08-01 PROCEDURE — 1036F TOBACCO NON-USER: CPT | Performed by: NURSE PRACTITIONER

## 2024-08-01 PROCEDURE — 3017F COLORECTAL CA SCREEN DOC REV: CPT | Performed by: NURSE PRACTITIONER

## 2024-08-01 PROCEDURE — G8427 DOCREV CUR MEDS BY ELIG CLIN: HCPCS | Performed by: NURSE PRACTITIONER

## 2024-08-01 PROCEDURE — G8419 CALC BMI OUT NRM PARAM NOF/U: HCPCS | Performed by: NURSE PRACTITIONER

## 2024-08-01 PROCEDURE — 81003 URINALYSIS AUTO W/O SCOPE: CPT | Performed by: NURSE PRACTITIONER

## 2024-08-01 PROCEDURE — 99214 OFFICE O/P EST MOD 30 MIN: CPT | Performed by: NURSE PRACTITIONER

## 2024-08-01 PROCEDURE — 1123F ACP DISCUSS/DSCN MKR DOCD: CPT | Performed by: NURSE PRACTITIONER

## 2024-08-01 RX ORDER — MIRABEGRON 50 MG/1
50 TABLET, EXTENDED RELEASE ORAL DAILY
Qty: 30 TABLET | Refills: 5 | Status: SHIPPED | OUTPATIENT
Start: 2024-08-01 | End: 2024-08-01 | Stop reason: ALTCHOICE

## 2024-08-01 RX ORDER — TAMSULOSIN HYDROCHLORIDE 0.4 MG/1
0.8 CAPSULE ORAL DAILY
Qty: 180 CAPSULE | Refills: 3 | Status: SHIPPED | OUTPATIENT
Start: 2024-08-01

## 2024-08-01 RX ORDER — MIRABEGRON 50 MG/1
50 TABLET, EXTENDED RELEASE ORAL DAILY
Qty: 90 TABLET | Refills: 3 | Status: SHIPPED | OUTPATIENT
Start: 2024-08-01

## 2024-08-01 RX ORDER — TAMSULOSIN HYDROCHLORIDE 0.4 MG/1
0.8 CAPSULE ORAL DAILY
Qty: 90 CAPSULE | Refills: 3 | Status: SHIPPED | OUTPATIENT
Start: 2024-08-01 | End: 2024-08-01 | Stop reason: ALTCHOICE

## 2024-08-01 ASSESSMENT — ENCOUNTER SYMPTOMS: BACK PAIN: 0

## 2024-08-01 NOTE — PROGRESS NOTES
[facility]  [address]  [place] [address]  [phone]          Billy Vega  : 1950    Chief Complaint   Patient presents with    Follow-up    Benign Prostatic Hypertrophy    Erectile Dysfunction          HPI     Billy Vega is a 74 y.o. male hx of LUTS.  [place] to have asymmetric prostate . Hs has moderate hesitancy, takes Flomax. PSA 1.0 in -. No family history of prostate cancer.   PSA 0.9 in -, 0.8 in -.   He has been taking Flomax ., takes Cialis 20 mg prn for mild ED.   He has nocturia x 5, intermittent stream.   He had a valencia when he had back surgery.   Increased Flomax to 0.8 mg. Cysto showed mild BPH,     PVR 2 ml by US.  AUASS is 21 with highest score of 5 for intermittency. QOL is \"unhappy\". Has nocturia x 3.   We discussed medical and surgical options. Has small prostate. His main complaint is nocturia. May have component of OAB. Was started on  Myrbetriq.50 mg . Continues tamsulosin 0.4 mg daily. May be candidate for bipolar button vaporization of prostate or TUIP. .   He thinks that his sxs are better. Nocturia went from 8 to 2 or 3.   Had urinary retention after shoulder surgery in . This resolved.   Back on tamsulosin 0.4 mg bid and Myrbetriq. He is having worsening ED, despite taking Cialis 20 mg. Gave Viagra.   Testosterone 418 in 3-22.   He thought that his testes are asymmetric. Has decreased volume of ejaculate.   May have had mumps orchitis as a teenager.   He will try to decrease or stop the tamsulosin. He is having retrograde ejaculation.   May be interested in Rezum. He will let me know .  PSA 2.0 in . Had back surgery by Dr. Cooper.   PSA 1.9 in 3-23, 1.2 in .  He came in for trimix teaching, hasn't used it yet. Would like to try this now.   Feels his LUTS may be slightly worsening. Not ready for nothing surgical.           Past Medical History:   Diagnosis Date    Arthritis 2015    mostly

## 2024-08-02 DIAGNOSIS — K58.1 IRRITABLE BOWEL SYNDROME WITH CONSTIPATION: ICD-10-CM

## 2024-08-27 ENCOUNTER — OFFICE VISIT (OUTPATIENT)
Dept: ORTHOPEDIC SURGERY | Age: 74
End: 2024-08-27

## 2024-08-27 VITALS — BODY MASS INDEX: 25.62 KG/M2 | HEIGHT: 69 IN | WEIGHT: 173 LBS

## 2024-08-27 DIAGNOSIS — M25.571 PAIN IN RIGHT ANKLE AND JOINTS OF RIGHT FOOT: Primary | ICD-10-CM

## 2024-08-27 NOTE — PROGRESS NOTES
Name: Billy Vega  YOB: 1950  Gender: male  MRN: [id number]     CC: Right outer foot pain    HPI:   8387-7353: Treated for anterior calcaneal process arthritis, sinus Tarsi syndrome, insertional peroneal tendinitis.  08/26/2021: Right hindfoot injection  09/14/2021: Reparel sleeve and lace up ankle brace and discussed future potential MRI scan  9501-9814:  He reports seeing Shahrzad for multiple insole modifications  0927-9651:  Right shoulder replacement: Left gluteus medius tendon tear  7665-8607:  Dr. Connelly for new insoles, hindfoot injection, MRI scan at [facility]  10/18/2022: Right outer hindfoot pain.  Sinus Tarsi/subtalar joint injection.  November 2022-January 2023: Severe back pain with right leg weakness: ANKIT: Eventual microscopic laminectomy with Dr. Cooper ... 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  Bilateral lower extremity swelling as well.  08/21/2023: Recurrent outer ankle/hindfoot pain.  Fady Coombs modified his insoles    12/01/2023: Recurrent lateral ankle/hindfoot pain: Fady Coombs modified his insoles before he retired.  03/20/2024: Mr. Amari Peacock: PA: Bilateral knee OA: Injected right knee  03/25/2024: Recurrent: Right outer hindfoot pain    08/27/2024: Right outer ankle/foot pain    ROS/Meds/PSH/PMH/FH/SH: reviewed today    Tobacco:  reports that he has never smoked. He has never used smokeless tobacco.     Physical Examination:  Patient appears to be alert and oriented with acceptable appearance.  No obvious distress or SOB  CV: appears to have acceptable vascular color and capillary refill  Neuro: appears to have mostly intact light touch sensation   Skin: Right = lateral hindfoot thickening suspected calcaneocuboid  MS: Standing: Pes planovalgus: Scoliosis pelvic obliquity: Gait full  Right = lateral hindfoot pain  Right =  limited hindfoot mobility; near 5/5 strength; no instability or crepitance    XR: Right side: Standing AP lateral mortise ankle plus AP oblique foot taken today with moderate hindfoot arthritis; naviculocuneiform area sagging; mild anterior ankle subluxation calcifications  XR Impression:  As above      Reviewed Test/Records/Documents:   03/25/2022: Right ankle MRI scan: Ordered by Dr. Connelly: Dr. Thacker radiology impression:  1.  Lower extremity edema with tibiotalar joint effusion  2.  Findings suggestive of sinus Tarsi syndrome.  Correlate  3.  Low-grade plantar fasciitis with reactive marrow edema and inferior calcaneal tuberosity  My review of the MRI scan correlated with sinus Tarsi syndrome.  Surprisingly I see no significant edema or suggestions of advanced subtalar joint or calcaneocuboid arthritis    Timeout: I confirmed with the patient and Ann she is just:   All are in agreement with: Correct patient, planned procedure, procedure site and laterality: Right lateral hindfoot/subtalar joint  Injection: We discussed risks/complications of injection: After acceptance and sterile prep: Right lateral hindfoot/subtalar joint    Right subtalar joint/sinus Tarsi injected 2 cc Xylocaine; 80 mg Depo-Medrol; he did well    Assessment:    Right planovalgus, lateral hindfoot arthritis   Scoliosis, pelvic obliquity LLD     Plan:   The patient and I discussed the above assessment. We explored treatment options.     Radiographs with hindfoot arthritis involving subtalar, talonavicular and calcaneocuboid  With his foot position will try different orthotics in hopes of offloading his lateral hindfoot  With his scoliosis, he has a pelvic obliquity LLD that hopefully can be improved with insoles     prosthetics: Custom full-length insoles; goal to offload plantar lateral foot and hindfoot: Insole or sole lift to try to accommodate leg length discrepancy associated with the scoliosis    Surgery: Future: Right triple

## 2024-08-28 ENCOUNTER — CLINICAL DOCUMENTATION (OUTPATIENT)
Dept: ORTHOPEDIC SURGERY | Age: 74
End: 2024-08-28

## 2024-09-05 ENCOUNTER — OFFICE VISIT (OUTPATIENT)
Dept: UROLOGY | Age: 74
End: 2024-09-05
Payer: MEDICARE

## 2024-09-05 DIAGNOSIS — N52.9 ERECTILE DYSFUNCTION, UNSPECIFIED ERECTILE DYSFUNCTION TYPE: Primary | ICD-10-CM

## 2024-09-05 PROCEDURE — G8419 CALC BMI OUT NRM PARAM NOF/U: HCPCS | Performed by: NURSE PRACTITIONER

## 2024-09-05 PROCEDURE — 3017F COLORECTAL CA SCREEN DOC REV: CPT | Performed by: NURSE PRACTITIONER

## 2024-09-05 PROCEDURE — G8427 DOCREV CUR MEDS BY ELIG CLIN: HCPCS | Performed by: NURSE PRACTITIONER

## 2024-09-05 PROCEDURE — 99213 OFFICE O/P EST LOW 20 MIN: CPT | Performed by: NURSE PRACTITIONER

## 2024-09-05 PROCEDURE — 1036F TOBACCO NON-USER: CPT | Performed by: NURSE PRACTITIONER

## 2024-09-05 PROCEDURE — 1123F ACP DISCUSS/DSCN MKR DOCD: CPT | Performed by: NURSE PRACTITIONER

## 2024-09-05 NOTE — PROGRESS NOTES
[facility]  [address]   [address]  [place] [address]  [phone]    Billy Vega  : 1950    Chief Complaint   Patient presents with    Follow-up     Trimix teching          HPI     Billy Vega is a 74 y.o. male  hx of LUTS.  [place] to have asymmetric prostate . Hs has moderate hesitancy, takes Flomax. PSA 1.0 in -. No family history of prostate cancer.   PSA 0.9 in -, 0.8 in .   He has been taking Flomax ., takes Cialis 20 mg prn for mild ED.   He has nocturia x 5, intermittent stream.   He had a valencia when he had back surgery.   Increased Flomax to 0.8 mg. Cysto showed mild BPH,     PVR 2 ml by US.  AUASS is 21 with highest score of 5 for intermittency. QOL is \"unhappy\". Has nocturia x 3.   We discussed medical and surgical options. Has small prostate. His main complaint is nocturia. May have component of OAB. Was started on  Myrbetriq.50 mg . Continues tamsulosin 0.4 mg daily. May be candidate for bipolar button vaporization of prostate or TUIP. .   He thinks that his sxs are better. Nocturia went from 8 to 2 or 3.   Had urinary retention after shoulder surgery in . This resolved.   Back on tamsulosin 0.4 mg bid and Myrbetriq. He is having worsening ED, despite taking Cialis 20 mg. Gave Viagra.   Testosterone 418 in 3-22.   He thought that his testes are asymmetric. Has decreased volume of ejaculate.   May have had mumps orchitis as a teenager.   He will try to decrease or stop the tamsulosin. He is having retrograde ejaculation.   May be interested in Rezum. He will let me know .  PSA 2.0 in . Had back surgery by Dr. Cooper.   PSA 1.9 in 3-23, 1.2 in .  He came in for trimix teaching, hasn't used it yet. Would like to try this now.   Feels his LUTS may be slightly worsening. Not ready for anything surgical.   Here today for trimix teaching. He brought meds, however they are frozen.           Past Medical History:   Diagnosis Date

## 2024-09-16 ENCOUNTER — HOSPITAL ENCOUNTER (OUTPATIENT)
Dept: MAMMOGRAPHY | Age: 74
Discharge: HOME | End: 2024-09-19
Attending: INTERNAL MEDICINE
Payer: MEDICARE

## 2024-09-16 DIAGNOSIS — M85.88 OSTEOPENIA OF SPINE: ICD-10-CM

## 2024-09-16 PROCEDURE — 77080 DXA BONE DENSITY AXIAL: CPT

## 2024-09-17 ENCOUNTER — LAB (OUTPATIENT)
Dept: INTERNAL MEDICINE CLINIC | Facility: CLINIC | Age: 74
End: 2024-09-17

## 2024-09-17 DIAGNOSIS — K58.1 IRRITABLE BOWEL SYNDROME WITH CONSTIPATION: ICD-10-CM

## 2024-09-17 DIAGNOSIS — N52.9 ERECTILE DYSFUNCTION, UNSPECIFIED ERECTILE DYSFUNCTION TYPE: ICD-10-CM

## 2024-09-17 DIAGNOSIS — R79.9 ABNORMAL FINDING OF BLOOD CHEMISTRY, UNSPECIFIED: ICD-10-CM

## 2024-09-17 DIAGNOSIS — M54.50 CHRONIC LOW BACK PAIN, UNSPECIFIED BACK PAIN LATERALITY, UNSPECIFIED WHETHER SCIATICA PRESENT: ICD-10-CM

## 2024-09-17 DIAGNOSIS — M85.88 OSTEOPENIA OF SPINE: ICD-10-CM

## 2024-09-17 DIAGNOSIS — M15.9 PRIMARY OSTEOARTHRITIS INVOLVING MULTIPLE JOINTS: ICD-10-CM

## 2024-09-17 DIAGNOSIS — Z12.5 ENCOUNTER FOR SCREENING FOR MALIGNANT NEOPLASM OF PROSTATE: ICD-10-CM

## 2024-09-17 DIAGNOSIS — G89.29 CHRONIC LOW BACK PAIN, UNSPECIFIED BACK PAIN LATERALITY, UNSPECIFIED WHETHER SCIATICA PRESENT: ICD-10-CM

## 2024-09-17 DIAGNOSIS — F51.01 PRIMARY INSOMNIA: ICD-10-CM

## 2024-09-17 LAB
25(OH)D3 SERPL-MCNC: 46.6 NG/ML (ref 30–100)
ALBUMIN SERPL-MCNC: 3.8 G/DL (ref 3.2–4.6)
ALBUMIN/GLOB SERPL: 1.5 (ref 1–1.9)
ALP SERPL-CCNC: 53 U/L (ref 40–129)
ALT SERPL-CCNC: 22 U/L (ref 12–65)
ANION GAP SERPL CALC-SCNC: 8 MMOL/L (ref 9–18)
APPEARANCE UR: CLEAR
AST SERPL-CCNC: 33 U/L (ref 15–37)
BILIRUB SERPL-MCNC: 0.6 MG/DL (ref 0–1.2)
BILIRUB UR QL: NEGATIVE
BUN SERPL-MCNC: 16 MG/DL (ref 8–23)
CALCIUM SERPL-MCNC: 9 MG/DL (ref 8.8–10.2)
CHLORIDE SERPL-SCNC: 97 MMOL/L (ref 98–107)
CHOLEST SERPL-MCNC: 160 MG/DL (ref 0–200)
CO2 SERPL-SCNC: 29 MMOL/L (ref 20–28)
COLOR UR: NORMAL
CREAT SERPL-MCNC: 1.02 MG/DL (ref 0.8–1.3)
ERYTHROCYTE [DISTWIDTH] IN BLOOD BY AUTOMATED COUNT: 11.6 % (ref 11.9–14.6)
EST. AVERAGE GLUCOSE BLD GHB EST-MCNC: 111 MG/DL
GLOBULIN SER CALC-MCNC: 2.5 G/DL (ref 2.3–3.5)
GLUCOSE SERPL-MCNC: 85 MG/DL (ref 70–99)
GLUCOSE UR STRIP.AUTO-MCNC: NEGATIVE MG/DL
HBA1C MFR BLD: 5.5 % (ref 0–5.6)
HCT VFR BLD AUTO: 43.1 % (ref 41.1–50.3)
HDLC SERPL-MCNC: 63 MG/DL (ref 40–60)
HDLC SERPL: 2.6 (ref 0–5)
HGB BLD-MCNC: 13.7 G/DL (ref 13.6–17.2)
HGB UR QL STRIP: NEGATIVE
KETONES UR QL STRIP.AUTO: NEGATIVE MG/DL
LDLC SERPL CALC-MCNC: 88 MG/DL (ref 0–100)
LEUKOCYTE ESTERASE UR QL STRIP.AUTO: NEGATIVE
MCH RBC QN AUTO: 31.6 PG (ref 26.1–32.9)
MCHC RBC AUTO-ENTMCNC: 31.8 G/DL (ref 31.4–35)
MCV RBC AUTO: 99.3 FL (ref 82–102)
NITRITE UR QL STRIP.AUTO: NEGATIVE
NRBC # BLD: 0 K/UL (ref 0–0.2)
PH UR STRIP: 7.5 (ref 5–9)
PLATELET # BLD AUTO: 232 K/UL (ref 150–450)
PMV BLD AUTO: 9.1 FL (ref 9.4–12.3)
POTASSIUM SERPL-SCNC: 4.4 MMOL/L (ref 3.5–5.1)
PROT SERPL-MCNC: 6.3 G/DL (ref 6.3–8.2)
PROT UR STRIP-MCNC: NEGATIVE MG/DL
PSA SERPL-MCNC: 1.1 NG/ML (ref 0–4)
RBC # BLD AUTO: 4.34 M/UL (ref 4.23–5.6)
SODIUM SERPL-SCNC: 134 MMOL/L (ref 136–145)
SP GR UR REFRACTOMETRY: 1.01 (ref 1–1.02)
TRIGL SERPL-MCNC: 48 MG/DL (ref 0–150)
TSH, 3RD GENERATION: 1.94 UIU/ML (ref 0.27–4.2)
UROBILINOGEN UR QL STRIP.AUTO: 0.2 EU/DL (ref 0.2–1)
VLDLC SERPL CALC-MCNC: 10 MG/DL (ref 6–23)
WBC # BLD AUTO: 5.5 K/UL (ref 4.3–11.1)

## 2024-09-22 SDOH — HEALTH STABILITY: PHYSICAL HEALTH: ON AVERAGE, HOW MANY DAYS PER WEEK DO YOU ENGAGE IN MODERATE TO STRENUOUS EXERCISE (LIKE A BRISK WALK)?: 5 DAYS

## 2024-09-22 SDOH — ECONOMIC STABILITY: FOOD INSECURITY: WITHIN THE PAST 12 MONTHS, YOU WORRIED THAT YOUR FOOD WOULD RUN OUT BEFORE YOU GOT MONEY TO BUY MORE.: NEVER TRUE

## 2024-09-22 SDOH — HEALTH STABILITY: PHYSICAL HEALTH: ON AVERAGE, HOW MANY MINUTES DO YOU ENGAGE IN EXERCISE AT THIS LEVEL?: 30 MIN

## 2024-09-22 SDOH — ECONOMIC STABILITY: FOOD INSECURITY: WITHIN THE PAST 12 MONTHS, THE FOOD YOU BOUGHT JUST DIDN'T LAST AND YOU DIDN'T HAVE MONEY TO GET MORE.: NEVER TRUE

## 2024-09-22 SDOH — ECONOMIC STABILITY: INCOME INSECURITY: HOW HARD IS IT FOR YOU TO PAY FOR THE VERY BASICS LIKE FOOD, HOUSING, MEDICAL CARE, AND HEATING?: NOT HARD AT ALL

## 2024-09-22 ASSESSMENT — PATIENT HEALTH QUESTIONNAIRE - PHQ9
SUM OF ALL RESPONSES TO PHQ QUESTIONS 1-9: 0
SUM OF ALL RESPONSES TO PHQ9 QUESTIONS 1 & 2: 0
2. FEELING DOWN, DEPRESSED OR HOPELESS: NOT AT ALL
1. LITTLE INTEREST OR PLEASURE IN DOING THINGS: NOT AT ALL

## 2024-09-22 ASSESSMENT — LIFESTYLE VARIABLES
HOW OFTEN DURING THE LAST YEAR HAVE YOU BEEN UNABLE TO REMEMBER WHAT HAPPENED THE NIGHT BEFORE BECAUSE YOU HAD BEEN DRINKING: NEVER
HAS A RELATIVE, FRIEND, DOCTOR, OR ANOTHER HEALTH PROFESSIONAL EXPRESSED CONCERN ABOUT YOUR DRINKING OR SUGGESTED YOU CUT DOWN: NO
HAVE YOU OR SOMEONE ELSE BEEN INJURED AS A RESULT OF YOUR DRINKING: NO
HOW OFTEN DO YOU HAVE A DRINK CONTAINING ALCOHOL: 5
HOW OFTEN DURING THE LAST YEAR HAVE YOU HAD A FEELING OF GUILT OR REMORSE AFTER DRINKING: NEVER
HOW MANY STANDARD DRINKS CONTAINING ALCOHOL DO YOU HAVE ON A TYPICAL DAY: 1 OR 2
HOW OFTEN DURING THE LAST YEAR HAVE YOU NEEDED AN ALCOHOLIC DRINK FIRST THING IN THE MORNING TO GET YOURSELF GOING AFTER A NIGHT OF HEAVY DRINKING: NEVER
HOW OFTEN DURING THE LAST YEAR HAVE YOU FOUND THAT YOU WERE NOT ABLE TO STOP DRINKING ONCE YOU HAD STARTED: NEVER
HOW MANY STANDARD DRINKS CONTAINING ALCOHOL DO YOU HAVE ON A TYPICAL DAY: 1
HOW OFTEN DURING THE LAST YEAR HAVE YOU NEEDED AN ALCOHOLIC DRINK FIRST THING IN THE MORNING TO GET YOURSELF GOING AFTER A NIGHT OF HEAVY DRINKING: NEVER
HOW OFTEN DURING THE LAST YEAR HAVE YOU FOUND THAT YOU WERE NOT ABLE TO STOP DRINKING ONCE YOU HAD STARTED: NEVER
HOW OFTEN DURING THE LAST YEAR HAVE YOU BEEN UNABLE TO REMEMBER WHAT HAPPENED THE NIGHT BEFORE BECAUSE YOU HAD BEEN DRINKING: NEVER
HOW OFTEN DO YOU HAVE SIX OR MORE DRINKS ON ONE OCCASION: 1
HOW OFTEN DURING THE LAST YEAR HAVE YOU FAILED TO DO WHAT WAS NORMALLY EXPECTED FROM YOU BECAUSE OF DRINKING: NEVER
HOW OFTEN DURING THE LAST YEAR HAVE YOU FAILED TO DO WHAT WAS NORMALLY EXPECTED FROM YOU BECAUSE OF DRINKING: NEVER
HAS A RELATIVE, FRIEND, DOCTOR, OR ANOTHER HEALTH PROFESSIONAL EXPRESSED CONCERN ABOUT YOUR DRINKING OR SUGGESTED YOU CUT DOWN: NO
HAVE YOU OR SOMEONE ELSE BEEN INJURED AS A RESULT OF YOUR DRINKING: NO
HOW OFTEN DO YOU HAVE A DRINK CONTAINING ALCOHOL: 4 OR MORE TIMES A WEEK
HOW OFTEN DURING THE LAST YEAR HAVE YOU HAD A FEELING OF GUILT OR REMORSE AFTER DRINKING: NEVER

## 2024-09-23 ENCOUNTER — OFFICE VISIT (OUTPATIENT)
Dept: INTERNAL MEDICINE CLINIC | Facility: CLINIC | Age: 74
End: 2024-09-23
Payer: MEDICARE

## 2024-09-23 VITALS
OXYGEN SATURATION: 97 % | DIASTOLIC BLOOD PRESSURE: 72 MMHG | SYSTOLIC BLOOD PRESSURE: 124 MMHG | HEART RATE: 84 BPM | TEMPERATURE: 97.9 F | BODY MASS INDEX: 25.48 KG/M2 | WEIGHT: 172 LBS | HEIGHT: 69 IN

## 2024-09-23 DIAGNOSIS — M85.80 LOW BONE MASS: ICD-10-CM

## 2024-09-23 DIAGNOSIS — Z00.00 MEDICARE ANNUAL WELLNESS VISIT, SUBSEQUENT: Primary | ICD-10-CM

## 2024-09-23 DIAGNOSIS — M15.9 PRIMARY OSTEOARTHRITIS INVOLVING MULTIPLE JOINTS: ICD-10-CM

## 2024-09-23 DIAGNOSIS — Z23 ENCOUNTER FOR IMMUNIZATION: ICD-10-CM

## 2024-09-23 DIAGNOSIS — G89.29 CHRONIC LOW BACK PAIN, UNSPECIFIED BACK PAIN LATERALITY, UNSPECIFIED WHETHER SCIATICA PRESENT: ICD-10-CM

## 2024-09-23 DIAGNOSIS — F51.01 PRIMARY INSOMNIA: ICD-10-CM

## 2024-09-23 DIAGNOSIS — M54.50 CHRONIC LOW BACK PAIN, UNSPECIFIED BACK PAIN LATERALITY, UNSPECIFIED WHETHER SCIATICA PRESENT: ICD-10-CM

## 2024-09-23 PROCEDURE — 99214 OFFICE O/P EST MOD 30 MIN: CPT | Performed by: INTERNAL MEDICINE

## 2024-09-23 PROCEDURE — G0008 ADMIN INFLUENZA VIRUS VAC: HCPCS | Performed by: INTERNAL MEDICINE

## 2024-09-23 PROCEDURE — G8427 DOCREV CUR MEDS BY ELIG CLIN: HCPCS | Performed by: INTERNAL MEDICINE

## 2024-09-23 PROCEDURE — G8419 CALC BMI OUT NRM PARAM NOF/U: HCPCS | Performed by: INTERNAL MEDICINE

## 2024-09-23 PROCEDURE — G0439 PPPS, SUBSEQ VISIT: HCPCS | Performed by: INTERNAL MEDICINE

## 2024-09-23 PROCEDURE — 1123F ACP DISCUSS/DSCN MKR DOCD: CPT | Performed by: INTERNAL MEDICINE

## 2024-09-23 PROCEDURE — 3017F COLORECTAL CA SCREEN DOC REV: CPT | Performed by: INTERNAL MEDICINE

## 2024-09-23 PROCEDURE — 1036F TOBACCO NON-USER: CPT | Performed by: INTERNAL MEDICINE

## 2024-09-23 PROCEDURE — 90653 IIV ADJUVANT VACCINE IM: CPT | Performed by: INTERNAL MEDICINE

## 2024-09-23 RX ORDER — ZOLPIDEM TARTRATE 10 MG/1
10 TABLET ORAL NIGHTLY PRN
Qty: 90 TABLET | Refills: 1 | Status: SHIPPED | OUTPATIENT
Start: 2024-09-23 | End: 2025-03-22

## 2024-09-23 RX ORDER — DULOXETIN HYDROCHLORIDE 20 MG/1
20 CAPSULE, DELAYED RELEASE ORAL DAILY
Qty: 90 CAPSULE | Refills: 3 | Status: SHIPPED | OUTPATIENT
Start: 2024-09-23

## 2024-09-23 RX ORDER — MELOXICAM 15 MG/1
15 TABLET ORAL DAILY
Qty: 90 TABLET | Refills: 3 | Status: SHIPPED | OUTPATIENT
Start: 2024-09-23

## 2024-09-24 ENCOUNTER — TELEPHONE (OUTPATIENT)
Dept: UROLOGY | Age: 74
End: 2024-09-24

## 2024-09-25 PROBLEM — M15.0 PRIMARY OSTEOARTHRITIS INVOLVING MULTIPLE JOINTS: Status: ACTIVE | Noted: 2024-09-25

## 2024-09-25 PROBLEM — M15.9 PRIMARY OSTEOARTHRITIS INVOLVING MULTIPLE JOINTS: Status: ACTIVE | Noted: 2024-09-25

## 2024-12-23 ENCOUNTER — OFFICE VISIT (OUTPATIENT)
Dept: UROLOGY | Age: 74
End: 2024-12-23
Payer: MEDICARE

## 2024-12-23 DIAGNOSIS — N13.8 BPH WITH OBSTRUCTION/LOWER URINARY TRACT SYMPTOMS: Primary | ICD-10-CM

## 2024-12-23 DIAGNOSIS — N40.1 BPH WITH OBSTRUCTION/LOWER URINARY TRACT SYMPTOMS: Primary | ICD-10-CM

## 2024-12-23 DIAGNOSIS — N32.81 OVERACTIVE BLADDER: ICD-10-CM

## 2024-12-23 LAB
BILIRUBIN, URINE, POC: NEGATIVE
BLOOD URINE, POC: NEGATIVE
GLUCOSE URINE, POC: NEGATIVE MG/DL
KETONES, URINE, POC: NEGATIVE MG/DL
LEUKOCYTE ESTERASE, URINE, POC: NEGATIVE
NITRITE, URINE, POC: NEGATIVE
PH, URINE, POC: 7 (ref 4.6–8)
PROTEIN,URINE, POC: NEGATIVE MG/DL
SPECIFIC GRAVITY, URINE, POC: 1.02 (ref 1–1.03)
URINALYSIS CLARITY, POC: NORMAL
URINALYSIS COLOR, POC: NORMAL
UROBILINOGEN, POC: NORMAL MG/DL

## 2024-12-23 PROCEDURE — 99213 OFFICE O/P EST LOW 20 MIN: CPT | Performed by: UROLOGY

## 2024-12-23 PROCEDURE — 1123F ACP DISCUSS/DSCN MKR DOCD: CPT | Performed by: UROLOGY

## 2024-12-23 PROCEDURE — 1159F MED LIST DOCD IN RCRD: CPT | Performed by: UROLOGY

## 2024-12-23 PROCEDURE — G8482 FLU IMMUNIZE ORDER/ADMIN: HCPCS | Performed by: UROLOGY

## 2024-12-23 PROCEDURE — G8419 CALC BMI OUT NRM PARAM NOF/U: HCPCS | Performed by: UROLOGY

## 2024-12-23 PROCEDURE — 81003 URINALYSIS AUTO W/O SCOPE: CPT | Performed by: UROLOGY

## 2024-12-23 PROCEDURE — G8427 DOCREV CUR MEDS BY ELIG CLIN: HCPCS | Performed by: UROLOGY

## 2024-12-23 PROCEDURE — 3017F COLORECTAL CA SCREEN DOC REV: CPT | Performed by: UROLOGY

## 2024-12-23 PROCEDURE — 1036F TOBACCO NON-USER: CPT | Performed by: UROLOGY

## 2024-12-23 ASSESSMENT — ENCOUNTER SYMPTOMS
NAUSEA: 0
BACK PAIN: 0

## 2024-12-23 NOTE — PROGRESS NOTES
[facility]  [address]  [place] [address]  [phone]    Billy Vega  : 1950    Chief Complaint   Patient presents with    Follow-up        HPI     Billy Vega is a 74 y.o. male    hx of LUTS.  [place] to have asymmetric prostate . Hs has moderate hesitancy, takes Flomax. PSA 1.0 in -. No family history of prostate cancer.   PSA 0.9 in -, 0.8 in -.   He has been taking Flomax ., takes Cialis 20 mg prn for mild ED.   He has nocturia x 5, intermittent stream.   He had a valencia when he had back surgery.   Increased Flomax to 0.8 mg. Cysto showed mild BPH,     PVR 2 ml by US.  AUASS is 21 with highest score of 5 for intermittency. QOL is \"unhappy\". Has nocturia x 3.   We discussed medical and surgical options. Has small prostate. His main complaint is nocturia. May have component of OAB. Was started on  Myrbetriq.50 mg . Continues tamsulosin 0.4 mg daily. May be candidate for bipolar button vaporization of prostate or TUIP. .   He thinks that his sxs are better. Nocturia went from 8 to 2 or 3.   Had urinary retention after shoulder surgery in . This resolved.   Back on tamsulosin 0.4 mg bid and Myrbetriq. He is having worsening ED, despite taking Cialis 20 mg. Gave Viagra.   Testosterone 418 in 3-22.   He thought that his testes are asymmetric. Has decreased volume of ejaculate.   May have had mumps orchitis as a teenager.   He will try to decrease or stop the tamsulosin. He is having retrograde ejaculation.   May be interested in Rezum. He will let me know .  PSA 2.0 in . Had back surgery by Dr. Cooper.   PSA 1.9 in 3-23, 1.2 in .  PSA 1.1 in .   Feels his LUTS may be slightly worsening. Not ready for anything surgical.   Currently on tamsulosin and Myrbetriq.   He has rx for Trimix.   Past Medical History:   Diagnosis Date    Arthritis 2015    mostly lumbar area    Back pain     DDD (degenerative disc disease), lumbar

## 2025-01-13 ENCOUNTER — PATIENT MESSAGE (OUTPATIENT)
Dept: INTERNAL MEDICINE CLINIC | Facility: CLINIC | Age: 75
End: 2025-01-13

## 2025-01-13 DIAGNOSIS — R05.2 SUBACUTE COUGH: Primary | ICD-10-CM

## 2025-01-13 RX ORDER — METHYLPREDNISOLONE 4 MG/1
4 TABLET ORAL SEE ADMIN INSTRUCTIONS
Qty: 21 TABLET | Refills: 0 | Status: SHIPPED | OUTPATIENT
Start: 2025-01-13

## 2025-01-13 RX ORDER — HYDROCODONE POLISTIREX AND CHLORPHENIRAMINE POLISTIREX 10; 8 MG/5ML; MG/5ML
5 SUSPENSION, EXTENDED RELEASE ORAL EVERY 12 HOURS PRN
Qty: 115 ML | Refills: 0 | Status: SHIPPED | OUTPATIENT
Start: 2025-01-13 | End: 2025-01-25

## 2025-01-14 NOTE — TELEPHONE ENCOUNTER
Controlled Substance Monitoring:    Acute and Chronic Pain Monitoring:   RX Monitoring Periodic Controlled Substance Monitoring   1/13/2025   7:58 PM No signs of potential drug abuse or diversion identified.       Orders Placed This Encounter    HYDROcodone-chlorpheniramine (TUSSIONEX) 10-8 MG/5ML SUER     Sig: Take 5 mLs by mouth every 12 hours as needed (cough) for up to 12 days. Max Daily Amount: 10 mLs     Dispense:  115 mL     Refill:  0     Reduce doses taken as pain becomes manageable    methylPREDNISolone (MEDROL DOSEPACK) 4 MG tablet     Sig: Take 1 tablet by mouth See Admin Instructions Take by mouth.     Dispense:  21 tablet     Refill:  0       Oohlyhart message sent to the patient.      Ezequiel Woody MD

## 2025-01-30 ENCOUNTER — TELEPHONE (OUTPATIENT)
Dept: ORTHOPEDIC SURGERY | Age: 75
End: 2025-01-30

## 2025-01-30 NOTE — PROGRESS NOTES
Name: Billy Vega  YOB: 1950  Gender: male  MRN: [id number]     CC: Right outer foot pain    HPI:   2227-2257: Treated for anterior calcaneal process arthritis, sinus Tarsi syndrome, insertional peroneal tendinitis.  08/26/2021: Right hindfoot injection  09/14/2021: Reparel sleeve and lace up ankle brace and discussed future potential MRI scan  9428-5926:  He reports seeing Shahrzad for multiple insole modifications  3001-1889:  Right shoulder replacement: Left gluteus medius tendon tear  3547-7395:  Dr. Connelly for new insoles, hindfoot injection, MRI scan at [facility]  10/18/2022: Right outer hindfoot pain.  Sinus Tarsi/subtalar joint injection.  November 2022-January 2023: Severe back pain with right leg weakness: ANKIT: Eventual microscopic laminectomy with Dr. Cooper ... 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  Bilateral lower extremity swelling as well.  08/21/2023: Recurrent outer ankle/hindfoot pain.  Fady Coombs modified his insoles    12/01/2023: Recurrent lateral ankle/hindfoot pain: Fady Coombs modified his insoles before he retired.  03/20/2024: Mr. Amari Peacock: PA: Bilateral knee OA: Injected right knee  03/25/2024: Recurrent: Right outer hindfoot pain  08/27/2024: Right outer ankle/foot pain    02/03/2025: Right outer ankle pain    ROS/Meds/PSH/PMH/FH/SH: reviewed today    Tobacco:  reports that he has never smoked. He has never used smokeless tobacco.     Reviewed Test/Records/Documents:   03/25/2022: Right ankle MRI scan: Ordered by Dr. Connelly: Dr. Thacker radiology impression:  1.  Lower extremity edema with tibiotalar joint effusion  2.  Findings suggestive of sinus Tarsi syndrome.  Correlate  3.  Low-grade plantar fasciitis with reactive marrow edema and inferior calcaneal tuberosity  My review of the MRI scan correlated with sinus Tarsi

## 2025-02-03 ENCOUNTER — OFFICE VISIT (OUTPATIENT)
Dept: ORTHOPEDIC SURGERY | Age: 75
End: 2025-02-03
Payer: MEDICARE

## 2025-02-03 DIAGNOSIS — M25.571 PAIN IN RIGHT ANKLE AND JOINTS OF RIGHT FOOT: Primary | ICD-10-CM

## 2025-02-03 PROCEDURE — 20605 DRAIN/INJ JOINT/BURSA W/O US: CPT | Performed by: ORTHOPAEDIC SURGERY

## 2025-02-03 RX ORDER — METHYLPREDNISOLONE ACETATE 40 MG/ML
40 INJECTION, SUSPENSION INTRA-ARTICULAR; INTRALESIONAL; INTRAMUSCULAR; SOFT TISSUE ONCE
Status: COMPLETED | OUTPATIENT
Start: 2025-02-03 | End: 2025-02-03

## 2025-02-03 RX ADMIN — METHYLPREDNISOLONE ACETATE 80 MG: 40 INJECTION, SUSPENSION INTRA-ARTICULAR; INTRALESIONAL; INTRAMUSCULAR; SOFT TISSUE at 11:12

## 2025-03-03 DIAGNOSIS — K58.1 IRRITABLE BOWEL SYNDROME WITH CONSTIPATION: ICD-10-CM

## 2025-03-03 RX ORDER — IPRATROPIUM BROMIDE 21 UG/1
2 SPRAY, METERED NASAL 2 TIMES DAILY
Qty: 30 ML | Refills: 5 | Status: SHIPPED | OUTPATIENT
Start: 2025-03-03

## 2025-03-27 DIAGNOSIS — F51.01 PRIMARY INSOMNIA: ICD-10-CM

## 2025-03-27 RX ORDER — ZOLPIDEM TARTRATE 10 MG/1
10 TABLET ORAL NIGHTLY PRN
Qty: 90 TABLET | Refills: 1 | Status: SHIPPED | OUTPATIENT
Start: 2025-03-27 | End: 2025-09-23

## 2025-03-31 DIAGNOSIS — G89.29 CHRONIC LOW BACK PAIN, UNSPECIFIED BACK PAIN LATERALITY, UNSPECIFIED WHETHER SCIATICA PRESENT: ICD-10-CM

## 2025-03-31 DIAGNOSIS — M54.50 CHRONIC LOW BACK PAIN, UNSPECIFIED BACK PAIN LATERALITY, UNSPECIFIED WHETHER SCIATICA PRESENT: ICD-10-CM

## 2025-03-31 DIAGNOSIS — M15.0 PRIMARY OSTEOARTHRITIS INVOLVING MULTIPLE JOINTS: ICD-10-CM

## 2025-03-31 RX ORDER — DULOXETIN HYDROCHLORIDE 20 MG/1
20 CAPSULE, DELAYED RELEASE ORAL DAILY
Qty: 90 CAPSULE | Refills: 3 | OUTPATIENT
Start: 2025-03-31

## 2025-04-03 DIAGNOSIS — M15.0 PRIMARY OSTEOARTHRITIS INVOLVING MULTIPLE JOINTS: ICD-10-CM

## 2025-04-03 DIAGNOSIS — G89.29 CHRONIC LOW BACK PAIN, UNSPECIFIED BACK PAIN LATERALITY, UNSPECIFIED WHETHER SCIATICA PRESENT: ICD-10-CM

## 2025-04-03 DIAGNOSIS — M54.50 CHRONIC LOW BACK PAIN, UNSPECIFIED BACK PAIN LATERALITY, UNSPECIFIED WHETHER SCIATICA PRESENT: ICD-10-CM

## 2025-04-03 RX ORDER — DULOXETIN HYDROCHLORIDE 20 MG/1
20 CAPSULE, DELAYED RELEASE ORAL DAILY
Qty: 90 CAPSULE | Refills: 3 | OUTPATIENT
Start: 2025-04-03

## 2025-08-08 DIAGNOSIS — N32.81 OVERACTIVE BLADDER: ICD-10-CM

## 2025-08-08 RX ORDER — MIRABEGRON 50 MG/1
50 TABLET, FILM COATED, EXTENDED RELEASE ORAL DAILY
Qty: 90 TABLET | Refills: 3 | Status: SHIPPED | OUTPATIENT
Start: 2025-08-08

## (undated) DEVICE — YANKAUER,BULB TIP,W/O VENT,RIGID,STERILE: Brand: MEDLINE

## (undated) DEVICE — SUTURE VCRL SZ 2-0 L27IN ABSRB UD L26MM SH 1/2 CIR J417H

## (undated) DEVICE — DRAPE,HAND,STERILE: Brand: MEDLINE

## (undated) DEVICE — 3M™ TEGADERM™ TRANSPARENT FILM DRESSING FRAME STYLE, 1626W, 4 IN X 4-3/4 IN (10 CM X 12 CM), 50/CT 4CT/CASE: Brand: 3M™ TEGADERM™

## (undated) DEVICE — SHEET, DRAPE, SPLIT, STERILE: Brand: MEDLINE

## (undated) DEVICE — (D)PREP SKN CHLRAPRP APPL 26ML -- CONVERT TO ITEM 371833

## (undated) DEVICE — WIRE ORTH SMOOTH SGL SHRP TIP TRCR PLN S STL ST 16MM DIA
Type: IMPLANTABLE DEVICE | Site: SHOULDER | Status: NON-FUNCTIONAL
Removed: 2021-12-03

## (undated) DEVICE — AMD ANTIMICROBIAL NON-ADHERENT PAD,0.2% POLYHEXAMETHYLENE BIGUANIDE HCI (PHMB): Brand: TELFA

## (undated) DEVICE — GOWN,REINF,POLY,ECL,PP SLV,XL: Brand: MEDLINE

## (undated) DEVICE — SUTURE MCRYL SZ 2-0 L27IN ABSRB UD CP-1 1 L36MM 1/2 CIR REV Y266H

## (undated) DEVICE — DRAPE TWL SURG 16X26IN BLU ORB04] ALLCARE INC]

## (undated) DEVICE — SOLUTION IV 1000ML 0.9% SOD CHL

## (undated) DEVICE — REM POLYHESIVE ADULT PATIENT RETURN ELECTRODE: Brand: VALLEYLAB

## (undated) DEVICE — BANDAGE,GAUZE,CONFORMING,2"X75",STRL,LF: Brand: MEDLINE INDUSTRIES, INC.

## (undated) DEVICE — DRAIN SURG PENROSE 0.25X12 IN CLOSED WND DRAINAGE PREM SIL

## (undated) DEVICE — BASIC SINGLE BASIN BTC-LF: Brand: MEDLINE INDUSTRIES, INC.

## (undated) DEVICE — SYR 10ML LUER LOK 1/5ML GRAD --

## (undated) DEVICE — PREP SKN CHLRAPRP APL 26ML STR --

## (undated) DEVICE — INTENDED TO BE USED TO OCCLUDE, RETRACT AND IDENTIFY ARTERIES, VEINS, TENDONS AND NERVES IN SURGICAL PROCEDURES: Brand: STERION®  VESSEL LOOP

## (undated) DEVICE — INTENDED FOR TISSUE SEPARATION, AND OTHER PROCEDURES THAT REQUIRE A SHARP SURGICAL BLADE TO PUNCTURE OR CUT.: Brand: BARD-PARKER ® STAINLESS STEEL BLADES

## (undated) DEVICE — COVER,MAYO STAND,STERILE: Brand: MEDLINE

## (undated) DEVICE — CARDINAL HEALTH FLEXIBLE LIGHT HANDLE COVER: Brand: CARDINAL HEALTH

## (undated) DEVICE — AMD ANTIMICROBIAL GAUZE SPONGES,12 PLY USP TYPE VII, 0.2% POLYHEXAMETHYLENE BIGUANIDE HCI (PHMB): Brand: CURITY

## (undated) DEVICE — NEEDLE HYPO 25GA L1.5IN BLU POLYPR HUB S STL REG BVL STR

## (undated) DEVICE — SLIM BODY SKIN STAPLER: Brand: APPOSE ULC

## (undated) DEVICE — COVER,TABLE,HEAVY DUTY,79"X110",STRL: Brand: MEDLINE

## (undated) DEVICE — SUTURE VCRL RAPIDE SZ 4-0 L18IN ABSRB UD L19MM PS-2 1/2 CIR VR496

## (undated) DEVICE — INSTRUMENT ORTH L6IN LNG S STL SGL IN TOME

## (undated) DEVICE — SUTURE ETHBND EXCEL SZ 2 L27IN NONABSORBABLE GRN WHT MO-7 D7485

## (undated) DEVICE — SYRINGE IRRIG 60ML SFT PLIABLE BLB EZ TO GRP 1 HND USE W/

## (undated) DEVICE — BUTTON SWITCH PENCIL BLADE ELECTRODE, HOLSTER: Brand: EDGE

## (undated) DEVICE — SURGICAL PROCEDURE PACK BASIC ST FRANCIS

## (undated) DEVICE — SLING ARM AD ULT

## (undated) DEVICE — BANDAGE COMPR SELF ADH 5 YDX4 IN TAN STRL PREMIERPRO LF

## (undated) DEVICE — DRAPE,SHOULDER,BEACH CHAIR,STERILE: Brand: MEDLINE

## (undated) DEVICE — SUTURE VCRL SZ 0 L27IN ABSRB UD L36MM CP-1 1/2 CIR REV CUT J267H

## (undated) DEVICE — OSCILLATING SAW BLDE 13X1.4X70MM

## (undated) DEVICE — TRAY PREP DRY W/ PREM GLV 2 APPL 6 SPNG 2 UNDPD 1 OVERWRAP

## (undated) DEVICE — NEEDLE HYPO 25GA L5/8IN ORNG HUB S STL LATCH BVL UP

## (undated) DEVICE — DRAPE, FILM SHEET, 44X65 STERILE: Brand: MEDLINE

## (undated) DEVICE — SUTURE VCRL SZ 3-0 L18IN ABSRB UD L26MM SH 1/2 CIR J864D

## (undated) DEVICE — SUTURE VCRL SZ 3-0 L27IN ABSRB UD L26MM SH 1/2 CIR J416H

## (undated) DEVICE — ZIMMER® STERILE DISPOSABLE TOURNIQUET CUFF WITH PLC, DUAL PORT, SINGLE BLADDER, 18 IN. (46 CM)

## (undated) DEVICE — 3M™ IOBAN™ 2 ANTIMICROBIAL INCISE DRAPE 6650EZ: Brand: IOBAN™ 2

## (undated) DEVICE — BNDG ELAS ESMARK 4INX12FT LF -- STRL

## (undated) DEVICE — STOCKINETTE TUBE 9X48 -- MEDICHOICE

## (undated) DEVICE — DERMABOND SKIN ADH 0.7ML -- DERMABOND ADVANCED 12/BX

## (undated) DEVICE — DRAPE,TOP,102X53,STERILE: Brand: MEDLINE

## (undated) DEVICE — NEEDLE HYPO 21GA L1.5IN INTRAMUSCULAR S STL LATCH BVL UP

## (undated) DEVICE — KENDALL SCD EXPRESS SLEEVES, KNEE LENGTH, MEDIUM: Brand: KENDALL SCD

## (undated) DEVICE — SUT SLK 2-0SH 30IN BLK --

## (undated) DEVICE — SOLUTION IRRIG 1000ML H2O STRL BLT

## (undated) DEVICE — SUTURE PROL SZ 2-0 L36IN NONABSORBABLE BLU SH L26MM 1/2 CIR 8523H

## (undated) DEVICE — ADHESIVE SKIN CLOSURE HI VISC MIC 0.5 CC PREMIERPRO EXOFIN

## (undated) DEVICE — BLADE ASSEMB CLP HAIR FINE --

## (undated) DEVICE — SPONGE LAP 18X18IN STRL -- 5/PK

## (undated) DEVICE — DRAIN WND PENRS RADPQ 0.25X12 --